# Patient Record
Sex: FEMALE | Race: BLACK OR AFRICAN AMERICAN | Employment: OTHER | URBAN - METROPOLITAN AREA
[De-identification: names, ages, dates, MRNs, and addresses within clinical notes are randomized per-mention and may not be internally consistent; named-entity substitution may affect disease eponyms.]

---

## 2016-07-01 LAB — DIABETIC RETINOPATHY: NEGATIVE

## 2017-01-18 ENCOUNTER — OFFICE VISIT (OUTPATIENT)
Dept: INTERNAL MEDICINE CLINIC | Age: 67
End: 2017-01-18

## 2017-01-18 VITALS
WEIGHT: 178 LBS | SYSTOLIC BLOOD PRESSURE: 136 MMHG | HEIGHT: 59 IN | BODY MASS INDEX: 35.88 KG/M2 | HEART RATE: 82 BPM | DIASTOLIC BLOOD PRESSURE: 70 MMHG

## 2017-01-18 DIAGNOSIS — R06.09 DOE (DYSPNEA ON EXERTION): ICD-10-CM

## 2017-01-18 DIAGNOSIS — E66.9 OBESITY, CLASS I, BMI 30-34.9: ICD-10-CM

## 2017-01-18 DIAGNOSIS — F17.200 TOBACCO DEPENDENCE: ICD-10-CM

## 2017-01-18 DIAGNOSIS — F33.9 MAJOR DEPRESSIVE DISORDER, RECURRENT EPISODE WITH ANXIOUS DISTRESS (HCC): ICD-10-CM

## 2017-01-18 DIAGNOSIS — E11.9 TYPE 2 DIABETES MELLITUS WITHOUT COMPLICATION, WITH LONG-TERM CURRENT USE OF INSULIN (HCC): Primary | ICD-10-CM

## 2017-01-18 DIAGNOSIS — Z79.4 TYPE 2 DIABETES MELLITUS WITHOUT COMPLICATION, WITH LONG-TERM CURRENT USE OF INSULIN (HCC): Primary | ICD-10-CM

## 2017-01-18 DIAGNOSIS — K59.00 CONSTIPATION, UNSPECIFIED CONSTIPATION TYPE: ICD-10-CM

## 2017-01-18 DIAGNOSIS — E78.2 MIXED HYPERLIPIDEMIA: ICD-10-CM

## 2017-01-18 PROCEDURE — 99213 OFFICE O/P EST LOW 20 MIN: CPT | Performed by: INTERNAL MEDICINE

## 2017-01-18 PROCEDURE — 83036 HEMOGLOBIN GLYCOSYLATED A1C: CPT | Performed by: INTERNAL MEDICINE

## 2017-01-18 RX ORDER — AMOXICILLIN 250 MG
1 CAPSULE ORAL NIGHTLY
Qty: 60 TABLET | Refills: 5 | Status: SHIPPED | OUTPATIENT
Start: 2017-01-18 | End: 2017-08-04 | Stop reason: SDUPTHER

## 2017-01-18 ASSESSMENT — ENCOUNTER SYMPTOMS
WHEEZING: 0
SORE THROAT: 0
SHORTNESS OF BREATH: 0
DIARRHEA: 0
ABDOMINAL PAIN: 0
BACK PAIN: 0
COUGH: 0
EYE PAIN: 0
CONSTIPATION: 0

## 2017-03-06 DIAGNOSIS — Z79.4 TYPE 2 DIABETES MELLITUS WITHOUT COMPLICATION, WITH LONG-TERM CURRENT USE OF INSULIN (HCC): ICD-10-CM

## 2017-03-06 DIAGNOSIS — E11.9 DIABETES MELLITUS WITHOUT COMPLICATION (HCC): ICD-10-CM

## 2017-03-06 DIAGNOSIS — E78.2 MIXED HYPERLIPIDEMIA: ICD-10-CM

## 2017-03-06 DIAGNOSIS — E11.9 TYPE 2 DIABETES MELLITUS WITHOUT COMPLICATION, WITH LONG-TERM CURRENT USE OF INSULIN (HCC): ICD-10-CM

## 2017-03-06 RX ORDER — PRAVASTATIN SODIUM 40 MG
40 TABLET ORAL DAILY
Qty: 30 TABLET | Refills: 5 | Status: SHIPPED | OUTPATIENT
Start: 2017-03-06 | End: 2017-11-06 | Stop reason: SDUPTHER

## 2017-05-01 ENCOUNTER — TELEPHONE (OUTPATIENT)
Dept: INTERNAL MEDICINE CLINIC | Age: 67
End: 2017-05-01

## 2017-05-03 ENCOUNTER — TELEPHONE (OUTPATIENT)
Dept: INTERNAL MEDICINE CLINIC | Age: 67
End: 2017-05-03

## 2017-05-04 ENCOUNTER — OFFICE VISIT (OUTPATIENT)
Dept: INTERNAL MEDICINE CLINIC | Age: 67
End: 2017-05-04

## 2017-05-04 VITALS
DIASTOLIC BLOOD PRESSURE: 62 MMHG | BODY MASS INDEX: 36.23 KG/M2 | SYSTOLIC BLOOD PRESSURE: 120 MMHG | HEART RATE: 73 BPM | WEIGHT: 179.4 LBS

## 2017-05-04 DIAGNOSIS — F33.41 RECURRENT MAJOR DEPRESSIVE DISORDER, IN PARTIAL REMISSION (HCC): ICD-10-CM

## 2017-05-04 DIAGNOSIS — Z12.11 COLON CANCER SCREENING: ICD-10-CM

## 2017-05-04 DIAGNOSIS — F17.200 TOBACCO DEPENDENCE: ICD-10-CM

## 2017-05-04 DIAGNOSIS — Z79.4 TYPE 2 DIABETES MELLITUS WITHOUT COMPLICATION, WITH LONG-TERM CURRENT USE OF INSULIN (HCC): Primary | ICD-10-CM

## 2017-05-04 DIAGNOSIS — E66.9 OBESITY, CLASS I, BMI 30-34.9: ICD-10-CM

## 2017-05-04 DIAGNOSIS — Z78.0 POSTMENOPAUSAL: ICD-10-CM

## 2017-05-04 DIAGNOSIS — E78.2 MIXED HYPERLIPIDEMIA: ICD-10-CM

## 2017-05-04 DIAGNOSIS — E11.9 TYPE 2 DIABETES MELLITUS WITHOUT COMPLICATION, WITH LONG-TERM CURRENT USE OF INSULIN (HCC): Primary | ICD-10-CM

## 2017-05-04 PROCEDURE — 99213 OFFICE O/P EST LOW 20 MIN: CPT | Performed by: INTERNAL MEDICINE

## 2017-05-04 PROCEDURE — 83036 HEMOGLOBIN GLYCOSYLATED A1C: CPT | Performed by: INTERNAL MEDICINE

## 2017-05-04 RX ORDER — BUPROPION HYDROCHLORIDE 150 MG/1
150 TABLET, EXTENDED RELEASE ORAL 2 TIMES DAILY
Qty: 60 TABLET | Refills: 5 | Status: SHIPPED | OUTPATIENT
Start: 2017-05-04 | End: 2017-11-06 | Stop reason: SDUPTHER

## 2017-05-04 ASSESSMENT — ENCOUNTER SYMPTOMS
SHORTNESS OF BREATH: 0
CONSTIPATION: 0
SORE THROAT: 0
DIARRHEA: 0
COUGH: 0
BACK PAIN: 0
ABDOMINAL PAIN: 0
EYE PAIN: 0
WHEEZING: 0

## 2017-05-22 ENCOUNTER — HOSPITAL ENCOUNTER (OUTPATIENT)
Dept: WOMENS IMAGING | Age: 67
Discharge: HOME | End: 2017-05-23
Attending: INTERNAL MEDICINE | Admitting: INTERNAL MEDICINE

## 2017-05-22 DIAGNOSIS — Z78.0 POSTMENOPAUSAL: ICD-10-CM

## 2017-08-03 ENCOUNTER — TELEPHONE (OUTPATIENT)
Dept: INTERNAL MEDICINE CLINIC | Age: 67
End: 2017-08-03

## 2017-08-04 ENCOUNTER — OFFICE VISIT (OUTPATIENT)
Dept: INTERNAL MEDICINE CLINIC | Age: 67
End: 2017-08-04

## 2017-08-04 VITALS
BODY MASS INDEX: 35.99 KG/M2 | SYSTOLIC BLOOD PRESSURE: 120 MMHG | WEIGHT: 178.2 LBS | DIASTOLIC BLOOD PRESSURE: 70 MMHG | HEART RATE: 69 BPM

## 2017-08-04 DIAGNOSIS — E78.2 MIXED HYPERLIPIDEMIA: ICD-10-CM

## 2017-08-04 DIAGNOSIS — K59.00 CONSTIPATION, UNSPECIFIED CONSTIPATION TYPE: ICD-10-CM

## 2017-08-04 DIAGNOSIS — F17.200 TOBACCO DEPENDENCE: ICD-10-CM

## 2017-08-04 DIAGNOSIS — Z12.11 COLON CANCER SCREENING: ICD-10-CM

## 2017-08-04 DIAGNOSIS — E66.9 OBESITY, CLASS I, BMI 30-34.9: ICD-10-CM

## 2017-08-04 DIAGNOSIS — E11.9 TYPE 2 DIABETES MELLITUS WITHOUT COMPLICATION, WITH LONG-TERM CURRENT USE OF INSULIN (HCC): Primary | ICD-10-CM

## 2017-08-04 DIAGNOSIS — Z79.4 TYPE 2 DIABETES MELLITUS WITHOUT COMPLICATION, WITH LONG-TERM CURRENT USE OF INSULIN (HCC): Primary | ICD-10-CM

## 2017-08-04 LAB — HBA1C MFR BLD: 6.7 %

## 2017-08-04 PROCEDURE — 99214 OFFICE O/P EST MOD 30 MIN: CPT | Performed by: INTERNAL MEDICINE

## 2017-08-04 PROCEDURE — 83036 HEMOGLOBIN GLYCOSYLATED A1C: CPT | Performed by: INTERNAL MEDICINE

## 2017-08-04 RX ORDER — GLUCOSAMINE HCL/CHONDROITIN SU 500-400 MG
CAPSULE ORAL
Qty: 100 STRIP | Refills: 5 | Status: SHIPPED | OUTPATIENT
Start: 2017-08-04 | End: 2019-01-22 | Stop reason: SDUPTHER

## 2017-08-04 RX ORDER — AMOXICILLIN 250 MG
1 CAPSULE ORAL NIGHTLY
Qty: 60 TABLET | Refills: 5 | Status: SHIPPED | OUTPATIENT
Start: 2017-08-04 | End: 2019-05-21 | Stop reason: SDUPTHER

## 2017-08-04 ASSESSMENT — ENCOUNTER SYMPTOMS
COUGH: 0
BACK PAIN: 0
SHORTNESS OF BREATH: 0
WHEEZING: 0
CONSTIPATION: 1
ABDOMINAL PAIN: 0
EYE PAIN: 0
SORE THROAT: 0
DIARRHEA: 0

## 2017-11-02 ENCOUNTER — TELEPHONE (OUTPATIENT)
Dept: INTERNAL MEDICINE CLINIC | Age: 67
End: 2017-11-02

## 2017-11-02 DIAGNOSIS — Z12.11 COLON CANCER SCREENING: ICD-10-CM

## 2017-11-02 LAB
CONTROL: POSITIVE
HEMOCCULT STL QL: NEGATIVE

## 2017-11-02 PROCEDURE — 82274 ASSAY TEST FOR BLOOD FECAL: CPT | Performed by: INTERNAL MEDICINE

## 2017-11-03 ENCOUNTER — TELEPHONE (OUTPATIENT)
Dept: INTERNAL MEDICINE CLINIC | Age: 67
End: 2017-11-03

## 2017-11-06 ENCOUNTER — OFFICE VISIT (OUTPATIENT)
Dept: INTERNAL MEDICINE CLINIC | Age: 67
End: 2017-11-06

## 2017-11-06 VITALS
HEART RATE: 69 BPM | BODY MASS INDEX: 36.96 KG/M2 | WEIGHT: 183 LBS | RESPIRATION RATE: 14 BRPM | DIASTOLIC BLOOD PRESSURE: 79 MMHG | SYSTOLIC BLOOD PRESSURE: 129 MMHG

## 2017-11-06 DIAGNOSIS — F33.41 RECURRENT MAJOR DEPRESSIVE DISORDER, IN PARTIAL REMISSION (HCC): ICD-10-CM

## 2017-11-06 DIAGNOSIS — E66.9 OBESITY, CLASS I, BMI 30-34.9: ICD-10-CM

## 2017-11-06 DIAGNOSIS — Z79.4 TYPE 2 DIABETES MELLITUS WITHOUT COMPLICATION, WITH LONG-TERM CURRENT USE OF INSULIN (HCC): Primary | ICD-10-CM

## 2017-11-06 DIAGNOSIS — E78.2 MIXED HYPERLIPIDEMIA: ICD-10-CM

## 2017-11-06 DIAGNOSIS — F17.200 TOBACCO DEPENDENCE: ICD-10-CM

## 2017-11-06 DIAGNOSIS — Z23 NEED FOR PNEUMOCOCCAL VACCINATION: ICD-10-CM

## 2017-11-06 DIAGNOSIS — E11.9 TYPE 2 DIABETES MELLITUS WITHOUT COMPLICATION, WITH LONG-TERM CURRENT USE OF INSULIN (HCC): Primary | ICD-10-CM

## 2017-11-06 LAB
CREATININE URINE: 30.5 MG/DL (ref 28–259)
HBA1C MFR BLD: 7 %
MICROALBUMIN UR-MCNC: <1.2 MG/DL
MICROALBUMIN/CREAT UR-RTO: NORMAL MG/G (ref 0–30)

## 2017-11-06 PROCEDURE — 90670 PCV13 VACCINE IM: CPT | Performed by: INTERNAL MEDICINE

## 2017-11-06 PROCEDURE — 83036 HEMOGLOBIN GLYCOSYLATED A1C: CPT | Performed by: INTERNAL MEDICINE

## 2017-11-06 PROCEDURE — 99213 OFFICE O/P EST LOW 20 MIN: CPT | Performed by: INTERNAL MEDICINE

## 2017-11-06 PROCEDURE — 90471 IMMUNIZATION ADMIN: CPT | Performed by: INTERNAL MEDICINE

## 2017-11-06 RX ORDER — PRAVASTATIN SODIUM 40 MG
40 TABLET ORAL DAILY
Qty: 30 TABLET | Refills: 5 | Status: SHIPPED | OUTPATIENT
Start: 2017-11-06 | End: 2018-05-09 | Stop reason: SDUPTHER

## 2017-11-06 RX ORDER — BUPROPION HYDROCHLORIDE 150 MG/1
150 TABLET, EXTENDED RELEASE ORAL 2 TIMES DAILY
Qty: 60 TABLET | Refills: 5 | Status: SHIPPED | OUTPATIENT
Start: 2017-11-06 | End: 2019-01-22 | Stop reason: SDUPTHER

## 2017-11-06 ASSESSMENT — ENCOUNTER SYMPTOMS
ABDOMINAL PAIN: 0
COUGH: 0
CONSTIPATION: 0
WHEEZING: 0
BACK PAIN: 0
EYE PAIN: 0
SORE THROAT: 0
SHORTNESS OF BREATH: 0
DIARRHEA: 0

## 2017-11-06 NOTE — PROGRESS NOTES
 insulin detemir (LEVEMIR FLEXTOUCH) 100 UNIT/ML injection pen Inject 15 Units into the skin nightly 5 Pen 3    SITagliptin (JANUVIA) 50 MG tablet Take 1 tablet by mouth daily 30 tablet 5    pravastatin (PRAVACHOL) 40 MG tablet Take 1 tablet by mouth daily 30 tablet 5    buPROPion (WELLBUTRIN SR) 150 MG extended release tablet Take 1 tablet by mouth 2 times daily 60 tablet 5    Insulin Pen Needle 30G X 8 MM MISC 1 each by Does not apply route daily 100 each 3    Glucose Blood (BLOOD GLUCOSE TEST STRIPS) STRP TIDAC and  strip 5    senna-docusate (PERICOLACE) 8.6-50 MG per tablet Take 1 tablet by mouth nightly 60 tablet 5    Lancets MISC TIDAC &  each 3     No current facility-administered medications for this visit. Objective:  /79   Pulse 69   Resp 14   Wt 183 lb (83 kg)   BMI 36.96 kg/m²   BP Readings from Last 3 Encounters:   11/06/17 129/79   08/04/17 120/70   05/04/17 120/62     Wt Readings from Last 3 Encounters:   11/06/17 183 lb (83 kg)   08/04/17 178 lb 3.2 oz (80.8 kg)   05/04/17 179 lb 6.4 oz (81.4 kg)         Physical Exam   Constitutional: She is oriented to person, place, and time. She appears well-developed and well-nourished. No distress. HENT:   Head: Normocephalic and atraumatic. Eyes: Conjunctivae are normal. No scleral icterus. Neck: Normal range of motion. Neck supple. Cardiovascular: Normal rate and regular rhythm. Pulmonary/Chest: Effort normal and breath sounds normal. No respiratory distress. She has no wheezes. Abdominal: Soft. Bowel sounds are normal. She exhibits no distension. There is no tenderness. Neurological: She is alert and oriented to person, place, and time. Psychiatric: She has a normal mood and affect.  Judgment normal.       Lab Results   Component Value Date    WBC 8.6 11/07/2016    HGB 14.7 11/07/2016    HCT 44.0 11/07/2016    MCV 85.8 11/07/2016     11/07/2016     Lab Results   Component Value Date     11/07/2016    K 4.4 11/07/2016     11/07/2016    CO2 23 11/07/2016    BUN 13 11/07/2016    CREATININE 0.5 (L) 11/07/2016    GLUCOSE 176 05/26/2016    CALCIUM 9.5 11/07/2016    PROT 7.1 11/07/2016    LABALBU 4.2 11/07/2016    BILITOT 0.3 11/07/2016    ALKPHOS 90 11/07/2016    AST 14 (L) 11/07/2016    ALT 12 11/07/2016    LABGLOM >60 11/07/2016    GFRAA >60 11/07/2016    AGRATIO 1.3 05/19/2016    GLOB 2.8 05/19/2016     Lab Results   Component Value Date    CHOL 273 (H) 05/19/2016    CHOL 245 (H) 05/19/2014    CHOL 242 (H) 11/18/2013     Lab Results   Component Value Date    TRIG 101 05/19/2016    TRIG 87 05/19/2014    TRIG 104 11/18/2013     Lab Results   Component Value Date    HDL 60 05/19/2016    HDL 59 (L) 05/19/2014    HDL 58 (L) 11/18/2013     Lab Results   Component Value Date    LDLCALC 193 (H) 05/19/2016    LDLCALC 169 (H) 05/19/2014    LDLCALC 163 (H) 11/18/2013     Lab Results   Component Value Date    LABA1C 7.0 11/06/2017     Lab Results   Component Value Date    TSH 1.99 05/19/2016    TSHHS 1.820 11/07/2016         ASSESSMENT:      1. Type 2 diabetes mellitus without complication, with long-term current use of insulin ([facility] [address]    2. Need for pneumococcal vaccination    3. Tobacco dependence    4. Mixed hyperlipidemia    5. Recurrent major depressive disorder, in partial remission (HCC)    6. Obesity, Class I, BMI 30-34.9        PLAN:  1. A1c 7.0 today. Acceptable. Continue current regimen. 2.  Medications reviewed and reconciled. She is compliant and tolerating the medications without any side effects. Necessary refills provided. 3.  Continue to encourage smoking cessation. 4.  Continue to encourage weight loss and lifestyle modification efforts. 5.  Prevnar 13 today.     Orders Placed This Encounter   Medications    insulin detemir (LEVEMIR FLEXTOUCH) 100 UNIT/ML injection pen     Sig: Inject 15 Units into the skin nightly     Dispense:  5 Pen     Refill:  3    SITagliptin (JANUVIA) 50 MG

## 2018-02-06 ENCOUNTER — OFFICE VISIT (OUTPATIENT)
Dept: INTERNAL MEDICINE CLINIC | Age: 68
End: 2018-02-06

## 2018-02-06 VITALS
HEIGHT: 59 IN | DIASTOLIC BLOOD PRESSURE: 70 MMHG | SYSTOLIC BLOOD PRESSURE: 118 MMHG | OXYGEN SATURATION: 97 % | RESPIRATION RATE: 14 BRPM | WEIGHT: 179 LBS | HEART RATE: 66 BPM | BODY MASS INDEX: 36.08 KG/M2

## 2018-02-06 DIAGNOSIS — E11.9 TYPE 2 DIABETES MELLITUS WITHOUT COMPLICATION, WITH LONG-TERM CURRENT USE OF INSULIN (HCC): ICD-10-CM

## 2018-02-06 DIAGNOSIS — E78.2 MIXED HYPERLIPIDEMIA: ICD-10-CM

## 2018-02-06 DIAGNOSIS — F33.41 RECURRENT MAJOR DEPRESSIVE DISORDER, IN PARTIAL REMISSION (HCC): ICD-10-CM

## 2018-02-06 DIAGNOSIS — R05.9 COUGH: Primary | ICD-10-CM

## 2018-02-06 DIAGNOSIS — Z79.4 TYPE 2 DIABETES MELLITUS WITHOUT COMPLICATION, WITH LONG-TERM CURRENT USE OF INSULIN (HCC): ICD-10-CM

## 2018-02-06 LAB — HBA1C MFR BLD: 7.4 %

## 2018-02-06 PROCEDURE — 99214 OFFICE O/P EST MOD 30 MIN: CPT | Performed by: FAMILY MEDICINE

## 2018-02-06 PROCEDURE — 83036 HEMOGLOBIN GLYCOSYLATED A1C: CPT | Performed by: FAMILY MEDICINE

## 2018-02-06 RX ORDER — PREDNISONE 20 MG/1
20 TABLET ORAL 2 TIMES DAILY
Qty: 10 TABLET | Refills: 0 | Status: SHIPPED | OUTPATIENT
Start: 2018-02-06 | End: 2018-02-11

## 2018-02-06 RX ORDER — AMOXICILLIN AND CLAVULANATE POTASSIUM 500; 125 MG/1; MG/1
1 TABLET, FILM COATED ORAL 2 TIMES DAILY
Qty: 14 TABLET | Refills: 0 | Status: SHIPPED | OUTPATIENT
Start: 2018-02-06 | End: 2018-02-13

## 2018-02-06 ASSESSMENT — ENCOUNTER SYMPTOMS
SINUS PRESSURE: 0
SHORTNESS OF BREATH: 1
SORE THROAT: 0
WHEEZING: 1
VOMITING: 0
BLOOD IN STOOL: 0
DIARRHEA: 0
EYE DISCHARGE: 0
BACK PAIN: 0
COUGH: 1
NAUSEA: 0

## 2018-02-06 ASSESSMENT — PATIENT HEALTH QUESTIONNAIRE - PHQ9
1. LITTLE INTEREST OR PLEASURE IN DOING THINGS: 0
2. FEELING DOWN, DEPRESSED OR HOPELESS: 0
SUM OF ALL RESPONSES TO PHQ9 QUESTIONS 1 & 2: 0
SUM OF ALL RESPONSES TO PHQ QUESTIONS 1-9: 0

## 2018-02-06 NOTE — PATIENT INSTRUCTIONS
affects blood flow and can make foot problems worse. If you need help quitting, talk to your doctor about stop-smoking programs and medicines. These can increase your chances of quitting for good. · Eat a diet that is low in fats. High fat intake can cause fat to build up in your blood vessels and decrease blood flow. · Inspect your feet daily for blisters, cuts, cracks, or sores. If you cannot see well, use a mirror or have someone help you. · Take care of your feet:  [facility] your feet every day. Use warm (not hot) water. Check the water temperature with your wrists or other part of your body, not your feet. ¨ Dry your feet well. Pat them dry. Do not rub the skin on your feet too hard. Dry well between your toes. If the skin on your feet stays moist, bacteria or a fungus can grow, which can lead to infection. ¨ Keep your skin soft. Use moisturizing skin cream to keep the skin on your feet soft and prevent calluses and cracks. But do not put the cream between your toes, and stop using any cream that causes a rash. ¨ Clean underneath your toenails carefully. Do not use a sharp object to clean underneath your toenails. Use the blunt end of a nail file or other rounded tool. ¨ Trim and file your toenails straight across to prevent ingrown toenails. Use a nail clipper, not scissors. Use an emery board to smooth the edges. · Change socks daily. Socks without seams are best, because seams often rub the feet. You can find socks for people with diabetes from specialty catalogs. · Look inside your shoes every day for things like gravel or torn linings, which could cause blisters or sores. · Buy shoes that fit well:  ¨ Look for shoes that have plenty of space around the toes. This helps prevent bunions and blisters. ¨ Try on shoes while wearing the kind of socks you will usually wear with the shoes. ¨ Avoid plastic shoes. They may rub your feet and cause blisters.  Good shoes should be made of materials that are 2017  Content Version: 11.5  © 5966-3680 [employer], [employer]. Care instructions adapted under license by [facility] ([facility]). If you have questions about a medical condition or this instruction, always ask your healthcare professional. [address] any warranty or liability for your use of this information. Patient Education        Learning About Benefits From Quitting Smoking  How does quitting smoking make you healthier? If you're thinking about quitting smoking, you may have a few reasons to be smoke-free. Your health may be one of them. · When you quit smoking, you lower your risks for cancer, lung disease, heart attack, stroke, blood vessel disease, and blindness from macular degeneration. · When you're smoke-free, you get sick less often, and you heal faster. You are less likely to get colds, flu, bronchitis, and pneumonia. · As a nonsmoker, you may find that your mood is better and you are less stressed. When and how will you feel healthier? Quitting has real health benefits that start from day 1 of being smoke-free. And the longer you stay smoke-free, the healthier you get and the better you feel. The first hours  · After just 20 minutes, your blood pressure and heart rate go down. That means there's less stress on your heart and blood vessels. · Within 12 hours, the level of carbon monoxide in your blood drops back to normal. That makes room for more oxygen. With more oxygen in your body, you may notice that you have more energy than when you smoked. After 2 weeks  · Your lungs start to work better. · Your risk of heart attack starts to drop. After 1 month  · When your lungs are clear, you cough less and breathe deeper, so it's easier to be active. · Your sense of taste and smell return. That means you can enjoy food more than you have since you started smoking.   Over the years  · After 1 year, your risk of heart disease is half what it would be if you kept

## 2018-05-07 ENCOUNTER — HOSPITAL ENCOUNTER (OUTPATIENT)
Dept: GENERAL RADIOLOGY | Age: 68
Discharge: HOME | End: 2018-05-07
Attending: FAMILY MEDICINE | Admitting: FAMILY MEDICINE

## 2018-05-07 LAB
ESTIMATED AVERAGE GLUCOSE: 177 MG/DL
HBA1C MFR BLD: 7.8 % (ref 4.2–6.3)

## 2018-05-09 ENCOUNTER — OFFICE VISIT (OUTPATIENT)
Dept: INTERNAL MEDICINE CLINIC | Age: 68
End: 2018-05-09

## 2018-05-09 VITALS
DIASTOLIC BLOOD PRESSURE: 54 MMHG | WEIGHT: 183 LBS | HEART RATE: 97 BPM | SYSTOLIC BLOOD PRESSURE: 104 MMHG | OXYGEN SATURATION: 97 % | RESPIRATION RATE: 14 BRPM | BODY MASS INDEX: 36.96 KG/M2

## 2018-05-09 DIAGNOSIS — E11.9 TYPE 2 DIABETES MELLITUS WITHOUT COMPLICATION, WITH LONG-TERM CURRENT USE OF INSULIN (HCC): Primary | ICD-10-CM

## 2018-05-09 DIAGNOSIS — F33.41 RECURRENT MAJOR DEPRESSIVE DISORDER, IN PARTIAL REMISSION (HCC): ICD-10-CM

## 2018-05-09 DIAGNOSIS — Z79.4 TYPE 2 DIABETES MELLITUS WITHOUT COMPLICATION, WITH LONG-TERM CURRENT USE OF INSULIN (HCC): Primary | ICD-10-CM

## 2018-05-09 DIAGNOSIS — E78.2 MIXED HYPERLIPIDEMIA: ICD-10-CM

## 2018-05-09 PROCEDURE — 99214 OFFICE O/P EST MOD 30 MIN: CPT | Performed by: FAMILY MEDICINE

## 2018-05-09 RX ORDER — PRAVASTATIN SODIUM 40 MG
40 TABLET ORAL DAILY
Qty: 90 TABLET | Refills: 1 | Status: SHIPPED | OUTPATIENT
Start: 2018-05-09 | End: 2019-01-22 | Stop reason: SDUPTHER

## 2018-05-09 ASSESSMENT — ENCOUNTER SYMPTOMS
EYE DISCHARGE: 0
COUGH: 0
SORE THROAT: 0
DIARRHEA: 0
BLOOD IN STOOL: 0
NAUSEA: 0
SHORTNESS OF BREATH: 0
BACK PAIN: 0
VOMITING: 0
SINUS PRESSURE: 0

## 2018-05-15 ENCOUNTER — TELEPHONE (OUTPATIENT)
Dept: INTERNAL MEDICINE CLINIC | Age: 68
End: 2018-05-15

## 2018-05-16 RX ORDER — METHOCARBAMOL 500 MG/1
500 TABLET, FILM COATED ORAL 4 TIMES DAILY
Qty: 40 TABLET | Refills: 1 | Status: SHIPPED | OUTPATIENT
Start: 2018-05-16 | End: 2018-05-26

## 2019-01-22 ENCOUNTER — OFFICE VISIT (OUTPATIENT)
Dept: FAMILY MEDICINE CLINIC | Age: 69
End: 2019-01-22
Payer: COMMERCIAL

## 2019-01-22 VITALS
HEART RATE: 67 BPM | SYSTOLIC BLOOD PRESSURE: 128 MMHG | DIASTOLIC BLOOD PRESSURE: 66 MMHG | WEIGHT: 167.2 LBS | OXYGEN SATURATION: 97 % | BODY MASS INDEX: 30.77 KG/M2 | HEIGHT: 62 IN

## 2019-01-22 DIAGNOSIS — F17.200 TOBACCO DEPENDENCE: ICD-10-CM

## 2019-01-22 DIAGNOSIS — E78.2 MIXED HYPERLIPIDEMIA: ICD-10-CM

## 2019-01-22 DIAGNOSIS — F41.9 ANXIETY: Primary | ICD-10-CM

## 2019-01-22 DIAGNOSIS — E11.9 TYPE 2 DIABETES MELLITUS WITHOUT COMPLICATION, UNSPECIFIED WHETHER LONG TERM INSULIN USE (HCC): ICD-10-CM

## 2019-01-22 LAB
GLUCOSE BLD-MCNC: NORMAL MG/DL
HBA1C MFR BLD: 14 %

## 2019-01-22 PROCEDURE — 1123F ACP DISCUSS/DSCN MKR DOCD: CPT | Performed by: NURSE PRACTITIONER

## 2019-01-22 PROCEDURE — 99213 OFFICE O/P EST LOW 20 MIN: CPT | Performed by: NURSE PRACTITIONER

## 2019-01-22 PROCEDURE — 3046F HEMOGLOBIN A1C LEVEL >9.0%: CPT | Performed by: NURSE PRACTITIONER

## 2019-01-22 PROCEDURE — G8427 DOCREV CUR MEDS BY ELIG CLIN: HCPCS | Performed by: NURSE PRACTITIONER

## 2019-01-22 PROCEDURE — 1090F PRES/ABSN URINE INCON ASSESS: CPT | Performed by: NURSE PRACTITIONER

## 2019-01-22 PROCEDURE — 1101F PT FALLS ASSESS-DOCD LE1/YR: CPT | Performed by: NURSE PRACTITIONER

## 2019-01-22 PROCEDURE — G8417 CALC BMI ABV UP PARAM F/U: HCPCS | Performed by: NURSE PRACTITIONER

## 2019-01-22 PROCEDURE — 3017F COLORECTAL CA SCREEN DOC REV: CPT | Performed by: NURSE PRACTITIONER

## 2019-01-22 PROCEDURE — 82962 GLUCOSE BLOOD TEST: CPT | Performed by: NURSE PRACTITIONER

## 2019-01-22 PROCEDURE — G8399 PT W/DXA RESULTS DOCUMENT: HCPCS | Performed by: NURSE PRACTITIONER

## 2019-01-22 PROCEDURE — 4004F PT TOBACCO SCREEN RCVD TLK: CPT | Performed by: NURSE PRACTITIONER

## 2019-01-22 PROCEDURE — 83036 HEMOGLOBIN GLYCOSYLATED A1C: CPT | Performed by: NURSE PRACTITIONER

## 2019-01-22 PROCEDURE — 2022F DILAT RTA XM EVC RTNOPTHY: CPT | Performed by: NURSE PRACTITIONER

## 2019-01-22 PROCEDURE — G8484 FLU IMMUNIZE NO ADMIN: HCPCS | Performed by: NURSE PRACTITIONER

## 2019-01-22 PROCEDURE — 4040F PNEUMOC VAC/ADMIN/RCVD: CPT | Performed by: NURSE PRACTITIONER

## 2019-01-22 RX ORDER — PRAVASTATIN SODIUM 40 MG
40 TABLET ORAL DAILY
Qty: 90 TABLET | Refills: 1 | Status: SHIPPED | OUTPATIENT
Start: 2019-01-22 | End: 2019-08-29 | Stop reason: SDUPTHER

## 2019-01-22 RX ORDER — BUPROPION HYDROCHLORIDE 150 MG/1
150 TABLET, EXTENDED RELEASE ORAL 2 TIMES DAILY
Qty: 60 TABLET | Refills: 5 | Status: SHIPPED | OUTPATIENT
Start: 2019-01-22 | End: 2020-01-27 | Stop reason: SDUPTHER

## 2019-01-22 RX ORDER — GLUCOSAMINE HCL/CHONDROITIN SU 500-400 MG
CAPSULE ORAL
Qty: 100 STRIP | Refills: 5 | Status: SHIPPED | OUTPATIENT
Start: 2019-01-22 | End: 2020-07-27 | Stop reason: SDUPTHER

## 2019-01-22 RX ORDER — LANCETS 30 GAUGE
EACH MISCELLANEOUS
Qty: 100 EACH | Refills: 3 | Status: SHIPPED | OUTPATIENT
Start: 2019-01-22

## 2019-01-22 ASSESSMENT — ENCOUNTER SYMPTOMS
NAUSEA: 0
SHORTNESS OF BREATH: 0
COUGH: 0
WHEEZING: 0
CHEST TIGHTNESS: 0

## 2019-01-23 DIAGNOSIS — Z79.4 TYPE 2 DIABETES MELLITUS WITHOUT COMPLICATION, WITH LONG-TERM CURRENT USE OF INSULIN (HCC): ICD-10-CM

## 2019-01-23 DIAGNOSIS — E11.9 TYPE 2 DIABETES MELLITUS WITHOUT COMPLICATION, WITH LONG-TERM CURRENT USE OF INSULIN (HCC): ICD-10-CM

## 2019-01-29 ENCOUNTER — TELEPHONE (OUTPATIENT)
Dept: FAMILY MEDICINE CLINIC | Age: 69
End: 2019-01-29

## 2019-04-23 ENCOUNTER — OFFICE VISIT (OUTPATIENT)
Dept: FAMILY MEDICINE CLINIC | Age: 69
End: 2019-04-23
Payer: MEDICARE

## 2019-04-23 VITALS
OXYGEN SATURATION: 96 % | BODY MASS INDEX: 31.68 KG/M2 | SYSTOLIC BLOOD PRESSURE: 112 MMHG | HEART RATE: 72 BPM | WEIGHT: 174.6 LBS | DIASTOLIC BLOOD PRESSURE: 72 MMHG

## 2019-04-23 DIAGNOSIS — Z12.11 COLON CANCER SCREENING: ICD-10-CM

## 2019-04-23 DIAGNOSIS — E78.2 MIXED HYPERLIPIDEMIA: ICD-10-CM

## 2019-04-23 DIAGNOSIS — F33.41 RECURRENT MAJOR DEPRESSIVE DISORDER, IN PARTIAL REMISSION (HCC): ICD-10-CM

## 2019-04-23 DIAGNOSIS — Z12.31 ENCOUNTER FOR SCREENING MAMMOGRAM FOR BREAST CANCER: ICD-10-CM

## 2019-04-23 DIAGNOSIS — F17.200 TOBACCO USE DISORDER: ICD-10-CM

## 2019-04-23 DIAGNOSIS — E11.9 TYPE 2 DIABETES MELLITUS WITHOUT COMPLICATION, UNSPECIFIED WHETHER LONG TERM INSULIN USE (HCC): Primary | ICD-10-CM

## 2019-04-23 LAB — HBA1C MFR BLD: 9.3 %

## 2019-04-23 PROCEDURE — G8399 PT W/DXA RESULTS DOCUMENT: HCPCS | Performed by: FAMILY MEDICINE

## 2019-04-23 PROCEDURE — 99214 OFFICE O/P EST MOD 30 MIN: CPT | Performed by: FAMILY MEDICINE

## 2019-04-23 PROCEDURE — 3017F COLORECTAL CA SCREEN DOC REV: CPT | Performed by: FAMILY MEDICINE

## 2019-04-23 PROCEDURE — 83036 HEMOGLOBIN GLYCOSYLATED A1C: CPT | Performed by: FAMILY MEDICINE

## 2019-04-23 PROCEDURE — G8427 DOCREV CUR MEDS BY ELIG CLIN: HCPCS | Performed by: FAMILY MEDICINE

## 2019-04-23 PROCEDURE — 1090F PRES/ABSN URINE INCON ASSESS: CPT | Performed by: FAMILY MEDICINE

## 2019-04-23 PROCEDURE — 3046F HEMOGLOBIN A1C LEVEL >9.0%: CPT | Performed by: FAMILY MEDICINE

## 2019-04-23 PROCEDURE — 1123F ACP DISCUSS/DSCN MKR DOCD: CPT | Performed by: FAMILY MEDICINE

## 2019-04-23 PROCEDURE — G8417 CALC BMI ABV UP PARAM F/U: HCPCS | Performed by: FAMILY MEDICINE

## 2019-04-23 PROCEDURE — 2022F DILAT RTA XM EVC RTNOPTHY: CPT | Performed by: FAMILY MEDICINE

## 2019-04-23 PROCEDURE — 4040F PNEUMOC VAC/ADMIN/RCVD: CPT | Performed by: FAMILY MEDICINE

## 2019-04-23 PROCEDURE — 4004F PT TOBACCO SCREEN RCVD TLK: CPT | Performed by: FAMILY MEDICINE

## 2019-04-23 NOTE — PROGRESS NOTES
Gerard Sawyer  20    Chief Complaint   Patient presents with    New Patient           80 yrs old lady with PMH of DM, HLD, came today to establish with us, patient doing fine and has no complaints, patient taking her medications, just restarted the levemin 3 months ago, tolerate the medication, she is taking 20 units at night, denies chest pain or SOB.  No concerns      Past Medical History:   Diagnosis Date    Arthritis     Diabetes mellitus ([address]     Hypertension     Mixed hyperlipidemia 2016     Past Surgical History:   Procedure Laterality Date     SECTION      x2    HERNIA REPAIR      OTHER SURGICAL HISTORY  12/10/13    Left AC lipoma excision    OTHER SURGICAL HISTORY  12/10/13    Left flank lipoma excision     Family History   Problem Relation Age of Onset    Diabetes Mother     Heart Disease Mother     High Blood Pressure Father     Diabetes Sister     High Blood Pressure Sister     Stroke Sister     Cancer Brother         Lung    High Blood Pressure Brother     Stroke Brother     Diabetes Brother     Diabetes Maternal Grandmother     Heart Disease Maternal Grandfather     Cancer Brother         Lung (smoker)     Social History     Socioeconomic History    Marital status:      Spouse name: Not on file    Number of children: Not on file    Years of education: Not on file    Highest education level: Not on file   Occupational History    Occupation: employed-[employer]   Social Needs    Financial resource strain: Not on file    Food insecurity:     Worry: Not on file     Inability: Not on file    Transportation needs:     Medical: Not on file     Non-medical: Not on file   Tobacco Use    Smoking status: Current Every Day Smoker     Packs/day: 0.50     Years: 32.00     Pack years: 16.00     Types: Cigarettes     Start date:     Smokeless tobacco: Never Used    Tobacco comment: currently 0.5 PPD   Substance and Sexual Activity    Alcohol use: No     Alcohol/week: 0.0 oz    Drug use: No    Sexual activity: Not Currently     Partners: Male   Lifestyle    Physical activity:     Days per week: Not on file     Minutes per session: Not on file    Stress: Not on file   Relationships    Social connections:     Talks on phone: Not on file     Gets together: Not on file     Attends Congregation service: Not on file     Active member of club or organization: Not on file     Attends meetings of clubs or organizations: Not on file     Relationship status: Not on file    Intimate partner violence:     Fear of current or ex partner: Not on file     Emotionally abused: Not on file     Physically abused: Not on file     Forced sexual activity: Not on file   Other Topics Concern    Not on file   Social History Narrative    Not on file       Allergies   Allergen Reactions    Codeine Rash    Hydrocodone-Acetaminophen Nausea And Vomiting     Current Outpatient Medications   Medication Sig Dispense Refill    insulin detemir (LEVEMIR FLEXTOUCH) 100 UNIT/ML injection pen Inject 20 Units into the skin nightly 5 pen 3    blood glucose monitor strips TIDAC and  strip 5    Insulin Pen Needle 30G X 8 MM MISC 1 each by Does not apply route daily 100 each 3    Lancets MISC TIDAC &  each 3    pravastatin (PRAVACHOL) 40 MG tablet Take 1 tablet by mouth daily 90 tablet 1    SITagliptin (JANUVIA) 50 MG tablet Take 1 tablet by mouth daily 90 tablet 1    buPROPion (WELLBUTRIN SR) 150 MG extended release tablet Take 1 tablet by mouth 2 times daily 60 tablet 5    insulin detemir (LEVEMIR FLEXTOUCH) 100 UNIT/ML injection pen Inject 20 Units into the skin nightly 5 pen 3    senna-docusate (PERICOLACE) 8.6-50 MG per tablet Take 1 tablet by mouth nightly 60 tablet 5     No current facility-administered medications for this visit. Review of Systems   Constitutional: Negative for activity change, appetite change, chills, fatigue and fever.    HENT: Negative for congestion, postnasal drip, sinus pressure and sore throat. Respiratory: Negative for cough, chest tightness, shortness of breath and wheezing. Cardiovascular: Negative for chest pain and leg swelling. Gastrointestinal: Negative for abdominal pain, constipation and diarrhea. Genitourinary: Negative for dysuria and frequency. Musculoskeletal: Negative for back pain and gait problem. Skin: Negative for rash. Neurological: Negative for dizziness, weakness and headaches. Psychiatric/Behavioral: Negative for agitation and behavioral problems. The patient is not nervous/anxious.         Lab Results   Component Value Date    WBC 8.6 11/07/2016    HGB 14.7 11/07/2016    HCT 44.0 11/07/2016    MCV 85.8 11/07/2016     11/07/2016     Lab Results   Component Value Date     11/07/2016    K 4.4 11/07/2016     11/07/2016    CO2 23 11/07/2016    BUN 13 11/07/2016    CREATININE 0.5 (L) 11/07/2016    GLUCOSE  01/22/2019      Comment:      ERROR-ordered incorrect test    CALCIUM 9.5 11/07/2016    PROT 7.1 11/07/2016    LABALBU 4.2 11/07/2016    BILITOT 0.3 11/07/2016    ALKPHOS 90 11/07/2016    AST 14 (L) 11/07/2016    ALT 12 11/07/2016    LABGLOM >60 11/07/2016    GFRAA >60 11/07/2016    AGRATIO 1.3 05/19/2016    GLOB 2.8 05/19/2016     Lab Results   Component Value Date    CHOL 273 (H) 05/19/2016    CHOL 245 (H) 05/19/2014    CHOL 242 (H) 11/18/2013     Lab Results   Component Value Date    TRIG 101 05/19/2016    TRIG 87 05/19/2014    TRIG 104 11/18/2013     Lab Results   Component Value Date    HDL 60 05/19/2016    HDL 59 (L) 05/19/2014    HDL 58 (L) 11/18/2013     Lab Results   Component Value Date    LDLCALC 193 (H) 05/19/2016    LDLCALC 169 (H) 05/19/2014    LDLCALC 163 (H) 11/18/2013     Lab Results   Component Value Date    LABA1C 9.3 04/23/2019     Lab Results   Component Value Date    TSH 1.99 05/19/2016    TSHHS 1.820 11/07/2016         /72 (Site: Left Upper Arm, Position: Sitting, Cuff Size: Medium Adult)   Pulse 72   Wt 174 lb 9.6 oz (79.2 kg)   SpO2 96%   BMI 31.68 kg/m²     BP Readings from Last 3 Encounters:   04/23/19 112/72   01/22/19 128/66   05/09/18 (!) 104/54       Wt Readings from Last 3 Encounters:   04/23/19 174 lb 9.6 oz (79.2 kg)   01/22/19 167 lb 3.2 oz (75.8 kg)   05/09/18 183 lb (83 kg)         Physical Exam   Constitutional: She is oriented to person, place, and time. She appears well-developed and well-nourished. No distress. HENT:   Head: Normocephalic and atraumatic. Right Ear: External ear normal.   Left Ear: External ear normal.   Nose: Nose normal.   Mouth/Throat: Oropharynx is clear and moist.   Eyes: Pupils are equal, round, and reactive to light. EOM are normal. No scleral icterus. Neck: Normal range of motion. Neck supple. No thyromegaly present. Cardiovascular: Normal rate, regular rhythm and normal heart sounds. No murmur heard. Pulmonary/Chest: Effort normal and breath sounds normal. She has no wheezes. She has no rales. Abdominal: Soft. She exhibits no distension and no mass. There is no tenderness. Musculoskeletal: Normal range of motion. She exhibits no edema. Lymphadenopathy:     She has no cervical adenopathy. Neurological: She is alert and oriented to person, place, and time. No cranial nerve deficit. She exhibits normal muscle tone. Coordination normal.   Skin: She is not diaphoretic. Psychiatric: She has a normal mood and affect. Her behavior is normal. Judgment and thought content normal.       ASSESSMENT/ PLAN:    1. Type 2 diabetes mellitus without complication, unspecified whether long term insulin use (HCC)  - still high, increase the levemire   - TSH without Reflex; Future  - T4, Free; Future  - Lipid Panel; Future  - Comprehensive Metabolic Panel; Future  - CBC Auto Differential; Future  - POCT glycosylated hemoglobin (Hb A1C)    2. Mixed hyperlipidemia  - will check the lipid panel    3.  Encounter for screening mammogram for breast cancer  - PAOLO DIGITAL SCREEN W CAD BILATERAL; Future    4. Colon cancer screening  - POCT Fecal Immunochemical Test (FIT); Future    5. Tobacco use disorder  - encourage smoking cessation    Quality & Risk Score Accuracy    Visit Dx:  F33.41 - Recurrent major depressive disorder, in partial remission ([facility] [address]  Assessment and plan:  Improving based upon symptoms and exam. Continue current treatment plan and follow up at least yearly. Last edited 04/28/19 13:01 EDT by Malena Heath MD                     - Appropriateprescription are addressed. - After visit summery provided. - Questions answered and patient verbalizes understanding.  - Call for any problem, questions, or concerns.  - RTC if symptoms worse. Return in about 1 month (around 5/23/2019).

## 2019-04-28 ASSESSMENT — ENCOUNTER SYMPTOMS
SHORTNESS OF BREATH: 0
CONSTIPATION: 0
BACK PAIN: 0
ABDOMINAL PAIN: 0
DIARRHEA: 0
SORE THROAT: 0
CHEST TIGHTNESS: 0
WHEEZING: 0
COUGH: 0
SINUS PRESSURE: 0

## 2019-05-17 ENCOUNTER — HOSPITAL ENCOUNTER (OUTPATIENT)
Age: 69
Discharge: HOME | End: 2019-05-17
Payer: COMMERCIAL

## 2019-05-17 DIAGNOSIS — E11.9 TYPE 2 DIABETES MELLITUS WITHOUT COMPLICATION, UNSPECIFIED WHETHER LONG TERM INSULIN USE (HCC): ICD-10-CM

## 2019-05-17 LAB
ALBUMIN SERPL-MCNC: 3.9 GM/DL (ref 3.4–5)
ALP BLD-CCNC: 89 IU/L (ref 40–128)
ALT SERPL-CCNC: 11 U/L (ref 10–40)
ANION GAP SERPL CALCULATED.3IONS-SCNC: 10 MMOL/L (ref 4–16)
AST SERPL-CCNC: 10 IU/L (ref 15–37)
BASOPHILS ABSOLUTE: 0 K/CU MM
BASOPHILS RELATIVE PERCENT: 0.3 % (ref 0–1)
BILIRUB SERPL-MCNC: 0.4 MG/DL (ref 0–1)
BUN BLDV-MCNC: 10 MG/DL (ref 6–23)
CALCIUM SERPL-MCNC: 9 MG/DL (ref 8.3–10.6)
CHLORIDE BLD-SCNC: 104 MMOL/L (ref 99–110)
CHOLESTEROL: 183 MG/DL
CO2: 29 MMOL/L (ref 21–32)
CREAT SERPL-MCNC: 0.7 MG/DL (ref 0.6–1.1)
DIFFERENTIAL TYPE: ABNORMAL
EOSINOPHILS ABSOLUTE: 0.2 K/CU MM
EOSINOPHILS RELATIVE PERCENT: 2 % (ref 0–3)
GFR AFRICAN AMERICAN: >60 ML/MIN/1.73M2
GFR NON-AFRICAN AMERICAN: >60 ML/MIN/1.73M2
GLUCOSE BLD-MCNC: 105 MG/DL (ref 70–99)
HCT VFR BLD CALC: 41.5 % (ref 37–47)
HDLC SERPL-MCNC: 63 MG/DL
HEMOGLOBIN: 13.6 GM/DL (ref 12.5–16)
IMMATURE NEUTROPHIL %: 0.2 % (ref 0–0.43)
LDL CHOLESTEROL DIRECT: 121 MG/DL
LYMPHOCYTES ABSOLUTE: 2.8 K/CU MM
LYMPHOCYTES RELATIVE PERCENT: 30.3 % (ref 24–44)
MCH RBC QN AUTO: 28.2 PG (ref 27–31)
MCHC RBC AUTO-ENTMCNC: 32.8 % (ref 32–36)
MCV RBC AUTO: 85.9 FL (ref 78–100)
MONOCYTES ABSOLUTE: 0.6 K/CU MM
MONOCYTES RELATIVE PERCENT: 6.7 % (ref 0–4)
NUCLEATED RBC %: 0 %
PDW BLD-RTO: 15.8 % (ref 11.7–14.9)
PLATELET # BLD: 247 K/CU MM (ref 140–440)
PMV BLD AUTO: 11 FL (ref 7.5–11.1)
POTASSIUM SERPL-SCNC: 4.4 MMOL/L (ref 3.5–5.1)
RBC # BLD: 4.83 M/CU MM (ref 4.2–5.4)
SEGMENTED NEUTROPHILS ABSOLUTE COUNT: 5.5 K/CU MM
SEGMENTED NEUTROPHILS RELATIVE PERCENT: 60.5 % (ref 36–66)
SODIUM BLD-SCNC: 143 MMOL/L (ref 135–145)
T4 FREE: 1.2 NG/DL (ref 0.9–1.8)
TOTAL IMMATURE NEUTOROPHIL: 0.02 K/CU MM
TOTAL NUCLEATED RBC: 0 K/CU MM
TOTAL PROTEIN: 6.6 GM/DL (ref 6.4–8.2)
TRIGL SERPL-MCNC: 70 MG/DL
TSH HIGH SENSITIVITY: 1.2 UIU/ML (ref 0.27–4.2)
WBC # BLD: 9.1 K/CU MM (ref 4–10.5)

## 2019-05-17 PROCEDURE — 80061 LIPID PANEL: CPT

## 2019-05-17 PROCEDURE — 84443 ASSAY THYROID STIM HORMONE: CPT

## 2019-05-17 PROCEDURE — 85025 COMPLETE CBC W/AUTO DIFF WBC: CPT

## 2019-05-17 PROCEDURE — 84439 ASSAY OF FREE THYROXINE: CPT

## 2019-05-17 PROCEDURE — 80053 COMPREHEN METABOLIC PANEL: CPT

## 2019-05-17 PROCEDURE — 83721 ASSAY OF BLOOD LIPOPROTEIN: CPT

## 2019-05-17 PROCEDURE — 36415 COLL VENOUS BLD VENIPUNCTURE: CPT

## 2019-05-21 ENCOUNTER — OFFICE VISIT (OUTPATIENT)
Dept: FAMILY MEDICINE CLINIC | Age: 69
End: 2019-05-21
Payer: COMMERCIAL

## 2019-05-21 VITALS
OXYGEN SATURATION: 95 % | HEART RATE: 73 BPM | SYSTOLIC BLOOD PRESSURE: 124 MMHG | BODY MASS INDEX: 32.11 KG/M2 | WEIGHT: 177 LBS | DIASTOLIC BLOOD PRESSURE: 74 MMHG

## 2019-05-21 DIAGNOSIS — K59.00 CONSTIPATION, UNSPECIFIED CONSTIPATION TYPE: ICD-10-CM

## 2019-05-21 DIAGNOSIS — Z12.31 ENCOUNTER FOR SCREENING MAMMOGRAM FOR BREAST CANCER: ICD-10-CM

## 2019-05-21 DIAGNOSIS — E11.9 TYPE 2 DIABETES MELLITUS WITHOUT COMPLICATION, WITH LONG-TERM CURRENT USE OF INSULIN (HCC): ICD-10-CM

## 2019-05-21 DIAGNOSIS — E11.9 TYPE 2 DIABETES MELLITUS WITHOUT COMPLICATION, UNSPECIFIED WHETHER LONG TERM INSULIN USE (HCC): Primary | ICD-10-CM

## 2019-05-21 DIAGNOSIS — L84 CALLUS: ICD-10-CM

## 2019-05-21 DIAGNOSIS — Z79.4 TYPE 2 DIABETES MELLITUS WITHOUT COMPLICATION, WITH LONG-TERM CURRENT USE OF INSULIN (HCC): ICD-10-CM

## 2019-05-21 LAB
CREATININE URINE POCT: NORMAL
HBA1C MFR BLD: 7.5 %
MICROALBUMIN/CREAT 24H UR: NORMAL MG/G{CREAT}
MICROALBUMIN/CREAT UR-RTO: NORMAL

## 2019-05-21 PROCEDURE — 4040F PNEUMOC VAC/ADMIN/RCVD: CPT | Performed by: FAMILY MEDICINE

## 2019-05-21 PROCEDURE — 1123F ACP DISCUSS/DSCN MKR DOCD: CPT | Performed by: FAMILY MEDICINE

## 2019-05-21 PROCEDURE — 3017F COLORECTAL CA SCREEN DOC REV: CPT | Performed by: FAMILY MEDICINE

## 2019-05-21 PROCEDURE — G8399 PT W/DXA RESULTS DOCUMENT: HCPCS | Performed by: FAMILY MEDICINE

## 2019-05-21 PROCEDURE — G8427 DOCREV CUR MEDS BY ELIG CLIN: HCPCS | Performed by: FAMILY MEDICINE

## 2019-05-21 PROCEDURE — 99213 OFFICE O/P EST LOW 20 MIN: CPT | Performed by: FAMILY MEDICINE

## 2019-05-21 PROCEDURE — 1090F PRES/ABSN URINE INCON ASSESS: CPT | Performed by: FAMILY MEDICINE

## 2019-05-21 PROCEDURE — 2022F DILAT RTA XM EVC RTNOPTHY: CPT | Performed by: FAMILY MEDICINE

## 2019-05-21 PROCEDURE — 82044 UR ALBUMIN SEMIQUANTITATIVE: CPT | Performed by: FAMILY MEDICINE

## 2019-05-21 PROCEDURE — 3045F PR MOST RECENT HEMOGLOBIN A1C LEVEL 7.0-9.0%: CPT | Performed by: FAMILY MEDICINE

## 2019-05-21 PROCEDURE — 83036 HEMOGLOBIN GLYCOSYLATED A1C: CPT | Performed by: FAMILY MEDICINE

## 2019-05-21 PROCEDURE — G8417 CALC BMI ABV UP PARAM F/U: HCPCS | Performed by: FAMILY MEDICINE

## 2019-05-21 PROCEDURE — 4004F PT TOBACCO SCREEN RCVD TLK: CPT | Performed by: FAMILY MEDICINE

## 2019-05-21 RX ORDER — AMOXICILLIN 250 MG
1 CAPSULE ORAL NIGHTLY
Qty: 60 TABLET | Refills: 5 | Status: SHIPPED | OUTPATIENT
Start: 2019-05-21 | End: 2021-07-09

## 2019-05-21 NOTE — PROGRESS NOTES
Katherine Freeman  20    Chief Complaint   Patient presents with    1 Month Follow-Up     discuss the lab results           Patient here to discuss the lab results, and f/u regarding her DM, we increased her levemir last visit. Doing fine and tolerate the medication.       Past Medical History:   Diagnosis Date    Arthritis     Diabetes mellitus ([address]     Hypertension     Mixed hyperlipidemia 2016     Past Surgical History:   Procedure Laterality Date     SECTION      x2    HERNIA REPAIR      OTHER SURGICAL HISTORY  12/10/13    Left AC lipoma excision    OTHER SURGICAL HISTORY  12/10/13    Left flank lipoma excision     Family History   Problem Relation Age of Onset    Diabetes Mother     Heart Disease Mother     High Blood Pressure Father     Diabetes Sister     High Blood Pressure Sister     Stroke Sister     Cancer Brother         Lung    High Blood Pressure Brother     Stroke Brother     Diabetes Brother     Diabetes Maternal Grandmother     Heart Disease Maternal Grandfather     Cancer Brother         Lung (smoker)     Social History     Socioeconomic History    Marital status:      Spouse name: Not on file    Number of children: Not on file    Years of education: Not on file    Highest education level: Not on file   Occupational History    Occupation: employed-[employer]   Social Needs    Financial resource strain: Not on file    Food insecurity:     Worry: Not on file     Inability: Not on file    Transportation needs:     Medical: Not on file     Non-medical: Not on file   Tobacco Use    Smoking status: Current Every Day Smoker     Packs/day: 0.50     Years: 32.00     Pack years: 16.00     Types: Cigarettes     Start date:     Smokeless tobacco: Never Used    Tobacco comment: currently 0.5 PPD   Substance and Sexual Activity    Alcohol use: No     Alcohol/week: 0.0 oz    Drug use: No    Sexual activity: Not Currently     Partners: Male Lifestyle    Physical activity:     Days per week: Not on file     Minutes per session: Not on file    Stress: Not on file   Relationships    Social connections:     Talks on phone: Not on file     Gets together: Not on file     Attends Jehovah's witness service: Not on file     Active member of club or organization: Not on file     Attends meetings of clubs or organizations: Not on file     Relationship status: Not on file    Intimate partner violence:     Fear of current or ex partner: Not on file     Emotionally abused: Not on file     Physically abused: Not on file     Forced sexual activity: Not on file   Other Topics Concern    Not on file   Social History Narrative    Not on file       Allergies   Allergen Reactions    Codeine Rash    Hydrocodone-Acetaminophen Nausea And Vomiting     Current Outpatient Medications   Medication Sig Dispense Refill    insulin detemir (LEVEMIR FLEXTOUCH) 100 UNIT/ML injection pen Inject 25 Units into the skin nightly 5 pen 3    senna-docusate (PERICOLACE) 8.6-50 MG per tablet Take 1 tablet by mouth nightly 60 tablet 5    blood glucose monitor strips TIDAC and  strip 5    Insulin Pen Needle 30G X 8 MM MISC 1 each by Does not apply route daily 100 each 3    Lancets MISC TIDAC &  each 3    pravastatin (PRAVACHOL) 40 MG tablet Take 1 tablet by mouth daily 90 tablet 1    SITagliptin (JANUVIA) 50 MG tablet Take 1 tablet by mouth daily 90 tablet 1    buPROPion (WELLBUTRIN SR) 150 MG extended release tablet Take 1 tablet by mouth 2 times daily 60 tablet 5    insulin detemir (LEVEMIR FLEXTOUCH) 100 UNIT/ML injection pen Inject 20 Units into the skin nightly 5 pen 3     No current facility-administered medications for this visit. Review of Systems   Constitutional: Negative for activity change, chills, fatigue and fever. Respiratory: Negative for cough and shortness of breath. Cardiovascular: Negative for chest pain and leg swelling. regular rhythm and normal heart sounds. No murmur heard. Pulmonary/Chest: Effort normal and breath sounds normal. She has no wheezes. She has no rales. Abdominal: Soft. She exhibits no mass. There is no tenderness. Musculoskeletal: Normal range of motion. She exhibits no edema. Neurological: She is alert and oriented to person, place, and time. microfilament test was negative for both feet, but patient has lots of callus both feet   Skin: She is not diaphoretic. ASSESSMENT/ PLAN:    1. Type 2 diabetes mellitus without complication, unspecified whether long term insulin use ([facility])  - POCT microalbumin  - POCT glycosylated hemoglobin (Hb A1C), doing much better  -  DIABETES FOOT EXAM    2. Type 2 diabetes mellitus without complication, with long-term current use of insulin ([facility])  - insulin detemir (LEVEMIR FLEXTOUCH) 100 UNIT/ML injection pen; Inject 25 Units into the skin nightly  Dispense: 5 pen; Refill: 3    3. Constipation, unspecified constipation type  - on medication  - senna-docusate (PERICOLACE) 8.6-50 MG per tablet; Take 1 tablet by mouth nightly  Dispense: 60 tablet; Refill: 5    4. Callus  - External Referral To Podiatry    5. Encounter for screening mammogram for breast cancer  - [facility] Screening Bilateral                - Appropriateprescription are addressed. - After visit summery provided. - Questions answered and patient verbalizes understanding.  - Call for any problem, questions, or concerns.  - RTC if symptoms worse. Return in about 3 weeks (around 6/11/2019).

## 2019-05-24 ENCOUNTER — HOSPITAL ENCOUNTER (OUTPATIENT)
Dept: WOMENS IMAGING | Age: 69
Discharge: HOME | End: 2019-05-24
Payer: COMMERCIAL

## 2019-05-24 DIAGNOSIS — Z12.31 ENCOUNTER FOR SCREENING MAMMOGRAM FOR BREAST CANCER: ICD-10-CM

## 2019-05-24 PROCEDURE — 77067 SCR MAMMO BI INCL CAD: CPT

## 2019-05-30 DIAGNOSIS — Z12.11 COLON CANCER SCREENING: ICD-10-CM

## 2019-05-30 LAB
CONTROL: NORMAL
HEMOCCULT STL QL: NEGATIVE

## 2019-05-30 PROCEDURE — 82274 ASSAY TEST FOR BLOOD FECAL: CPT | Performed by: FAMILY MEDICINE

## 2019-06-03 ASSESSMENT — ENCOUNTER SYMPTOMS
COUGH: 0
SHORTNESS OF BREATH: 0
ABDOMINAL PAIN: 0
CONSTIPATION: 1
VOMITING: 0
NAUSEA: 0

## 2019-06-04 ENCOUNTER — HOSPITAL ENCOUNTER (OUTPATIENT)
Dept: WOMENS IMAGING | Age: 69
Discharge: HOME | End: 2019-06-04
Payer: COMMERCIAL

## 2019-06-04 ENCOUNTER — HOSPITAL ENCOUNTER (OUTPATIENT)
Dept: ULTRASOUND IMAGING | Age: 69
Discharge: HOME | End: 2019-06-04
Payer: COMMERCIAL

## 2019-06-04 DIAGNOSIS — N64.89 BREAST ASYMMETRY: ICD-10-CM

## 2019-06-04 DIAGNOSIS — R92.8 ABNORMAL MAMMOGRAM: ICD-10-CM

## 2019-06-04 PROCEDURE — 76642 ULTRASOUND BREAST LIMITED: CPT

## 2019-06-04 PROCEDURE — 77065 DX MAMMO INCL CAD UNI: CPT

## 2019-06-27 ENCOUNTER — OFFICE VISIT (OUTPATIENT)
Dept: FAMILY MEDICINE CLINIC | Age: 69
End: 2019-06-27
Payer: COMMERCIAL

## 2019-06-27 VITALS
HEIGHT: 57 IN | SYSTOLIC BLOOD PRESSURE: 124 MMHG | HEART RATE: 83 BPM | DIASTOLIC BLOOD PRESSURE: 60 MMHG | BODY MASS INDEX: 38.36 KG/M2 | OXYGEN SATURATION: 96 % | WEIGHT: 177.8 LBS

## 2019-06-27 DIAGNOSIS — Z00.00 ROUTINE GENERAL MEDICAL EXAMINATION AT A HEALTH CARE FACILITY: Primary | ICD-10-CM

## 2019-06-27 PROCEDURE — 3017F COLORECTAL CA SCREEN DOC REV: CPT | Performed by: FAMILY MEDICINE

## 2019-06-27 PROCEDURE — 1123F ACP DISCUSS/DSCN MKR DOCD: CPT | Performed by: FAMILY MEDICINE

## 2019-06-27 PROCEDURE — G0438 PPPS, INITIAL VISIT: HCPCS | Performed by: FAMILY MEDICINE

## 2019-06-27 PROCEDURE — 4040F PNEUMOC VAC/ADMIN/RCVD: CPT | Performed by: FAMILY MEDICINE

## 2019-06-27 RX ORDER — NICOTINE 21 MG/24HR
1 PATCH, TRANSDERMAL 24 HOURS TRANSDERMAL EVERY 24 HOURS
Qty: 42 PATCH | Refills: 0 | Status: SHIPPED | OUTPATIENT
Start: 2019-06-27 | End: 2020-01-27 | Stop reason: SDUPTHER

## 2019-06-27 ASSESSMENT — PATIENT HEALTH QUESTIONNAIRE - PHQ9
SUM OF ALL RESPONSES TO PHQ QUESTIONS 1-9: 0
SUM OF ALL RESPONSES TO PHQ QUESTIONS 1-9: 0

## 2019-06-27 ASSESSMENT — ANXIETY QUESTIONNAIRES: GAD7 TOTAL SCORE: 0

## 2019-06-27 ASSESSMENT — LIFESTYLE VARIABLES: HOW OFTEN DO YOU HAVE A DRINK CONTAINING ALCOHOL: 0

## 2019-06-27 NOTE — PROGRESS NOTES
Medicare Annual Wellness Visit  Name: Rich Marx Date: 2019   MRN: [id number] Sex: Female   Age: 80 y.o. Ethnicity: Non-/Non    : 1950 Race: Black      Avril Naranjo is here for Medicare AWV    Screenings for behavioral, psychosocial and functional/safety risks, and cognitive dysfunction are all negative except as indicated below. These results, as well as other patient data from the [address] form, are documented in Flowsheets linked to this Encounter. Allergies   Allergen Reactions    Codeine Rash    Hydrocodone-Acetaminophen Nausea And Vomiting     Prior to Visit Medications    Medication Sig Taking?  Authorizing Provider   insulin detemir (LEVEMIR FLEXTOUCH) 100 UNIT/ML injection pen Inject 25 Units into the skin nightly Yes Dena Talley MD   senna-docusate (PERICOLACE) 8.6-50 MG per tablet Take 1 tablet by mouth nightly Yes Dena Talley MD   insulin detemir (LEVEMIR FLEXTOUCH) 100 UNIT/ML injection pen Inject 20 Units into the skin nightly Yes CLIVE De La Vega CNP   blood glucose monitor strips TIDAC and QHS Yes CLIVE De La Vega CNP   Insulin Pen Needle 30G X 8 MM MISC 1 each by Does not apply route daily Yes CLIVE De La Vega CNP   Lancets MISC TIDAC & QHS Yes CLIVE De La Vega CNP   pravastatin (PRAVACHOL) 40 MG tablet Take 1 tablet by mouth daily Yes CLIVE De La Vega CNP   SITagliptin (JANUVIA) 50 MG tablet Take 1 tablet by mouth daily Yes CLIVE De La Vega CNP   buPROPion [facility] SR) 150 MG extended release tablet Take 1 tablet by mouth 2 times daily Yes CLIVE De La Vega CNP     Past Medical History:   Diagnosis Date    Arthritis     Diabetes mellitus ([address]     Hypertension     Mixed hyperlipidemia 2016     Past Surgical History:   Procedure Laterality Date     SECTION      x2    HERNIA REPAIR      OTHER SURGICAL HISTORY  12/10/13    Left AC lipoma excision    OTHER SURGICAL HISTORY 12/10/13    Left flank lipoma excision     Family History   Problem Relation Age of Onset    Diabetes Mother     Heart Disease Mother     High Blood Pressure Father     Diabetes Sister     High Blood Pressure Sister     Stroke Sister     Cancer Brother         Lung    High Blood Pressure Brother     Stroke Brother     Diabetes Brother     Diabetes Maternal Grandmother     Heart Disease Maternal Grandfather     Cancer Brother         Lung (smoker)       CareTeam (Including outside providers/suppliers regularly involved in providing care):   Patient Care Team:  Colin Kessler MD as PCP - General (Family Medicine)  Colin Kessler MD as PCP - [facility] Empaneled Provider    Wt Readings from Last 3 Encounters:   06/27/19 177 lb 12.8 oz (80.6 kg)   05/21/19 177 lb (80.3 kg)   04/23/19 174 lb 9.6 oz (79.2 kg)     Vitals:    06/27/19 1108   BP: 124/60   Site: Left Upper Arm   Position: Sitting   Cuff Size: Medium Adult   Pulse: 83   SpO2: 96%   Weight: 177 lb 12.8 oz (80.6 kg)   Height: 4' 8.69\" (1.44 m)     Body mass index is 38.89 kg/m². Based upon direct observation of the patient, evaluation of cognition reveals recent and remote memory intact. Patient's complete Health Risk Assessment and screening values have been reviewed and are found in Flowsheets. The following problems were reviewed today and where indicated follow up appointments were made and/or referrals ordered.     Positive Risk Factor Screenings with Interventions:     Substance Abuse:  Social History     Tobacco History     Smoking Status  Current Every Day Smoker Smoking Start Date  1/1/1986 Smoking Frequency  0.5 packs/day for 32 years (16 pk yrs) Smoking Tobacco Type  Cigarettes    Smokeless Tobacco Use  Never Used    Tobacco Comment  currently 0.5 PPD          Alcohol History     Alcohol Use Status  No          Drug Use     Drug Use Status  No          Sexual Activity     Sexually Active  Not Currently Partners  Male Audit Questionnaire: Screen for Alcohol Misuse  How often do you have a drink containing alcohol?: Never  Substance Abuse Interventions:  · Tobacco abuse:  will order the nicotin patches    General Health:  General  In general, how would you say your health is?: Good  In the past 7 days, have you experienced any of the following? New or Increased Pain, New or Increased Fatigue, Loneliness, Social Isolation, Stress or Anger?: None of These  Do you get the social and emotional support that you need?: Yes  Do you have a Living Will?: (!) No  General Health Risk Interventions:  · she is going to work on the living will    Health Habits/Nutrition:  Health Habits/Nutrition  Do you exercise for at least 20 minutes 2-3 times per week?: Yes  Have you lost any weight without trying in the past 3 months?: No  Do you eat fewer than 2 meals per day?: (!) Yes  Have you seen a dentist within the past year?: (!) No  Body mass index is 38.89 kg/m².   Health Habits/Nutrition Interventions:  ·     Hearing/Vision:  Hearing/Vision  Do you or your family notice any trouble with your hearing?: No  Do you have difficulty driving, watching TV, or doing any of your daily activities because of your eyesight?: No  Have you had an eye exam within the past year?: (!) No  Hearing/Vision Interventions:  · good hearing and vision    Safety:  Safety  Do you have working smoke detectors?: Yes  Have all throw rugs been removed or fastened?: Yes  Do you have non-slip mats in all bathtubs?: (!) No  Do all of your stairways have a railing or banister?: Yes  Are your doorways, halls and stairs free of clutter?: Yes  Do you always fasten your seatbelt when you are in a car?: Yes  Safety Interventions:  · safe home    Personalized Preventive Plan   Current Health Maintenance Status  Immunization History   Administered Date(s) Administered    Pneumococcal Conjugate 13-valent ([id number]) 11/06/2017    Pneumococcal Polysaccharide ([id number]) 05/12/2016 Health Maintenance   Topic Date Due    DTaP/Tdap/Td vaccine (1 - Tdap) 08/24/1970    Shingles Vaccine (1 of 2) 08/24/2001    Annual Wellness Visit (AWV)  08/17/2017    Diabetic retinal exam  07/28/2018    Flu vaccine (Season Ended) 09/01/2019    Lipid screen  05/17/2020    Diabetic foot exam  05/21/2020    A1C test (Diabetic or Prediabetic)  05/21/2020    Diabetic microalbuminuria test  05/21/2020    Colon Cancer Screen FIT/FOBT  05/30/2020    Pneumococcal 65+ years Vaccine (2 of 2 - PPSV23) 05/12/2021    Breast cancer screen  06/04/2021    DEXA (modify frequency per FRAX score)  Completed    Hepatitis C screen  Completed     Recommendations for Preventive Services Due: see orders and patient instructions/AVS.  .   Recommended screening schedule for the next 5-10 years is provided to the patient in written form: see Patient Instructions/AVS.

## 2019-06-27 NOTE — PATIENT INSTRUCTIONS
Personalized Preventive Plan for Noemi Ortega - 6/27/2019  Medicare offers a range of preventive health benefits. Some of the tests and screenings are paid in full while other may be subject to a deductible, co-insurance, and/or copay. Some of these benefits include a comprehensive review of your medical history including lifestyle, illnesses that may run in your family, and various assessments and screenings as appropriate. After reviewing your medical record and screening and assessments performed today your provider may have ordered immunizations, labs, imaging, and/or referrals for you. A list of these orders (if applicable) as well as your Preventive Care list are included within your After Visit Summary for your review. Other Preventive Recommendations:    · A preventive eye exam performed by an eye specialist is recommended every 1-2 years to screen for glaucoma; cataracts, macular degeneration, and other eye disorders. · A preventive dental visit is recommended every 6 months. · Try to get at least 150 minutes of exercise per week or 10,000 steps per day on a pedometer . · Order or download the FREE \"Exercise & Physical Activity: Your Everyday Guide\" from [employer]. Call 2-993.945.3609 or search [employer] online. · You need 4630-6628 mg of calcium and 3671-2358 IU of vitamin D per day. It is possible to meet your calcium requirement with diet alone, but a vitamin D supplement is usually necessary to meet this goal.  · When exposed to the sun, use a sunscreen that protects against both UVA and UVB radiation with an SPF of 30 or greater. Reapply every 2 to 3 hours or after sweating, drying off with a towel, or swimming. · Always wear a seat belt when traveling in a car. Always wear a helmet when riding a bicycle or motorcycle.

## 2019-08-29 DIAGNOSIS — E78.2 MIXED HYPERLIPIDEMIA: ICD-10-CM

## 2019-08-29 DIAGNOSIS — E11.9 TYPE 2 DIABETES MELLITUS WITHOUT COMPLICATION, UNSPECIFIED WHETHER LONG TERM INSULIN USE (HCC): ICD-10-CM

## 2019-08-29 RX ORDER — PRAVASTATIN SODIUM 40 MG
40 TABLET ORAL DAILY
Qty: 90 TABLET | Refills: 1 | Status: SHIPPED | OUTPATIENT
Start: 2019-08-29 | End: 2020-01-27 | Stop reason: SDUPTHER

## 2019-09-26 ENCOUNTER — APPOINTMENT (OUTPATIENT)
Dept: CT IMAGING | Age: 69
End: 2019-09-26
Payer: COMMERCIAL

## 2019-09-26 ENCOUNTER — HOSPITAL ENCOUNTER (EMERGENCY)
Age: 69
Discharge: HOME | End: 2019-09-26
Payer: COMMERCIAL

## 2019-09-26 ENCOUNTER — APPOINTMENT (OUTPATIENT)
Dept: ULTRASOUND IMAGING | Age: 69
End: 2019-09-26
Payer: COMMERCIAL

## 2019-09-26 ENCOUNTER — APPOINTMENT (OUTPATIENT)
Dept: GENERAL RADIOLOGY | Age: 69
End: 2019-09-26
Payer: COMMERCIAL

## 2019-09-26 VITALS
TEMPERATURE: 98 F | HEART RATE: 71 BPM | DIASTOLIC BLOOD PRESSURE: 54 MMHG | WEIGHT: 176 LBS | SYSTOLIC BLOOD PRESSURE: 107 MMHG | RESPIRATION RATE: 15 BRPM | BODY MASS INDEX: 35.48 KG/M2 | OXYGEN SATURATION: 97 % | HEIGHT: 59 IN

## 2019-09-26 DIAGNOSIS — M79.605 LEFT LEG PAIN: ICD-10-CM

## 2019-09-26 DIAGNOSIS — M25.562 ACUTE PAIN OF LEFT KNEE: Primary | ICD-10-CM

## 2019-09-26 LAB
ALBUMIN SERPL-MCNC: 3.9 GM/DL (ref 3.4–5)
ALP BLD-CCNC: 93 IU/L (ref 40–129)
ALT SERPL-CCNC: 11 U/L (ref 10–40)
ANION GAP SERPL CALCULATED.3IONS-SCNC: 9 MMOL/L (ref 4–16)
AST SERPL-CCNC: 15 IU/L (ref 15–37)
BASOPHILS ABSOLUTE: 0 K/CU MM
BASOPHILS RELATIVE PERCENT: 0.3 % (ref 0–1)
BILIRUB SERPL-MCNC: 0.3 MG/DL (ref 0–1)
BUN BLDV-MCNC: 9 MG/DL (ref 6–23)
CALCIUM SERPL-MCNC: 9 MG/DL (ref 8.3–10.6)
CHLORIDE BLD-SCNC: 100 MMOL/L (ref 99–110)
CO2: 27 MMOL/L (ref 21–32)
CREAT SERPL-MCNC: 0.6 MG/DL (ref 0.6–1.1)
DIFFERENTIAL TYPE: ABNORMAL
EOSINOPHILS ABSOLUTE: 0.1 K/CU MM
EOSINOPHILS RELATIVE PERCENT: 1.4 % (ref 0–3)
GFR AFRICAN AMERICAN: >60 ML/MIN/1.73M2
GFR NON-AFRICAN AMERICAN: >60 ML/MIN/1.73M2
GLUCOSE BLD-MCNC: 133 MG/DL (ref 70–99)
HCT VFR BLD CALC: 42.4 % (ref 37–47)
HEMOGLOBIN: 14 GM/DL (ref 12.5–16)
IMMATURE NEUTROPHIL %: 0.1 % (ref 0–0.43)
LACTATE: 0.7 MMOL/L (ref 0.4–2)
LYMPHOCYTES ABSOLUTE: 2.9 K/CU MM
LYMPHOCYTES RELATIVE PERCENT: 33.1 % (ref 24–44)
MCH RBC QN AUTO: 28.5 PG (ref 27–31)
MCHC RBC AUTO-ENTMCNC: 33 % (ref 32–36)
MCV RBC AUTO: 86.2 FL (ref 78–100)
MONOCYTES ABSOLUTE: 0.6 K/CU MM
MONOCYTES RELATIVE PERCENT: 6.9 % (ref 0–4)
NUCLEATED RBC %: 0 %
PDW BLD-RTO: 15.9 % (ref 11.7–14.9)
PLATELET # BLD: 258 K/CU MM (ref 140–440)
PMV BLD AUTO: 11.2 FL (ref 7.5–11.1)
POTASSIUM SERPL-SCNC: 4.7 MMOL/L (ref 3.5–5.1)
RBC # BLD: 4.92 M/CU MM (ref 4.2–5.4)
SEGMENTED NEUTROPHILS ABSOLUTE COUNT: 5 K/CU MM
SEGMENTED NEUTROPHILS RELATIVE PERCENT: 58.2 % (ref 36–66)
SODIUM BLD-SCNC: 136 MMOL/L (ref 135–145)
TOTAL IMMATURE NEUTOROPHIL: 0.01 K/CU MM
TOTAL NUCLEATED RBC: 0 K/CU MM
TOTAL PROTEIN: 6.8 GM/DL (ref 6.4–8.2)
WBC # BLD: 8.6 K/CU MM (ref 4–10.5)

## 2019-09-26 PROCEDURE — 73560 X-RAY EXAM OF KNEE 1 OR 2: CPT

## 2019-09-26 PROCEDURE — 85025 COMPLETE CBC W/AUTO DIFF WBC: CPT

## 2019-09-26 PROCEDURE — 6360000004 HC RX CONTRAST MEDICATION: Performed by: PHYSICIAN ASSISTANT

## 2019-09-26 PROCEDURE — 80053 COMPREHEN METABOLIC PANEL: CPT

## 2019-09-26 PROCEDURE — 99284 EMERGENCY DEPT VISIT MOD MDM: CPT

## 2019-09-26 PROCEDURE — 83605 ASSAY OF LACTIC ACID: CPT

## 2019-09-26 PROCEDURE — 6370000000 HC RX 637 (ALT 250 FOR IP): Performed by: PHYSICIAN ASSISTANT

## 2019-09-26 PROCEDURE — 73701 CT LOWER EXTREMITY W/DYE: CPT

## 2019-09-26 PROCEDURE — 2580000003 HC RX 258: Performed by: PHYSICIAN ASSISTANT

## 2019-09-26 PROCEDURE — 93971 EXTREMITY STUDY: CPT

## 2019-09-26 RX ORDER — ACETAMINOPHEN 500 MG
1000 TABLET ORAL ONCE
Status: COMPLETED | OUTPATIENT
Start: 2019-09-26 | End: 2019-09-26

## 2019-09-26 RX ORDER — 0.9 % SODIUM CHLORIDE 0.9 %
10 VIAL (ML) INJECTION
Status: COMPLETED | OUTPATIENT
Start: 2019-09-26 | End: 2019-09-26

## 2019-09-26 RX ADMIN — IOPAMIDOL 75 ML: 755 INJECTION, SOLUTION INTRAVENOUS at 14:55

## 2019-09-26 RX ADMIN — ACETAMINOPHEN 1000 MG: 500 TABLET ORAL at 12:48

## 2019-09-26 RX ADMIN — Medication 10 ML: at 14:55

## 2019-09-26 ASSESSMENT — PAIN SCALES - GENERAL: PAINLEVEL_OUTOF10: 5

## 2019-09-26 NOTE — ED PROVIDER NOTES
([facility] [place]) 21 MG/24HR Place 1 patch onto the skin every 24 hours 42 patch 0    insulin detemir (LEVEMIR FLEXTOUCH) 100 UNIT/ML injection pen Inject 25 Units into the skin nightly 5 pen 3    senna-docusate (PERICOLACE) 8.6-50 MG per tablet Take 1 tablet by mouth nightly 60 tablet 5    insulin detemir (LEVEMIR FLEXTOUCH) 100 UNIT/ML injection pen Inject 20 Units into the skin nightly 5 pen 3    blood glucose monitor strips TIDAC and  strip 5    Insulin Pen Needle 30G X 8 MM MISC 1 each by Does not apply route daily 100 each 3    Lancets MISC TIDAC &  each 3    buPROPion (WELLBUTRIN SR) 150 MG extended release tablet Take 1 tablet by mouth 2 times daily 60 tablet 5       ALLERGIES    Allergies   Allergen Reactions    Codeine Rash    Hydrocodone-Acetaminophen Nausea And Vomiting       SOCIAL AND FAMILY HISTORY    Social History     Socioeconomic History    Marital status:      Spouse name: None    Number of children: None    Years of education: None    Highest education level: None   Occupational History    Occupation: [employer]   Social Needs    Financial resource strain: None    Food insecurity:     Worry: None     Inability: None    Transportation needs:     Medical: None     Non-medical: None   Tobacco Use    Smoking status: Current Every Day Smoker     Packs/day: 0.50     Years: 32.00     Pack years: 16.00     Types: Cigarettes     Start date: 1986    Smokeless tobacco: Never Used    Tobacco comment: currently 0.5 PPD   Substance and Sexual Activity    Alcohol use: No     Alcohol/week: 0.0 standard drinks    Drug use: No    Sexual activity: Not Currently     Partners: Male   Lifestyle    Physical activity:     Days per week: None     Minutes per session: None    Stress: None   Relationships    Social connections:     Talks on phone: None     Gets together: None     Attends [employer] service: None     Active member of club or organization: None     Attends

## 2020-01-02 ENCOUNTER — TELEPHONE (OUTPATIENT)
Dept: FAMILY MEDICINE CLINIC | Age: 70
End: 2020-01-02

## 2020-01-02 NOTE — TELEPHONE ENCOUNTER
1-2-2020 Patient was no call no show for todays appt. I called patient at home and patient forgot.  New appt scheduled for [email or web address] (EV) First no show Letter Mailed

## 2020-01-15 NOTE — PROGRESS NOTES
Jazlyn Lara  1950  78 y.o. SUBJECT [place]OR[place]:    Chief Complaint   Patient presents with    Cough     productive w/ green sputum, chest congestion x 1 week    Diabetes     Patient with above. No fever or body aches. She has been near sick family member. She has been using Robitussin which has bene helping with the cough. Patient is a smoker. She has had some occasional wheezing as well. Cough   This is a new problem. The current episode started 1 to 4 weeks ago (1 week). The problem has been gradually worsening. The problem occurs every few minutes. The cough is productive of sputum. Associated symptoms include nasal congestion, shortness of breath and wheezing. Pertinent negatives include no chest pain, chills, ear pain, fever, headaches, rash or sore throat. Nothing aggravates the symptoms. She has tried OTC cough suppressant (robotussin) for the symptoms. The treatment provided moderate relief. There is no history of asthma, COPD or environmental allergies. Smoker       Review of Systems   Constitutional: Negative for chills, fatigue and fever. HENT: Negative for congestion, ear pain, sinus pressure and sore throat. Eyes: Negative for discharge and visual disturbance. Respiratory: Positive for cough, shortness of breath and wheezing. Cardiovascular: Negative for chest pain and leg swelling. Gastrointestinal: Negative for blood in stool, diarrhea, nausea and vomiting. Endocrine: Negative for polydipsia. Genitourinary: Negative for dysuria, frequency and hematuria. Musculoskeletal: Negative for back pain and gait problem. Skin: Negative for rash. Allergic/Immunologic: Negative for environmental allergies and food allergies. Neurological: Negative for dizziness and headaches. Psychiatric/Behavioral: Negative for behavioral problems, sleep disturbance and suicidal ideas. The patient is not nervous/anxious.         Past Medical, Family, and Social History have been reviewed today.    OBJECTIVE:     /70   Pulse 66   Resp 14   Ht 4' 11\" (1.499 m)   Wt 179 lb (81.2 kg)   SpO2 97%   BMI 36.15 kg/m²       Physical Exam   Constitutional: She is oriented to person, place, and time. She appears well-developed and well-nourished. HENT:   Head: Normocephalic and atraumatic. Right Ear: External ear normal.   Left Ear: External ear normal.   Nose: Nose normal.   Mouth/Throat: Oropharynx is clear and moist.   Eyes: Conjunctivae and EOM are normal. Pupils are equal, round, and reactive to light. No scleral icterus. Neck: Normal range of motion. Neck supple. Cardiovascular: Normal rate, regular rhythm and intact distal pulses. Exam reveals no gallop and no friction rub. Murmur heard. Systolic murmur is present with a grade of 4/6   Pulmonary/Chest: Effort normal. No respiratory distress. She has decreased breath sounds. She has wheezes. She has no rales. Abdominal: Soft. Bowel sounds are normal. She exhibits no distension and no mass. There is no tenderness. There is no rebound and no guarding. Musculoskeletal: Normal range of motion. Neurological: She is alert and oriented to person, place, and time. Skin: Skin is warm and dry. No rash noted. Psychiatric: She has a normal mood and affect. Her behavior is normal. Judgment and thought content normal.       11/6/2017 12:14 PM - Dulce Maria Lindquist     Component Results     Component Value Ref Range & Units Status Collected Lab   Hemoglobin A1C 7.0  % Final 11/06/2017 12:14 PM      Results for orders placed or performed in visit on 02/06/18   POCT glycosylated hemoglobin (Hb A1C)   Result Value Ref Range    Hemoglobin A1C 7.4 %       ASSESSMENT/ PLAN:    1. Cough  Patient will be covered for bronchitis. We'll give her course as well as a prednisone burst.  Patient to push fluids and rest.  She is to contact the office if things are not resolved. Encouraged her to stop smoking as well today.   - amoxicillin-clavulanate (AUGMENTIN) 500-125 MG per tablet; Take 1 tablet by mouth 2 times daily for 7 days  Dispense: 14 tablet; Refill: 0  - predniSONE (DELTASONE) 20 MG tablet; Take 1 tablet by mouth 2 times daily for 5 days  Dispense: 10 tablet; Refill: 0    2. Type 2 diabetes mellitus without complication, with long-term current use of insulin ([facility])  A1c is up from her last visit. Also of note is that since the holidays. Patient has been encouraged to resume her regular diet and will recheck A1c prior to her follow-up appointment.  - POCT glycosylated hemoglobin (Hb A1C)  - TSH with Reflex; Future  - CBC WITH AUTO DIFFERENTIAL; Future    3. Mixed hyperlipidemia  Patient will have a repeat of her lipid panel and CMP done prior to her next appointment.  - LIPID PANEL; Future  - COMPREHENSIVE METABOLIC PANEL; Future    4. Recurrent major depressive disorder, in partial remission [facility])  Patient denies any depression or anxiety today. No suicidal or homicidal ideations. Patient is tolerating Wellbutrin well without any side effects. We'll continue to monitor. Orders Placed This Encounter   Procedures    LIPID PANEL     Standing Status:   Future     Standing Expiration Date:   2/6/2019     Order Specific Question:   Is Patient Fasting?/# of Hours     Answer:   yes,  12 hours    COMPREHENSIVE METABOLIC PANEL     Standing Status:   Future     Standing Expiration Date:   2/6/2019    TSH with Reflex     Standing Status:   Future     Standing Expiration Date:   2/6/2019    CBC WITH AUTO DIFFERENTIAL     Standing Status:   Future     Standing Expiration Date:   2/6/2019    POCT glycosylated hemoglobin (Hb A1C)       Return in about 3 months (around 5/6/2018), or if symptoms worsen or fail to improve, for Diabetes Recheck, Diabetic Foot Exam, Hyperlipidemia Recheck. Improved

## 2020-01-27 ENCOUNTER — OFFICE VISIT (OUTPATIENT)
Dept: FAMILY MEDICINE CLINIC | Age: 70
End: 2020-01-27
Payer: MEDICARE

## 2020-01-27 VITALS
BODY MASS INDEX: 36.19 KG/M2 | HEART RATE: 83 BPM | DIASTOLIC BLOOD PRESSURE: 78 MMHG | SYSTOLIC BLOOD PRESSURE: 116 MMHG | OXYGEN SATURATION: 96 % | WEIGHT: 179.2 LBS

## 2020-01-27 PROCEDURE — 1090F PRES/ABSN URINE INCON ASSESS: CPT | Performed by: FAMILY MEDICINE

## 2020-01-27 PROCEDURE — G8427 DOCREV CUR MEDS BY ELIG CLIN: HCPCS | Performed by: FAMILY MEDICINE

## 2020-01-27 PROCEDURE — 1123F ACP DISCUSS/DSCN MKR DOCD: CPT | Performed by: FAMILY MEDICINE

## 2020-01-27 PROCEDURE — 2022F DILAT RTA XM EVC RTNOPTHY: CPT | Performed by: FAMILY MEDICINE

## 2020-01-27 PROCEDURE — 4004F PT TOBACCO SCREEN RCVD TLK: CPT | Performed by: FAMILY MEDICINE

## 2020-01-27 PROCEDURE — G8484 FLU IMMUNIZE NO ADMIN: HCPCS | Performed by: FAMILY MEDICINE

## 2020-01-27 PROCEDURE — 83036 HEMOGLOBIN GLYCOSYLATED A1C: CPT | Performed by: FAMILY MEDICINE

## 2020-01-27 PROCEDURE — 4040F PNEUMOC VAC/ADMIN/RCVD: CPT | Performed by: FAMILY MEDICINE

## 2020-01-27 PROCEDURE — G8417 CALC BMI ABV UP PARAM F/U: HCPCS | Performed by: FAMILY MEDICINE

## 2020-01-27 PROCEDURE — 3017F COLORECTAL CA SCREEN DOC REV: CPT | Performed by: FAMILY MEDICINE

## 2020-01-27 PROCEDURE — 3046F HEMOGLOBIN A1C LEVEL >9.0%: CPT | Performed by: FAMILY MEDICINE

## 2020-01-27 PROCEDURE — 99214 OFFICE O/P EST MOD 30 MIN: CPT | Performed by: FAMILY MEDICINE

## 2020-01-27 PROCEDURE — G8399 PT W/DXA RESULTS DOCUMENT: HCPCS | Performed by: FAMILY MEDICINE

## 2020-01-27 RX ORDER — PRAVASTATIN SODIUM 40 MG
40 TABLET ORAL DAILY
Qty: 90 TABLET | Refills: 3 | Status: SHIPPED | OUTPATIENT
Start: 2020-01-27 | End: 2021-03-22

## 2020-01-27 RX ORDER — BUPROPION HYDROCHLORIDE 150 MG/1
150 TABLET, EXTENDED RELEASE ORAL 2 TIMES DAILY
Qty: 60 TABLET | Refills: 5 | Status: SHIPPED | OUTPATIENT
Start: 2020-01-27 | End: 2021-03-15

## 2020-01-27 RX ORDER — NICOTINE 21 MG/24HR
1 PATCH, TRANSDERMAL 24 HOURS TRANSDERMAL EVERY 24 HOURS
Qty: 42 PATCH | Refills: 0 | Status: SHIPPED | OUTPATIENT
Start: 2020-01-27 | End: 2020-04-29 | Stop reason: SDUPTHER

## 2020-01-27 NOTE — PROGRESS NOTES
Mathieu Brambila  21    Chief Complaint   Patient presents with    6 Month Follow-Up    Diabetes    Hyperlipidemia    Anxiety           Patient here for 6 months f/u regarding DM, HLD, and anxiety, patient doing fine and has no complaints, patient taking her medications, her BS at home is good, she is taking 25 units of levemir, patient stats her anxiety under control, and she is taking her cholesterol medication with no side effects. Needs medications refill.       Past Medical History:   Diagnosis Date    Arthritis     Diabetes mellitus ([address]     Hypertension     Mixed hyperlipidemia 2016     Past Surgical History:   Procedure Laterality Date     SECTION      x2    HERNIA REPAIR      OTHER SURGICAL HISTORY  12/10/13    Left AC lipoma excision    OTHER SURGICAL HISTORY  12/10/13    Left flank lipoma excision     Family History   Problem Relation Age of Onset    Diabetes Mother     Heart Disease Mother     High Blood Pressure Father     Diabetes Sister     High Blood Pressure Sister     Stroke Sister     Cancer Brother         Lung    High Blood Pressure Brother     Stroke Brother     Diabetes Brother     Diabetes Maternal Grandmother     Heart Disease Maternal Grandfather     Cancer Brother         Lung (smoker)     Social History     Socioeconomic History    Marital status:      Spouse name: Not on file    Number of children: Not on file    Years of education: Not on file    Highest education level: Not on file   Occupational History    Occupation: employed-[employer]   Social Needs    Financial resource strain: Not on file    Food insecurity:     Worry: Not on file     Inability: Not on file    Transportation needs:     Medical: Not on file     Non-medical: Not on file   Tobacco Use    Smoking status: Current Every Day Smoker     Packs/day: 0.50     Years: 32.00     Pack years: 16.00     Types: Cigarettes     Start date:     Smokeless 100 each 3     No current facility-administered medications for this visit. Review of Systems   Constitutional: Negative for activity change, appetite change, chills, fatigue and fever. HENT: Negative for congestion, postnasal drip, sinus pressure and sore throat. Respiratory: Negative for cough, chest tightness, shortness of breath and wheezing. Cardiovascular: Negative for chest pain and leg swelling. Gastrointestinal: Negative for abdominal pain, constipation and diarrhea. Genitourinary: Negative for dysuria and frequency. Musculoskeletal: Negative for back pain and gait problem. Skin: Negative for rash. Neurological: Negative for dizziness, weakness and headaches. Psychiatric/Behavioral: Negative for agitation and behavioral problems. The patient is not nervous/anxious.         Lab Results   Component Value Date    WBC 8.6 09/26/2019    HGB 14.0 09/26/2019    HCT 42.4 09/26/2019    MCV 86.2 09/26/2019     09/26/2019     Lab Results   Component Value Date     09/26/2019    K 4.7 09/26/2019     09/26/2019    CO2 27 09/26/2019    BUN 9 09/26/2019    CREATININE 0.6 09/26/2019    GLUCOSE 133 (H) 09/26/2019    CALCIUM 9.0 09/26/2019    PROT 6.8 09/26/2019    LABALBU 3.9 09/26/2019    BILITOT 0.3 09/26/2019    ALKPHOS 93 09/26/2019    AST 15 09/26/2019    ALT 11 09/26/2019    LABGLOM >60 09/26/2019    GFRAA >60 09/26/2019    AGRATIO 1.3 05/19/2016    GLOB 2.8 05/19/2016     Lab Results   Component Value Date    CHOL 183 05/17/2019    CHOL 273 (H) 05/19/2016    CHOL 245 (H) 05/19/2014     Lab Results   Component Value Date    TRIG 70 05/17/2019    TRIG 101 05/19/2016    TRIG 87 05/19/2014     Lab Results   Component Value Date    HDL 63 05/17/2019    HDL 60 05/19/2016    HDL 59 (L) 05/19/2014     Lab Results   Component Value Date    LDLCALC 193 (H) 05/19/2016    LDLCALC 169 (H) 05/19/2014    LDLCALC 163 (H) 11/18/2013     Lab Results   Component Value Date    LABA1C 7.5

## 2020-01-29 LAB — HBA1C MFR BLD: 9.9 %

## 2020-02-01 ASSESSMENT — ENCOUNTER SYMPTOMS
SORE THROAT: 0
CHEST TIGHTNESS: 0
BACK PAIN: 0
SINUS PRESSURE: 0
ABDOMINAL PAIN: 0
CONSTIPATION: 0
WHEEZING: 0
DIARRHEA: 0
COUGH: 0
SHORTNESS OF BREATH: 0

## 2020-04-29 ENCOUNTER — TELEMEDICINE (OUTPATIENT)
Dept: FAMILY MEDICINE CLINIC | Age: 70
End: 2020-04-29
Payer: MEDICARE

## 2020-04-29 PROCEDURE — 3017F COLORECTAL CA SCREEN DOC REV: CPT | Performed by: FAMILY MEDICINE

## 2020-04-29 PROCEDURE — 4040F PNEUMOC VAC/ADMIN/RCVD: CPT | Performed by: FAMILY MEDICINE

## 2020-04-29 PROCEDURE — 1123F ACP DISCUSS/DSCN MKR DOCD: CPT | Performed by: FAMILY MEDICINE

## 2020-04-29 PROCEDURE — 1090F PRES/ABSN URINE INCON ASSESS: CPT | Performed by: FAMILY MEDICINE

## 2020-04-29 PROCEDURE — G8399 PT W/DXA RESULTS DOCUMENT: HCPCS | Performed by: FAMILY MEDICINE

## 2020-04-29 PROCEDURE — 2022F DILAT RTA XM EVC RTNOPTHY: CPT | Performed by: FAMILY MEDICINE

## 2020-04-29 PROCEDURE — 99214 OFFICE O/P EST MOD 30 MIN: CPT | Performed by: FAMILY MEDICINE

## 2020-04-29 PROCEDURE — 3046F HEMOGLOBIN A1C LEVEL >9.0%: CPT | Performed by: FAMILY MEDICINE

## 2020-04-29 PROCEDURE — G8428 CUR MEDS NOT DOCUMENT: HCPCS | Performed by: FAMILY MEDICINE

## 2020-04-29 RX ORDER — INSULIN DETEMIR 100 [IU]/ML
35 INJECTION, SOLUTION SUBCUTANEOUS NIGHTLY
Qty: 10 PEN | Refills: 5 | Status: SHIPPED | OUTPATIENT
Start: 2020-04-29 | End: 2021-05-05

## 2020-04-29 RX ORDER — NICOTINE 21 MG/24HR
1 PATCH, TRANSDERMAL 24 HOURS TRANSDERMAL EVERY 24 HOURS
Qty: 42 PATCH | Refills: 0 | Status: SHIPPED | OUTPATIENT
Start: 2020-04-29 | End: 2021-03-15

## 2020-04-29 ASSESSMENT — ENCOUNTER SYMPTOMS
CONSTIPATION: 0
WHEEZING: 0
DIARRHEA: 0
ABDOMINAL PAIN: 0
SHORTNESS OF BREATH: 0
CHEST TIGHTNESS: 0
COUGH: 0
BACK PAIN: 0

## 2020-04-29 NOTE — PROGRESS NOTES
(PERICOLACE) 8.6-50 MG per tablet Take 1 tablet by mouth nightly  Alison Tapia MD   blood glucose monitor strips TIDAC and QHS  CLIVE Barrett CNP   Insulin Pen Needle 30G X 8 MM MISC 1 each by Does not apply route daily  CLIVE Barrett CNP   Lancets MISC TIDAC & QHS  CLIVE Barrett CNP       Social History     Tobacco Use    Smoking status: Current Every Day Smoker     Packs/day: 0.50     Years: 32.00     Pack years: 16.00     Types: Cigarettes     Start date:     Smokeless tobacco: Never Used    Tobacco comment: currently 0.5 PPD   Substance Use Topics    Alcohol use: No     Alcohol/week: 0.0 standard drinks    Drug use: No        Allergies   Allergen Reactions    Codeine Rash    Hydrocodone-Acetaminophen Nausea And Vomiting   ,   Past Medical History:   Diagnosis Date    Arthritis     Diabetes mellitus ([address]     Hypertension     Mixed hyperlipidemia 2016   ,   Past Surgical History:   Procedure Laterality Date     SECTION      x2    HERNIA REPAIR      OTHER SURGICAL HISTORY  12/10/13    Left AC lipoma excision    OTHER SURGICAL HISTORY  12/10/13    Left flank lipoma excision   ,   Social History     Tobacco Use    Smoking status: Current Every Day Smoker     Packs/day: 0.50     Years: 32.00     Pack years: 16.00     Types: Cigarettes     Start date:     Smokeless tobacco: Never Used    Tobacco comment: currently 0.5 PPD   Substance Use Topics    Alcohol use: No     Alcohol/week: 0.0 standard drinks    Drug use: No   ,   Family History   Problem Relation Age of Onset    Diabetes Mother     Heart Disease Mother     High Blood Pressure Father     Diabetes Sister     High Blood Pressure Sister     Stroke Sister     Cancer Brother         Lung    High Blood Pressure Brother     Stroke Brother     Diabetes Brother     Diabetes Maternal Grandmother     Heart Disease Maternal Grandfather     Cancer Brother         Lung (smoker)       PHYSICAL

## 2020-07-02 ENCOUNTER — OFFICE VISIT (OUTPATIENT)
Dept: FAMILY MEDICINE CLINIC | Age: 70
End: 2020-07-02
Payer: MEDICARE

## 2020-07-02 VITALS
BODY MASS INDEX: 37.54 KG/M2 | WEIGHT: 186.2 LBS | OXYGEN SATURATION: 96 % | DIASTOLIC BLOOD PRESSURE: 78 MMHG | HEART RATE: 84 BPM | SYSTOLIC BLOOD PRESSURE: 118 MMHG | HEIGHT: 59 IN

## 2020-07-02 PROCEDURE — 1123F ACP DISCUSS/DSCN MKR DOCD: CPT | Performed by: FAMILY MEDICINE

## 2020-07-02 PROCEDURE — 4040F PNEUMOC VAC/ADMIN/RCVD: CPT | Performed by: FAMILY MEDICINE

## 2020-07-02 PROCEDURE — G0439 PPPS, SUBSEQ VISIT: HCPCS | Performed by: FAMILY MEDICINE

## 2020-07-02 PROCEDURE — 3017F COLORECTAL CA SCREEN DOC REV: CPT | Performed by: FAMILY MEDICINE

## 2020-07-02 ASSESSMENT — PATIENT HEALTH QUESTIONNAIRE - PHQ9
SUM OF ALL RESPONSES TO PHQ QUESTIONS 1-9: 0
SUM OF ALL RESPONSES TO PHQ QUESTIONS 1-9: 0

## 2020-07-02 ASSESSMENT — LIFESTYLE VARIABLES: HOW OFTEN DO YOU HAVE A DRINK CONTAINING ALCOHOL: 0

## 2020-07-02 NOTE — PATIENT INSTRUCTIONS
Personalized Preventive Plan for Julianna Powers - 7/2/2020  Medicare offers a range of preventive health benefits. Some of the tests and screenings are paid in full while other may be subject to a deductible, co-insurance, and/or copay. Some of these benefits include a comprehensive review of your medical history including lifestyle, illnesses that may run in your family, and various assessments and screenings as appropriate. After reviewing your medical record and screening and assessments performed today your provider may have ordered immunizations, labs, imaging, and/or referrals for you. A list of these orders (if applicable) as well as your Preventive Care list are included within your After Visit Summary for your review. Other Preventive Recommendations:    · A preventive eye exam performed by an eye specialist is recommended every 1-2 years to screen for glaucoma; cataracts, macular degeneration, and other eye disorders. · A preventive dental visit is recommended every 6 months. · Try to get at least 150 minutes of exercise per week or 10,000 steps per day on a pedometer . · Order or download the FREE \"Exercise & Physical Activity: Your Everyday Guide\" from [employer]. Call 2-801.812.8758 or search [employer] online. · You need 6586-3598 mg of calcium and 3848-1390 IU of vitamin D per day. It is possible to meet your calcium requirement with diet alone, but a vitamin D supplement is usually necessary to meet this goal.  · When exposed to the sun, use a sunscreen that protects against both UVA and UVB radiation with an SPF of 30 or greater. Reapply every 2 to 3 hours or after sweating, drying off with a towel, or swimming. · Always wear a seat belt when traveling in a car. Always wear a helmet when riding a bicycle or motorcycle.

## 2020-07-02 NOTE — PROGRESS NOTES
Medicare Annual Wellness Visit  Name: Kaleigh Acosta Date: 2020   MRN: [id number] Sex: Female   Age: 77 y.o. Ethnicity: Non-/Non    : 1950 Race: Black      Avril Lechuga is here for Medicare AWV    Screenings for behavioral, psychosocial and functional/safety risks, and cognitive dysfunction are all negative except as indicated below. These results, as well as other patient data from the [address] form, are documented in Flowsheets linked to this Encounter. Allergies   Allergen Reactions    Codeine Rash    Hydrocodone-Acetaminophen Nausea And Vomiting         Prior to Visit Medications    Medication Sig Taking?  Authorizing Provider   nicotine (NICODERM CQ) 21 MG/24HR Place 1 patch onto the skin every 24 hours Yes Sanekt Rocha MD   insulin detemir (LEVEMIR FLEXTOUCH) 100 UNIT/ML injection pen Inject 35 Units into the skin nightly Yes Sanket Rocha MD   SITagliptin (JANUVIA) 100 MG tablet Take 1 tablet by mouth daily Yes Sanket Rocha MD   buPROPion (WELLBUTRIN SR) 150 MG extended release tablet Take 1 tablet by mouth 2 times daily Yes Sanket Rocha MD   pravastatin (PRAVACHOL) 40 MG tablet Take 1 tablet by mouth daily Yes Sanket Rocha MD   senna-docusate (PERICOLACE) 8.6-50 MG per tablet Take 1 tablet by mouth nightly Yes Sanket Rocha MD   blood glucose monitor strips TIDAC and QHS Yes CLIVE Meyers CNP   Insulin Pen Needle 30G X 8 MM MISC 1 each by Does not apply route daily Yes CLIVE Meyers CNP   Lancets [facility] QHS Yes CLIVE Meyers CNP         Past Medical History:   Diagnosis Date    Arthritis     Diabetes mellitus ([address]     Hypertension     Mixed hyperlipidemia 2016       Past Surgical History:   Procedure Laterality Date     SECTION      x2    HERNIA REPAIR      OTHER SURGICAL HISTORY  12/10/13    Left AC lipoma excision    OTHER SURGICAL HISTORY  12/10/13    Left flank lipoma excision Family History   Problem Relation Age of Onset    Diabetes Mother     Heart Disease Mother     High Blood Pressure Father     Diabetes Sister     High Blood Pressure Sister     Stroke Sister     Cancer Brother         Lung    High Blood Pressure Brother     Stroke Brother     Diabetes Brother     Diabetes Maternal Grandmother     Heart Disease Maternal Grandfather     Cancer Brother         Lung (smoker)       CareTeam (Including outside providers/suppliers regularly involved in providing care):   Patient Care Team:  Tawanda Lester MD as PCP - General (Family Medicine)  Tawanda Lester MD as PCP - [facility] Empaneled Provider    Wt Readings from Last 3 Encounters:   07/02/20 186 lb 3.2 oz (84.5 kg)   01/27/20 179 lb 3.2 oz (81.3 kg)   09/26/19 176 lb (79.8 kg)     Vitals:    07/02/20 1044   BP: 118/78   Site: Left Upper Arm   Position: Sitting   Cuff Size: Large Adult   Pulse: 84   SpO2: 96%   Weight: 186 lb 3.2 oz (84.5 kg)   Height: 4' 11\" (1.499 m)     Body mass index is 37.61 kg/m². Based upon direct observation of the patient, evaluation of cognition reveals recent and remote memory intact. Patient's complete Health Risk Assessment and screening values have been reviewed and are found in Flowsheets. The following problems were reviewed today and where indicated follow up appointments were made and/or referrals ordered.     Positive Risk Factor Screenings with Interventions:     Substance Abuse:  Social History     Tobacco History     Smoking Status  Current Every Day Smoker Smoking Start Date  1/1/1986 Smoking Frequency  0.5 packs/day for 32 years (16 pk yrs) Smoking Tobacco Type  Cigarettes    Smokeless Tobacco Use  Never Used    Tobacco Comment  currently 0.5 PPD          Alcohol History     Alcohol Use Status  No          Drug Use     Drug Use Status  No          Sexual Activity     Sexually Active  Not Currently Partners  Male               Audit Questionnaire: Screen for Alcohol Misuse  How often do you have a drink containing alcohol?: Never  Substance Abuse Interventions:  · she is on nicotin patches    General Health:  General  In general, how would you say your health is?: Good  In the past 7 days, have you experienced any of the following? New or Increased Pain, New or Increased Fatigue, Loneliness, Social Isolation, Stress or Anger?: None of These  Do you get the social and emotional support that you need?: Yes  Do you have a Living Will?: (!) No  General Health Risk Interventions:  · need to work on the living will    Health Habits/Nutrition:  Health Habits/Nutrition  Do you exercise for at least 20 minutes 2-3 times per week?: (!) No  Have you lost any weight without trying in the past 3 months?: No  Do you eat fewer than 2 meals per day?: No  Have you seen a dentist within the past year?: (!) No  Body mass index is 37.61 kg/m².   Health Habits/Nutrition Interventions:  · need to see a dentist    Hearing/Vision:  No exam data present  Hearing/Vision  Do you or your family notice any trouble with your hearing?: No  Do you have difficulty driving, watching TV, or doing any of your daily activities because of your eyesight?: No  Have you had an eye exam within the past year?: (!) No  Hearing/Vision Interventions:  · both okay    Safety:  Safety  Do you have working smoke detectors?: Yes  Have all throw rugs been removed or fastened?: Yes  Do you have non-slip mats or surfaces in all bathtubs/showers?: (!) No  Are your doorways, halls and stairs free of clutter?: Yes  Do you always fasten your seatbelt when you are in a car?: Yes  Safety Interventions:  · lives by herself, she is taking care of her parents    Personalized Preventive Plan   Current Health Maintenance Status  Immunization History   Administered Date(s) Administered    Pneumococcal Conjugate 13-valent ([id number]) 11/06/2017    Pneumococcal Polysaccharide ([id number]) 05/12/2016        Health Maintenance   Topic Date Due  DTaP/Tdap/Td vaccine (1 - Tdap) 08/24/1970    Shingles Vaccine (1 of 2) 08/24/2001    Diabetic retinal exam  07/28/2018    Annual Wellness Visit (AWV)  05/29/2019    A1C test (Diabetic or Prediabetic)  04/29/2020    Lipid screen  05/17/2020    Diabetic foot exam  05/21/2020    Diabetic microalbuminuria test  05/21/2020    Colon Cancer Screen FIT/FOBT  05/30/2020    Flu vaccine (1) 09/01/2020    Pneumococcal 65+ years Vaccine (2 of 2 - PPSV23) 05/12/2021    Breast cancer screen  06/04/2021    DEXA (modify frequency per FRAX score)  Completed    Hepatitis C screen  Completed    Hepatitis A vaccine  Aged Out    Hib vaccine  Aged Out    Meningococcal (ACWY) vaccine  Aged Out     Recommendations for [employer] Due: see orders and patient instructions/AVS.  . Recommended screening schedule for the next 5-10 years is provided to the patient in written form: see Patient Instructions/AVS.    Frank Corfu was seen today for medicare awv.     Diagnoses and all orders for this visit:    Routine general medical examination at a health care facility          Will bring her in one month to check her A1C

## 2020-07-27 RX ORDER — GLUCOSAMINE HCL/CHONDROITIN SU 500-400 MG
CAPSULE ORAL
Qty: 100 STRIP | Refills: 5 | Status: SHIPPED | OUTPATIENT
Start: 2020-07-27 | End: 2021-12-01 | Stop reason: SDUPTHER

## 2020-08-12 ENCOUNTER — OFFICE VISIT (OUTPATIENT)
Dept: FAMILY MEDICINE CLINIC | Age: 70
End: 2020-08-12
Payer: MEDICARE

## 2020-08-12 VITALS
SYSTOLIC BLOOD PRESSURE: 122 MMHG | OXYGEN SATURATION: 95 % | HEIGHT: 59 IN | HEART RATE: 89 BPM | TEMPERATURE: 97.5 F | BODY MASS INDEX: 37.29 KG/M2 | WEIGHT: 185 LBS | DIASTOLIC BLOOD PRESSURE: 76 MMHG

## 2020-08-12 PROBLEM — E66.01 MORBIDLY OBESE (HCC): Status: ACTIVE | Noted: 2020-08-12

## 2020-08-12 LAB — HBA1C MFR BLD: 6.7 %

## 2020-08-12 PROCEDURE — 4004F PT TOBACCO SCREEN RCVD TLK: CPT | Performed by: FAMILY MEDICINE

## 2020-08-12 PROCEDURE — 1123F ACP DISCUSS/DSCN MKR DOCD: CPT | Performed by: FAMILY MEDICINE

## 2020-08-12 PROCEDURE — G8417 CALC BMI ABV UP PARAM F/U: HCPCS | Performed by: FAMILY MEDICINE

## 2020-08-12 PROCEDURE — 99214 OFFICE O/P EST MOD 30 MIN: CPT | Performed by: FAMILY MEDICINE

## 2020-08-12 PROCEDURE — 1090F PRES/ABSN URINE INCON ASSESS: CPT | Performed by: FAMILY MEDICINE

## 2020-08-12 PROCEDURE — 2022F DILAT RTA XM EVC RTNOPTHY: CPT | Performed by: FAMILY MEDICINE

## 2020-08-12 PROCEDURE — G8427 DOCREV CUR MEDS BY ELIG CLIN: HCPCS | Performed by: FAMILY MEDICINE

## 2020-08-12 PROCEDURE — G8399 PT W/DXA RESULTS DOCUMENT: HCPCS | Performed by: FAMILY MEDICINE

## 2020-08-12 PROCEDURE — 3046F HEMOGLOBIN A1C LEVEL >9.0%: CPT | Performed by: FAMILY MEDICINE

## 2020-08-12 PROCEDURE — 83036 HEMOGLOBIN GLYCOSYLATED A1C: CPT | Performed by: FAMILY MEDICINE

## 2020-08-12 PROCEDURE — 4040F PNEUMOC VAC/ADMIN/RCVD: CPT | Performed by: FAMILY MEDICINE

## 2020-08-12 PROCEDURE — 3017F COLORECTAL CA SCREEN DOC REV: CPT | Performed by: FAMILY MEDICINE

## 2020-08-12 RX ORDER — NYSTATIN 100000 U/G
CREAM TOPICAL
Qty: 60 G | Refills: 0 | Status: SHIPPED | OUTPATIENT
Start: 2020-08-12 | End: 2021-07-09

## 2020-08-12 NOTE — PROGRESS NOTES
Tristen Manuel  1950  08/15/20    Chief Complaint   Patient presents with    3 Month Follow-Up    Diabetes    Hyperlipidemia    Anxiety    Depression           For 3 months f/u regarding DM, HLD, depression, and anxiety, patient doing fine and has no complaints, her BS much better 110- 120 fasting AM, , we increased her levemir last visit to 35 units and , patient stats her anxiety under control, and she is taking her cholesterol medication with no side effects.        Past Medical History:   Diagnosis Date    Arthritis     Diabetes mellitus ([address]     Hypertension     Mixed hyperlipidemia 2016     Past Surgical History:   Procedure Laterality Date     SECTION      x2    HERNIA REPAIR      OTHER SURGICAL HISTORY  12/10/13    Left AC lipoma excision    OTHER SURGICAL HISTORY  12/10/13    Left flank lipoma excision     Family History   Problem Relation Age of Onset    Diabetes Mother     Heart Disease Mother     High Blood Pressure Father     Diabetes Sister     High Blood Pressure Sister     Stroke Sister     Cancer Brother         Lung    High Blood Pressure Brother     Stroke Brother     Diabetes Brother     Diabetes Maternal Grandmother     Heart Disease Maternal Grandfather     Cancer Brother         Lung (smoker)     Social History     Socioeconomic History    Marital status:      Spouse name: Not on file    Number of children: Not on file    Years of education: Not on file    Highest education level: Not on file   Occupational History    Occupation: employed-[employer]er   Social Needs    Financial resource strain: Not on file    Food insecurity     Worry: Not on file     Inability: Not on file   [employer] needs     Medical: Not on file     Non-medical: Not on file   Tobacco Use    Smoking status: Current Every Day Smoker     Packs/day: 0.50     Years: 32.00     Pack years: 16.00     Types: Cigarettes     Start date:     Smokeless tobacco: Never Used    Tobacco comment: currently 0.5 PPD   Substance and Sexual Activity    Alcohol use: No     Alcohol/week: 0.0 standard drinks    Drug use: No    Sexual activity: Not Currently     Partners: Male   Lifestyle    Physical activity     Days per week: Not on file     Minutes per session: Not on file    Stress: Not on file   Relationships    Social connections     Talks on phone: Not on file     Gets together: Not on file     Attends Oriental orthodox service: Not on file     Active member of club or organization: Not on file     Attends meetings of clubs or organizations: Not on file     Relationship status: Not on file    Intimate partner violence     Fear of current or ex partner: Not on file     Emotionally abused: Not on file     Physically abused: Not on file     Forced sexual activity: Not on file   Other Topics Concern    Not on file   Social History Narrative    Not on file       Allergies   Allergen Reactions    Codeine Rash    Hydrocodone-Acetaminophen Nausea And Vomiting     Current Outpatient Medications   Medication Sig Dispense Refill    nystatin (MYCOSTATIN) 894054 UNIT/GM cream Apply topically 2 times daily.  60 g 0    Insulin Pen Needle 30G X 8 MM MISC 1 each by Does not apply route daily 100 each 5    blood glucose monitor strips TIDAC and  strip 5    insulin detemir (LEVEMIR FLEXTOUCH) 100 UNIT/ML injection pen Inject 35 Units into the skin nightly 10 pen 5    SITagliptin (JANUVIA) 100 MG tablet Take 1 tablet by mouth daily 30 tablet 5    buPROPion (WELLBUTRIN SR) 150 MG extended release tablet Take 1 tablet by mouth 2 times daily 60 tablet 5    pravastatin (PRAVACHOL) 40 MG tablet Take 1 tablet by mouth daily 90 tablet 3    senna-docusate (PERICOLACE) 8.6-50 MG per tablet Take 1 tablet by mouth nightly 60 tablet 5    Lancets MISC TIDAC &  each 3    nicotine (NICODERM CQ) 21 MG/24HR Place 1 patch onto the skin every 24 hours 42 patch 0     No current facility-administered medications for this visit. Review of Systems   Constitutional: Negative for activity change, appetite change, chills, fatigue and fever. HENT: Negative for congestion. Respiratory: Negative for cough, chest tightness, shortness of breath and wheezing. Cardiovascular: Negative for chest pain and leg swelling. Gastrointestinal: Negative for abdominal pain, constipation and diarrhea. Genitourinary: Negative for dysuria and frequency. Musculoskeletal: Negative for back pain and gait problem. Neurological: Negative for dizziness and headaches. Psychiatric/Behavioral: Negative for agitation and behavioral problems. The patient is not nervous/anxious.         Lab Results   Component Value Date    WBC 8.6 09/26/2019    HGB 14.0 09/26/2019    HCT 42.4 09/26/2019    MCV 86.2 09/26/2019     09/26/2019     Lab Results   Component Value Date     09/26/2019    K 4.7 09/26/2019     09/26/2019    CO2 27 09/26/2019    BUN 9 09/26/2019    CREATININE 0.6 09/26/2019    GLUCOSE 133 (H) 09/26/2019    CALCIUM 9.0 09/26/2019    PROT 6.8 09/26/2019    LABALBU 3.9 09/26/2019    BILITOT 0.3 09/26/2019    ALKPHOS 93 09/26/2019    AST 15 09/26/2019    ALT 11 09/26/2019    LABGLOM >60 09/26/2019    GFRAA >60 09/26/2019    AGRATIO 1.3 05/19/2016    GLOB 2.8 05/19/2016     Lab Results   Component Value Date    CHOL 183 05/17/2019    CHOL 273 (H) 05/19/2016    CHOL 245 (H) 05/19/2014     Lab Results   Component Value Date    TRIG 70 05/17/2019    TRIG 101 05/19/2016    TRIG 87 05/19/2014     Lab Results   Component Value Date    HDL 63 05/17/2019    HDL 60 05/19/2016    HDL 59 (L) 05/19/2014     Lab Results   Component Value Date    LDLCALC 193 (H) 05/19/2016    LDLCALC 169 (H) 05/19/2014    LDLCALC 163 (H) 11/18/2013     Lab Results   Component Value Date    LABA1C 9.9 01/29/2020     Lab Results   Component Value Date    TSH 1.99 05/19/2016    TSHHS 1.200 05/17/2019         BP 122/76 (Site: Left Upper Arm, Position: Sitting)   Pulse 89   Temp 97.5 °F (36.4 °C)   Ht 4' 11\" (1.499 m)   Wt 185 lb (83.9 kg)   SpO2 95%   BMI 37.37 kg/m²     BP Readings from Last 3 Encounters:   08/12/20 122/76   07/02/20 118/78   01/27/20 116/78       Wt Readings from Last 3 Encounters:   08/12/20 185 lb (83.9 kg)   07/02/20 186 lb 3.2 oz (84.5 kg)   01/27/20 179 lb 3.2 oz (81.3 kg)         Physical Exam  Constitutional:       General: She is not in acute distress. Appearance: Normal appearance. She is well-developed. She is not diaphoretic. HENT:      Head: Normocephalic and atraumatic. Eyes:      General: No scleral icterus. Neck:      Musculoskeletal: Normal range of motion and neck supple. Thyroid: No thyromegaly. Cardiovascular:      Rate and Rhythm: Normal rate and regular rhythm. Heart sounds: Normal heart sounds. No murmur. Pulmonary:      Effort: Pulmonary effort is normal.      Breath sounds: Normal breath sounds. No wheezing or rales. Abdominal:      Palpations: There is no mass. Musculoskeletal: Normal range of motion. Right lower leg: No edema. Left lower leg: No edema. Lymphadenopathy:      Cervical: No cervical adenopathy. Neurological:      Mental Status: She is alert and oriented to person, place, and time. Cranial Nerves: No cranial nerve deficit. Motor: No abnormal muscle tone. Coordination: Coordination normal.   Psychiatric:         Mood and Affect: Mood normal.         Behavior: Behavior normal.         ASSESSMENT/ PLAN:    1. Type 2 diabetes mellitus without complication, unspecified whether long term insulin use (HCC)  - getting much better  - MICROALBUMIN / CREATININE URINE RATIO; Future  -  DIABETES FOOT EXAM  - POCT glycosylated hemoglobin (Hb A1C)  - Insulin Pen Needle 30G X 8 MM MISC; 1 each by Does not apply route daily  Dispense: 100 each; Refill: 5    2. Mixed hyperlipidemia  - stable  - LIPID PANEL; Future    3.

## 2020-08-15 ASSESSMENT — ENCOUNTER SYMPTOMS
CONSTIPATION: 0
BACK PAIN: 0
DIARRHEA: 0
WHEEZING: 0
SHORTNESS OF BREATH: 0
COUGH: 0
CHEST TIGHTNESS: 0
ABDOMINAL PAIN: 0

## 2021-03-15 ENCOUNTER — OFFICE VISIT (OUTPATIENT)
Dept: FAMILY MEDICINE CLINIC | Age: 71
End: 2021-03-15
Payer: MEDICARE

## 2021-03-15 VITALS
BODY MASS INDEX: 39.91 KG/M2 | SYSTOLIC BLOOD PRESSURE: 114 MMHG | HEART RATE: 87 BPM | WEIGHT: 197.6 LBS | OXYGEN SATURATION: 97 % | DIASTOLIC BLOOD PRESSURE: 76 MMHG

## 2021-03-15 DIAGNOSIS — F17.200 TOBACCO DEPENDENCE: ICD-10-CM

## 2021-03-15 DIAGNOSIS — Z79.4 TYPE 2 DIABETES MELLITUS WITHOUT COMPLICATION, WITH LONG-TERM CURRENT USE OF INSULIN (HCC): Primary | ICD-10-CM

## 2021-03-15 DIAGNOSIS — F41.9 ANXIETY: ICD-10-CM

## 2021-03-15 DIAGNOSIS — E78.2 MIXED HYPERLIPIDEMIA: ICD-10-CM

## 2021-03-15 DIAGNOSIS — Z12.31 ENCOUNTER FOR SCREENING MAMMOGRAM FOR BREAST CANCER: ICD-10-CM

## 2021-03-15 DIAGNOSIS — E11.9 TYPE 2 DIABETES MELLITUS WITHOUT COMPLICATION, WITH LONG-TERM CURRENT USE OF INSULIN (HCC): Primary | ICD-10-CM

## 2021-03-15 DIAGNOSIS — Z12.11 COLON CANCER SCREENING: ICD-10-CM

## 2021-03-15 LAB
CREATININE URINE POCT: NORMAL
HBA1C MFR BLD: 7.9 %
MICROALBUMIN/CREAT 24H UR: NORMAL MG/G{CREAT}
MICROALBUMIN/CREAT UR-RTO: NORMAL

## 2021-03-15 PROCEDURE — 1090F PRES/ABSN URINE INCON ASSESS: CPT | Performed by: FAMILY MEDICINE

## 2021-03-15 PROCEDURE — 3017F COLORECTAL CA SCREEN DOC REV: CPT | Performed by: FAMILY MEDICINE

## 2021-03-15 PROCEDURE — 83036 HEMOGLOBIN GLYCOSYLATED A1C: CPT | Performed by: FAMILY MEDICINE

## 2021-03-15 PROCEDURE — 4004F PT TOBACCO SCREEN RCVD TLK: CPT | Performed by: FAMILY MEDICINE

## 2021-03-15 PROCEDURE — 4040F PNEUMOC VAC/ADMIN/RCVD: CPT | Performed by: FAMILY MEDICINE

## 2021-03-15 PROCEDURE — G8399 PT W/DXA RESULTS DOCUMENT: HCPCS | Performed by: FAMILY MEDICINE

## 2021-03-15 PROCEDURE — 3051F HG A1C>EQUAL 7.0%<8.0%: CPT | Performed by: FAMILY MEDICINE

## 2021-03-15 PROCEDURE — 2022F DILAT RTA XM EVC RTNOPTHY: CPT | Performed by: FAMILY MEDICINE

## 2021-03-15 PROCEDURE — 1123F ACP DISCUSS/DSCN MKR DOCD: CPT | Performed by: FAMILY MEDICINE

## 2021-03-15 PROCEDURE — G8417 CALC BMI ABV UP PARAM F/U: HCPCS | Performed by: FAMILY MEDICINE

## 2021-03-15 PROCEDURE — G8427 DOCREV CUR MEDS BY ELIG CLIN: HCPCS | Performed by: FAMILY MEDICINE

## 2021-03-15 PROCEDURE — 99214 OFFICE O/P EST MOD 30 MIN: CPT | Performed by: FAMILY MEDICINE

## 2021-03-15 PROCEDURE — G8484 FLU IMMUNIZE NO ADMIN: HCPCS | Performed by: FAMILY MEDICINE

## 2021-03-15 PROCEDURE — 82044 UR ALBUMIN SEMIQUANTITATIVE: CPT | Performed by: FAMILY MEDICINE

## 2021-03-15 ASSESSMENT — ENCOUNTER SYMPTOMS
WHEEZING: 0
SHORTNESS OF BREATH: 0
COUGH: 0
CHEST TIGHTNESS: 0
ABDOMINAL PAIN: 0
BACK PAIN: 0

## 2021-03-15 NOTE — PROGRESS NOTES
Sebas Sorto  1950  03/15/21    Chief Complaint   Patient presents with    6 Month Follow-Up    Diabetes    Hyperlipidemia    Anxiety    Depression           Patient here for 6 months f/u regarding DM, HLD, depression, and anxiety, patient doing fine and has no complaints, her BS okay, she gained weight and she has not been watching her diet, patient stats her anxiety under control, she stopped taking the Wellbutrin. She is taking her cholesterol medication with no side effects.        Past Medical History:   Diagnosis Date    Arthritis     Diabetes mellitus ([address]     Hypertension     Mixed hyperlipidemia 2016     Past Surgical History:   Procedure Laterality Date     SECTION      x2    HERNIA REPAIR      OTHER SURGICAL HISTORY  12/10/13    Left AC lipoma excision    OTHER SURGICAL HISTORY  12/10/13    Left flank lipoma excision     Family History   Problem Relation Age of Onset    Diabetes Mother     Heart Disease Mother     High Blood Pressure Father     Diabetes Sister     High Blood Pressure Sister     Stroke Sister     Cancer Brother         Lung    High Blood Pressure Brother     Stroke Brother     Diabetes Brother     Diabetes Maternal Grandmother     Heart Disease Maternal Grandfather     Cancer Brother         Lung (smoker)     Social History     Socioeconomic History    Marital status:      Spouse name: Not on file    Number of children: Not on file    Years of education: Not on file    Highest education level: Not on file   Occupational History    Occupation: employed-[employer]   Social Needs    Financial resource strain: Not on file    Food insecurity     Worry: Not on file     Inability: Not on file   [employer] needs     Medical: Not on file     Non-medical: Not on file   Tobacco Use    Smoking status: Current Every Day Smoker     Packs/day: 0.50     Years: 32.00     Pack years: 16.00     Types: Cigarettes     Start date:  [facility] Smokeless tobacco: Never Used    Tobacco comment: currently 0.5 PPD   Substance and Sexual Activity    Alcohol use: No     Alcohol/week: 0.0 standard drinks    Drug use: No    Sexual activity: Not Currently     Partners: Male   Lifestyle    Physical activity     Days per week: Not on file     Minutes per session: Not on file    Stress: Not on file   Relationships    Social connections     Talks on phone: Not on file     Gets together: Not on file     Attends Zoroastrian service: Not on file     Active member of club or organization: Not on file     Attends meetings of clubs or organizations: Not on file     Relationship status: Not on file    Intimate partner violence     Fear of current or ex partner: Not on file     Emotionally abused: Not on file     Physically abused: Not on file     Forced sexual activity: Not on file   Other Topics Concern    Not on file   Social History Narrative    Not on file       Allergies   Allergen Reactions    Codeine Rash    Hydrocodone-Acetaminophen Nausea And Vomiting     Current Outpatient Medications   Medication Sig Dispense Refill    nystatin (MYCOSTATIN) 261151 UNIT/GM cream Apply topically 2 times daily. 60 g 0    Insulin Pen Needle 30G X 8 MM MISC 1 each by Does not apply route daily 100 each 5    blood glucose monitor strips TIDAC and  strip 5    insulin detemir (LEVEMIR FLEXTOUCH) 100 UNIT/ML injection pen Inject 35 Units into the skin nightly 10 pen 5    SITagliptin (JANUVIA) 100 MG tablet Take 1 tablet by mouth daily 30 tablet 5    pravastatin (PRAVACHOL) 40 MG tablet Take 1 tablet by mouth daily 90 tablet 3    senna-docusate (PERICOLACE) 8.6-50 MG per tablet Take 1 tablet by mouth nightly 60 tablet 5    Lancets MISC TIDAC &  each 3     No current facility-administered medications for this visit. Review of Systems   Constitutional: Negative for activity change, appetite change, chills, fatigue and fever.    HENT: Negative for congestion. Respiratory: Negative for cough, chest tightness, shortness of breath and wheezing. Cardiovascular: Negative for chest pain and leg swelling. Gastrointestinal: Negative for abdominal pain. Genitourinary: Negative for dysuria and frequency. Musculoskeletal: Negative for back pain. Neurological: Negative for dizziness and headaches. Psychiatric/Behavioral: Negative for agitation, behavioral problems and sleep disturbance. The patient is not nervous/anxious.         Lab Results   Component Value Date    WBC 8.6 09/26/2019    HGB 14.0 09/26/2019    HCT 42.4 09/26/2019    MCV 86.2 09/26/2019     09/26/2019     Lab Results   Component Value Date     09/26/2019    K 4.7 09/26/2019     09/26/2019    CO2 27 09/26/2019    BUN 9 09/26/2019    CREATININE 0.6 09/26/2019    GLUCOSE 133 (H) 09/26/2019    CALCIUM 9.0 09/26/2019    PROT 6.8 09/26/2019    LABALBU 3.9 09/26/2019    BILITOT 0.3 09/26/2019    ALKPHOS 93 09/26/2019    AST 15 09/26/2019    ALT 11 09/26/2019    LABGLOM >60 09/26/2019    GFRAA >60 09/26/2019    AGRATIO 1.3 05/19/2016    GLOB 2.8 05/19/2016     Lab Results   Component Value Date    CHOL 183 05/17/2019    CHOL 273 (H) 05/19/2016    CHOL 245 (H) 05/19/2014     Lab Results   Component Value Date    TRIG 70 05/17/2019    TRIG 101 05/19/2016    TRIG 87 05/19/2014     Lab Results   Component Value Date    HDL 63 05/17/2019    HDL 60 05/19/2016    HDL 59 (L) 05/19/2014     Lab Results   Component Value Date    LDLCALC 193 (H) 05/19/2016    LDLCALC 169 (H) 05/19/2014    LDLCALC 163 (H) 11/18/2013     Lab Results   Component Value Date    LABA1C 7.9 03/15/2021     Lab Results   Component Value Date    TSH 1.99 05/19/2016    TSHHS 1.200 05/17/2019         /76 (Site: Left Upper Arm, Position: Sitting, Cuff Size: Large Adult)   Pulse 87   Wt 197 lb 9.6 oz (89.6 kg)   SpO2 97%   BMI 39.91 kg/m²     BP Readings from Last 3 Encounters:   03/15/21 114/76   08/12/20 122/76   07/02/20 118/78       Wt Readings from Last 3 Encounters:   03/15/21 197 lb 9.6 oz (89.6 kg)   08/12/20 185 lb (83.9 kg)   07/02/20 186 lb 3.2 oz (84.5 kg)         Physical Exam  Constitutional:       General: She is not in acute distress. Appearance: Normal appearance. She is well-developed. She is not ill-appearing or diaphoretic. HENT:      Head: Normocephalic and atraumatic. Eyes:      General: No scleral icterus. Neck:      Musculoskeletal: Normal range of motion and neck supple. Thyroid: No thyromegaly. Cardiovascular:      Rate and Rhythm: Normal rate and regular rhythm. Heart sounds: Normal heart sounds. No murmur. Pulmonary:      Effort: Pulmonary effort is normal.      Breath sounds: Normal breath sounds. No wheezing or rales. Musculoskeletal: Normal range of motion. Right lower leg: No edema. Left lower leg: No edema. Lymphadenopathy:      Cervical: No cervical adenopathy. Neurological:      General: No focal deficit present. Mental Status: She is alert and oriented to person, place, and time. Motor: No abnormal muscle tone. Psychiatric:         Mood and Affect: Mood normal.         Behavior: Behavior normal.         ASSESSMENT/ PLAN:    1. Type 2 diabetes mellitus without complication, with long-term current use of insulin (HCC)  -The A1c went up refused to change the medication she will go back watch her diet and lose weight  - POCT glycosylated hemoglobin (Hb A1C)  - POCT microalbumin    2. Mixed hyperlipidemia  -Stable  - Lipid Panel; Future    3. Anxiety  -Getting much better, stop taking the Wellbutrin    4. Tobacco dependence  -Encourage smoking cessation    5. Encounter for screening mammogram for breast cancer  - PAOLO DIGITAL SCREEN W CAD BILATERAL; Future    6. Colon cancer screening  - POCT Fecal Immunochemical Test (FIT); Future              - All old blood work reviewed with the patient  - Appropriate prescription are addressed.   -

## 2021-03-17 DIAGNOSIS — E78.2 MIXED HYPERLIPIDEMIA: ICD-10-CM

## 2021-03-17 DIAGNOSIS — Z12.11 COLON CANCER SCREENING: ICD-10-CM

## 2021-03-17 LAB
CHOLESTEROL, TOTAL: 309 MG/DL (ref 0–199)
CONTROL: PRESENT
HDLC SERPL-MCNC: 60 MG/DL (ref 40–60)
HEMOCCULT STL QL: NORMAL
LDL CHOLESTEROL CALCULATED: 224 MG/DL
TRIGL SERPL-MCNC: 126 MG/DL (ref 0–150)
VLDLC SERPL CALC-MCNC: 25 MG/DL

## 2021-03-17 PROCEDURE — 82274 ASSAY TEST FOR BLOOD FECAL: CPT | Performed by: FAMILY MEDICINE

## 2021-03-22 DIAGNOSIS — E78.2 MIXED HYPERLIPIDEMIA: ICD-10-CM

## 2021-03-22 RX ORDER — PRAVASTATIN SODIUM 80 MG/1
80 TABLET ORAL DAILY
Qty: 90 TABLET | Refills: 3 | Status: SHIPPED | OUTPATIENT
Start: 2021-03-22

## 2021-04-01 DIAGNOSIS — E11.9 TYPE 2 DIABETES MELLITUS WITHOUT COMPLICATION, WITH LONG-TERM CURRENT USE OF INSULIN (HCC): ICD-10-CM

## 2021-04-01 DIAGNOSIS — Z79.4 TYPE 2 DIABETES MELLITUS WITHOUT COMPLICATION, WITH LONG-TERM CURRENT USE OF INSULIN (HCC): ICD-10-CM

## 2021-05-04 DIAGNOSIS — E11.9 TYPE 2 DIABETES MELLITUS WITHOUT COMPLICATION, WITH LONG-TERM CURRENT USE OF INSULIN (HCC): ICD-10-CM

## 2021-05-04 DIAGNOSIS — Z79.4 TYPE 2 DIABETES MELLITUS WITHOUT COMPLICATION, WITH LONG-TERM CURRENT USE OF INSULIN (HCC): ICD-10-CM

## 2021-05-05 RX ORDER — INSULIN DETEMIR 100 [IU]/ML
INJECTION, SOLUTION SUBCUTANEOUS
Qty: 5 PEN | Refills: 5 | Status: SHIPPED | OUTPATIENT
Start: 2021-05-05 | End: 2022-04-04

## 2021-05-19 ENCOUNTER — TELEPHONE (OUTPATIENT)
Dept: FAMILY MEDICINE CLINIC | Age: 71
End: 2021-05-19

## 2021-05-19 DIAGNOSIS — N63.0 BREAST MASS: Primary | ICD-10-CM

## 2021-05-19 NOTE — TELEPHONE ENCOUNTER
Graciela from central scheduling called called stating pt told her the mammo that was ordered is the first mammo following a breast biopsy and if that is the case the mammo order needs chanded from screening to diagnostic, please advise.

## 2021-05-20 ENCOUNTER — TELEPHONE (OUTPATIENT)
Dept: FAMILY MEDICINE CLINIC | Age: 71
End: 2021-05-20

## 2021-05-20 DIAGNOSIS — N63.0 LUMP OR MASS IN BREAST: Primary | ICD-10-CM

## 2021-05-20 NOTE — TELEPHONE ENCOUNTER
Patient is scheduled to have her mammo but the patient's insurance will not except the diagnosis code of N63.0. Can you change this so the patient can have the mammo done?  Please advise

## 2021-05-26 ENCOUNTER — HOSPITAL ENCOUNTER (OUTPATIENT)
Dept: WOMENS IMAGING | Age: 71
Discharge: HOME | End: 2021-05-26
Payer: MEDICARE

## 2021-05-26 DIAGNOSIS — N63.0 BREAST MASS: ICD-10-CM

## 2021-05-26 PROCEDURE — G0279 TOMOSYNTHESIS, MAMMO: HCPCS

## 2021-06-04 ENCOUNTER — HOSPITAL ENCOUNTER (OUTPATIENT)
Dept: GENERAL RADIOLOGY | Age: 71
Discharge: HOME | End: 2021-06-04
Payer: MEDICARE

## 2021-06-04 ENCOUNTER — HOSPITAL ENCOUNTER (OUTPATIENT)
Age: 71
Discharge: HOME | End: 2021-06-04
Payer: MEDICARE

## 2021-06-04 DIAGNOSIS — J20.9 ACUTE BRONCHITIS, UNSPECIFIED ORGANISM: ICD-10-CM

## 2021-06-04 PROCEDURE — 71046 X-RAY EXAM CHEST 2 VIEWS: CPT

## 2021-06-07 ENCOUNTER — APPOINTMENT (OUTPATIENT)
Dept: CT IMAGING | Age: 71
DRG: 287 | End: 2021-06-07
Payer: MEDICARE

## 2021-06-07 ENCOUNTER — HOSPITAL ENCOUNTER (INPATIENT)
Age: 71
LOS: 1 days | Discharge: HOME | DRG: 287 | End: 2021-06-09
Attending: EMERGENCY MEDICINE | Admitting: STUDENT IN AN ORGANIZED HEALTH CARE EDUCATION/TRAINING PROGRAM
Payer: MEDICARE

## 2021-06-07 ENCOUNTER — APPOINTMENT (OUTPATIENT)
Dept: GENERAL RADIOLOGY | Age: 71
DRG: 287 | End: 2021-06-07
Payer: MEDICARE

## 2021-06-07 DIAGNOSIS — R09.89 PULMONARY VASCULAR CONGESTION: ICD-10-CM

## 2021-06-07 DIAGNOSIS — R77.8 ELEVATED TROPONIN: ICD-10-CM

## 2021-06-07 DIAGNOSIS — R06.02 SHORTNESS OF BREATH: Primary | ICD-10-CM

## 2021-06-07 LAB
ALBUMIN SERPL-MCNC: 4.1 GM/DL (ref 3.4–5)
ALP BLD-CCNC: 92 IU/L (ref 40–129)
ALT SERPL-CCNC: 15 U/L (ref 10–40)
ANION GAP SERPL CALCULATED.3IONS-SCNC: 11 MMOL/L (ref 4–16)
AST SERPL-CCNC: 16 IU/L (ref 15–37)
BASE EXCESS MIXED: 1.7 (ref 0–2.3)
BASOPHILS ABSOLUTE: 0 K/CU MM
BASOPHILS RELATIVE PERCENT: 0.1 % (ref 0–1)
BILIRUB SERPL-MCNC: 0.3 MG/DL (ref 0–1)
BUN BLDV-MCNC: 9 MG/DL (ref 6–23)
CALCIUM SERPL-MCNC: 9.2 MG/DL (ref 8.3–10.6)
CHLORIDE BLD-SCNC: 101 MMOL/L (ref 99–110)
CO2: 24 MMOL/L (ref 21–32)
CREAT SERPL-MCNC: 0.5 MG/DL (ref 0.6–1.1)
DIFFERENTIAL TYPE: ABNORMAL
EKG ATRIAL RATE: 66 BPM
EKG DIAGNOSIS: NORMAL
EKG P AXIS: 54 DEGREES
EKG P-R INTERVAL: 150 MS
EKG Q-T INTERVAL: 378 MS
EKG QRS DURATION: 70 MS
EKG QTC CALCULATION (BAZETT): 396 MS
EKG R AXIS: 13 DEGREES
EKG T AXIS: 122 DEGREES
EKG VENTRICULAR RATE: 66 BPM
EOSINOPHILS ABSOLUTE: 0 K/CU MM
EOSINOPHILS RELATIVE PERCENT: 0 % (ref 0–3)
GFR AFRICAN AMERICAN: >60 ML/MIN/1.73M2
GFR NON-AFRICAN AMERICAN: >60 ML/MIN/1.73M2
GLUCOSE BLD-MCNC: 141 MG/DL (ref 70–99)
HCO3 VENOUS: 24.2 MMOL/L (ref 19–25)
HCT VFR BLD CALC: 41.6 % (ref 37–47)
HEMOGLOBIN: 14.3 GM/DL (ref 12.5–16)
IMMATURE NEUTROPHIL %: 0.4 % (ref 0–0.43)
LYMPHOCYTES ABSOLUTE: 1.6 K/CU MM
LYMPHOCYTES RELATIVE PERCENT: 12.1 % (ref 24–44)
MCH RBC QN AUTO: 28.4 PG (ref 27–31)
MCHC RBC AUTO-ENTMCNC: 34.4 % (ref 32–36)
MCV RBC AUTO: 82.7 FL (ref 78–100)
MONOCYTES ABSOLUTE: 0.1 K/CU MM
MONOCYTES RELATIVE PERCENT: 0.7 % (ref 0–4)
NUCLEATED RBC %: 0 %
O2 SAT, VEN: 93.6 % (ref 50–70)
PCO2, VEN: 31 MMHG (ref 38–52)
PDW BLD-RTO: 15.6 % (ref 11.7–14.9)
PH VENOUS: 7.5 (ref 7.32–7.42)
PLATELET # BLD: 236 K/CU MM (ref 140–440)
PMV BLD AUTO: 11.6 FL (ref 7.5–11.1)
PO2, VEN: 104 MMHG (ref 28–48)
POTASSIUM SERPL-SCNC: 4.6 MMOL/L (ref 3.5–5.1)
PRO-BNP: 283.7 PG/ML
RBC # BLD: 5.03 M/CU MM (ref 4.2–5.4)
SARS-COV-2, NAAT: NOT DETECTED
SEGMENTED NEUTROPHILS ABSOLUTE COUNT: 11.8 K/CU MM
SEGMENTED NEUTROPHILS RELATIVE PERCENT: 86.7 % (ref 36–66)
SODIUM BLD-SCNC: 136 MMOL/L (ref 135–145)
SOURCE: NORMAL
TOTAL IMMATURE NEUTOROPHIL: 0.06 K/CU MM
TOTAL NUCLEATED RBC: 0 K/CU MM
TOTAL PROTEIN: 7.6 GM/DL (ref 6.4–8.2)
TROPONIN T: 0.02 NG/ML
TROPONIN T: 0.03 NG/ML
WBC # BLD: 13.6 K/CU MM (ref 4–10.5)

## 2021-06-07 PROCEDURE — 36415 COLL VENOUS BLD VENIPUNCTURE: CPT

## 2021-06-07 PROCEDURE — 85025 COMPLETE CBC W/AUTO DIFF WBC: CPT

## 2021-06-07 PROCEDURE — 94640 AIRWAY INHALATION TREATMENT: CPT

## 2021-06-07 PROCEDURE — 6370000000 HC RX 637 (ALT 250 FOR IP): Performed by: EMERGENCY MEDICINE

## 2021-06-07 PROCEDURE — 71275 CT ANGIOGRAPHY CHEST: CPT

## 2021-06-07 PROCEDURE — 80053 COMPREHEN METABOLIC PANEL: CPT

## 2021-06-07 PROCEDURE — 93010 ELECTROCARDIOGRAM REPORT: CPT | Performed by: INTERNAL MEDICINE

## 2021-06-07 PROCEDURE — 87635 SARS-COV-2 COVID-19 AMP PRB: CPT

## 2021-06-07 PROCEDURE — 82805 BLOOD GASES W/O2 SATURATION: CPT

## 2021-06-07 PROCEDURE — 93005 ELECTROCARDIOGRAM TRACING: CPT | Performed by: EMERGENCY MEDICINE

## 2021-06-07 PROCEDURE — 99285 EMERGENCY DEPT VISIT HI MDM: CPT

## 2021-06-07 PROCEDURE — 83880 ASSAY OF NATRIURETIC PEPTIDE: CPT

## 2021-06-07 PROCEDURE — 71045 X-RAY EXAM CHEST 1 VIEW: CPT

## 2021-06-07 PROCEDURE — 6360000004 HC RX CONTRAST MEDICATION: Performed by: EMERGENCY MEDICINE

## 2021-06-07 PROCEDURE — 84484 ASSAY OF TROPONIN QUANT: CPT

## 2021-06-07 PROCEDURE — 2700000000 HC OXYGEN THERAPY PER DAY

## 2021-06-07 RX ORDER — ALBUTEROL SULFATE 90 UG/1
2 AEROSOL, METERED RESPIRATORY (INHALATION) ONCE
Status: COMPLETED | OUTPATIENT
Start: 2021-06-07 | End: 2021-06-07

## 2021-06-07 RX ORDER — BENZONATATE 100 MG/1
100 CAPSULE ORAL ONCE
Status: COMPLETED | OUTPATIENT
Start: 2021-06-07 | End: 2021-06-07

## 2021-06-07 RX ORDER — GUAIFENESIN 100 MG/5ML
200 SOLUTION ORAL ONCE
Status: COMPLETED | OUTPATIENT
Start: 2021-06-07 | End: 2021-06-07

## 2021-06-07 RX ORDER — FUROSEMIDE 10 MG/ML
40 INJECTION INTRAMUSCULAR; INTRAVENOUS ONCE
Status: COMPLETED | OUTPATIENT
Start: 2021-06-07 | End: 2021-06-08

## 2021-06-07 RX ADMIN — ALBUTEROL SULFATE 2 PUFF: 90 AEROSOL, METERED RESPIRATORY (INHALATION) at 20:50

## 2021-06-07 RX ADMIN — IOPAMIDOL 80 ML: 755 INJECTION, SOLUTION INTRAVENOUS at 23:21

## 2021-06-07 RX ADMIN — Medication 2 PUFF: at 20:50

## 2021-06-07 RX ADMIN — BENZONATATE 100 MG: 100 CAPSULE ORAL at 20:41

## 2021-06-07 RX ADMIN — GUAIFENESIN 200 MG: 200 SOLUTION ORAL at 20:41

## 2021-06-07 NOTE — ED PROVIDER NOTES
[address] [place] [address]      As physician-in-triage, I performed a medical screening history and physical exam on this patient. HISTORY OF PRESENT ILLNESS  Ramez Jules is a 72 y.o. female with a complaint of cough/dyspnea for the past 3 days. Has cough and dyspnea, no other questions/concerns. Has not had Covid 19 vaccine. PHYSICAL EXAM  BP (!) 152/76   Pulse 78   Temp 98.4 °F (36.9 °C) (Oral)   Resp 22   Ht 4' 11\" (1.499 m)   Wt 185 lb (83.9 kg)   SpO2 97%   BMI 37.37 kg/m²     On exam, the patient appears well-hydrated, well-nourished, and in no acute distress. Mucous membranes are moist. Speech is clear. Breathing is unlabored. Skin is dry. Mental status is normal. Moves all extremities, and is without facial droop. Comment: Please note this report has been produced using speech recognition software and may contain errors related to that system including errors in grammar, punctuation, and spelling, as well as words and phrases that may be inappropriate. If there are any questions or concerns please feel free to contact the dictating provider for clarification.         Jude Westbrook,   06/07/21 2266

## 2021-06-08 PROBLEM — Z98.61 S/P PTCA (PERCUTANEOUS TRANSLUMINAL CORONARY ANGIOPLASTY): Status: ACTIVE | Noted: 2021-06-08

## 2021-06-08 PROBLEM — R06.02 SOB (SHORTNESS OF BREATH): Status: ACTIVE | Noted: 2021-06-08

## 2021-06-08 LAB
ACTIVATED CLOTTING TIME, LOW RANGE: 185 SEC
ANION GAP SERPL CALCULATED.3IONS-SCNC: 11 MMOL/L (ref 4–16)
BASE EXCESS MIXED: 9.1 (ref 0–2.3)
BUN BLDV-MCNC: 10 MG/DL (ref 6–23)
CALCIUM SERPL-MCNC: 9.3 MG/DL (ref 8.3–10.6)
CARBON MONOXIDE, BLOOD: 2.3 % (ref 0–5)
CHLORIDE BLD-SCNC: 101 MMOL/L (ref 99–110)
CHOLESTEROL: 256 MG/DL
CO2 CONTENT: 35.6 MMOL/L (ref 19–24)
CO2: 28 MMOL/L (ref 21–32)
COMMENT: ABNORMAL
CREAT SERPL-MCNC: 0.6 MG/DL (ref 0.6–1.1)
ESTIMATED AVERAGE GLUCOSE: 166 MG/DL
GFR AFRICAN AMERICAN: >60 ML/MIN/1.73M2
GFR NON-AFRICAN AMERICAN: >60 ML/MIN/1.73M2
GLUCOSE BLD-MCNC: 131 MG/DL (ref 70–99)
GLUCOSE BLD-MCNC: 136 MG/DL (ref 70–99)
GLUCOSE BLD-MCNC: 230 MG/DL (ref 70–99)
GLUCOSE BLD-MCNC: 293 MG/DL (ref 70–99)
GLUCOSE BLD-MCNC: 343 MG/DL (ref 70–99)
HBA1C MFR BLD: 7.4 % (ref 4.2–6.3)
HCO3 ARTERIAL: 34.2 MMOL/L (ref 18–23)
HCT VFR BLD CALC: 43.2 % (ref 37–47)
HDLC SERPL-MCNC: 65 MG/DL
HEMOGLOBIN: 14.3 GM/DL (ref 12.5–16)
LDL CHOLESTEROL DIRECT: 187 MG/DL
LV EF: 53 %
LVEF MODALITY: NORMAL
MAGNESIUM: 2 MG/DL (ref 1.8–2.4)
MCH RBC QN AUTO: 27.3 PG (ref 27–31)
MCHC RBC AUTO-ENTMCNC: 33.1 % (ref 32–36)
MCV RBC AUTO: 82.6 FL (ref 78–100)
METHEMOGLOBIN ARTERIAL: 1.5 %
O2 SATURATION: 93.1 % (ref 96–97)
PCO2 ARTERIAL: 47 MMHG (ref 32–45)
PDW BLD-RTO: 15.5 % (ref 11.7–14.9)
PH BLOOD: 7.47 (ref 7.34–7.45)
PLATELET # BLD: 200 K/CU MM (ref 140–440)
PMV BLD AUTO: 11.6 FL (ref 7.5–11.1)
PO2 ARTERIAL: 73 MMHG (ref 75–100)
POTASSIUM SERPL-SCNC: 4.2 MMOL/L (ref 3.5–5.1)
RBC # BLD: 5.23 M/CU MM (ref 4.2–5.4)
SODIUM BLD-SCNC: 140 MMOL/L (ref 135–145)
TRIGL SERPL-MCNC: 104 MG/DL
TROPONIN T: 0.03 NG/ML
WBC # BLD: 12.4 K/CU MM (ref 4–10.5)

## 2021-06-08 PROCEDURE — 85610 PROTHROMBIN TIME: CPT

## 2021-06-08 PROCEDURE — B2111ZZ FLUOROSCOPY OF MULTIPLE CORONARY ARTERIES USING LOW OSMOLAR CONTRAST: ICD-10-PCS | Performed by: INTERNAL MEDICINE

## 2021-06-08 PROCEDURE — 94640 AIRWAY INHALATION TREATMENT: CPT

## 2021-06-08 PROCEDURE — 6370000000 HC RX 637 (ALT 250 FOR IP): Performed by: INTERNAL MEDICINE

## 2021-06-08 PROCEDURE — 6360000002 HC RX W HCPCS

## 2021-06-08 PROCEDURE — 6370000000 HC RX 637 (ALT 250 FOR IP): Performed by: STUDENT IN AN ORGANIZED HEALTH CARE EDUCATION/TRAINING PROGRAM

## 2021-06-08 PROCEDURE — 4A023N8 MEASUREMENT OF CARDIAC SAMPLING AND PRESSURE, BILATERAL, PERCUTANEOUS APPROACH: ICD-10-PCS | Performed by: INTERNAL MEDICINE

## 2021-06-08 PROCEDURE — 84439 ASSAY OF FREE THYROXINE: CPT

## 2021-06-08 PROCEDURE — 93460 R&L HRT ART/VENTRICLE ANGIO: CPT

## 2021-06-08 PROCEDURE — 83735 ASSAY OF MAGNESIUM: CPT

## 2021-06-08 PROCEDURE — 6360000002 HC RX W HCPCS: Performed by: STUDENT IN AN ORGANIZED HEALTH CARE EDUCATION/TRAINING PROGRAM

## 2021-06-08 PROCEDURE — 1200000000 HC SEMI PRIVATE

## 2021-06-08 PROCEDURE — 80048 BASIC METABOLIC PNL TOTAL CA: CPT

## 2021-06-08 PROCEDURE — 85027 COMPLETE CBC AUTOMATED: CPT

## 2021-06-08 PROCEDURE — 83721 ASSAY OF BLOOD LIPOPROTEIN: CPT

## 2021-06-08 PROCEDURE — 94761 N-INVAS EAR/PLS OXIMETRY MLT: CPT

## 2021-06-08 PROCEDURE — 6360000002 HC RX W HCPCS: Performed by: INTERNAL MEDICINE

## 2021-06-08 PROCEDURE — 85347 COAGULATION TIME ACTIVATED: CPT

## 2021-06-08 PROCEDURE — 2580000003 HC RX 258: Performed by: STUDENT IN AN ORGANIZED HEALTH CARE EDUCATION/TRAINING PROGRAM

## 2021-06-08 PROCEDURE — 2709999900 HC NON-CHARGEABLE SUPPLY

## 2021-06-08 PROCEDURE — 2580000003 HC RX 258: Performed by: INTERNAL MEDICINE

## 2021-06-08 PROCEDURE — C1887 CATHETER, GUIDING: HCPCS

## 2021-06-08 PROCEDURE — B2161ZZ FLUOROSCOPY OF RIGHT AND LEFT HEART USING LOW OSMOLAR CONTRAST: ICD-10-PCS | Performed by: INTERNAL MEDICINE

## 2021-06-08 PROCEDURE — 36415 COLL VENOUS BLD VENIPUNCTURE: CPT

## 2021-06-08 PROCEDURE — 93306 TTE W/DOPPLER COMPLETE: CPT

## 2021-06-08 PROCEDURE — 82962 GLUCOSE BLOOD TEST: CPT

## 2021-06-08 PROCEDURE — 6360000004 HC RX CONTRAST MEDICATION

## 2021-06-08 PROCEDURE — 80061 LIPID PANEL: CPT

## 2021-06-08 PROCEDURE — 82803 BLOOD GASES ANY COMBINATION: CPT

## 2021-06-08 PROCEDURE — 84484 ASSAY OF TROPONIN QUANT: CPT

## 2021-06-08 PROCEDURE — C1894 INTRO/SHEATH, NON-LASER: HCPCS

## 2021-06-08 PROCEDURE — 83036 HEMOGLOBIN GLYCOSYLATED A1C: CPT

## 2021-06-08 PROCEDURE — 6360000002 HC RX W HCPCS: Performed by: EMERGENCY MEDICINE

## 2021-06-08 PROCEDURE — C1769 GUIDE WIRE: HCPCS

## 2021-06-08 PROCEDURE — 85730 THROMBOPLASTIN TIME PARTIAL: CPT

## 2021-06-08 PROCEDURE — 84443 ASSAY THYROID STIM HORMONE: CPT

## 2021-06-08 RX ORDER — SODIUM CHLORIDE 9 MG/ML
25 INJECTION, SOLUTION INTRAVENOUS PRN
Status: DISCONTINUED | OUTPATIENT
Start: 2021-06-08 | End: 2021-06-09 | Stop reason: HOSPADM

## 2021-06-08 RX ORDER — POLYETHYLENE GLYCOL 3350 17 G/17G
17 POWDER, FOR SOLUTION ORAL DAILY PRN
Status: DISCONTINUED | OUTPATIENT
Start: 2021-06-08 | End: 2021-06-09 | Stop reason: HOSPADM

## 2021-06-08 RX ORDER — ONDANSETRON 4 MG/1
4 TABLET, ORALLY DISINTEGRATING ORAL EVERY 8 HOURS PRN
Status: DISCONTINUED | OUTPATIENT
Start: 2021-06-08 | End: 2021-06-09 | Stop reason: HOSPADM

## 2021-06-08 RX ORDER — SODIUM CHLORIDE 0.9 % (FLUSH) 0.9 %
5-40 SYRINGE (ML) INJECTION EVERY 12 HOURS SCHEDULED
Status: DISCONTINUED | OUTPATIENT
Start: 2021-06-08 | End: 2021-06-09 | Stop reason: HOSPADM

## 2021-06-08 RX ORDER — SODIUM CHLORIDE 9 MG/ML
INJECTION, SOLUTION INTRAVENOUS CONTINUOUS
Status: DISCONTINUED | OUTPATIENT
Start: 2021-06-08 | End: 2021-06-09 | Stop reason: HOSPADM

## 2021-06-08 RX ORDER — NICOTINE 21 MG/24HR
1 PATCH, TRANSDERMAL 24 HOURS TRANSDERMAL DAILY
Status: DISCONTINUED | OUTPATIENT
Start: 2021-06-08 | End: 2021-06-09 | Stop reason: HOSPADM

## 2021-06-08 RX ORDER — DEXTROSE MONOHYDRATE 50 MG/ML
100 INJECTION, SOLUTION INTRAVENOUS PRN
Status: DISCONTINUED | OUTPATIENT
Start: 2021-06-08 | End: 2021-06-09 | Stop reason: HOSPADM

## 2021-06-08 RX ORDER — PRAVASTATIN SODIUM 40 MG
80 TABLET ORAL DAILY
Status: DISCONTINUED | OUTPATIENT
Start: 2021-06-08 | End: 2021-06-08

## 2021-06-08 RX ORDER — SODIUM CHLORIDE 0.9 % (FLUSH) 0.9 %
5-40 SYRINGE (ML) INJECTION PRN
Status: DISCONTINUED | OUTPATIENT
Start: 2021-06-08 | End: 2021-06-09 | Stop reason: HOSPADM

## 2021-06-08 RX ORDER — ATROPINE SULFATE 0.4 MG/ML
0.5 AMPUL (ML) INJECTION
Status: ACTIVE | OUTPATIENT
Start: 2021-06-08 | End: 2021-06-08

## 2021-06-08 RX ORDER — ACETAMINOPHEN 325 MG/1
650 TABLET ORAL EVERY 4 HOURS PRN
Status: DISCONTINUED | OUTPATIENT
Start: 2021-06-08 | End: 2021-06-09 | Stop reason: HOSPADM

## 2021-06-08 RX ORDER — BENZONATATE 100 MG/1
100 CAPSULE ORAL 3 TIMES DAILY PRN
Status: DISCONTINUED | OUTPATIENT
Start: 2021-06-08 | End: 2021-06-09 | Stop reason: HOSPADM

## 2021-06-08 RX ORDER — IPRATROPIUM BROMIDE AND ALBUTEROL SULFATE 2.5; .5 MG/3ML; MG/3ML
1 SOLUTION RESPIRATORY (INHALATION)
Status: DISCONTINUED | OUTPATIENT
Start: 2021-06-08 | End: 2021-06-09 | Stop reason: HOSPADM

## 2021-06-08 RX ORDER — ACETAMINOPHEN 650 MG/1
650 SUPPOSITORY RECTAL EVERY 6 HOURS PRN
Status: DISCONTINUED | OUTPATIENT
Start: 2021-06-08 | End: 2021-06-09 | Stop reason: HOSPADM

## 2021-06-08 RX ORDER — ONDANSETRON 2 MG/ML
4 INJECTION INTRAMUSCULAR; INTRAVENOUS EVERY 6 HOURS PRN
Status: DISCONTINUED | OUTPATIENT
Start: 2021-06-08 | End: 2021-06-09 | Stop reason: HOSPADM

## 2021-06-08 RX ORDER — DEXTROSE MONOHYDRATE 25 G/50ML
12.5 INJECTION, SOLUTION INTRAVENOUS PRN
Status: DISCONTINUED | OUTPATIENT
Start: 2021-06-08 | End: 2021-06-09 | Stop reason: HOSPADM

## 2021-06-08 RX ORDER — PREDNISONE 20 MG/1
40 TABLET ORAL DAILY
Status: DISCONTINUED | OUTPATIENT
Start: 2021-06-08 | End: 2021-06-09 | Stop reason: HOSPADM

## 2021-06-08 RX ORDER — ACETAMINOPHEN 325 MG/1
650 TABLET ORAL EVERY 6 HOURS PRN
Status: DISCONTINUED | OUTPATIENT
Start: 2021-06-08 | End: 2021-06-08 | Stop reason: ALTCHOICE

## 2021-06-08 RX ORDER — FUROSEMIDE 10 MG/ML
20 INJECTION INTRAMUSCULAR; INTRAVENOUS 2 TIMES DAILY
Status: DISCONTINUED | OUTPATIENT
Start: 2021-06-08 | End: 2021-06-09 | Stop reason: HOSPADM

## 2021-06-08 RX ORDER — NICOTINE POLACRILEX 4 MG
15 LOZENGE BUCCAL PRN
Status: DISCONTINUED | OUTPATIENT
Start: 2021-06-08 | End: 2021-06-09 | Stop reason: HOSPADM

## 2021-06-08 RX ADMIN — FUROSEMIDE 20 MG: 10 INJECTION, SOLUTION INTRAMUSCULAR; INTRAVENOUS at 18:27

## 2021-06-08 RX ADMIN — SODIUM CHLORIDE, PRESERVATIVE FREE 10 ML: 5 INJECTION INTRAVENOUS at 09:33

## 2021-06-08 RX ADMIN — FUROSEMIDE 20 MG: 10 INJECTION, SOLUTION INTRAMUSCULAR; INTRAVENOUS at 10:49

## 2021-06-08 RX ADMIN — INSULIN DETEMIR 35 UNITS: 100 INJECTION, SOLUTION SUBCUTANEOUS at 20:22

## 2021-06-08 RX ADMIN — INSULIN LISPRO 6 UNITS: 100 INJECTION, SOLUTION INTRAVENOUS; SUBCUTANEOUS at 18:28

## 2021-06-08 RX ADMIN — SODIUM CHLORIDE: 9 INJECTION, SOLUTION INTRAVENOUS at 18:30

## 2021-06-08 RX ADMIN — ENOXAPARIN SODIUM 40 MG: 40 INJECTION SUBCUTANEOUS at 09:33

## 2021-06-08 RX ADMIN — SODIUM CHLORIDE, PRESERVATIVE FREE 10 ML: 5 INJECTION INTRAVENOUS at 20:25

## 2021-06-08 RX ADMIN — PREDNISONE 40 MG: 20 TABLET ORAL at 09:33

## 2021-06-08 RX ADMIN — IPRATROPIUM BROMIDE AND ALBUTEROL SULFATE 1 AMPULE: .5; 3 SOLUTION RESPIRATORY (INHALATION) at 22:29

## 2021-06-08 RX ADMIN — FUROSEMIDE 40 MG: 10 INJECTION INTRAMUSCULAR; INTRAVENOUS at 00:53

## 2021-06-08 RX ADMIN — SODIUM CHLORIDE, PRESERVATIVE FREE 10 ML: 5 INJECTION INTRAVENOUS at 20:23

## 2021-06-08 RX ADMIN — INSULIN LISPRO 4 UNITS: 100 INJECTION, SOLUTION INTRAVENOUS; SUBCUTANEOUS at 12:32

## 2021-06-08 ASSESSMENT — ENCOUNTER SYMPTOMS
WHEEZING: 1
CHEST TIGHTNESS: 0
SHORTNESS OF BREATH: 1
VOMITING: 0
RHINORRHEA: 0
COLOR CHANGE: 0
BACK PAIN: 0
DIARRHEA: 0
SORE THROAT: 0
COUGH: 1
NAUSEA: 0
ABDOMINAL PAIN: 0

## 2021-06-08 ASSESSMENT — PAIN SCALES - GENERAL
PAINLEVEL_OUTOF10: 0
PAINLEVEL_OUTOF10: 0

## 2021-06-08 NOTE — ED PROVIDER NOTES
reported. No yellowing of the skin.     Medical history:  Past Medical History:   Diagnosis Date    Arthritis     Diabetes mellitus ([address]     Hypertension     Mixed hyperlipidemia 2016     Past Surgical History:   Procedure Laterality Date    BREAST BIOPSY      BREAST SURGERY       SECTION      x2    HERNIA REPAIR      OTHER SURGICAL HISTORY  12/10/13    Left AC lipoma excision    OTHER SURGICAL HISTORY  12/10/13    Left flank lipoma excision     Family History   Problem Relation Age of Onset    Diabetes Mother     Heart Disease Mother     High Blood Pressure Father     Diabetes Sister     High Blood Pressure Sister     Stroke Sister     Breast Cancer Sister 58    Cancer Brother         Lung    High Blood Pressure Brother     Stroke Brother     Diabetes Brother     Diabetes Maternal Grandmother     Heart Disease Maternal Grandfather     Cancer Brother         Lung (smoker)    Breast Cancer Maternal Aunt      Social History     Socioeconomic History    Marital status:      Spouse name: Not on file    Number of children: Not on file    Years of education: Not on file    Highest education level: Not on file   Occupational History    Occupation: employed-[employer]   Tobacco Use    Smoking status: Current Every Day Smoker     Packs/day: 0.50     Years: 32.00     Pack years: 16.00     Types: Cigarettes     Start date:     Smokeless tobacco: Never Used    Tobacco comment: currently 0.5 PPD   Vaping Use    Vaping Use: Former    Substances: Occasionally   Substance and Sexual Activity    Alcohol use: No     Alcohol/week: 0.0 standard drinks    Drug use: No    Sexual activity: Not Currently     Partners: Male   Other Topics Concern    Not on file   Social History Narrative    Not on file     Social Determinants of Health     Financial Resource Strain:     Difficulty of Paying Living Expenses:    Food Insecurity:     Worried About Running Out of Food in the Last palpable cords or Homans signs. No tenderness or limitation range of motion to the bilateral shoulders, elbows, wrists, hips, knees, or ankles. No accompanying long bone tenderness or deformity. SKIN: Normal tone for ethnicity. Normal turgor and brisk capillary refill peripherally. No petechiae, purpura, vesicles, bullae, or other lesions. No icterus. PSYCHIATRIC: Normal mood. Normal affect. No voiced suicidal or homicidal ideation. Patient does not respond to internal stimuli. Judgment and insight appear intact. Emergency department course. Initial medical screening studies ordered by the provider in triage. Bronchodilators are ordered by another physician separately. Patient is brought to bed 18 at approximately 2040 and assessed and reassessed by me. Triage EKG shows no criteria ST elevation or reciprocal changes, but there is T wave inversion in leads I, aVL, and V6 compared with most recent prior, though that EKG was in 2016. After initial evaluation, additional orders are placed for CT chest PE protocol to rule out occult pneumonia, pulmonary embolism, or other cause for new onset dyspnea and pulmonary congestion. Furosemide 40 mg IV is ordered for pulmonary congestion and clinical signs of CHF. Repeat troponin is ordered to assess for acuity. We have begun discussed likely admission to the hospital.  Patient is agreeable to continuing plan. Upon most recent reevaluation, patient is resting a little more comfortably. Work of breathing appears to have improved. There has been no hypoxia or cyanosis. Second troponin is still abnormal, though it does not appear to be rising sharply. We have discussed that this may represent cardiac strain, though this is difficult to confirm. Patient and I have discussed further monitoring in the hospital, and she is agreeable. Hospitalist Dr. Nicole De Leon is paged at [facility] to discuss admission.   Initial verbal orders are discussed at >60 >60 mL/min/1.73m2    GFR African American >60 >60 mL/min/1.73m2    Anion Gap 11 4 - 16   Troponin   Result Value Ref Range    Troponin T 0.025 (H) <0.01 NG/ML   Brain Natriuretic Peptide   Result Value Ref Range    Pro-.7 <300 PG/ML   Blood Gas, Venous   Result Value Ref Range    pH, Mac 7.50 (H) 7.32 - 7.42    pCO2, Mac 31 (L) 38 - 52 mmHG    pO2, Mac 104 (H) 28 - 48 mmHG    Base Exc, Mixed 1.7 0 - 2.3    HCO3, Venous 24.2 19 - 25 MMOL/L    O2 Sat, Mac 93.6 (H) 50 - 70 %   Troponin   Result Value Ref Range    Troponin T 0.020 (H) <0.01 NG/ML   EKG 12 Lead   Result Value Ref Range    Ventricular Rate 66 BPM    Atrial Rate 66 BPM    P-R Interval 150 ms    QRS Duration 70 ms    Q-T Interval 378 ms    QTc Calculation (Bazett) 396 ms    P Axis 54 degrees    R Axis 13 degrees    T Axis 122 degrees    Diagnosis       Normal sinus rhythm with sinus arrhythmia  ST & T wave abnormality, consider lateral ischemia  Abnormal ECG  When compared with ECG of 07-NOV-2016 22:42,  Borderline criteria for Inferior infarct are no longer present  T wave inversion now evident in Lateral leads  Confirmed by [facility] Omar QUINN ([id number]) on 6/7/2021 7:03:33 PM          Radiographs (if obtained):  Radiologist's Report Reviewed:  XR CHEST PORTABLE    Result Date: 6/7/2021  EXAMINATION: ONE XRAY VIEW OF THE CHEST 6/7/2021 11:47 am COMPARISON: 06/04/2021 HISTORY: ORDERING SYSTEM PROVIDED HISTORY: shortness of breath TECHNOLOGIST PROVIDED HISTORY: Reason for exam:->shortness of breath Reason for Exam: shortness of breath FINDINGS: Discoid atelectasis/scarring in the peripheral left lung is again noted. Pulmonary vasculature is congested and there is increased interstitial opacity, and that appears increased when compared to the previous exam.  A focal infiltrate is not found. No pneumothorax is seen. No free air. No acute bony abnormality.      Findings suggest interstitial edema, which appears increased when compared to the previous exam.     CTA PULMONARY W CONTRAST    1. No evidence of large or central intraluminal filling defect to suggest pulmonary embolism. Smaller more peripheral pulmonary emboli are difficult to exclude due to motion artifact at the lung bases. 2. Cardiomegaly with reflux of contrast into the IVC and hepatic veins which could be related to right heart failure. 3. Mild diffuse septal thickening is seen which could be related to pulmonary vascular congestion. 4. Bilateral lower lobe atelectasis. No focal consolidation. 5. Mild enlargement of the main pulmonary artery which can be seen in pulmonary hypertension. EKG:  Twelve-lead EKG obtained at 1415 on 7 June 2021 and interpreted by me in the absence of a cardiologist.  There is T wave inversion in leads I, aVL, and V6 compared to prior. There is no criteria ST elevation or reciprocal change. There are no hyperacute T wave changes. There is no sign of acute ischemia or infarction. This tracing shows a normal sinus rhythm with sinus arrhythmia. Rate and intervals are 66 beats per minute, LA interval 150 milliseconds, QRS duration 70 milliseconds, QTc interval 396 milliseconds, and R axis normal at 13 degrees. There is no acute change compared with the most recent EKG dated November 7, 2016 except were noted. Medical decision making:  Patient presents the emerge department with about a weeks worth of shortness of breath. She did present with increased respiratory effort, though she has improved with initial management. There was some faint wheezing noted. Certainly consider an infectious process such as an asthmatic bronchitis. Primary concern, however, is for a cardiac source, particularly new onset and developing congestive heart failure. BNP is normal, but this does not entirely exclude the diagnosis. She does have modest, flat elevations in troponin. EKG does not show detectable ischemia or infarctions.   Abdomen does not suggest a surgical, infectious, or vascular emergency. CT chest PE protocol does not show complicating pulmonary embolism,  pneumonia, or aortic catastrophe. Procedures: None. Consultations: Hospitalist service. Clinical Impression:  1. Shortness of breath    2. Elevated troponin    3. Pulmonary vascular congestion      Disposition referral (if applicable):  No follow-up provider specified. Disposition medications (if applicable):  New Prescriptions    No medications on file     ED Provider Disposition Time  DISPOSITION Admitted 06/08/2021 12:43:52 AM      Comment: Please note this report has been produced using speech recognition software and may contain errors related to that system including errors in grammar, punctuation, and spelling, as well as words and phrases that may be inappropriate. Efforts were made to edit the dictations.         Nikki Almaraz MD  06/08/21 [id number]

## 2021-06-08 NOTE — CONSULTS
Patient known to me  Admitted with CHF  Will dictate full note  Thanks     DICTATED [id number]  SEVERE AS   WILL NEED CATH RIGHT AND LEFT  CTA NEGATIVE     Electronically signed by Lorenzo Lobo MD on 6/8/21 at 10:04 AM EDT

## 2021-06-08 NOTE — FLOWSHEET NOTE
TR Band removed at this time. Right radial and right ulnar sites free of bleeding/hematoma. Tegaderm applied to site. Arm board remains secured with kerlex as reminder to pt to minimize use of right arm.

## 2021-06-08 NOTE — CONSULTS
[address], [address] [facility]                                  CONSULTATION    PATIENT NAME: Payal Rahman                    :        1950  MED REC NO:   [id number]                          ROOM:       [id number]  ACCOUNT NO:   [de-identified]                           ADMIT DATE: 2021  PROVIDER:     Senthil Kim MD    CONSULT DATE:  2021    INDICATION:  Heart failure. HISTORY OF PRESENT ILLNESS:  A 60-year-old female patient who follows up  in the office, again comes to the hospital having progressive shortness  of breath present, and is getting worse for the last month. She has a  cough present. She denies any fevers or chills present. No other ,  GI complaints present. No syncopal episode present. She did have an  echo done. Echo shows LV function was preserved. Severe aortic  stenosis noted. Mean gradient is around 63 mmHg present. Her last echo  was done in . Her echo at that time had showed the mean gradient  was around 30 mmHg present. This was in 2016. Mild to moderate AI also  was noted at that time. She denies any fevers or chills. No sputum  production. PAST MEDICAL HISTORY:  History of having arthritis, diabetes,  hypertension, hyperlipidemia present. Aortic stenosis present. PAST SURGICAL HISTORY:  Breast biopsy done. Hernia repair. SOCIAL HISTORY:  She quit smoking. No alcohol use. ALLERGIES:  CODEINE. MEDICATIONS:  At home, she was on Januvia, Pravachol, insulin. FAMILY HISTORY:  Significant for hypertension present. PHYSICAL EXAMINATION:  GENERAL:  The patient is awake, alert, and answers questions, not in  acute distress. VITAL SIGNS:  Temperature is afebrile, pulse is 79, blood pressure is  139/79. HEENT:  Head is normocephalic and atraumatic. Pupils are equal and  reactive. CHEST:  Equal expansion. LUNGS:  Clear to auscultation.   Diffuse wheezing and rhonchi  appreciated. HEART:  Regular rate and rhythm. ABDOMEN:  Soft and nontender. Bowel sounds present. No  hepatosplenomegaly or guarding appreciated. EXTREMITIES:  No cyanosis or clubbing noted. NEUROLOGIC:  Cranial nerves II through XII are grossly intact. LABORATORY DATA:  BUN is 9, creatinine is 0.5. Troponin is elevated,  not positive. LFTs are normal.  CBC is within normal range. CT chest  with contrast was performed. A large central PE noted. Cardiomegaly  with reflux of contrast in the IVC present. Her EKG shows a sinus  rhythm present. IMPRESSION:  This is a 73-year-old female patient who was getting  progressive shortness of breath present. Echo shows LV function is  preserved, but severe aortic stenosis is noted. Right ventricle  systolic pressure is within normal range. CT with the contrast showed  reflux of the contrast into the hepatic veins present. She has right  heart failure present. I think her symptoms are secondary to her aortic stenosis. We will make  further recommendation based on hospital course. I think she will need  right and left heart catheterization also.         Andrea Nixon MD    D: 06/08/2021 10:01:51       T: 06/08/2021 10:06:37     [email or web address]  Job#: [id number]     Doc#: [id number]    CC:

## 2021-06-08 NOTE — OP NOTE
Operative Note      Patient: Ellie Graves  YOB: 1950  MRN: [id number]    Date of Procedure: 6/8/21    Pre-Op Diagnosis: severe aortic stenosis   Post-Op Diagnosis: Same      Estimated Blood Loss (mL): Minimal    Complications: None          Electronically signed by Eduar Irvin MD on 6/8/2021 at 3:57 PM   DICTATED --[id number]  LEFT MAIN PATENT   LAD MILD DX  LCX MILD DX  RCA MILD DX  LVEDP 25  BRITTNEE MEAN PG IS 64 AND BRITTNEE IS 0.68  MODERATELY ELEVATED RIGHT HEART PRESSURES   CONTINUE DIURETICS  WILL DISCUSS WITH CV TEAM FOR AVR  RIGHT BRACHIAL AND RIGHT ULNAR ACCESS   NO COMPLICATIONS

## 2021-06-08 NOTE — ED NOTES
Pt arrived to room from lobby with 02 from NC at 4Lpm. Pt states that she has never required 02.  Removed 02 from pt and sats maintained at 100-99%     Micki Garcia RN  06/07/21 2052

## 2021-06-08 NOTE — H&P
History and Physical      Name:  Jordon Lawrence /Age/Sex: 1950  (76 y.o. female)   MRN & CSN:  [id number] & [id number] Admission Date/Time: 2021  8:36 PM   Location:  ED18/ED-18 PCP: Mary Cain MD       Hospital Day: 2    Assessment and Plan:   Jordon Lawrence is a 72 y.o.  female  who presents with SOB (shortness of breath)    1. COPD exacerbation, likely, undiagnosed  2. New onset heart failure? 3. Elevated troponin  -Admit to inpatient services with telemetry  -.7 on presentation but patient is obese so this is likely falsely decreased. So cannot effectively rule out HF  -Echocardiogram in AM  -Daily weights, Strict intake and output  -Troponin 0.020, cycle troponins x2  -CTA pulmonary with contrast as below  -40 mg IV Lasix given in ED, will hold off on further Lasix until seen by cardiology and echo was done.  -N.p.o.  -Cardiology consulted, appreciate recommendations  -Duonebs, and prednisone  -Encourage deep breathing and coughing and incentive spirometry  -Continue home COPD meds, hold Spiriva when on short-acting inhaled anticholinergics  -Covid negative  -Tessalon Perles  -Avoid mucoactive agents (N-acetylcysteine) and chest physiotherapy if possible  -Needs outpatient PFT's    4. IDDM type II  -Holding home oral medication, n.p.o. diet, low n.p.o. SSI + BG checks ACHS, hypoglycemic protocol, and target -180    5. Tobacco abuse  6. Tobacco abuse counseling  -I spent approximately 70 minutes counseling patient to stop smoking and using tobacco.  I explained the complications and consequences that might be encountered by smoking. These include but are not limited to cancer, stroke, heart disease and ultimately death. The patient expressed understanding. Other chronic medical conditions:   Continue all home meds except stated above or contraindicated.    HLD    Diet No diet orders on file   DVT Prophylaxis [x] Lovenox, []  Heparin, [] SCDs, [] Ambulation   GI Prophylaxis [] PPI,  [] H2 Blocker,  [] Carafate,  [] Diet/Tube Feeds   Code Status Prior   Disposition Patient requires continued admission due to SOB (shortness of breath)   MDM [] Low, [] Moderate,[x]  High  Patient's risk as above due to acuity of condition with potential for decompensation. History of Present Illness:     Chief Complaint: SOB (shortness of breath)    Hakeem Handley is a 72 y.o.  female with a reviewed and noncontributory family history and a PMH as stated above, who presents with complaints of complaints for evaluation of dyspnea, palpitations and tachypnea. Symptoms have included: fatigue, poor exercise tolerance and tachypnea and have been moderate. Onset was 1 week ago, and symptoms occur all day. The symptoms are aggravated by exertion. They are relieved by rest. Associated factors: orthopnea. Patient denies any history of heart failure or COPD but does endorse current tobacco abuse. Patient states she has also gained quite a bit of abdominal girth and weight over the last 3 months. Patient denies any headaches, fever, chills, chest pains, abdominal pain, nausea, vomiting, diarrhea, dark tarry stools, blood per rectum, or dysuria. Discussed case with ED provider. ROS:   Review of Systems   Constitutional: Positive for fatigue. Negative for appetite change, chills, diaphoresis and fever. HENT: Negative for congestion, rhinorrhea and sore throat. Eyes: Negative for visual disturbance. Respiratory: Positive for cough, shortness of breath and wheezing. Negative for chest tightness. Cardiovascular: Negative for chest pain, palpitations and leg swelling. Gastrointestinal: Negative for abdominal pain, diarrhea, nausea and vomiting. Genitourinary: Negative for dysuria, frequency, hematuria and urgency. Musculoskeletal: Negative for arthralgias and back pain. Skin: Negative for color change, pallor and rash.    Neurological: Negative for dizziness, seizures, syncope, speech difficulty, weakness, light-headedness, numbness and headaches. Psychiatric/Behavioral: Negative for confusion. Objective:   No intake or output data in the 24 hours ending 06/08/21 0045   Vitals:   Vitals:    06/08/21 0030   BP:    Pulse: 63   Resp: 19   Temp:    SpO2: 99%     /73   Pulse 63   Temp 98.4 °F (36.9 °C) (Oral)   Resp 19   Ht 4' 11\" (1.499 m)   Wt 185 lb (83.9 kg)   SpO2 99%   BMI 37.37 kg/m²   Physical Exam:   Physical Exam  Vitals and nursing note reviewed. Constitutional:       General: She is awake. She is not in acute distress. Appearance: Normal appearance. She is obese. She is not ill-appearing, toxic-appearing or diaphoretic. HENT:      Head: Atraumatic. Right Ear: External ear normal.      Left Ear: External ear normal.      Nose: Nose normal. No rhinorrhea. Mouth/Throat:      Mouth: Mucous membranes are moist.   Eyes:      General: No scleral icterus. Extraocular Movements: Extraocular movements intact. Conjunctiva/sclera: Conjunctivae normal.      Pupils: Pupils are equal, round, and reactive to light. Cardiovascular:      Rate and Rhythm: Normal rate and regular rhythm. Pulses: Normal pulses. Heart sounds: Murmur heard. No gallop. Pulmonary:      Effort: Pulmonary effort is normal. Tachypnea present. No accessory muscle usage, prolonged expiration or respiratory distress. Breath sounds: No stridor or decreased air movement. Wheezing and rales present. No rhonchi. Abdominal:      General: Abdomen is protuberant. Bowel sounds are normal. There is no distension. Palpations: Abdomen is soft. Tenderness: There is no abdominal tenderness. There is no guarding or rebound. Negative signs include Mora's sign and Rovsing's sign. Musculoskeletal:         General: Normal range of motion. Cervical back: Neck supple. Right lower leg: No edema. Left lower leg: No edema.    Skin:     General: Skin is warm and dry. Capillary Refill: Capillary refill takes less than 2 seconds. Neurological:      General: No focal deficit present. Mental Status: She is alert and oriented to person, place, and time. Mental status is at baseline. Cranial Nerves: No cranial nerve deficit, dysarthria or facial asymmetry. Motor: No tremor or seizure activity. Psychiatric:         Attention and Perception: She is attentive. Mood and Affect: Mood is not anxious. Speech: She is communicative. Speech is not slurred. Behavior: Behavior is cooperative. Past Medical History:      Past Medical History:   Diagnosis Date    Arthritis     Diabetes mellitus ([address]     Hypertension     Mixed hyperlipidemia 2016     PSHX:  has a past surgical history that includes hernia repair; other surgical history (12/10/13); other surgical history (12/10/13);  section; Breast surgery; and Breast biopsy. Allergies: Allergies   Allergen Reactions    Codeine Rash    Hydrocodone-Acetaminophen Nausea And Vomiting       FAM HX: family history includes Breast Cancer in her maternal aunt; Breast Cancer (age of onset: 58) in her sister; Cancer in her brother and brother; Diabetes in her brother, maternal grandmother, mother, and sister; Heart Disease in her maternal grandfather and mother; High Blood Pressure in her brother, father, and sister; Stroke in her brother and sister.   Soc HX:   Social History     Socioeconomic History    Marital status:      Spouse name: None    Number of children: None    Years of education: None    Highest education level: None   Occupational History    Occupation: employed-[employer]   Tobacco Use    Smoking status: Current Every Day Smoker     Packs/day: 0.50     Years: 32.00     Pack years: 16.00     Types: Cigarettes     Start date:     Smokeless tobacco: Never Used    Tobacco comment: currently 0.5 PPD   Vaping Use    Vaping Use: Former    >60 06/07/2021    GLUCOSE 141 06/07/2021    PROT 7.6 06/07/2021    PROT 7.3 09/28/2012    LABALBU 4.1 06/07/2021    CALCIUM 9.2 06/07/2021    BILITOT 0.3 06/07/2021    ALKPHOS 92 06/07/2021    AST 16 06/07/2021    ALT 15 06/07/2021       Troponin:  Lab Results   Component Value Date    TROPONINT 0.020 06/07/2021       U/A:    Lab Results   Component Value Date    COLORU YELLOW 06/25/2015    PROTEINU NEGATIVE 06/25/2015    WBCUA 1 06/25/2015    RBCUA <1 06/25/2015    MUCUS RARE 06/25/2015    BACTERIA NEGATIVE 06/25/2015    CLARITYU CLEAR 06/25/2015    SPECGRAV 1.023 06/25/2015    LEUKOCYTESUR NEGATIVE 06/25/2015    UROBILINOGEN 0.2 06/25/2015    BILIRUBINUR NEGATIVE 06/25/2015    BLOODU NEGATIVE 06/25/2015     Radiology results:  CTA PULMONARY W CONTRAST   Preliminary Result   1. No evidence of large or central intraluminal filling defect to suggest   pulmonary embolism. Smaller more peripheral pulmonary emboli are difficult to   exclude due to motion artifact at the lung bases. 2. Cardiomegaly with reflux of contrast into the IVC and hepatic veins which   could be related to right heart failure. 3. Mild diffuse septal thickening is seen which could be related to pulmonary   vascular congestion. 4. Bilateral lower lobe atelectasis. No focal consolidation. 5. Mild enlargement of the main pulmonary artery which can be seen in   pulmonary hypertension. XR CHEST PORTABLE   Final Result   Findings suggest interstitial edema, which appears increased when compared to   the previous exam.                Medications:   Home Medications:   Prior to Admission medications    Medication Sig Start Date End Date Taking?  Authorizing Provider   LEVEMIR FLEXTOUCH 100 UNIT/ML injection pen INJECT 35 UNITS UNDER THE SKIN EVERY NIGHT 5/5/21   Xi Lema MD   SITagliptin (JANUVIA) 100 MG tablet Take 1 tablet by mouth daily 4/1/21   Xi Lema MD   pravastatin (PRAVACHOL) 80 MG tablet Take 1 tablet by mouth daily 3/22/21   Payal Cuevas MD   nystatin (MYCOSTATIN) 224597 UNIT/GM cream Apply topically 2 times daily. 8/12/20   Payal Cuevas MD   Insulin Pen Needle 30G X 8 MM MISC 1 each by Does not apply route daily 8/12/20   Payal Cuevas MD   blood glucose monitor strips TIDAC and QHS 7/27/20   Payal Cuevas MD   senna-docusate (PERICOLACE) 8.6-50 MG per tablet Take 1 tablet by mouth nightly 5/21/19   Payal Cuevas MD   Lancets [facility] & QHS 1/22/19   Peyman Lechuga, APRN - CNP     Medications:    ipratropium  2 puff Inhalation 4x daily    furosemide  40 mg Intravenous Once      Infusions:   PRN Meds:      Electronically signed by Belén Francisco DO on 6/8/2021 at 12:45 AM      This dictation was created with voice recognition software. While attempts have been made to review the dictation as it is transcribed, on occasion the spoken word can be misinterpreted by the technology leading to omissions or inappropriate words, phrases or sentences.

## 2021-06-08 NOTE — CONSULTS
Nutrition Education    · Verbally reviewed information with Patient and Family  · Educated on Heart Healthy Consistent Carbohydrate Nutrition Therapy (Nutrition Care Manual)  · Written educational materials provided. · Contact name and number provided. · Refer to Patient Education activity for more details.     Electronically signed by Sky Richmond RD, SCHUYLER on 6/8/21 at 2:19 PM EDT    Contact: [phone]

## 2021-06-09 VITALS
DIASTOLIC BLOOD PRESSURE: 60 MMHG | OXYGEN SATURATION: 97 % | TEMPERATURE: 98 F | RESPIRATION RATE: 26 BRPM | HEIGHT: 59 IN | WEIGHT: 194.4 LBS | HEART RATE: 77 BPM | SYSTOLIC BLOOD PRESSURE: 122 MMHG | BODY MASS INDEX: 39.19 KG/M2

## 2021-06-09 LAB
ANION GAP SERPL CALCULATED.3IONS-SCNC: 11 MMOL/L (ref 4–16)
BUN BLDV-MCNC: 14 MG/DL (ref 6–23)
CALCIUM SERPL-MCNC: 8.7 MG/DL (ref 8.3–10.6)
CHLORIDE BLD-SCNC: 102 MMOL/L (ref 99–110)
CO2: 29 MMOL/L (ref 21–32)
CREAT SERPL-MCNC: 0.6 MG/DL (ref 0.6–1.1)
GFR AFRICAN AMERICAN: >60 ML/MIN/1.73M2
GFR NON-AFRICAN AMERICAN: >60 ML/MIN/1.73M2
GLUCOSE BLD-MCNC: 117 MG/DL (ref 70–99)
GLUCOSE BLD-MCNC: 145 MG/DL (ref 70–99)
GLUCOSE BLD-MCNC: 162 MG/DL (ref 70–99)
GLUCOSE BLD-MCNC: 230 MG/DL (ref 70–99)
GLUCOSE BLD-MCNC: 84 MG/DL (ref 70–99)
MAGNESIUM: 1.9 MG/DL (ref 1.8–2.4)
POTASSIUM SERPL-SCNC: 3.6 MMOL/L (ref 3.5–5.1)
SODIUM BLD-SCNC: 142 MMOL/L (ref 135–145)
T4 FREE: 1.34 NG/DL (ref 0.9–1.8)
TSH HIGH SENSITIVITY: 1.37 UIU/ML (ref 0.27–4.2)

## 2021-06-09 PROCEDURE — 6360000002 HC RX W HCPCS: Performed by: INTERNAL MEDICINE

## 2021-06-09 PROCEDURE — 36415 COLL VENOUS BLD VENIPUNCTURE: CPT

## 2021-06-09 PROCEDURE — 2580000003 HC RX 258: Performed by: INTERNAL MEDICINE

## 2021-06-09 PROCEDURE — 80048 BASIC METABOLIC PNL TOTAL CA: CPT

## 2021-06-09 PROCEDURE — 82962 GLUCOSE BLOOD TEST: CPT

## 2021-06-09 PROCEDURE — 83735 ASSAY OF MAGNESIUM: CPT

## 2021-06-09 PROCEDURE — 94761 N-INVAS EAR/PLS OXIMETRY MLT: CPT

## 2021-06-09 PROCEDURE — 6370000000 HC RX 637 (ALT 250 FOR IP): Performed by: INTERNAL MEDICINE

## 2021-06-09 PROCEDURE — 94640 AIRWAY INHALATION TREATMENT: CPT

## 2021-06-09 RX ORDER — FUROSEMIDE 20 MG/1
20 TABLET ORAL 2 TIMES DAILY
Qty: 180 TABLET | Refills: 1 | Status: SHIPPED | OUTPATIENT
Start: 2021-06-09 | End: 2021-10-14

## 2021-06-09 RX ORDER — MAGNESIUM SULFATE 1 G/100ML
1000 INJECTION INTRAVENOUS ONCE
Status: DISCONTINUED | OUTPATIENT
Start: 2021-06-09 | End: 2021-06-09

## 2021-06-09 RX ORDER — LISINOPRIL 5 MG/1
2.5 TABLET ORAL DAILY
Qty: 45 TABLET | Refills: 1 | Status: ON HOLD
Start: 2021-06-09 | End: 2021-10-01 | Stop reason: HOSPADM

## 2021-06-09 RX ORDER — ALBUTEROL SULFATE 90 UG/1
2 AEROSOL, METERED RESPIRATORY (INHALATION) 4 TIMES DAILY PRN
Qty: 3 INHALER | Refills: 1 | Status: SHIPPED | OUTPATIENT
Start: 2021-06-09 | End: 2021-09-09

## 2021-06-09 RX ADMIN — SODIUM CHLORIDE, PRESERVATIVE FREE 10 ML: 5 INJECTION INTRAVENOUS at 08:41

## 2021-06-09 RX ADMIN — ENOXAPARIN SODIUM 40 MG: 40 INJECTION SUBCUTANEOUS at 08:40

## 2021-06-09 RX ADMIN — PREDNISONE 40 MG: 20 TABLET ORAL at 08:41

## 2021-06-09 RX ADMIN — FUROSEMIDE 20 MG: 10 INJECTION, SOLUTION INTRAMUSCULAR; INTRAVENOUS at 08:40

## 2021-06-09 RX ADMIN — INSULIN LISPRO 4 UNITS: 100 INJECTION, SOLUTION INTRAVENOUS; SUBCUTANEOUS at 00:14

## 2021-06-09 RX ADMIN — IPRATROPIUM BROMIDE AND ALBUTEROL SULFATE 1 AMPULE: .5; 3 SOLUTION RESPIRATORY (INHALATION) at 07:21

## 2021-06-09 ASSESSMENT — PAIN SCALES - GENERAL
PAINLEVEL_OUTOF10: 0
PAINLEVEL_OUTOF10: 0

## 2021-06-09 NOTE — PROCEDURES
[address] [place] [address], [address] [place] [address]                            CARDIAC CATHETERIZATION    PATIENT NAME: Traci Benavidez                    :        1950  MED REC NO:   [id number]                          ROOM:       1866  ACCOUNT NO:   [de-identified]                           ADMIT DATE: 2021  PROVIDER:     Mary Glynn MD    DATE OF PROCEDURE:  2021    INDICATION:  Aortic stenosis. This is a 67-year-old female patient brought to the cath lab today. Right and left heart catheterization was performed. The patient was injected 5 mL of 2% lidocaine in the right brachial  vein. A 5-[place] sheath was placed in the right brachial vein. A  5/6-[place] sheath was placed in the right ulnar artery. The entire  procedure was done using guidewire. The sheath was flushed in between  the procedure. The RA pressure is 15/11 with mean of 11. RV pressure  is 40/5 with the mean of 16. PA pressure is 36/15 with the mean of 23. Pulmonary capillary wedge pressure is 15/13 with a mean of 11 present. LVEDP is around 25 mmHg present. Using a TIG catheter, right coronary angiograph was performed. Right  coronary angiogram reveals the right coronary artery is a medium-sized  vessel. It is a dominant vessel. It fills PD and PL branch. Mild  disease noted. Using a TIG catheter, left coronary angiograph was performed. Left  coronary angiogram reveals the left main is patent. It bifurcates into  LAD and circumflex artery. The circ is a large-sized vessel, gives off  the large OM branch. Mild disease noted. LAD is a medium-sized vessel  and it reaches and wraps the apex. LAD has mild disease present. It  gives off a medium-sized diagonal branch. There is mild disease in the  diagonal branch also. ARIAN-3 flow is noted.     Using a 6-[place]izen [place] AL-1 catheter, it was crossing the LV and simultaneous  pressures were recorded. There was severe aortic stenosis noted. Her  cardiac output is 5.29 L/min. Cardiac index is index is 3 L/m2. Her  valve area is _____. The mean gradient 64 is mmHg present. IMPRESSION:  1. Moderately elevated right heart pressure present. PA mean is around  23, RA is around 11.  2.  Left main is patent. 3.  The circ has mild disease noted. 4. LAD has mild disease present. 5.  RCA has mild disease noted. 6.  The patient has severe aortic stenosis noted. We will discuss with the CV team and make further recommendation  regarding her aortic stenosis. Mean gradient 64 mmHg present. Valve  area is _____.     Blood loss Basia Magaña MD    D: 06/08/2021 16:03:44       T: 06/08/2021 16:08:36     [email or web address]  Job#: [id number]     Doc#: [id number]    CC:

## 2021-06-09 NOTE — DISCHARGE SUMMARY
Days  2. Cardiology in 1 week  3. Cardiothoracic surgery in 1 week    Follow up labs: none       Discharge Physician Signed: Electronically signed by Eloisa Gale MD on 6/9/2021 at 1:51 PM    The patient was seen and examined on day of discharge and this discharge summary is in conjunction with any daily progress note from day of discharge.   Time spent on discharge in the examination, evaluation, counseling and review of medications and discharge plan: >30 minutes

## 2021-06-09 NOTE — CARE COORDINATION
CM into see pt to initiate a safe discharge plan. Cm introduced self and explained role of CM. Pt is kind, alert and oriented. Pt lives alone. Pt has several siblings that are very support. Pt is independent for all her ADL's. She has transportation. Pt is able to obtain her groceries, prepare meals. Pt uses no DME. Pt has a PCP. Pt has insurance and is able to obtain her prescriptions. Discharge plan is for pt to return home alone. Pt declines home care. Pt is very well supported by her family and spiritual support from her Zoroastrian. No needs LH  CM provided card and encouraged to call for any needs or concern. CM is available if any needs arise.

## 2021-06-09 NOTE — DISCHARGE INSTR - COC
Continuity of Care Form    Patient Name: Logan Franks   :  1950  MRN:  [id number]    [address] [place] [address] date:  2021  Discharge date:  ***    Code Status Order: Full Code   Advance Directives:   Advance Care Flowsheet Documentation     Date/Time Healthcare Directive Type of Healthcare Directive Copy in [address] Agent's Name Healthcare Agent's Phone Number    21 1558  No, patient does not have an advance directive for healthcare treatment -- -- -- -- --          Admitting Physician:  Ramy Ewing DO  PCP: Luis Miguel Hayward MD    Discharging Nurse: [facility] Unit/Room#: 4004/4004-A  Discharging Unit Phone Number: ***    Emergency Contact:   Extended Emergency Contact Information  Primary Emergency Contact: Mimi Byrd, [address] [place] [address] Phone: [phone]  Mobile Phone: [phone]  Relation: Brother/Sister  Secondary Emergency Contact: [place] [address] Phone: [phone]  Mobile Phone: [phone]  Relation: Brother/Sister    Past Surgical History:  Past Surgical History:   Procedure Laterality Date    BREAST BIOPSY      BREAST SURGERY       SECTION      x2    HERNIA REPAIR      OTHER SURGICAL HISTORY  12/10/13    Left AC lipoma excision    OTHER SURGICAL HISTORY  12/10/13    Left flank lipoma excision       Immunization History:   Immunization History   Administered Date(s) Administered    Pneumococcal Conjugate 13-valent (Sridevi Nipple) 2017    Pneumococcal Polysaccharide (Ibgbkmyth28) 2016       Active Problems:  Patient Active Problem List   Diagnosis Code    Type 2 diabetes mellitus without complication [facility] K10.2    Mixed hyperlipidemia E78.2    Tobacco dependence F17.200    Obesity, Class I, BMI 30-34.9 E66.9    Recurrent major depressive disorder, in partial remission ([facility] F33.41    Murmur, cardiac R01.1    Morbidly obese (HCC) E66.01    SOB (shortness of breath) R06.02    S/P PTCA (percutaneous transluminal coronary angioplasty) Z98.61       Isolation/Infection:   Isolation          No Isolation        Patient Infection Status     Infection Onset Added Last Indicated Last Indicated By Review Planned Expiration Resolved Resolved By    None active    Resolved    COVID-19 Rule Out 21 COVID-19, Rapid (Ordered)   21 Rule-Out Test Resulted          Nurse Assessment:  Last Vital Signs: /60   Pulse 63   Temp 98 °F (36.7 °C) (Oral)   Resp 16   Ht 4' 11\" (1.499 m)   Wt 194 lb 6.4 oz (88.2 kg)   SpO2 97%   BMI 39.26 kg/m²     Last documented pain score (0-10 scale): Pain Level: 0  Last Weight:   Wt Readings from Last 1 Encounters:   21 194 lb 6.4 oz (88.2 kg)     Mental Status:  {IP PT MENTAL STATUS:}    IV Access:  { TERESA IV ACCESS:135669810}    Nursing Mobility/ADLs:  Walking   {[facility] DME BRISA:596324754}  Transfer  {[facility] DME HVFO:114574409}  Bathing  {[facility] DME UMP}  Dressing  {[facility] DME OJVQ:634798966}  Toileting  {[facility] DME BTOO:715588245}  Feeding  {[facility] DME SLWT:380586069}  Med Admin  {[facility] DME NOCR:787699830}  Med Delivery   { TERESA MED Delivery:911833032}    Wound Care Documentation and Therapy:        Elimination:  Continence:   · Bowel: {YES / OU:84975}  · Bladder: {YES / EK:40607}  Urinary Catheter: {Urinary Catheter:818385030}   Colostomy/Ileostomy/Ileal Conduit: {YES / GZ:36763}       Date of Last BM: ***    Intake/Output Summary (Last 24 hours) at 2021 1222  Last data filed at 2021 5509  Gross per 24 hour   Intake 594.17 ml   Output 1275 ml   Net -680.83 ml     I/O last 3 completed shifts: In: 834.2 [P.O.:240;  I.V.:594.2]  Out: 1275 [Urine:1275]    Safety Concerns:     [facility] Safety Concerns:143639881}    Impairments/Disabilities:      [address] Impairments/Disabilities:330246384}    Nutrition Therapy:  Current Nutrition Therapy:   [address] MO[address] Diet List:243062820}    Routes of Feeding: {CHP DME Other Feedings:350899674}  Liquids: {Slp liquid thickness:49063}  Daily Fluid Restriction: {CHP DME Yes amt example:612969959}  Last Modified Barium Swallow with Video (Video Swallowing Test): {Done Not Done [facility]:608030923}    Treatments at the Time of Hospital Discharge:   Respiratory Treatments: ***  Oxygen Therapy:  {Therapy; copd oxygen:26337}  Ventilator:    {MH CC Vent PPEI:724429204}    Rehab Therapies: {THERAPEUTIC INTERVENTION:1642549735}  Weight Bearing Status/Restrictions: { CC Weight Bearin}  Other Medical Equipment (for information only, NOT a DME order):  {EQUIPMENT:840732515}  Other Treatments: ***    Patient's personal belongings (please select all that are sent with patient):  {CHP DME Belongings:115404331}    RN SIGNATURE:  {Esignature:046142550}    CASE MANAGEMENT/SOCIAL WORK SECTION    Inpatient Status Date: ***    Readmission Risk Assessment Score:  Readmission Risk              Risk of Unplanned Readmission:  11           Discharging to Facility/ Agency   · Name:   · Address:  · Phone:  · Fax:    Dialysis Facility (if applicable)   · Name:  · Address:  · Dialysis Schedule:  · Phone:  · Fax:    / signature: {Esignature:745883682}    PHYSICIAN SECTION    Prognosis: {Prognosis:6633553769}    Condition at Discharge: [facility] Patient Condition:985354672}    Rehab Potential (if transferring to Rehab): {Prognosis:9129784675}    Recommended Labs or Other Treatments After Discharge: ***    Physician Certification: I certify the above information and transfer of Stacie Hilliard  is necessary for the continuing treatment of the diagnosis listed and that she requires {Admit to Appropriate Level of Care:18041} for {GREATER/LESS:705400195} 30 days.      Update Admission H&P: {CHP DME Changes in UNXVZ:729927404}    PHYSICIAN SIGNATURE:  {Esignature:644183347}

## 2021-06-09 NOTE — PLAN OF CARE
Problem: Falls - Risk of:  Goal: Will remain free from falls  6/9/2021 1000 by Harpreet Cueto RN  Outcome: Ongoing  6/9/2021 0022 by Devi Villavicencio RN  Outcome: Ongoing  Goal: Absence of physical injury  6/9/2021 1000 by Harpreet Cueto RN  Outcome: Ongoing  6/9/2021 0022 by Devi Villavicencio RN  Outcome: Ongoing

## 2021-06-09 NOTE — DISCHARGE INSTR - DIET

## 2021-06-18 PROBLEM — I50.32 CHRONIC DIASTOLIC CONGESTIVE HEART FAILURE (HCC): Status: ACTIVE | Noted: 2021-06-18

## 2021-06-18 PROBLEM — I35.0 NONRHEUMATIC AORTIC VALVE STENOSIS: Status: ACTIVE | Noted: 2021-06-18

## 2021-06-18 PROBLEM — I11.0 LVH (LEFT VENTRICULAR HYPERTROPHY) DUE TO HYPERTENSIVE DISEASE, WITH HEART FAILURE (HCC): Status: ACTIVE | Noted: 2021-06-18

## 2021-06-23 NOTE — PROGRESS NOTES
Chart reviewed by Cardiac Rehab. Patient seen at bedside with Dr. Eil Leary regarding consult for AVR per Dr. Brielle Willis. Patient aware of need for surgery. She states that she is still active in her home and assist with caring for her elderly parents. Dr. Eli Leary states that it is okay for her to go home and come back for PAT and surgery. Patient is requesting an office visit to go over the surgery more in detail with the doctor. I messaged Astrid Rivera regarding office visit and awaiting response.
Hospitalist Progress Note       6/8/2021 1:18 PM  Admit Date: 6/7/2021    PCP: Efren Rivera MD     Assessment and Plan:   1. Acute heart failure: This may be due to severe aortic stenosis or acute coronary syndrome. Echo-preserved EF, severe aortic stenosis. Cardiology consult appreciated. Continue IV Lasix 20 mg twice daily. Monitor chemistry. 2.  Possible acute coronary syndrome: Cardiology consult appreciated- for cardiac catheterization possibly today. 3.  Possible undiagnosed COPD exacerbation: CTA-no pulmonary embolism. On bronchodilators and prednisone. 4.  Diabetes type 2: Hold Januvia-resume when patient starts to eat. On Levemir and insulin sliding scale. 5.  Smoking: Smoking cessation counseling given. Nicotine patch ordered. Chronic problems include hypertension and hyperlipidemia. DVT prophylaxis: Lovenox. Patient Active Problem List:     Type 2 diabetes mellitus without complication (HCC)     Mixed hyperlipidemia     Tobacco dependence     Obesity, Class I, BMI 30-34.9     Recurrent major depressive disorder, in partial remission (HCC)     Murmur, cardiac     Morbidly obese (HCC)     SOB (shortness of breath)     S/P PTCA (percutaneous transluminal coronary angioplasty)      Subjective:     Chief Complaint   Patient presents with    Cough    Shortness of Breath       F/U:  Interval History: Daughter and brother at bedside. Admitted with shortness of breath, palpitation, orthopnea and weight gain. No chest pain. Objective: Intake/Output Summary (Last 24 hours) at 6/8/2021 1318  Last data filed at 6/8/2021 0900  Gross per 24 hour   Intake 240 ml   Output --   Net 240 ml      Vitals:   Vitals:    06/08/21 1228   BP:    Pulse: 69   Resp: 15   Temp:    SpO2: 97%     Physical Exam:  General: No evidence of respiratory distress. Neurologic: Generally weak. No gross focal neurological deficit  Cardiovascular: Regular heartbeat.   No S3.  Respiratory: Bilateral
Physician Progress Note      PATIENT:               Abel Dias  MARIE #:                  [id number]  :                       1950  ADMIT DATE:       2021 8:36 PM  [address] [place] DATE:        2021 1:14 PM  RESPONDING  PROVIDER #:        Jannie MCKENZIE          QUERY TEXT:    /Hospitalists,    -Pt admitted with and has acute heart failure documented. If possible, please   document in progress notes and discharge summary further specificity regarding   the type and acuity of CHF:      The medical record reflects the following:  Risk Factors: aortic stenosis, CAD  Clinical Indicators: ECHO-Left ventricular systolic function is normal.    Ejection fraction is visually estimated at 50-55%. Moderate left ventricular hypertrophy. Indeterminate diastolic function; E/A   flow reversal is noted; CXR-interstital edema  Treatment: labs, imaging, ECHO, LHC, Lasix, Lisinopril    Thank you,  Nancy Gordillo RN CDS  [phone]  Options provided:  -- Acute on Chronic Systolic CHF/HFrEF  -- Acute on Chronic Diastolic CHF/HFpEF  -- Acute on Chronic Systolic and Diastolic CHF  -- Acute Systolic CHF/HFrEF  -- Acute Diastolic CHF/HFpEF  -- Acute Systolic and Diastolic CHF  -- Other - I will add my own diagnosis  -- Disagree - Not applicable / Not valid  -- Disagree - Clinically unable to determine / Unknown  -- Refer to Clinical Documentation Reviewer    PROVIDER RESPONSE TEXT:    This patient is in acute diastolic CHF/HFpEF.     Query created by: Nandini Ceballos on 6/15/2021 11:28 AM      Electronically signed by:  Marlena Small 2021 10:19 AM
Regular heartbeat. No S3.  Respiratory: Bilateral rhonchi and minimal wheezes. No crepitation. Genitourinary: Tilley catheter noted  Abdomen: No tenderness or palpable mass. Extremities: No pedal edema. Feet are both warm.
percussion. Abdomen: Soft, NT, ND, +BS  Extremities: no cyanosis or edema   Vascular:  Equal 2+ peripheral pulses. Lab Data:  CBC:   Recent Labs     06/07/21  1552 06/08/21  0930   WBC 13.6* 12.4*   HGB 14.3 14.3   HCT 41.6 43.2   MCV 82.7 82.6    200     BMP:   Recent Labs     06/07/21  1552 06/08/21  0930    140   K 4.6 4.2    101   CO2 24 28   BUN 9 10   CREATININE 0.5* 0.6     LIVER PROFILE:   Recent Labs     06/07/21  1552   AST 16   ALT 15   BILITOT 0.3   ALKPHOS 92     PT/INR: No results for input(s): PROTIME, INR in the last 72 hours. APTT: No results for input(s): APTT in the last 72 hours. BNP:  No results for input(s): BNP in the last 72 hours.       Assessment:  Patient Active Problem List    Diagnosis Date Noted    SOB (shortness of breath) 06/08/2021    S/P PTCA (percutaneous transluminal coronary angioplasty) 06/08/2021    Morbidly obese ([facility] [address] 08/12/2020    Murmur, cardiac 10/25/2016    Recurrent major depressive disorder, in partial remission ([address] 08/17/2016    Obesity, Class I, BMI 30-34.9 05/26/2016    Type 2 diabetes mellitus without complication ([facility] [address] 33/31/2488    Mixed hyperlipidemia 05/12/2016    Tobacco dependence 05/12/2016       Electronically signed by CLIVE Anderson CNP on 6/9/2021 at 11:38 AM     Electronically signed by Archana Chavez MD on 6/9/21 at 12:55 PM EDT

## 2021-07-06 ENCOUNTER — OFFICE VISIT (OUTPATIENT)
Dept: FAMILY MEDICINE CLINIC | Age: 71
End: 2021-07-06
Payer: MEDICARE

## 2021-07-06 VITALS
BODY MASS INDEX: 38.95 KG/M2 | DIASTOLIC BLOOD PRESSURE: 78 MMHG | SYSTOLIC BLOOD PRESSURE: 116 MMHG | HEIGHT: 59 IN | OXYGEN SATURATION: 98 % | HEART RATE: 69 BPM | WEIGHT: 193.2 LBS

## 2021-07-06 DIAGNOSIS — Z00.00 ROUTINE GENERAL MEDICAL EXAMINATION AT A HEALTH CARE FACILITY: Primary | ICD-10-CM

## 2021-07-06 PROCEDURE — 3017F COLORECTAL CA SCREEN DOC REV: CPT | Performed by: FAMILY MEDICINE

## 2021-07-06 PROCEDURE — 1123F ACP DISCUSS/DSCN MKR DOCD: CPT | Performed by: FAMILY MEDICINE

## 2021-07-06 PROCEDURE — G0439 PPPS, SUBSEQ VISIT: HCPCS | Performed by: FAMILY MEDICINE

## 2021-07-06 PROCEDURE — 4040F PNEUMOC VAC/ADMIN/RCVD: CPT | Performed by: FAMILY MEDICINE

## 2021-07-06 ASSESSMENT — PATIENT HEALTH QUESTIONNAIRE - PHQ9
SUM OF ALL RESPONSES TO PHQ QUESTIONS 1-9: 0
2. FEELING DOWN, DEPRESSED OR HOPELESS: 0
SUM OF ALL RESPONSES TO PHQ QUESTIONS 1-9: 0
SUM OF ALL RESPONSES TO PHQ QUESTIONS 1-9: 0
SUM OF ALL RESPONSES TO PHQ9 QUESTIONS 1 & 2: 0
1. LITTLE INTEREST OR PLEASURE IN DOING THINGS: 0

## 2021-07-06 ASSESSMENT — LIFESTYLE VARIABLES: HOW OFTEN DO YOU HAVE A DRINK CONTAINING ALCOHOL: 0

## 2021-07-06 NOTE — PATIENT INSTRUCTIONS
Personalized Preventive Plan for Melanie Oliver - 7/6/2021  Medicare offers a range of preventive health benefits. Some of the tests and screenings are paid in full while other may be subject to a deductible, co-insurance, and/or copay. Some of these benefits include a comprehensive review of your medical history including lifestyle, illnesses that may run in your family, and various assessments and screenings as appropriate. After reviewing your medical record and screening and assessments performed today your provider may have ordered immunizations, labs, imaging, and/or referrals for you. A list of these orders (if applicable) as well as your Preventive Care list are included within your After Visit Summary for your review. Other Preventive Recommendations:    · A preventive eye exam performed by an eye specialist is recommended every 1-2 years to screen for glaucoma; cataracts, macular degeneration, and other eye disorders. · A preventive dental visit is recommended every 6 months. · Try to get at least 150 minutes of exercise per week or 10,000 steps per day on a pedometer . · Order or download the FREE \"Exercise & Physical Activity: Your Everyday Guide\" from [employer]. Call 6-102.140.1041 or search [employer] online. · You need 2305-2698 mg of calcium and 4177-5298 IU of vitamin D per day. It is possible to meet your calcium requirement with diet alone, but a vitamin D supplement is usually necessary to meet this goal.  · When exposed to the sun, use a sunscreen that protects against both UVA and UVB radiation with an SPF of 30 or greater. Reapply every 2 to 3 hours or after sweating, drying off with a towel, or swimming. · Always wear a seat belt when traveling in a car. Always wear a helmet when riding a bicycle or motorcycle.

## 2021-07-06 NOTE — PROGRESS NOTES
Medicare Annual Wellness Visit  Name: Brad Mew Date: 2021   MRN: [id number] Sex: Female   Age: 72 y.o. Ethnicity: Non-/Non    : 1950 Race: Black      Avril Bowers is here for Medicare AWV    Screenings for behavioral, psychosocial and functional/safety risks, and cognitive dysfunction are all negative except as indicated below. These results, as well as other patient data from the [address] form, are documented in Flowsheets linked to this Encounter. Allergies   Allergen Reactions    Codeine Rash    Hydrocodone-Acetaminophen Nausea And Vomiting         Prior to Visit Medications    Medication Sig Taking? Authorizing Provider   lisinopril (PRINIVIL;ZESTRIL) 5 MG tablet Take 0.5 tablets by mouth daily Yes Rosalinda Singh MD   furosemide (LASIX) 20 MG tablet Take 1 tablet by mouth 2 times daily Yes Rosalinda Singh MD   LEVEMIR FLEXTOUCH 100 UNIT/ML injection pen INJECT 35 UNITS UNDER THE SKIN EVERY NIGHT Yes Nereida Castañeda MD   SITagliptin (JANUVIA) 100 MG tablet Take 1 tablet by mouth daily Yes Nereida Castañeda MD   pravastatin (PRAVACHOL) 80 MG tablet Take 1 tablet by mouth daily Yes Nereida Castañeda MD   Insulin Pen Needle 30G X 8 MM MISC 1 each by Does not apply route daily Yes Nereida Castañeda MD   blood glucose monitor strips TIDAC and QHS Yes Nereida Castañeda MD   Lancets MISC TIDAC & QHS Yes Marchia Gaucher, APRN - CNP   albuterol sulfate HFA (VENTOLIN HFA) 108 (90 Base) MCG/ACT inhaler Inhale 2 puffs into the lungs 4 times daily as needed for Wheezing  Patient not taking: Reported on 2021  Rosalinda Singh MD   nystatin (MYCOSTATIN) 428749 UNIT/GM cream Apply topically 2 times daily.   Patient not taking: Reported on 2021  Nereida Castañeda MD   senna-docusate (PERICOLACE) 8.6-50 MG per tablet Take 1 tablet by mouth nightly  Patient not taking: Reported on 2021  Nereida Castañeda MD         Past Medical History:   Diagnosis Date    Arthritis     Diabetes mellitus [facility] [address]     Hypertension     Mixed hyperlipidemia 2016       Past Surgical History:   Procedure Laterality Date    BREAST BIOPSY      BREAST SURGERY       SECTION      x2    HERNIA REPAIR      OTHER SURGICAL HISTORY  12/10/13    Left AC lipoma excision    OTHER SURGICAL HISTORY  12/10/13    Left flank lipoma excision         Family History   Problem Relation Age of Onset    Diabetes Mother     Heart Disease Mother     High Blood Pressure Father     Diabetes Sister     High Blood Pressure Sister     Stroke Sister     Breast Cancer Sister 58    Cancer Brother         Lung    High Blood Pressure Brother     Stroke Brother     Diabetes Brother     Diabetes Maternal Grandmother     Heart Disease Maternal Grandfather     Cancer Brother         Lung (smoker)    Breast Cancer Maternal Aunt        CareTeam (Including outside providers/suppliers regularly involved in providing care):   Patient Care Team:  Isabella Dickey MD as PCP - General (Family Medicine)  Isabella Dickey MD as PCP - [facility] Empaneled Provider    Wt Readings from Last 3 Encounters:   21 193 lb 3.2 oz (87.6 kg)   21 195 lb (88.5 kg)   21 194 lb 6.4 oz (88.2 kg)     Vitals:    21 1041   BP: 116/78   Site: Left Upper Arm   Position: Sitting   Pulse: 69   SpO2: 98%   Weight: 193 lb 3.2 oz (87.6 kg)   Height: 4' 11\" (1.499 m)     Body mass index is 39.02 kg/m². Based upon direct observation of the patient, evaluation of cognition reveals recent and remote memory intact. Patient's complete Health Risk Assessment and screening values have been reviewed and are found in Flowsheets. The following problems were reviewed today and where indicated follow up appointments were made and/or referrals ordered. Positive Risk Factor Screenings with Interventions:      Cognitive:   Words recalled: 0 Words Recalled  Clock Drawing Test (CDT) Score: Normal  Total Score Interpretation: Positive Mini-Cog  Cognitive Impairment Interventions:  · going to have AVR next wk         General Health and ACP:  General  In general, how would you say your health is?: Fair  In the past 7 days, have you experienced any of the following?  New or Increased Pain, New or Increased Fatigue, Loneliness, Social Isolation, Stress or Anger?: None of These  Do you get the social and emotional support that you need?: Yes  Do you have a Living Will?: (!) No  Advance Directives     Power of [facility] Will ACP-Advance Directive ACP-Power of     Not on File Not on File Not on File Not on File      General Health Risk Interventions:  · need to work on the living will    Health Habits/Nutrition:  Health Habits/Nutrition  Do you exercise for at least 20 minutes 2-3 times per week?: (!) No  Have you lost any weight without trying in the past 3 months?: No  Do you eat only one meal per day?: No  Have you seen the dentist within the past year?: (!) No  Body mass index: (!) 39.02  Health Habits/Nutrition Interventions:  · doing fine    Hearing/Vision:  No exam data present  Hearing/Vision  Do you or your family notice any trouble with your hearing that hasn't been managed with hearing aids?: No  Do you have difficulty driving, watching TV, or doing any of your daily activities because of your eyesight?: No  Have you had an eye exam within the past year?: (!) No  Hearing/Vision Interventions:  · doing fine      Personalized Preventive Plan   Current Health Maintenance Status  Immunization History   Administered Date(s) Administered    Pneumococcal Conjugate 13-valent ([id number]) 11/06/2017    Pneumococcal Polysaccharide ([id number]) 05/12/2016        Health Maintenance   Topic Date Due    COVID-19 Vaccine (1) Never done    DTaP/Tdap/Td vaccine (1 - Tdap) Never done    Shingles Vaccine (1 of 2) Never done    Diabetic retinal exam  07/28/2018    Annual Wellness Visit (AWV)  Never done    Pneumococcal 65+ years Vaccine (2

## 2021-07-08 RX ORDER — CHLORHEXIDINE GLUCONATE 0.12 MG/ML
30 RINSE ORAL ONCE
Status: CANCELLED | OUTPATIENT
Start: 2021-07-13

## 2021-07-08 RX ORDER — SIMVASTATIN 40 MG
40 TABLET ORAL ONCE
Status: CANCELLED | OUTPATIENT
Start: 2021-07-13

## 2021-07-08 RX ORDER — CARVEDILOL 3.12 MG/1
3.12 TABLET ORAL ONCE
Status: CANCELLED | OUTPATIENT
Start: 2021-07-13

## 2021-07-09 ENCOUNTER — HOSPITAL ENCOUNTER (OUTPATIENT)
Dept: PULMONOLOGY | Age: 71
Discharge: HOME | End: 2021-07-09
Payer: MEDICARE

## 2021-07-09 ENCOUNTER — HOSPITAL ENCOUNTER (OUTPATIENT)
Dept: LAB | Age: 71
Discharge: HOME | End: 2021-07-09
Payer: MEDICARE

## 2021-07-09 ENCOUNTER — HOSPITAL ENCOUNTER (OUTPATIENT)
Dept: PREADMISSION TESTING | Age: 71
Discharge: HOME | End: 2021-07-13
Payer: MEDICARE

## 2021-07-09 ENCOUNTER — HOSPITAL ENCOUNTER (OUTPATIENT)
Dept: ULTRASOUND IMAGING | Age: 71
Discharge: HOME | End: 2021-07-09
Payer: MEDICARE

## 2021-07-09 VITALS
HEART RATE: 72 BPM | OXYGEN SATURATION: 97 % | SYSTOLIC BLOOD PRESSURE: 131 MMHG | TEMPERATURE: 96.2 F | BODY MASS INDEX: 39.11 KG/M2 | WEIGHT: 194 LBS | RESPIRATION RATE: 18 BRPM | HEIGHT: 59 IN | DIASTOLIC BLOOD PRESSURE: 69 MMHG

## 2021-07-09 LAB
ANION GAP SERPL CALCULATED.3IONS-SCNC: 11 MMOL/L (ref 4–16)
APTT: 32 SECONDS (ref 25.1–37.1)
BACTERIA: ABNORMAL /HPF
BASOPHILS ABSOLUTE: 0 K/CU MM
BASOPHILS RELATIVE PERCENT: 0.5 % (ref 0–1)
BILIRUBIN URINE: NEGATIVE MG/DL
BLOOD, URINE: NEGATIVE
BUN BLDV-MCNC: 11 MG/DL (ref 6–23)
CALCIUM SERPL-MCNC: 9.5 MG/DL (ref 8.3–10.6)
CHLORIDE BLD-SCNC: 103 MMOL/L (ref 99–110)
CLARITY: ABNORMAL
CO2: 27 MMOL/L (ref 21–32)
COLOR: YELLOW
CREAT SERPL-MCNC: 0.6 MG/DL (ref 0.6–1.1)
DIFFERENTIAL TYPE: ABNORMAL
EOSINOPHILS ABSOLUTE: 0.1 K/CU MM
EOSINOPHILS RELATIVE PERCENT: 1.5 % (ref 0–3)
ESTIMATED AVERAGE GLUCOSE: 192 MG/DL
GFR AFRICAN AMERICAN: >60 ML/MIN/1.73M2
GFR NON-AFRICAN AMERICAN: >60 ML/MIN/1.73M2
GLUCOSE BLD-MCNC: 127 MG/DL (ref 70–99)
GLUCOSE, URINE: >500 MG/DL
HBA1C MFR BLD: 8.3 % (ref 4.2–6.3)
HCT VFR BLD CALC: 41.2 % (ref 37–47)
HEMOGLOBIN: 14.1 GM/DL (ref 12.5–16)
IMMATURE NEUTROPHIL %: 0.2 % (ref 0–0.43)
INR BLD: 0.92 INDEX
KETONES, URINE: NEGATIVE MG/DL
LEUKOCYTE ESTERASE, URINE: NEGATIVE
LYMPHOCYTES ABSOLUTE: 2.2 K/CU MM
LYMPHOCYTES RELATIVE PERCENT: 26 % (ref 24–44)
MAGNESIUM: 2 MG/DL (ref 1.8–2.4)
MCH RBC QN AUTO: 28.3 PG (ref 27–31)
MCHC RBC AUTO-ENTMCNC: 34.2 % (ref 32–36)
MCV RBC AUTO: 82.7 FL (ref 78–100)
MONOCYTES ABSOLUTE: 0.6 K/CU MM
MONOCYTES RELATIVE PERCENT: 7 % (ref 0–4)
MUCUS: ABNORMAL HPF
NITRITE URINE, QUANTITATIVE: NEGATIVE
NUCLEATED RBC %: 0 %
PDW BLD-RTO: 15.7 % (ref 11.7–14.9)
PH, URINE: 5 (ref 5–8)
PLATELET # BLD: 206 K/CU MM (ref 140–440)
PMV BLD AUTO: 11 FL (ref 7.5–11.1)
POTASSIUM SERPL-SCNC: 4.4 MMOL/L (ref 3.5–5.1)
PROTEIN UA: NEGATIVE MG/DL
PROTHROMBIN TIME: 11.9 SECONDS (ref 11.7–14.5)
RBC # BLD: 4.98 M/CU MM (ref 4.2–5.4)
RBC URINE: ABNORMAL /HPF (ref 0–6)
SARS-COV-2, NAAT: NOT DETECTED
SEGMENTED NEUTROPHILS ABSOLUTE COUNT: 5.6 K/CU MM
SEGMENTED NEUTROPHILS RELATIVE PERCENT: 64.8 % (ref 36–66)
SODIUM BLD-SCNC: 141 MMOL/L (ref 135–145)
SOURCE: NORMAL
SPECIFIC GRAVITY UA: 1.02 (ref 1–1.03)
SQUAMOUS EPITHELIAL: 2 /HPF
TOTAL IMMATURE NEUTOROPHIL: 0.02 K/CU MM
TOTAL NUCLEATED RBC: 0 K/CU MM
TRICHOMONAS: ABNORMAL /HPF
UROBILINOGEN, URINE: NEGATIVE MG/DL (ref 0.2–1)
WBC # BLD: 8.6 K/CU MM (ref 4–10.5)
WBC UA: 3 /HPF (ref 0–5)

## 2021-07-09 PROCEDURE — 93880 EXTRACRANIAL BILAT STUDY: CPT

## 2021-07-09 PROCEDURE — 86850 RBC ANTIBODY SCREEN: CPT

## 2021-07-09 PROCEDURE — 87081 CULTURE SCREEN ONLY: CPT

## 2021-07-09 PROCEDURE — 87635 SARS-COV-2 COVID-19 AMP PRB: CPT

## 2021-07-09 PROCEDURE — 83735 ASSAY OF MAGNESIUM: CPT

## 2021-07-09 PROCEDURE — 86901 BLOOD TYPING SEROLOGIC RH(D): CPT

## 2021-07-09 PROCEDURE — 86900 BLOOD TYPING SEROLOGIC ABO: CPT

## 2021-07-09 PROCEDURE — 85730 THROMBOPLASTIN TIME PARTIAL: CPT

## 2021-07-09 PROCEDURE — 94010 BREATHING CAPACITY TEST: CPT

## 2021-07-09 PROCEDURE — 81001 URINALYSIS AUTO W/SCOPE: CPT

## 2021-07-09 PROCEDURE — 36415 COLL VENOUS BLD VENIPUNCTURE: CPT

## 2021-07-09 PROCEDURE — 83036 HEMOGLOBIN GLYCOSYLATED A1C: CPT

## 2021-07-09 PROCEDURE — 86922 COMPATIBILITY TEST ANTIGLOB: CPT

## 2021-07-09 PROCEDURE — 85610 PROTHROMBIN TIME: CPT

## 2021-07-09 PROCEDURE — 80048 BASIC METABOLIC PNL TOTAL CA: CPT

## 2021-07-09 PROCEDURE — 82803 BLOOD GASES ANY COMBINATION: CPT

## 2021-07-09 PROCEDURE — 85025 COMPLETE CBC W/AUTO DIFF WBC: CPT

## 2021-07-09 ASSESSMENT — PAIN SCALES - GENERAL: PAINLEVEL_OUTOF10: 0

## 2021-07-11 LAB
CULTURE: NORMAL
Lab: NORMAL
SPECIMEN: NORMAL

## 2021-07-12 NOTE — PROGRESS NOTES
Called results of abn HgbA1c- 8.3, glucose 127 and abn urine results done with pretesting 7/9/2021 to Dr Duong Cook

## 2021-07-17 LAB
ABO/RH: NORMAL
ANTIBODY SCREEN: NEGATIVE
COMMENT: NORMAL
COMPONENT: NORMAL
CROSSMATCH RESULT: NORMAL
STATUS: NORMAL
THERMAL AMPLITUDE STUDY: NORMAL
TRANSFUSION STATUS: NORMAL
UNIT DIVISION: 0
UNIT NUMBER: NORMAL

## 2021-09-08 RX ORDER — CARVEDILOL 3.12 MG/1
3.12 TABLET ORAL ONCE
Status: CANCELLED | OUTPATIENT
Start: 2021-09-16

## 2021-09-08 RX ORDER — CHLORHEXIDINE GLUCONATE 0.12 MG/ML
30 RINSE ORAL ONCE
Status: CANCELLED | OUTPATIENT
Start: 2021-09-16

## 2021-09-08 RX ORDER — SIMVASTATIN 40 MG
40 TABLET ORAL ONCE
Status: CANCELLED | OUTPATIENT
Start: 2021-09-16

## 2021-09-09 ENCOUNTER — HOSPITAL ENCOUNTER (OUTPATIENT)
Age: 71
Discharge: HOME | End: 2021-09-09
Payer: MEDICARE

## 2021-09-09 ENCOUNTER — HOSPITAL ENCOUNTER (OUTPATIENT)
Dept: PULMONOLOGY | Age: 71
Discharge: HOME | End: 2021-09-09
Payer: MEDICARE

## 2021-09-09 ENCOUNTER — HOSPITAL ENCOUNTER (OUTPATIENT)
Dept: PREADMISSION TESTING | Age: 71
Discharge: HOME | End: 2021-09-13
Payer: MEDICARE

## 2021-09-09 ENCOUNTER — HOSPITAL ENCOUNTER (OUTPATIENT)
Dept: LAB | Age: 71
Discharge: HOME | End: 2021-09-09
Payer: MEDICARE

## 2021-09-09 ENCOUNTER — HOSPITAL ENCOUNTER (OUTPATIENT)
Dept: GENERAL RADIOLOGY | Age: 71
Discharge: HOME | End: 2021-09-09
Payer: MEDICARE

## 2021-09-09 VITALS
OXYGEN SATURATION: 98 % | BODY MASS INDEX: 39.88 KG/M2 | SYSTOLIC BLOOD PRESSURE: 123 MMHG | DIASTOLIC BLOOD PRESSURE: 67 MMHG | HEART RATE: 68 BPM | WEIGHT: 197.8 LBS | RESPIRATION RATE: 18 BRPM | HEIGHT: 59 IN

## 2021-09-09 DIAGNOSIS — Z01.818 PRE-OP TESTING: ICD-10-CM

## 2021-09-09 DIAGNOSIS — Z01.818 PRE-OP TESTING: Primary | ICD-10-CM

## 2021-09-09 LAB
ANION GAP SERPL CALCULATED.3IONS-SCNC: 8 MMOL/L (ref 4–16)
APTT: 32.2 SECONDS (ref 25.1–37.1)
BACTERIA: NEGATIVE /HPF
BASE EXCESS MIXED: 3.9 (ref 0–2.3)
BASOPHILS ABSOLUTE: 0 K/CU MM
BASOPHILS RELATIVE PERCENT: 0.5 % (ref 0–1)
BILIRUBIN URINE: NEGATIVE MG/DL
BLOOD, URINE: NEGATIVE
BUN BLDV-MCNC: 9 MG/DL (ref 6–23)
CALCIUM SERPL-MCNC: 8.9 MG/DL (ref 8.3–10.6)
CARBON MONOXIDE, BLOOD: 2.5 % (ref 0–5)
CHLORIDE BLD-SCNC: 105 MMOL/L (ref 99–110)
CLARITY: CLEAR
CO2 CONTENT: 29.8 MMOL/L (ref 19–24)
CO2: 28 MMOL/L (ref 21–32)
COLOR: YELLOW
COMMENT: ABNORMAL
CREAT SERPL-MCNC: 0.6 MG/DL (ref 0.6–1.1)
DIFFERENTIAL TYPE: ABNORMAL
EKG ATRIAL RATE: 66 BPM
EKG DIAGNOSIS: NORMAL
EKG P AXIS: 62 DEGREES
EKG P-R INTERVAL: 174 MS
EKG Q-T INTERVAL: 390 MS
EKG QRS DURATION: 70 MS
EKG QTC CALCULATION (BAZETT): 408 MS
EKG R AXIS: -4 DEGREES
EKG T AXIS: 147 DEGREES
EKG VENTRICULAR RATE: 66 BPM
EOSINOPHILS ABSOLUTE: 0.2 K/CU MM
EOSINOPHILS RELATIVE PERCENT: 1.8 % (ref 0–3)
ESTIMATED AVERAGE GLUCOSE: 166 MG/DL
GFR AFRICAN AMERICAN: >60 ML/MIN/1.73M2
GFR NON-AFRICAN AMERICAN: >60 ML/MIN/1.73M2
GLUCOSE BLD-MCNC: 105 MG/DL (ref 70–99)
GLUCOSE, URINE: 150 MG/DL
HBA1C MFR BLD: 7.4 % (ref 4.2–6.3)
HCO3 ARTERIAL: 28.5 MMOL/L (ref 18–23)
HCT VFR BLD CALC: 39.7 % (ref 37–47)
HEMOGLOBIN: 13.3 GM/DL (ref 12.5–16)
IMMATURE NEUTROPHIL %: 0.2 % (ref 0–0.43)
INR BLD: 0.88 INDEX
KETONES, URINE: NEGATIVE MG/DL
LEUKOCYTE ESTERASE, URINE: NEGATIVE
LYMPHOCYTES ABSOLUTE: 2.3 K/CU MM
LYMPHOCYTES RELATIVE PERCENT: 27.9 % (ref 24–44)
MAGNESIUM: 2 MG/DL (ref 1.8–2.4)
MCH RBC QN AUTO: 27.9 PG (ref 27–31)
MCHC RBC AUTO-ENTMCNC: 33.5 % (ref 32–36)
MCV RBC AUTO: 83.2 FL (ref 78–100)
METHEMOGLOBIN ARTERIAL: 1.3 %
MONOCYTES ABSOLUTE: 0.5 K/CU MM
MONOCYTES RELATIVE PERCENT: 6 % (ref 0–4)
MUCUS: ABNORMAL HPF
NITRITE URINE, QUANTITATIVE: NEGATIVE
NUCLEATED RBC %: 0 %
O2 SATURATION: 93.9 % (ref 96–97)
PCO2 ARTERIAL: 42 MMHG (ref 32–45)
PDW BLD-RTO: 16 % (ref 11.7–14.9)
PH BLOOD: 7.44 (ref 7.34–7.45)
PH, URINE: 5 (ref 5–8)
PLATELET # BLD: 216 K/CU MM (ref 140–440)
PMV BLD AUTO: 11.7 FL (ref 7.5–11.1)
PO2 ARTERIAL: 75 MMHG (ref 75–100)
POTASSIUM SERPL-SCNC: 4.1 MMOL/L (ref 3.5–5.1)
PROTEIN UA: NEGATIVE MG/DL
PROTHROMBIN TIME: 11.4 SECONDS (ref 11.7–14.5)
RBC # BLD: 4.77 M/CU MM (ref 4.2–5.4)
RBC URINE: <1 /HPF (ref 0–6)
SARS-COV-2, NAAT: NOT DETECTED
SEGMENTED NEUTROPHILS ABSOLUTE COUNT: 5.2 K/CU MM
SEGMENTED NEUTROPHILS RELATIVE PERCENT: 63.6 % (ref 36–66)
SODIUM BLD-SCNC: 141 MMOL/L (ref 135–145)
SOURCE: NORMAL
SPECIFIC GRAVITY UA: 1.01 (ref 1–1.03)
SQUAMOUS EPITHELIAL: 2 /HPF
TOTAL IMMATURE NEUTOROPHIL: 0.02 K/CU MM
TOTAL NUCLEATED RBC: 0 K/CU MM
TRICHOMONAS: ABNORMAL /HPF
UROBILINOGEN, URINE: NEGATIVE MG/DL (ref 0.2–1)
WBC # BLD: 8.2 K/CU MM (ref 4–10.5)
WBC UA: 2 /HPF (ref 0–5)

## 2021-09-09 PROCEDURE — 94150 VITAL CAPACITY TEST: CPT

## 2021-09-09 PROCEDURE — 85610 PROTHROMBIN TIME: CPT

## 2021-09-09 PROCEDURE — 85025 COMPLETE CBC W/AUTO DIFF WBC: CPT

## 2021-09-09 PROCEDURE — 86922 COMPATIBILITY TEST ANTIGLOB: CPT

## 2021-09-09 PROCEDURE — 86901 BLOOD TYPING SEROLOGIC RH(D): CPT

## 2021-09-09 PROCEDURE — 86850 RBC ANTIBODY SCREEN: CPT

## 2021-09-09 PROCEDURE — 83036 HEMOGLOBIN GLYCOSYLATED A1C: CPT

## 2021-09-09 PROCEDURE — 94010 BREATHING CAPACITY TEST: CPT

## 2021-09-09 PROCEDURE — 80048 BASIC METABOLIC PNL TOTAL CA: CPT

## 2021-09-09 PROCEDURE — 86900 BLOOD TYPING SEROLOGIC ABO: CPT

## 2021-09-09 PROCEDURE — 93005 ELECTROCARDIOGRAM TRACING: CPT | Performed by: SURGERY

## 2021-09-09 PROCEDURE — 87635 SARS-COV-2 COVID-19 AMP PRB: CPT

## 2021-09-09 PROCEDURE — 36600 WITHDRAWAL OF ARTERIAL BLOOD: CPT

## 2021-09-09 PROCEDURE — 93010 ELECTROCARDIOGRAM REPORT: CPT | Performed by: INTERNAL MEDICINE

## 2021-09-09 PROCEDURE — 87081 CULTURE SCREEN ONLY: CPT

## 2021-09-09 PROCEDURE — 81001 URINALYSIS AUTO W/SCOPE: CPT

## 2021-09-09 PROCEDURE — 71046 X-RAY EXAM CHEST 2 VIEWS: CPT

## 2021-09-09 PROCEDURE — 85730 THROMBOPLASTIN TIME PARTIAL: CPT

## 2021-09-09 PROCEDURE — 82803 BLOOD GASES ANY COMBINATION: CPT

## 2021-09-09 PROCEDURE — 83735 ASSAY OF MAGNESIUM: CPT

## 2021-09-09 NOTE — PROGRESS NOTES
Patient called because she was not checked in at 0800. Patient said she had a text stating she was to be here at 0900. She said she is on her way.

## 2021-09-09 NOTE — PROGRESS NOTES
Chart reviewed by cardiac rehab nurse. Met patient in lobby. Introduced myself and teaching plan. Patient was previously scheduled for surgery in July but due to needing some dental work for decayed teeth surgery needed to be postponed. Patient's planned procedure is AVR. Teaching done on A&P of the heart valves and formation of valve disease. Explained the surgical process, Pre-Op,Inter-Op and Post-Op. Discussed length of stay and recovery. Explanation given of pre op routine tests, lines/tubes/equipment, and infection control. Educated on weaning from ventilator, medication and equipment to expect, on progressive activity, post op pain, and how it will be managed. Encouraged pt to communicate about pain in order to create a partnership for her recovery success. Discussed importance of coughing and deep breathing and sternal precautions. Introduced multidisciplinary approach and daily routine in CVICU. Pt verbalized commitment to recovery program.        Teaching done on post discharge recovery, activity guidelines, and weight monitoring. Discussed follow up appointments, possibility of ECF, role of home health, and family involvement. Pt lives alone but her sister and adult children will be assisting with her care when she returns home. Introduced benefits of out patient cardiac rehab after appropriate time frame. Went over visitation policy with patient. Given Understanding Heart Surgery book. Escorted to main exit with all personal belongings as well as items given to patient from facility for upcoming surgery.

## 2021-09-09 NOTE — PROGRESS NOTES
.Surgery is on 9/16/21 you will be called      with times on 9/15/21               1. Do not eat or drink anything after midnight - unless instructed by your doctor prior to surgery. This includes                   no water, chewing gum or mints. 2. Follow your directions as prescribed by the doctor for your procedure and medications. 3. Check with your Doctor regarding stopping Plavix, Coumadin, Lovenox,Effient,Pradaxa,Xarelto, Fragmin or other blood thinners and                   follow their instructions. Do not take anymore lisinopril after 9/10/21. You may use your inhaler the morning of surgery, but do not take anything else. 4. Do not smoke, and do not drink any alcoholic beverages 24 hours prior to surgery. This includes NA Beer. 5. You may brush your teeth and gargle the morning of surgery. DO NOT SWALLOW WATER   6. You MUST make arrangements for a responsible adult to take you home after your surgery and be able to check on you every couple                   hours for the day. You will not be allowed to leave alone or drive yourself home. It is strongly suggested someone stay with you the first 24                   hrs. Your surgery will be cancelled if you do not have a ride home. 7. Please wear simple, loose fitting clothing to the hospital.  Enma Mckeon not bring valuables (money, credit cards, checkbooks, etc.) Do not wear any                   makeup (including no eye makeup) or nail polish on your fingers or toes. 8. DO NOT wear any jewelry or piercings on day of surgery. All body piercing jewelry must be removed. 9. If you have dentures, they will be removed before going to the OR; we will provide you a container. If you wear contact lenses or glasses,                  they will be removed; please bring a case for them. 10. If you  have a Living Will and Durable Power of  for Healthcare, please bring in a copy.            11. Please bring picture ID,  insurance card, paperwork from the doctors office    (H & P, Consent, & card for implantable devices). 12. Take a shower the night before or morning of your procedure, do not apply any lotion, oil or powder. 13. Wear a mask covering your nose & mouth when entering the hospital. Have your covid-19 test performed within 2-7 days of your                  surgery. Quarantine yourself after the test until after your surgery.   Patient completing Covid test today 9/9/21 at hospital.

## 2021-09-09 NOTE — PROGRESS NOTES
Dr. Mack Sicard notified of pt's risk score as well as abnormal labwork. No new orders noted at this time.

## 2021-09-11 LAB
CULTURE: NORMAL
Lab: NORMAL
SPECIMEN: NORMAL

## 2021-09-15 ENCOUNTER — ANESTHESIA EVENT (OUTPATIENT)
Dept: OPERATING ROOM | Age: 71
DRG: 220 | End: 2021-09-15
Payer: MEDICARE

## 2021-09-15 RX ORDER — NOREPINEPHRINE BIT/0.9 % NACL 16MG/250ML
2-100 INFUSION BOTTLE (ML) INTRAVENOUS
Status: DISCONTINUED | OUTPATIENT
Start: 2021-09-16 | End: 2021-09-15 | Stop reason: CLARIF

## 2021-09-15 ASSESSMENT — ENCOUNTER SYMPTOMS: SHORTNESS OF BREATH: 1

## 2021-09-15 NOTE — ANESTHESIA PRE PROCEDURE
Department of Anesthesiology  Preprocedure Note       Name:  Bert Hagen   Age:  80 y.o.  :  1950                                          MRN:  [id number]         Date:  9/15/2021      Surgeon: Margarita Huffman):  Collette Strickland MD    Procedure: Procedure(s):  AORTIC VALVE REPLACEMENT    Medications prior to admission:   Prior to Admission medications    Medication Sig Start Date End Date Taking? Authorizing Provider   lisinopril (PRINIVIL;ZESTRIL) 5 MG tablet Take 0.5 tablets by mouth daily 21   Donnie Jeff MD   furosemide (LASIX) 20 MG tablet Take 1 tablet by mouth 2 times daily 21   Donnie Jeff MD   LEVEMIR FLEXTOUCH 100 UNIT/ML injection pen INJECT 35 UNITS UNDER THE SKIN EVERY NIGHT 21   Lupe Cherry MD   SITagliptin (JANUVIA) 100 MG tablet Take 1 tablet by mouth daily 21   Lupe Cherry MD   pravastatin (PRAVACHOL) 80 MG tablet Take 1 tablet by mouth daily 3/22/21   Lupe Cherry MD   Insulin Pen Needle 30G X 8 MM MISC 1 each by Does not apply route daily 20   Lupe Cherry MD   blood glucose monitor strips TIDAC and QHS 20   Lupe Cherry MD   Lancets [facility] & QHS 19   Talia Schrader, APRN - CNP       Current medications:    No current facility-administered medications for this encounter.      Current Outpatient Medications   Medication Sig Dispense Refill    lisinopril (PRINIVIL;ZESTRIL) 5 MG tablet Take 0.5 tablets by mouth daily 45 tablet 1    furosemide (LASIX) 20 MG tablet Take 1 tablet by mouth 2 times daily 180 tablet 1    LEVEMIR FLEXTOUCH 100 UNIT/ML injection pen INJECT 35 UNITS UNDER THE SKIN EVERY NIGHT 5 pen 5    SITagliptin (JANUVIA) 100 MG tablet Take 1 tablet by mouth daily 30 tablet 5    pravastatin (PRAVACHOL) 80 MG tablet Take 1 tablet by mouth daily 90 tablet 3    Insulin Pen Needle 30G X 8 MM MISC 1 each by Does not apply route daily 100 each 5    blood glucose monitor strips TIDAC and  strip 5    Lancets MISC TIDAC &  each 3       Allergies:     Allergies   Allergen Reactions    Codeine Rash    Hydrocodone-Acetaminophen Nausea And Vomiting       Problem List:    Patient Active Problem List   Diagnosis Code    Type 2 diabetes mellitus without complication ([facility] [address] U27.1    Mixed hyperlipidemia E78.2    Tobacco dependence F17.200    Obesity, Class I, BMI 30-34.9 E66.9    Recurrent major depressive disorder, in partial remission ([facility] [address] F33.41    Murmur, cardiac R01.1    Morbidly obese (HCC) E66.01    SOB (shortness of breath) R06.02    S/P PTCA (percutaneous transluminal coronary angioplasty) Z98.61    Nonrheumatic aortic valve stenosis I35.0    Chronic diastolic congestive heart failure (HCC) I50.32    LVH (left ventricular hypertrophy) due to hypertensive disease, with heart failure (HCC) I11.0       Past Medical History:        Diagnosis Date    Aortic valve stenosis     Arthritis     Bronchitis 2021    Diabetes mellitus (HCC)     dx age 53's    Heart murmur     Hx of drug abuse ([facility] [address]     \"did cocaine back in     Hypertension     Irregular heart beat     \"they just say I skip a beat , I think from the heart murmur- not sure\" follow with Dr Marysol Hedrick LVH (left ventricular hypertrophy)     hx per old chart    Mixed hyperlipidemia 2016    Wears dentures     full upper plate    Wears glasses     to read       Past Surgical History:        Procedure Laterality Date    BREAST BIOPSY      BREAST SURGERY  2019    left breast bx    CARDIAC CATHETERIZATION  2021     SECTION   and ( with BPS)    x2    DILATION AND CURETTAGE OF UTERUS      HERNIA REPAIR      umbilical hernia\"think done when I was a teenager    OTHER SURGICAL HISTORY  12/10/13    Left AC lipoma excision    OTHER SURGICAL HISTORY  12/10/13    Left flank lipoma excision       Social History:    Social History     Tobacco Use    Smoking status: Former Smoker     Packs/day: 0.50     Years: 32.00 Pack years: 16.00     Types: Cigarettes     Start date: 12     Quit date: 2021     Years since quittin.2    Smokeless tobacco: Never Used    Tobacco comment: currently 0.5 PPD   Substance Use Topics    Alcohol use: No     Alcohol/week: 0.0 standard drinks     Comment: average \"2 times per month glass of wine\"                                Counseling given: Not Answered  Comment: currently 0.5 PPD      Vital Signs (Current): There were no vitals filed for this visit. BP Readings from Last 3 Encounters:   21 123/67   21 (!) 148/76   21 131/69       NPO Status:                                                                                 BMI:   Wt Readings from Last 3 Encounters:   21 197 lb 12.8 oz (89.7 kg)   21 196 lb (88.9 kg)   21 194 lb (88 kg)     There is no height or weight on file to calculate BMI.    CBC:   Lab Results   Component Value Date    WBC 8.2 2021    RBC 4.77 2021    HGB 13.3 2021    HCT 39.7 2021    MCV 83.2 2021    RDW 16.0 2021     2021       CMP:   Lab Results   Component Value Date     2021    K 4.1 2021     2021    CO2 28 2021    BUN 9 2021    CREATININE 0.6 2021    GFRAA >60 2021    AGRATIO 1.3 2016    LABGLOM >60 2021    GLUCOSE 105 2021    PROT 7.6 2021    PROT 7.3 2012    CALCIUM 8.9 2021    BILITOT 0.3 2021    ALKPHOS 92 2021    AST 16 2021    ALT 15 2021       POC Tests: No results for input(s): POCGLU, POCNA, POCK, POCCL, POCBUN, POCHEMO, POCHCT in the last 72 hours.     Coags:   Lab Results   Component Value Date    PROTIME 11.4 2021    PROTIME 11.6 2011    INR 0.88 2021    APTT 32.2 2021       HCG (If Applicable): No results found for: PREGTESTUR, PREGSERUM, HCG, HCGQUANT     ABGs:   Lab Results Component Value Date    PO2ART 75 2021    GSN0REN 42.0 2021    MIP4BSC 28.5 2021        Type & Screen (If Applicable):  No results found for: LABABO, LABRH    Drug/Infectious Status (If Applicable):  No results found for: HIV, HEPCAB    COVID-19 Screening (If Applicable):   Lab Results   Component Value Date    COVID19 NOT DETECTED 2021           Anesthesia Evaluation  Patient summary reviewed  Airway:         Dental:          Pulmonary:   (+) shortness of breath:                             Cardiovascular:  Exercise tolerance: poor (<4 METS),   (+) hypertension:, valvular problems/murmurs: AS, dysrhythmias:,       NYHA Classification: III  ECG reviewed      Echocardiogram reviewed               ROS comment:  echo       Summary   Left ventricular systolic function is normal.   Ejection fraction is visually estimated at 50-55%. Moderate left ventricular hypertrophy. Indeterminate diastolic function; E/A flow reversal is noted. Heavily calcified aortic valve with severe aortic stenosis; mean P   mmHG. BRITTNEE: 0.57 cm2. DVI: 0.2. No evidence of any pericardial effusion.    [facility]  IMPRESSION:  1. Moderately elevated right heart pressure present. PA mean is around  23, RA is around 11.  2.  Left main is patent. 3.  The circ has mild disease noted. 4. LAD has mild disease present. 5.  RCA has mild disease noted. 6.  The patient has severe aortic stenosis noted.           Neuro/Psych:   (+) psychiatric history:            GI/Hepatic/Renal:   (+) morbid obesity          Endo/Other:    (+) DiabetesType II DM, , .                 Abdominal:             Vascular:           ROS comment: The right internal carotid artery demonstrates 0-50% stenosis.     The left internal carotid artery demonstrates 0-50% stenosis.     Bilateral vertebral arteries are patent with flow in the normal direction.     . Other Findings:             Anesthesia Plan      general     ASA 4     ( Pre Anesthesia Assessment complete.  Chart reviewed on 9/15/2021)    arterial line, BIS, central line, CVP, PA catheter and JOSE                        CLIVE Cristobal - CRNA   9/15/2021

## 2021-09-16 ENCOUNTER — APPOINTMENT (OUTPATIENT)
Dept: GENERAL RADIOLOGY | Age: 71
DRG: 220 | End: 2021-09-16
Attending: SURGERY
Payer: MEDICARE

## 2021-09-16 ENCOUNTER — ANESTHESIA (OUTPATIENT)
Dept: OPERATING ROOM | Age: 71
DRG: 220 | End: 2021-09-16
Payer: MEDICARE

## 2021-09-16 ENCOUNTER — HOSPITAL ENCOUNTER (INPATIENT)
Age: 71
LOS: 5 days | Discharge: NURSING, REHAB OR LONG-TERM CARE | DRG: 220 | End: 2021-09-21
Attending: SURGERY | Admitting: SURGERY
Payer: MEDICARE

## 2021-09-16 VITALS
OXYGEN SATURATION: 100 % | TEMPERATURE: 96.3 F | SYSTOLIC BLOOD PRESSURE: 147 MMHG | RESPIRATION RATE: 1 BRPM | DIASTOLIC BLOOD PRESSURE: 76 MMHG

## 2021-09-16 DIAGNOSIS — I35.0 NONRHEUMATIC AORTIC VALVE STENOSIS: ICD-10-CM

## 2021-09-16 LAB
ANION GAP SERPL CALCULATED.3IONS-SCNC: 9 MMOL/L (ref 4–16)
APTT: 43.3 SECONDS (ref 25.1–37.1)
BASE EXCESS MIXED: 0.5 (ref 0–2.3)
BASE EXCESS MIXED: 0.6 (ref 0–2.3)
BASE EXCESS MIXED: 2.1 (ref 0–2.3)
BASE EXCESS MIXED: 2.1 (ref 0–2.3)
BASE EXCESS MIXED: 2.2 (ref 0–2.3)
BASE EXCESS MIXED: 2.6 (ref 0–2.3)
BASE EXCESS MIXED: 2.7 (ref 0–2.3)
BASE EXCESS MIXED: 2.7 (ref 0–2.3)
BASE EXCESS MIXED: 2.9 (ref 0–2.3)
BASE EXCESS MIXED: 3.4 (ref 0–2.3)
BASE EXCESS MIXED: 3.5 (ref 0–2.3)
BASE EXCESS MIXED: 3.5 (ref 0–2.3)
BASE EXCESS MIXED: 3.7 (ref 0–2.3)
BASE EXCESS MIXED: 3.8 (ref 0–2.3)
BASE EXCESS MIXED: 3.9 (ref 0–2.3)
BASE EXCESS MIXED: 4.1 (ref 0–2.3)
BASE EXCESS MIXED: 4.1 (ref 0–2.3)
BASE EXCESS MIXED: 4.5 (ref 0–2.3)
BASE EXCESS: ABNORMAL (ref 0–2.4)
BUN BLDV-MCNC: 9 MG/DL (ref 6–23)
CALCIUM SERPL-MCNC: 8.7 MG/DL (ref 8.3–10.6)
CHLORIDE BLD-SCNC: 112 MMOL/L (ref 99–110)
CO2 CONTENT: 21.5 MMOL/L (ref 19–24)
CO2 CONTENT: 22.5 MMOL/L (ref 19–24)
CO2 CONTENT: 26.1 MMOL/L (ref 19–24)
CO2: 20 MMOL/L (ref 21–32)
CO2: 21 MMOL/L (ref 21–32)
CO2: 22 MMOL/L (ref 21–32)
CO2: 22 MMOL/L (ref 21–32)
CO2: 23 MMOL/L (ref 21–32)
CO2: 23 MMOL/L (ref 21–32)
CO2: 24 MMOL/L (ref 21–32)
CO2: 24 MMOL/L (ref 21–32)
CO2: 25 MMOL/L (ref 21–32)
CO2: 28 MMOL/L (ref 21–32)
CO2: 30 MMOL/L (ref 21–32)
CO2: 30 MMOL/L (ref 21–32)
CO2: 31 MMOL/L (ref 21–32)
CREAT SERPL-MCNC: 0.4 MG/DL (ref 0.6–1.1)
GFR AFRICAN AMERICAN: >60 ML/MIN/1.73M2
GFR NON-AFRICAN AMERICAN: >60 ML/MIN/1.73M2
GLUCOSE BLD-MCNC: 100 MG/DL (ref 70–99)
GLUCOSE BLD-MCNC: 102 MG/DL (ref 70–99)
GLUCOSE BLD-MCNC: 105 MG/DL (ref 70–99)
GLUCOSE BLD-MCNC: 110 MG/DL (ref 70–99)
GLUCOSE BLD-MCNC: 110 MG/DL (ref 70–99)
GLUCOSE BLD-MCNC: 113 MG/DL (ref 70–99)
GLUCOSE BLD-MCNC: 116 MG/DL (ref 70–99)
GLUCOSE BLD-MCNC: 117 MG/DL (ref 70–99)
GLUCOSE BLD-MCNC: 118 MG/DL (ref 70–99)
GLUCOSE BLD-MCNC: 120 MG/DL (ref 70–99)
GLUCOSE BLD-MCNC: 123 MG/DL (ref 70–99)
GLUCOSE BLD-MCNC: 130 MG/DL (ref 70–99)
GLUCOSE BLD-MCNC: 139 MG/DL (ref 70–99)
GLUCOSE BLD-MCNC: 146 MG/DL (ref 70–99)
GLUCOSE BLD-MCNC: 146 MG/DL (ref 70–99)
GLUCOSE BLD-MCNC: 170 MG/DL (ref 70–99)
GLUCOSE BLD-MCNC: 182 MG/DL (ref 70–99)
GLUCOSE BLD-MCNC: 82 MG/DL (ref 70–99)
GLUCOSE BLD-MCNC: 84 MG/DL (ref 70–99)
GLUCOSE BLD-MCNC: 90 MG/DL (ref 70–99)
HCO3 ARTERIAL: 19 MMOL/L (ref 18–23)
HCO3 ARTERIAL: 19.4 MMOL/L (ref 18–23)
HCO3 ARTERIAL: 19.6 MMOL/L (ref 18–23)
HCO3 ARTERIAL: 20.2 MMOL/L (ref 18–23)
HCO3 ARTERIAL: 20.5 MMOL/L (ref 18–23)
HCO3 ARTERIAL: 20.6 MMOL/L (ref 18–23)
HCO3 ARTERIAL: 20.8 MMOL/L (ref 18–23)
HCO3 ARTERIAL: 21.4 MMOL/L (ref 18–23)
HCO3 ARTERIAL: 22 MMOL/L (ref 18–23)
HCO3 ARTERIAL: 22.1 MMOL/L (ref 18–23)
HCO3 ARTERIAL: 22.5 MMOL/L (ref 18–23)
HCO3 ARTERIAL: 23 MMOL/L (ref 18–23)
HCO3 ARTERIAL: 23.8 MMOL/L (ref 18–23)
HCO3 ARTERIAL: 24.8 MMOL/L (ref 18–23)
HCO3 ARTERIAL: 27 MMOL/L (ref 18–23)
HCO3 ARTERIAL: 28.3 MMOL/L (ref 18–23)
HCO3 ARTERIAL: 28.4 MMOL/L (ref 18–23)
HCO3 ARTERIAL: 29.3 MMOL/L (ref 18–23)
HCT VFR BLD CALC: 21.6 % (ref 37–47)
HCT VFR BLD CALC: 22 % (ref 37–47)
HCT VFR BLD CALC: 24 % (ref 37–47)
HCT VFR BLD CALC: 26 % (ref 37–47)
HCT VFR BLD CALC: 26 % (ref 37–47)
HCT VFR BLD CALC: 27 % (ref 37–47)
HCT VFR BLD CALC: 28 % (ref 37–47)
HCT VFR BLD CALC: 29 % (ref 37–47)
HCT VFR BLD CALC: 30 % (ref 37–47)
HCT VFR BLD CALC: 31 % (ref 37–47)
HCT VFR BLD CALC: 32 % (ref 37–47)
HCT VFR BLD CALC: 33 % (ref 37–47)
HCT VFR BLD CALC: 35 % (ref 37–47)
HCT VFR BLD CALC: NORMAL % (ref 37–47)
HEMOGLOBIN: 10.3 GM/DL (ref 12.5–16)
HEMOGLOBIN: 10.6 GM/DL (ref 12.5–16)
HEMOGLOBIN: 10.8 GM/DL (ref 12.5–16)
HEMOGLOBIN: 11.3 GM/DL (ref 12.5–16)
HEMOGLOBIN: 11.9 GM/DL (ref 12.5–16)
HEMOGLOBIN: 6.9 GM/DL (ref 12.5–16)
HEMOGLOBIN: 7.4 GM/DL (ref 12.5–16)
HEMOGLOBIN: 8.3 GM/DL (ref 12.5–16)
HEMOGLOBIN: 8.8 GM/DL (ref 12.5–16)
HEMOGLOBIN: 8.8 GM/DL (ref 12.5–16)
HEMOGLOBIN: 9 GM/DL (ref 12.5–16)
HEMOGLOBIN: 9.2 GM/DL (ref 12.5–16)
HEMOGLOBIN: 9.3 GM/DL (ref 12.5–16)
HEMOGLOBIN: 9.4 GM/DL (ref 12.5–16)
HEMOGLOBIN: 9.7 GM/DL (ref 12.5–16)
HEMOGLOBIN: 9.7 GM/DL (ref 12.5–16)
HEMOGLOBIN: 9.9 GM/DL (ref 12.5–16)
HEMOGLOBIN: NORMAL GM/DL (ref 12.5–16)
INR BLD: 2.07 INDEX
MAGNESIUM: 0.9 MG/DL (ref 1.8–2.4)
MAGNESIUM: 1.9 MG/DL (ref 1.8–2.4)
MCH RBC QN AUTO: 28.5 PG (ref 27–31)
MCH RBC QN AUTO: NORMAL PG (ref 27–31)
MCHC RBC AUTO-ENTMCNC: 31.9 % (ref 32–36)
MCHC RBC AUTO-ENTMCNC: NORMAL % (ref 32–36)
MCV RBC AUTO: 89.3 FL (ref 78–100)
MCV RBC AUTO: NORMAL FL (ref 78–100)
O2 SATURATION: 100 % (ref 96–97)
O2 SATURATION: 90.1 % (ref 96–97)
O2 SATURATION: 90.7 % (ref 96–97)
O2 SATURATION: 90.8 % (ref 96–97)
O2 SATURATION: 90.9 % (ref 96–97)
O2 SATURATION: 91.4 % (ref 96–97)
O2 SATURATION: 92.7 % (ref 96–97)
O2 SATURATION: 94 % (ref 96–97)
O2 SATURATION: 94.6 % (ref 96–97)
O2 SATURATION: 95 % (ref 96–97)
O2 SATURATION: 96.8 % (ref 96–97)
O2 SATURATION: 97.2 % (ref 96–97)
O2 SATURATION: 99.7 % (ref 96–97)
O2 SATURATION: 99.9 % (ref 96–97)
PCO2 ARTERIAL: 26.4 MMHG (ref 32–45)
PCO2 ARTERIAL: 27.5 MMHG (ref 32–45)
PCO2 ARTERIAL: 28.6 MMHG (ref 32–45)
PCO2 ARTERIAL: 30 MMHG (ref 32–45)
PCO2 ARTERIAL: 33.1 MMHG (ref 32–45)
PCO2 ARTERIAL: 33.8 MMHG (ref 32–45)
PCO2 ARTERIAL: 34 MMHG (ref 32–45)
PCO2 ARTERIAL: 34.7 MMHG (ref 32–45)
PCO2 ARTERIAL: 35.2 MMHG (ref 32–45)
PCO2 ARTERIAL: 36.6 MMHG (ref 32–45)
PCO2 ARTERIAL: 38.2 MMHG (ref 32–45)
PCO2 ARTERIAL: 39.3 MMHG (ref 32–45)
PCO2 ARTERIAL: 39.3 MMHG (ref 32–45)
PCO2 ARTERIAL: 42.3 MMHG (ref 32–45)
PCO2 ARTERIAL: 42.6 MMHG (ref 32–45)
PCO2 ARTERIAL: 43.3 MMHG (ref 32–45)
PCO2 ARTERIAL: 46.6 MMHG (ref 32–45)
PCO2 ARTERIAL: 48.7 MMHG (ref 32–45)
PDW BLD-RTO: 16.6 % (ref 11.7–14.9)
PDW BLD-RTO: NORMAL % (ref 11.7–14.9)
PH BLOOD: 7.37 (ref 7.34–7.45)
PH BLOOD: 7.38 (ref 7.34–7.45)
PH BLOOD: 7.38 (ref 7.34–7.45)
PH BLOOD: 7.4 (ref 7.34–7.45)
PH BLOOD: 7.41 (ref 7.34–7.45)
PH BLOOD: 7.41 (ref 7.34–7.45)
PH BLOOD: 7.42 (ref 7.34–7.45)
PH BLOOD: 7.43 (ref 7.34–7.45)
PH BLOOD: 7.44 (ref 7.34–7.45)
PH BLOOD: 7.45 (ref 7.34–7.45)
PH BLOOD: 7.46 (ref 7.34–7.45)
PH BLOOD: 7.47 (ref 7.34–7.45)
PH BLOOD: 7.47 (ref 7.34–7.45)
PLATELET # BLD: 183 K/CU MM (ref 140–440)
PLATELET # BLD: NORMAL K/CU MM (ref 140–440)
PMV BLD AUTO: 12.5 FL (ref 7.5–11.1)
PMV BLD AUTO: NORMAL FL (ref 7.5–11.1)
PO2 ARTERIAL: 204.1 MMHG (ref 75–100)
PO2 ARTERIAL: 261.1 MMHG (ref 75–100)
PO2 ARTERIAL: 334.1 MMHG (ref 75–100)
PO2 ARTERIAL: 580.8 MMHG (ref 75–100)
PO2 ARTERIAL: 60 MMHG (ref 75–100)
PO2 ARTERIAL: 60.1 MMHG (ref 75–100)
PO2 ARTERIAL: 61.9 MMHG (ref 75–100)
PO2 ARTERIAL: 62 MMHG (ref 75–100)
PO2 ARTERIAL: 62.2 MMHG (ref 75–100)
PO2 ARTERIAL: 64 MMHG (ref 75–100)
PO2 ARTERIAL: 642.9 MMHG (ref 75–100)
PO2 ARTERIAL: 645.1 MMHG (ref 75–100)
PO2 ARTERIAL: 667.7 MMHG (ref 75–100)
PO2 ARTERIAL: 68.5 MMHG (ref 75–100)
PO2 ARTERIAL: 71.1 MMHG (ref 75–100)
PO2 ARTERIAL: 74.3 MMHG (ref 75–100)
PO2 ARTERIAL: 81.2 MMHG (ref 75–100)
PO2 ARTERIAL: 85.3 MMHG (ref 75–100)
POC ACT PLUS: 109 SEC
POC ACT PLUS: 447 SEC
POC ACT PLUS: 457 SEC
POC ACT PLUS: 480 SEC
POC ACT PLUS: 480 SEC
POC ACT PLUS: 99 SEC
POC ACT PLUS: >1000 SEC
POC CALCIUM: 0.99 MMOL/L (ref 1.12–1.32)
POC CALCIUM: 1.09 MMOL/L (ref 1.12–1.32)
POC CALCIUM: 1.13 MMOL/L (ref 1.12–1.32)
POC CALCIUM: 1.14 MMOL/L (ref 1.12–1.32)
POC CALCIUM: 1.16 MMOL/L (ref 1.12–1.32)
POC CALCIUM: 1.16 MMOL/L (ref 1.12–1.32)
POC CALCIUM: 1.26 MMOL/L (ref 1.12–1.32)
POC CALCIUM: 1.29 MMOL/L (ref 1.12–1.32)
POC CALCIUM: 1.3 MMOL/L (ref 1.12–1.32)
POC CALCIUM: 1.31 MMOL/L (ref 1.12–1.32)
POC CALCIUM: 1.31 MMOL/L (ref 1.12–1.32)
POC CALCIUM: 1.33 MMOL/L (ref 1.12–1.32)
POC CALCIUM: 1.42 MMOL/L (ref 1.12–1.32)
POC CALCIUM: 1.45 MMOL/L (ref 1.12–1.32)
POC CALCIUM: 1.49 MMOL/L (ref 1.12–1.32)
POC CALCIUM: 1.54 MMOL/L (ref 1.12–1.32)
POC CHLORIDE: 105 MMOL/L (ref 98–109)
POC CHLORIDE: 106 MMOL/L (ref 98–109)
POC CHLORIDE: 107 MMOL/L (ref 98–109)
POC CHLORIDE: 109 MMOL/L (ref 98–109)
POC CHLORIDE: 112 MMOL/L (ref 98–109)
POC CHLORIDE: 112 MMOL/L (ref 98–109)
POC CHLORIDE: 113 MMOL/L (ref 98–109)
POC CHLORIDE: 114 MMOL/L (ref 98–109)
POC CREATININE: 0.6 MG/DL (ref 0.6–1.1)
POC CREATININE: 0.7 MG/DL (ref 0.6–1.1)
POC CREATININE: 0.8 MG/DL (ref 0.6–1.1)
POC CREATININE: 0.9 MG/DL (ref 0.6–1.1)
POTASSIUM SERPL-SCNC: 3.1 MMOL/L (ref 3.5–4.5)
POTASSIUM SERPL-SCNC: 3.2 MMOL/L (ref 3.5–4.5)
POTASSIUM SERPL-SCNC: 3.5 MMOL/L (ref 3.5–4.5)
POTASSIUM SERPL-SCNC: 3.6 MMOL/L (ref 3.5–4.5)
POTASSIUM SERPL-SCNC: 3.6 MMOL/L (ref 3.5–4.5)
POTASSIUM SERPL-SCNC: 3.7 MMOL/L (ref 3.5–4.5)
POTASSIUM SERPL-SCNC: 3.8 MMOL/L (ref 3.5–4.5)
POTASSIUM SERPL-SCNC: 3.8 MMOL/L (ref 3.5–4.5)
POTASSIUM SERPL-SCNC: 4.1 MMOL/L (ref 3.5–4.5)
POTASSIUM SERPL-SCNC: 4.2 MMOL/L (ref 3.5–4.5)
POTASSIUM SERPL-SCNC: 4.2 MMOL/L (ref 3.5–5.1)
POTASSIUM SERPL-SCNC: 4.4 MMOL/L (ref 3.5–4.5)
POTASSIUM SERPL-SCNC: 4.6 MMOL/L (ref 3.5–4.5)
POTASSIUM SERPL-SCNC: 5 MMOL/L (ref 3.5–4.5)
POTASSIUM SERPL-SCNC: 5.7 MMOL/L (ref 3.5–4.5)
PROTHROMBIN TIME: 26.9 SECONDS (ref 11.7–14.5)
RBC # BLD: 2.42 M/CU MM (ref 4.2–5.4)
RBC # BLD: NORMAL M/CU MM (ref 4.2–5.4)
SODIUM BLD-SCNC: 141 MMOL/L (ref 135–145)
SODIUM BLD-SCNC: 142 MMOL/L (ref 138–146)
SODIUM BLD-SCNC: 143 MMOL/L (ref 138–146)
SODIUM BLD-SCNC: 144 MMOL/L (ref 138–146)
SODIUM BLD-SCNC: 144 MMOL/L (ref 138–146)
SODIUM BLD-SCNC: 145 MMOL/L (ref 138–146)
SODIUM BLD-SCNC: 145 MMOL/L (ref 138–146)
SODIUM BLD-SCNC: 146 MMOL/L (ref 138–146)
SODIUM BLD-SCNC: 153 MMOL/L (ref 138–146)
SOURCE, BLOOD GAS: ABNORMAL
WBC # BLD: 11.1 K/CU MM (ref 4–10.5)
WBC # BLD: NORMAL K/CU MM (ref 4–10.5)

## 2021-09-16 PROCEDURE — 02RF08Z REPLACEMENT OF AORTIC VALVE WITH ZOOPLASTIC TISSUE, OPEN APPROACH: ICD-10-PCS | Performed by: THORACIC SURGERY (CARDIOTHORACIC VASCULAR SURGERY)

## 2021-09-16 PROCEDURE — 6370000000 HC RX 637 (ALT 250 FOR IP)

## 2021-09-16 PROCEDURE — 88311 DECALCIFY TISSUE: CPT | Performed by: PATHOLOGY

## 2021-09-16 PROCEDURE — C1768 GRAFT, VASCULAR: HCPCS | Performed by: THORACIC SURGERY (CARDIOTHORACIC VASCULAR SURGERY)

## 2021-09-16 PROCEDURE — 2580000003 HC RX 258

## 2021-09-16 PROCEDURE — 85730 THROMBOPLASTIN TIME PARTIAL: CPT

## 2021-09-16 PROCEDURE — 6360000002 HC RX W HCPCS

## 2021-09-16 PROCEDURE — 3600000008 HC SURGERY OHS BASE: Performed by: THORACIC SURGERY (CARDIOTHORACIC VASCULAR SURGERY)

## 2021-09-16 PROCEDURE — 84295 ASSAY OF SERUM SODIUM: CPT

## 2021-09-16 PROCEDURE — 6360000002 HC RX W HCPCS: Performed by: THORACIC SURGERY (CARDIOTHORACIC VASCULAR SURGERY)

## 2021-09-16 PROCEDURE — P9016 RBC LEUKOCYTES REDUCED: HCPCS

## 2021-09-16 PROCEDURE — 94761 N-INVAS EAR/PLS OXIMETRY MLT: CPT

## 2021-09-16 PROCEDURE — 3700000000 HC ANESTHESIA ATTENDED CARE: Performed by: THORACIC SURGERY (CARDIOTHORACIC VASCULAR SURGERY)

## 2021-09-16 PROCEDURE — P9045 ALBUMIN (HUMAN), 5%, 250 ML: HCPCS | Performed by: THORACIC SURGERY (CARDIOTHORACIC VASCULAR SURGERY)

## 2021-09-16 PROCEDURE — 85027 COMPLETE CBC AUTOMATED: CPT

## 2021-09-16 PROCEDURE — 2780000006 HC MISC HEART VALVE: Performed by: THORACIC SURGERY (CARDIOTHORACIC VASCULAR SURGERY)

## 2021-09-16 PROCEDURE — 82962 GLUCOSE BLOOD TEST: CPT

## 2021-09-16 PROCEDURE — 37799 UNLISTED PX VASCULAR SURGERY: CPT

## 2021-09-16 PROCEDURE — 6370000000 HC RX 637 (ALT 250 FOR IP): Performed by: PHYSICIAN ASSISTANT

## 2021-09-16 PROCEDURE — 6370000000 HC RX 637 (ALT 250 FOR IP): Performed by: SURGERY

## 2021-09-16 PROCEDURE — 6370000000 HC RX 637 (ALT 250 FOR IP): Performed by: THORACIC SURGERY (CARDIOTHORACIC VASCULAR SURGERY)

## 2021-09-16 PROCEDURE — 2709999900 HC NON-CHARGEABLE SUPPLY: Performed by: THORACIC SURGERY (CARDIOTHORACIC VASCULAR SURGERY)

## 2021-09-16 PROCEDURE — 2580000003 HC RX 258: Performed by: SURGERY

## 2021-09-16 PROCEDURE — 2580000003 HC RX 258: Performed by: ANESTHESIOLOGY

## 2021-09-16 PROCEDURE — 82330 ASSAY OF CALCIUM: CPT

## 2021-09-16 PROCEDURE — P9047 ALBUMIN (HUMAN), 25%, 50ML: HCPCS

## 2021-09-16 PROCEDURE — 85610 PROTHROMBIN TIME: CPT

## 2021-09-16 PROCEDURE — 2580000003 HC RX 258: Performed by: PHYSICIAN ASSISTANT

## 2021-09-16 PROCEDURE — 2580000003 HC RX 258: Performed by: THORACIC SURGERY (CARDIOTHORACIC VASCULAR SURGERY)

## 2021-09-16 PROCEDURE — 2500000003 HC RX 250 WO HCPCS

## 2021-09-16 PROCEDURE — 94002 VENT MGMT INPAT INIT DAY: CPT

## 2021-09-16 PROCEDURE — 6360000002 HC RX W HCPCS: Performed by: PHYSICIAN ASSISTANT

## 2021-09-16 PROCEDURE — 85347 COAGULATION TIME ACTIVATED: CPT

## 2021-09-16 PROCEDURE — 02HV33Z INSERTION OF INFUSION DEVICE INTO SUPERIOR VENA CAVA, PERCUTANEOUS APPROACH: ICD-10-PCS | Performed by: THORACIC SURGERY (CARDIOTHORACIC VASCULAR SURGERY)

## 2021-09-16 PROCEDURE — C1769 GUIDE WIRE: HCPCS | Performed by: THORACIC SURGERY (CARDIOTHORACIC VASCULAR SURGERY)

## 2021-09-16 PROCEDURE — C1751 CATH, INF, PER/CENT/MIDLINE: HCPCS | Performed by: THORACIC SURGERY (CARDIOTHORACIC VASCULAR SURGERY)

## 2021-09-16 PROCEDURE — 85014 HEMATOCRIT: CPT

## 2021-09-16 PROCEDURE — 80048 BASIC METABOLIC PNL TOTAL CA: CPT

## 2021-09-16 PROCEDURE — 3600000018 HC SURGERY OHS ADDTL 15MIN: Performed by: THORACIC SURGERY (CARDIOTHORACIC VASCULAR SURGERY)

## 2021-09-16 PROCEDURE — 2000000000 HC ICU R&B

## 2021-09-16 PROCEDURE — 6360000002 HC RX W HCPCS: Performed by: NURSE ANESTHETIST, CERTIFIED REGISTERED

## 2021-09-16 PROCEDURE — P9045 ALBUMIN (HUMAN), 5%, 250 ML: HCPCS | Performed by: NURSE ANESTHETIST, CERTIFIED REGISTERED

## 2021-09-16 PROCEDURE — 2500000003 HC RX 250 WO HCPCS: Performed by: NURSE ANESTHETIST, CERTIFIED REGISTERED

## 2021-09-16 PROCEDURE — 2700000000 HC OXYGEN THERAPY PER DAY

## 2021-09-16 PROCEDURE — 6360000002 HC RX W HCPCS: Performed by: SURGERY

## 2021-09-16 PROCEDURE — 94640 AIRWAY INHALATION TREATMENT: CPT

## 2021-09-16 PROCEDURE — 84132 ASSAY OF SERUM POTASSIUM: CPT

## 2021-09-16 PROCEDURE — 3700000001 HC ADD 15 MINUTES (ANESTHESIA): Performed by: THORACIC SURGERY (CARDIOTHORACIC VASCULAR SURGERY)

## 2021-09-16 PROCEDURE — 2720000010 HC SURG SUPPLY STERILE: Performed by: THORACIC SURGERY (CARDIOTHORACIC VASCULAR SURGERY)

## 2021-09-16 PROCEDURE — C1894 INTRO/SHEATH, NON-LASER: HCPCS | Performed by: THORACIC SURGERY (CARDIOTHORACIC VASCULAR SURGERY)

## 2021-09-16 PROCEDURE — 5A1221Z PERFORMANCE OF CARDIAC OUTPUT, CONTINUOUS: ICD-10-PCS | Performed by: THORACIC SURGERY (CARDIOTHORACIC VASCULAR SURGERY)

## 2021-09-16 PROCEDURE — C1729 CATH, DRAINAGE: HCPCS | Performed by: THORACIC SURGERY (CARDIOTHORACIC VASCULAR SURGERY)

## 2021-09-16 PROCEDURE — 83735 ASSAY OF MAGNESIUM: CPT

## 2021-09-16 PROCEDURE — 71045 X-RAY EXAM CHEST 1 VIEW: CPT

## 2021-09-16 PROCEDURE — 2580000003 HC RX 258: Performed by: NURSE ANESTHETIST, CERTIFIED REGISTERED

## 2021-09-16 PROCEDURE — 2500000003 HC RX 250 WO HCPCS: Performed by: PHYSICIAN ASSISTANT

## 2021-09-16 PROCEDURE — 7100000000 HC PACU RECOVERY - FIRST 15 MIN

## 2021-09-16 PROCEDURE — 88305 TISSUE EXAM BY PATHOLOGIST: CPT | Performed by: PATHOLOGY

## 2021-09-16 PROCEDURE — 82803 BLOOD GASES ANY COMBINATION: CPT

## 2021-09-16 PROCEDURE — B24BZZ4 ULTRASONOGRAPHY OF HEART WITH AORTA, TRANSESOPHAGEAL: ICD-10-PCS | Performed by: ANESTHESIOLOGY

## 2021-09-16 PROCEDURE — P9045 ALBUMIN (HUMAN), 5%, 250 ML: HCPCS | Performed by: PHYSICIAN ASSISTANT

## 2021-09-16 PROCEDURE — 85018 HEMOGLOBIN: CPT

## 2021-09-16 DEVICE — VALVE AORT DIA25MM H17.5MM ORIFICE DIA22.5MM SUT RNG: Type: IMPLANTABLE DEVICE | Site: HEART | Status: FUNCTIONAL

## 2021-09-16 DEVICE — GRAFT VASC L30CM BOR 32MM THORACOABDOMINAL POLY GEL SEAL: Type: IMPLANTABLE DEVICE | Site: CHEST | Status: FUNCTIONAL

## 2021-09-16 RX ORDER — ALBUMIN, HUMAN INJ 5% 5 %
25 SOLUTION INTRAVENOUS PRN
Status: DISCONTINUED | OUTPATIENT
Start: 2021-09-16 | End: 2021-09-21 | Stop reason: HOSPADM

## 2021-09-16 RX ORDER — CARVEDILOL 3.12 MG/1
3.12 TABLET ORAL ONCE
Status: COMPLETED | OUTPATIENT
Start: 2021-09-16 | End: 2021-09-16

## 2021-09-16 RX ORDER — FENTANYL CITRATE 50 UG/ML
INJECTION, SOLUTION INTRAMUSCULAR; INTRAVENOUS
Status: COMPLETED
Start: 2021-09-16 | End: 2021-09-16

## 2021-09-16 RX ORDER — DEXAMETHASONE SODIUM PHOSPHATE 4 MG/ML
INJECTION, SOLUTION INTRA-ARTICULAR; INTRALESIONAL; INTRAMUSCULAR; INTRAVENOUS; SOFT TISSUE PRN
Status: DISCONTINUED | OUTPATIENT
Start: 2021-09-16 | End: 2021-09-16 | Stop reason: SDUPTHER

## 2021-09-16 RX ORDER — HEPARIN SODIUM 1000 [USP'U]/ML
INJECTION, SOLUTION INTRAVENOUS; SUBCUTANEOUS PRN
Status: DISCONTINUED | OUTPATIENT
Start: 2021-09-16 | End: 2021-09-16 | Stop reason: SDUPTHER

## 2021-09-16 RX ORDER — FENTANYL CITRATE 0.05 MG/ML
INJECTION, SOLUTION INTRAMUSCULAR; INTRAVENOUS PRN
Status: DISCONTINUED | OUTPATIENT
Start: 2021-09-16 | End: 2021-09-16 | Stop reason: SDUPTHER

## 2021-09-16 RX ORDER — NICOTINE POLACRILEX 4 MG
15 LOZENGE BUCCAL PRN
Status: DISCONTINUED | OUTPATIENT
Start: 2021-09-16 | End: 2021-09-21 | Stop reason: HOSPADM

## 2021-09-16 RX ORDER — SODIUM CHLORIDE 450 MG/100ML
INJECTION, SOLUTION INTRAVENOUS CONTINUOUS
Status: DISCONTINUED | OUTPATIENT
Start: 2021-09-16 | End: 2021-09-19

## 2021-09-16 RX ORDER — PROPOFOL 10 MG/ML
INJECTION, EMULSION INTRAVENOUS PRN
Status: DISCONTINUED | OUTPATIENT
Start: 2021-09-16 | End: 2021-09-16 | Stop reason: SDUPTHER

## 2021-09-16 RX ORDER — VANCOMYCIN HYDROCHLORIDE 1 G/20ML
INJECTION, POWDER, LYOPHILIZED, FOR SOLUTION INTRAVENOUS PRN
Status: DISCONTINUED | OUTPATIENT
Start: 2021-09-16 | End: 2021-09-16 | Stop reason: SDUPTHER

## 2021-09-16 RX ORDER — ROCURONIUM BROMIDE 10 MG/ML
INJECTION, SOLUTION INTRAVENOUS PRN
Status: DISCONTINUED | OUTPATIENT
Start: 2021-09-16 | End: 2021-09-16 | Stop reason: SDUPTHER

## 2021-09-16 RX ORDER — ALBUMIN, HUMAN INJ 5% 5 %
12.5 SOLUTION INTRAVENOUS ONCE
Status: COMPLETED | OUTPATIENT
Start: 2021-09-17 | End: 2021-09-17

## 2021-09-16 RX ORDER — CALCIUM GLUCONATE 20 MG/ML
2000 INJECTION, SOLUTION INTRAVENOUS PRN
Status: DISCONTINUED | OUTPATIENT
Start: 2021-09-16 | End: 2021-09-21 | Stop reason: HOSPADM

## 2021-09-16 RX ORDER — VECURONIUM BROMIDE 1 MG/ML
INJECTION, POWDER, LYOPHILIZED, FOR SOLUTION INTRAVENOUS PRN
Status: DISCONTINUED | OUTPATIENT
Start: 2021-09-16 | End: 2021-09-16 | Stop reason: SDUPTHER

## 2021-09-16 RX ORDER — SODIUM CHLORIDE 9 MG/ML
25 INJECTION, SOLUTION INTRAVENOUS PRN
Status: DISCONTINUED | OUTPATIENT
Start: 2021-09-16 | End: 2021-09-21 | Stop reason: HOSPADM

## 2021-09-16 RX ORDER — ACETAMINOPHEN 325 MG/1
650 TABLET ORAL EVERY 4 HOURS PRN
Status: DISCONTINUED | OUTPATIENT
Start: 2021-09-16 | End: 2021-09-21 | Stop reason: HOSPADM

## 2021-09-16 RX ORDER — BISACODYL 10 MG
10 SUPPOSITORY, RECTAL RECTAL DAILY PRN
Status: DISCONTINUED | OUTPATIENT
Start: 2021-09-16 | End: 2021-09-21 | Stop reason: HOSPADM

## 2021-09-16 RX ORDER — KETOROLAC TROMETHAMINE 30 MG/ML
15 INJECTION, SOLUTION INTRAMUSCULAR; INTRAVENOUS EVERY 6 HOURS PRN
Status: DISPENSED | OUTPATIENT
Start: 2021-09-16 | End: 2021-09-21

## 2021-09-16 RX ORDER — NITROGLYCERIN 20 MG/100ML
10 INJECTION INTRAVENOUS CONTINUOUS PRN
Status: DISCONTINUED | OUTPATIENT
Start: 2021-09-16 | End: 2021-09-21 | Stop reason: HOSPADM

## 2021-09-16 RX ORDER — HYDRALAZINE HYDROCHLORIDE 20 MG/ML
5 INJECTION INTRAMUSCULAR; INTRAVENOUS EVERY 5 MIN PRN
Status: DISCONTINUED | OUTPATIENT
Start: 2021-09-16 | End: 2021-09-21 | Stop reason: HOSPADM

## 2021-09-16 RX ORDER — IPRATROPIUM BROMIDE AND ALBUTEROL SULFATE 2.5; .5 MG/3ML; MG/3ML
1 SOLUTION RESPIRATORY (INHALATION)
Status: DISCONTINUED | OUTPATIENT
Start: 2021-09-16 | End: 2021-09-16

## 2021-09-16 RX ORDER — ONDANSETRON 2 MG/ML
INJECTION INTRAMUSCULAR; INTRAVENOUS PRN
Status: DISCONTINUED | OUTPATIENT
Start: 2021-09-16 | End: 2021-09-16 | Stop reason: SDUPTHER

## 2021-09-16 RX ORDER — HYDROCODONE BITARTRATE AND ACETAMINOPHEN 5; 325 MG/1; MG/1
2 TABLET ORAL EVERY 4 HOURS PRN
Status: DISCONTINUED | OUTPATIENT
Start: 2021-09-16 | End: 2021-09-21 | Stop reason: HOSPADM

## 2021-09-16 RX ORDER — CHLORHEXIDINE GLUCONATE 0.12 MG/ML
30 RINSE ORAL ONCE
Status: COMPLETED | OUTPATIENT
Start: 2021-09-16 | End: 2021-09-16

## 2021-09-16 RX ORDER — SODIUM CHLORIDE, SODIUM LACTATE, POTASSIUM CHLORIDE, CALCIUM CHLORIDE 600; 310; 30; 20 MG/100ML; MG/100ML; MG/100ML; MG/100ML
INJECTION, SOLUTION INTRAVENOUS CONTINUOUS PRN
Status: DISCONTINUED | OUTPATIENT
Start: 2021-09-16 | End: 2021-09-16 | Stop reason: SDUPTHER

## 2021-09-16 RX ORDER — CALCIUM CHLORIDE 100 MG/ML
INJECTION INTRAVENOUS; INTRAVENTRICULAR PRN
Status: DISCONTINUED | OUTPATIENT
Start: 2021-09-16 | End: 2021-09-16 | Stop reason: SDUPTHER

## 2021-09-16 RX ORDER — MULTIVITAMIN WITH IRON
1 TABLET ORAL
Status: DISCONTINUED | OUTPATIENT
Start: 2021-09-17 | End: 2021-09-21 | Stop reason: HOSPADM

## 2021-09-16 RX ORDER — SIMVASTATIN 40 MG
40 TABLET ORAL ONCE
Status: COMPLETED | OUTPATIENT
Start: 2021-09-16 | End: 2021-09-16

## 2021-09-16 RX ORDER — SODIUM CHLORIDE 0.9 % (FLUSH) 0.9 %
10 SYRINGE (ML) INJECTION PRN
Status: DISCONTINUED | OUTPATIENT
Start: 2021-09-16 | End: 2021-09-21 | Stop reason: HOSPADM

## 2021-09-16 RX ORDER — POTASSIUM CHLORIDE 29.8 MG/ML
20 INJECTION INTRAVENOUS PRN
Status: DISCONTINUED | OUTPATIENT
Start: 2021-09-16 | End: 2021-09-21 | Stop reason: HOSPADM

## 2021-09-16 RX ORDER — MAGNESIUM SULFATE IN WATER 40 MG/ML
2000 INJECTION, SOLUTION INTRAVENOUS PRN
Status: DISCONTINUED | OUTPATIENT
Start: 2021-09-16 | End: 2021-09-21 | Stop reason: HOSPADM

## 2021-09-16 RX ORDER — CARVEDILOL 3.12 MG/1
3.12 TABLET ORAL 2 TIMES DAILY
Status: DISCONTINUED | OUTPATIENT
Start: 2021-09-16 | End: 2021-09-21 | Stop reason: HOSPADM

## 2021-09-16 RX ORDER — ALBUTEROL SULFATE 90 UG/1
2 AEROSOL, METERED RESPIRATORY (INHALATION)
Status: DISCONTINUED | OUTPATIENT
Start: 2021-09-16 | End: 2021-09-18

## 2021-09-16 RX ORDER — SENNA AND DOCUSATE SODIUM 50; 8.6 MG/1; MG/1
1 TABLET, FILM COATED ORAL DAILY
Status: DISCONTINUED | OUTPATIENT
Start: 2021-09-16 | End: 2021-09-21 | Stop reason: HOSPADM

## 2021-09-16 RX ORDER — PROTAMINE SULFATE 10 MG/ML
INJECTION, SOLUTION INTRAVENOUS PRN
Status: DISCONTINUED | OUTPATIENT
Start: 2021-09-16 | End: 2021-09-16 | Stop reason: SDUPTHER

## 2021-09-16 RX ORDER — AMIODARONE HYDROCHLORIDE 200 MG/1
200 TABLET ORAL DAILY
Status: DISCONTINUED | OUTPATIENT
Start: 2021-09-20 | End: 2021-09-21 | Stop reason: HOSPADM

## 2021-09-16 RX ORDER — SODIUM CHLORIDE 0.9 % (FLUSH) 0.9 %
10 SYRINGE (ML) INJECTION EVERY 12 HOURS SCHEDULED
Status: DISCONTINUED | OUTPATIENT
Start: 2021-09-16 | End: 2021-09-21 | Stop reason: HOSPADM

## 2021-09-16 RX ORDER — SODIUM CHLORIDE 9 MG/ML
INJECTION, SOLUTION INTRAVENOUS CONTINUOUS PRN
Status: DISCONTINUED | OUTPATIENT
Start: 2021-09-16 | End: 2021-09-16 | Stop reason: SDUPTHER

## 2021-09-16 RX ORDER — ONDANSETRON 2 MG/ML
4 INJECTION INTRAMUSCULAR; INTRAVENOUS EVERY 8 HOURS PRN
Status: DISCONTINUED | OUTPATIENT
Start: 2021-09-16 | End: 2021-09-17

## 2021-09-16 RX ORDER — FUROSEMIDE 10 MG/ML
20 INJECTION INTRAMUSCULAR; INTRAVENOUS
Status: DISPENSED | OUTPATIENT
Start: 2021-09-16 | End: 2021-09-16

## 2021-09-16 RX ORDER — CEFAZOLIN SODIUM 2 G/100ML
2000 INJECTION, SOLUTION INTRAVENOUS EVERY 8 HOURS
Status: COMPLETED | OUTPATIENT
Start: 2021-09-16 | End: 2021-09-18

## 2021-09-16 RX ORDER — DEXTROSE MONOHYDRATE 50 MG/ML
100 INJECTION, SOLUTION INTRAVENOUS PRN
Status: DISCONTINUED | OUTPATIENT
Start: 2021-09-16 | End: 2021-09-21 | Stop reason: HOSPADM

## 2021-09-16 RX ORDER — FENTANYL CITRATE 50 UG/ML
25 INJECTION, SOLUTION INTRAMUSCULAR; INTRAVENOUS
Status: ACTIVE | OUTPATIENT
Start: 2021-09-16 | End: 2021-09-17

## 2021-09-16 RX ORDER — SODIUM PHOSPHATE, DIBASIC AND SODIUM PHOSPHATE, MONOBASIC 7; 19 G/133ML; G/133ML
1 ENEMA RECTAL DAILY PRN
Status: DISCONTINUED | OUTPATIENT
Start: 2021-09-16 | End: 2021-09-21 | Stop reason: HOSPADM

## 2021-09-16 RX ORDER — SODIUM CHLORIDE, SODIUM LACTATE, POTASSIUM CHLORIDE, CALCIUM CHLORIDE 600; 310; 30; 20 MG/100ML; MG/100ML; MG/100ML; MG/100ML
INJECTION, SOLUTION INTRAVENOUS CONTINUOUS
Status: DISCONTINUED | OUTPATIENT
Start: 2021-09-16 | End: 2021-09-16

## 2021-09-16 RX ORDER — LIDOCAINE HYDROCHLORIDE 20 MG/ML
INJECTION, SOLUTION INTRAVENOUS PRN
Status: DISCONTINUED | OUTPATIENT
Start: 2021-09-16 | End: 2021-09-16 | Stop reason: SDUPTHER

## 2021-09-16 RX ORDER — HYDROCODONE BITARTRATE AND ACETAMINOPHEN 5; 325 MG/1; MG/1
1 TABLET ORAL EVERY 4 HOURS PRN
Status: DISCONTINUED | OUTPATIENT
Start: 2021-09-16 | End: 2021-09-21 | Stop reason: HOSPADM

## 2021-09-16 RX ORDER — MIDAZOLAM HYDROCHLORIDE 5 MG/ML
INJECTION INTRAMUSCULAR; INTRAVENOUS PRN
Status: DISCONTINUED | OUTPATIENT
Start: 2021-09-16 | End: 2021-09-16 | Stop reason: SDUPTHER

## 2021-09-16 RX ORDER — METOPROLOL TARTRATE 5 MG/5ML
2.5 INJECTION INTRAVENOUS EVERY 10 MIN PRN
Status: DISCONTINUED | OUTPATIENT
Start: 2021-09-16 | End: 2021-09-21 | Stop reason: HOSPADM

## 2021-09-16 RX ORDER — PANTOPRAZOLE SODIUM 40 MG/1
40 TABLET, DELAYED RELEASE ORAL DAILY
Status: DISCONTINUED | OUTPATIENT
Start: 2021-09-16 | End: 2021-09-21 | Stop reason: HOSPADM

## 2021-09-16 RX ORDER — DEXTROSE MONOHYDRATE 25 G/50ML
12.5 INJECTION, SOLUTION INTRAVENOUS PRN
Status: DISCONTINUED | OUTPATIENT
Start: 2021-09-16 | End: 2021-09-21 | Stop reason: HOSPADM

## 2021-09-16 RX ORDER — CEFAZOLIN SODIUM 2 G/100ML
2000 INJECTION, SOLUTION INTRAVENOUS ONCE
Status: COMPLETED | OUTPATIENT
Start: 2021-09-16 | End: 2021-09-16

## 2021-09-16 RX ORDER — AMIODARONE HYDROCHLORIDE 200 MG/1
200 TABLET ORAL 3 TIMES DAILY
Status: DISPENSED | OUTPATIENT
Start: 2021-09-16 | End: 2021-09-20

## 2021-09-16 RX ORDER — ALBUMIN, HUMAN INJ 5% 5 %
SOLUTION INTRAVENOUS PRN
Status: DISCONTINUED | OUTPATIENT
Start: 2021-09-16 | End: 2021-09-16 | Stop reason: SDUPTHER

## 2021-09-16 RX ORDER — ASPIRIN 81 MG/1
81 TABLET ORAL DAILY
Status: DISCONTINUED | OUTPATIENT
Start: 2021-09-16 | End: 2021-09-21 | Stop reason: HOSPADM

## 2021-09-16 RX ADMIN — CALCIUM CHLORIDE 1 G: 100 INJECTION, SOLUTION INTRAVENOUS; INTRAVENTRICULAR at 11:09

## 2021-09-16 RX ADMIN — ALBUMIN (HUMAN) 12.5 G: 12.5 INJECTION, SOLUTION INTRAVENOUS at 23:39

## 2021-09-16 RX ADMIN — FENTANYL CITRATE 50 MCG: 50 INJECTION, SOLUTION INTRAMUSCULAR; INTRAVENOUS at 07:50

## 2021-09-16 RX ADMIN — FENTANYL CITRATE 100 MCG: 50 INJECTION, SOLUTION INTRAMUSCULAR; INTRAVENOUS at 12:02

## 2021-09-16 RX ADMIN — HEPARIN SODIUM 30000 UNITS: 1000 INJECTION INTRAVENOUS; SUBCUTANEOUS at 07:54

## 2021-09-16 RX ADMIN — ALBUTEROL SULFATE 2 PUFF: 90 AEROSOL, METERED RESPIRATORY (INHALATION) at 15:41

## 2021-09-16 RX ADMIN — MIDAZOLAM 7 MG: 5 INJECTION INTRAMUSCULAR; INTRAVENOUS at 07:39

## 2021-09-16 RX ADMIN — VANCOMYCIN HYDROCHLORIDE 1000 MG: 500 INJECTION, POWDER, LYOPHILIZED, FOR SOLUTION INTRAVENOUS at 07:49

## 2021-09-16 RX ADMIN — FENTANYL CITRATE 100 MCG: 50 INJECTION, SOLUTION INTRAMUSCULAR; INTRAVENOUS at 07:40

## 2021-09-16 RX ADMIN — SODIUM CHLORIDE, POTASSIUM CHLORIDE, SODIUM LACTATE AND CALCIUM CHLORIDE: 600; 310; 30; 20 INJECTION, SOLUTION INTRAVENOUS at 06:53

## 2021-09-16 RX ADMIN — SODIUM CHLORIDE, POTASSIUM CHLORIDE, SODIUM LACTATE AND CALCIUM CHLORIDE: 600; 310; 30; 20 INJECTION, SOLUTION INTRAVENOUS at 06:19

## 2021-09-16 RX ADMIN — SODIUM CHLORIDE, PRESERVATIVE FREE 10 ML: 5 INJECTION INTRAVENOUS at 21:30

## 2021-09-16 RX ADMIN — FENTANYL CITRATE 25 MCG: 50 INJECTION, SOLUTION INTRAMUSCULAR; INTRAVENOUS at 11:00

## 2021-09-16 RX ADMIN — FENTANYL CITRATE 50 MCG: 50 INJECTION, SOLUTION INTRAMUSCULAR; INTRAVENOUS at 11:45

## 2021-09-16 RX ADMIN — SODIUM CHLORIDE: 4.5 INJECTION, SOLUTION INTRAVENOUS at 13:43

## 2021-09-16 RX ADMIN — ALBUMIN (HUMAN) 12.5 G: 12.5 INJECTION, SOLUTION INTRAVENOUS at 11:05

## 2021-09-16 RX ADMIN — SODIUM CHLORIDE: 9 INJECTION, SOLUTION INTRAVENOUS at 07:45

## 2021-09-16 RX ADMIN — CEFAZOLIN SODIUM 2000 MG: 2 INJECTION, SOLUTION INTRAVENOUS at 18:47

## 2021-09-16 RX ADMIN — ONDANSETRON 4 MG: 2 INJECTION INTRAMUSCULAR; INTRAVENOUS at 20:30

## 2021-09-16 RX ADMIN — Medication 2 PUFF: at 15:43

## 2021-09-16 RX ADMIN — EPINEPHRINE 5 MCG/MIN: 1 INJECTION INTRAMUSCULAR; INTRAVENOUS; SUBCUTANEOUS at 10:23

## 2021-09-16 RX ADMIN — CEFAZOLIN SODIUM 2000 MG: 2 INJECTION, SOLUTION INTRAVENOUS at 11:02

## 2021-09-16 RX ADMIN — VECURONIUM BROMIDE FOR INJECTION 10 MG: 1 INJECTION, POWDER, LYOPHILIZED, FOR SOLUTION INTRAVENOUS at 07:39

## 2021-09-16 RX ADMIN — SODIUM CHLORIDE: 9 INJECTION, SOLUTION INTRAVENOUS at 11:10

## 2021-09-16 RX ADMIN — VECURONIUM BROMIDE FOR INJECTION 6 MG: 1 INJECTION, POWDER, LYOPHILIZED, FOR SOLUTION INTRAVENOUS at 08:00

## 2021-09-16 RX ADMIN — PROPOFOL 100 MG: 10 INJECTION, EMULSION INTRAVENOUS at 07:17

## 2021-09-16 RX ADMIN — ROCURONIUM BROMIDE 50 MG: 10 INJECTION INTRAVENOUS at 07:17

## 2021-09-16 RX ADMIN — PROTAMINE SULFATE 350 MG: 10 INJECTION, SOLUTION INTRAVENOUS at 10:41

## 2021-09-16 RX ADMIN — CEFAZOLIN SODIUM 2000 MG: 2 INJECTION, SOLUTION INTRAVENOUS at 07:00

## 2021-09-16 RX ADMIN — CHLORHEXIDINE GLUCONATE: 4 LIQUID TOPICAL at 06:20

## 2021-09-16 RX ADMIN — FENTANYL CITRATE 100 MCG: 50 INJECTION, SOLUTION INTRAMUSCULAR; INTRAVENOUS at 07:45

## 2021-09-16 RX ADMIN — MIDAZOLAM 3 MG: 5 INJECTION INTRAMUSCULAR; INTRAVENOUS at 06:57

## 2021-09-16 RX ADMIN — NITROGLYCERIN 10 MCG/MIN: 20 INJECTION INTRAVENOUS at 12:37

## 2021-09-16 RX ADMIN — ONDANSETRON 8 MG: 2 INJECTION INTRAMUSCULAR; INTRAVENOUS at 07:39

## 2021-09-16 RX ADMIN — VECURONIUM BROMIDE FOR INJECTION 4 MG: 1 INJECTION, POWDER, LYOPHILIZED, FOR SOLUTION INTRAVENOUS at 07:58

## 2021-09-16 RX ADMIN — INSULIN HUMAN 3.5 UNITS/HR: 1 INJECTION, SOLUTION INTRAVENOUS at 14:34

## 2021-09-16 RX ADMIN — FENTANYL CITRATE 100 MCG: 50 INJECTION, SOLUTION INTRAMUSCULAR; INTRAVENOUS at 11:54

## 2021-09-16 RX ADMIN — DEXAMETHASONE SODIUM PHOSPHATE 8 MG: 4 INJECTION, SOLUTION INTRAMUSCULAR; INTRAVENOUS at 07:39

## 2021-09-16 RX ADMIN — SIMVASTATIN 40 MG: 40 TABLET, FILM COATED ORAL at 06:13

## 2021-09-16 RX ADMIN — SODIUM CHLORIDE, PRESERVATIVE FREE 10 ML: 5 INJECTION INTRAVENOUS at 21:31

## 2021-09-16 RX ADMIN — LIDOCAINE HYDROCHLORIDE 100 MG: 20 INJECTION, SOLUTION INTRAVENOUS at 07:17

## 2021-09-16 RX ADMIN — AMINOCAPROIC ACID 5 G/HR: 250 INJECTION, SOLUTION INTRAVENOUS at 07:45

## 2021-09-16 RX ADMIN — ALBUMIN (HUMAN) 25 G: 12.5 INJECTION, SOLUTION INTRAVENOUS at 13:40

## 2021-09-16 RX ADMIN — 0.12% CHLORHEXIDINE GLUCONATE 30 ML: 1.2 RINSE ORAL at 06:13

## 2021-09-16 RX ADMIN — Medication: at 06:13

## 2021-09-16 RX ADMIN — CARVEDILOL 3.12 MG: 3.12 TABLET, FILM COATED ORAL at 06:13

## 2021-09-16 ASSESSMENT — PULMONARY FUNCTION TESTS
PIF_VALUE: 26
PIF_VALUE: 0
PIF_VALUE: 0
PIF_VALUE: 2
PIF_VALUE: 1
PIF_VALUE: 24
PIF_VALUE: 26
PIF_VALUE: 1
PIF_VALUE: 17
PIF_VALUE: 1
PIF_VALUE: 0
PIF_VALUE: 1
PIF_VALUE: 0
PIF_VALUE: 30
PIF_VALUE: 24
PIF_VALUE: 31
PIF_VALUE: 0
PIF_VALUE: 1
PIF_VALUE: 0
PIF_VALUE: 24
PIF_VALUE: -8
PIF_VALUE: 6
PIF_VALUE: 28
PIF_VALUE: 18
PIF_VALUE: 1
PIF_VALUE: 0
PIF_VALUE: 23
PIF_VALUE: 0
PIF_VALUE: 0
PIF_VALUE: 24
PIF_VALUE: 29
PIF_VALUE: 0
PIF_VALUE: 0
PIF_VALUE: 24
PIF_VALUE: 0
PIF_VALUE: 0
PIF_VALUE: 25
PIF_VALUE: 27
PIF_VALUE: 22
PIF_VALUE: 0
PIF_VALUE: 0
PIF_VALUE: 23
PIF_VALUE: 0
PIF_VALUE: 24
PIF_VALUE: 1
PIF_VALUE: 29
PIF_VALUE: 0
PIF_VALUE: 23
PIF_VALUE: 30
PIF_VALUE: 0
PIF_VALUE: 30
PIF_VALUE: 0
PIF_VALUE: 26
PIF_VALUE: 28
PIF_VALUE: 21
PIF_VALUE: 0
PIF_VALUE: 0
PIF_VALUE: 23
PIF_VALUE: 0
PIF_VALUE: -2
PIF_VALUE: 30
PIF_VALUE: 0
PIF_VALUE: 1
PIF_VALUE: 5
PIF_VALUE: 0
PIF_VALUE: 31
PIF_VALUE: 23
PIF_VALUE: 1
PIF_VALUE: 0
PIF_VALUE: 1
PIF_VALUE: 29
PIF_VALUE: 0
PIF_VALUE: 5
PIF_VALUE: 0
PIF_VALUE: 30
PIF_VALUE: 13
PIF_VALUE: 28
PIF_VALUE: 1
PIF_VALUE: 31
PIF_VALUE: 28
PIF_VALUE: 0
PIF_VALUE: 0
PIF_VALUE: 1
PIF_VALUE: 1
PIF_VALUE: 32
PIF_VALUE: 0
PIF_VALUE: 24
PIF_VALUE: 0
PIF_VALUE: 30
PIF_VALUE: -5
PIF_VALUE: 27
PIF_VALUE: 0
PIF_VALUE: 25
PIF_VALUE: 23
PIF_VALUE: 0
PIF_VALUE: 0
PIF_VALUE: 1
PIF_VALUE: -2
PIF_VALUE: 31
PIF_VALUE: 23
PIF_VALUE: -6
PIF_VALUE: 23
PIF_VALUE: 17
PIF_VALUE: 0
PIF_VALUE: 1
PIF_VALUE: 2
PIF_VALUE: 22
PIF_VALUE: 0
PIF_VALUE: 2
PIF_VALUE: 31
PIF_VALUE: 23
PIF_VALUE: 32
PIF_VALUE: 1
PIF_VALUE: 0
PIF_VALUE: -8
PIF_VALUE: 0
PIF_VALUE: 30
PIF_VALUE: 0
PIF_VALUE: 0
PIF_VALUE: 25
PIF_VALUE: 0
PIF_VALUE: 15
PIF_VALUE: 28
PIF_VALUE: 36
PIF_VALUE: 0
PIF_VALUE: -7
PIF_VALUE: 0
PIF_VALUE: 23
PIF_VALUE: 0
PIF_VALUE: 0
PIF_VALUE: 1
PIF_VALUE: 24
PIF_VALUE: 0
PIF_VALUE: 0
PIF_VALUE: 31
PIF_VALUE: 25
PIF_VALUE: 1
PIF_VALUE: 25
PIF_VALUE: 28
PIF_VALUE: 0
PIF_VALUE: 1
PIF_VALUE: 27
PIF_VALUE: 0
PIF_VALUE: 1
PIF_VALUE: 0
PIF_VALUE: 1
PIF_VALUE: 24
PIF_VALUE: 32
PIF_VALUE: 0
PIF_VALUE: 0
PIF_VALUE: 16
PIF_VALUE: 31
PIF_VALUE: 0
PIF_VALUE: 22
PIF_VALUE: 0
PIF_VALUE: 24
PIF_VALUE: 25
PIF_VALUE: 0
PIF_VALUE: 26
PIF_VALUE: 0
PIF_VALUE: 25
PIF_VALUE: 0
PIF_VALUE: 0
PIF_VALUE: 25
PIF_VALUE: -6
PIF_VALUE: 26
PIF_VALUE: 25
PIF_VALUE: 0
PIF_VALUE: 0
PIF_VALUE: 31
PIF_VALUE: 0
PIF_VALUE: 1
PIF_VALUE: 33
PIF_VALUE: 32
PIF_VALUE: 0
PIF_VALUE: 31
PIF_VALUE: 0
PIF_VALUE: 1
PIF_VALUE: 0
PIF_VALUE: 22
PIF_VALUE: 0
PIF_VALUE: 35
PIF_VALUE: 2
PIF_VALUE: 2
PIF_VALUE: 25
PIF_VALUE: 25
PIF_VALUE: 2
PIF_VALUE: 25
PIF_VALUE: 1
PIF_VALUE: 0
PIF_VALUE: -6
PIF_VALUE: 0
PIF_VALUE: 23
PIF_VALUE: 0
PIF_VALUE: 1
PIF_VALUE: 26
PIF_VALUE: 0
PIF_VALUE: 32
PIF_VALUE: 0
PIF_VALUE: 27
PIF_VALUE: 24
PIF_VALUE: 0
PIF_VALUE: 0
PIF_VALUE: 30
PIF_VALUE: 27
PIF_VALUE: -4
PIF_VALUE: 28
PIF_VALUE: 0
PIF_VALUE: 22
PIF_VALUE: 27
PIF_VALUE: 24
PIF_VALUE: 2
PIF_VALUE: 28
PIF_VALUE: 0
PIF_VALUE: -8
PIF_VALUE: 0
PIF_VALUE: 0
PIF_VALUE: 1
PIF_VALUE: 26
PIF_VALUE: 24
PIF_VALUE: 0
PIF_VALUE: 0
PIF_VALUE: 27
PIF_VALUE: 0
PIF_VALUE: 28
PIF_VALUE: 25
PIF_VALUE: 30
PIF_VALUE: 29
PIF_VALUE: 0
PIF_VALUE: 1
PIF_VALUE: 32
PIF_VALUE: 33
PIF_VALUE: 18
PIF_VALUE: 28
PIF_VALUE: 22
PIF_VALUE: 0
PIF_VALUE: 27
PIF_VALUE: 2
PIF_VALUE: 29
PIF_VALUE: 0
PIF_VALUE: 22
PIF_VALUE: 28
PIF_VALUE: 32
PIF_VALUE: 27
PIF_VALUE: -5
PIF_VALUE: 1
PIF_VALUE: 0
PIF_VALUE: 0
PIF_VALUE: 1
PIF_VALUE: 0
PIF_VALUE: 24
PIF_VALUE: 28
PIF_VALUE: 0
PIF_VALUE: 28
PIF_VALUE: -2
PIF_VALUE: 0
PIF_VALUE: 29
PIF_VALUE: 21
PIF_VALUE: 0
PIF_VALUE: 24
PIF_VALUE: 32
PIF_VALUE: 0
PIF_VALUE: 26
PIF_VALUE: 0
PIF_VALUE: 30
PIF_VALUE: 2
PIF_VALUE: 15
PIF_VALUE: 30
PIF_VALUE: 30
PIF_VALUE: 26
PIF_VALUE: 32
PIF_VALUE: 1
PIF_VALUE: 1
PIF_VALUE: 31
PIF_VALUE: -7
PIF_VALUE: 31
PIF_VALUE: 0
PIF_VALUE: 25
PIF_VALUE: 27
PIF_VALUE: 24
PIF_VALUE: 23
PIF_VALUE: 18
PIF_VALUE: 0
PIF_VALUE: 1
PIF_VALUE: 32
PIF_VALUE: 1
PIF_VALUE: 26
PIF_VALUE: 0
PIF_VALUE: 3
PIF_VALUE: 29
PIF_VALUE: 22
PIF_VALUE: 24
PIF_VALUE: 0
PIF_VALUE: 29
PIF_VALUE: 0
PIF_VALUE: 1
PIF_VALUE: 0
PIF_VALUE: 1
PIF_VALUE: 1
PIF_VALUE: 26
PIF_VALUE: 0
PIF_VALUE: 0
PIF_VALUE: 22
PIF_VALUE: 27
PIF_VALUE: 0
PIF_VALUE: 1
PIF_VALUE: 0
PIF_VALUE: 32
PIF_VALUE: 33
PIF_VALUE: 0
PIF_VALUE: 34
PIF_VALUE: 0
PIF_VALUE: 1
PIF_VALUE: 8
PIF_VALUE: 16
PIF_VALUE: 0
PIF_VALUE: 29
PIF_VALUE: 28
PIF_VALUE: 28
PIF_VALUE: 1
PIF_VALUE: 26
PIF_VALUE: 25
PIF_VALUE: 23
PIF_VALUE: 24
PIF_VALUE: 31
PIF_VALUE: 28
PIF_VALUE: 26
PIF_VALUE: 0
PIF_VALUE: 26
PIF_VALUE: 26

## 2021-09-16 ASSESSMENT — PAIN SCALES - GENERAL
PAINLEVEL_OUTOF10: 0

## 2021-09-16 ASSESSMENT — ENCOUNTER SYMPTOMS: SHORTNESS OF BREATH: 1

## 2021-09-16 NOTE — ANESTHESIA POSTPROCEDURE EVALUATION
Department of Anesthesiology  Postprocedure Note    Patient: Shonna Husbands  MRN: [id number]  YOB: 1950  Date of evaluation: 9/16/2021  Time:  12:16 PM     Procedure Summary     Date: 09/16/21 Room / Location: [address]    Anesthesia Start: 5015 Anesthesia Stop: 1216    Procedure: AORTIC VALVE REPLACEMENT AND AORTIC ROOT ENLARGEMENT (N/A Chest) Diagnosis:       Nonrheumatic aortic valve stenosis      (Nonrheumatic aortic valve stenosis [I35.0])    Surgeons: Heide Jones MD Responsible Provider: Fabrizio Martinez MD    Anesthesia Type: general ASA Status: 4          Anesthesia Type: general    Karla Phase I: Karla Score: 3    Karla Phase II:      Last vitals: Reviewed and per EMR flowsheets. Anesthesia Post Evaluation    Patient location during evaluation: ICU  Patient participation: waiting for patient participation  Level of consciousness: sedated and ventilated  Pain score: 0  Airway patency: patent  Nausea & Vomiting: no vomiting and no nausea  Complications: no  Cardiovascular status: hemodynamically stable and vasoactive/inotropes  Respiratory status: intubated and ventilator  Hydration status: stable  Comments: Patient taken to ICU. Blood pressure arterial line, as well as pulse oximetry, and TELE monitored throughout transport.  VSS

## 2021-09-16 NOTE — ANESTHESIA PROCEDURE NOTES
Procedure Performed: JOSE     Start Time:        End Time:      Preanesthesia Checklist:  Patient identified, IV assessed, risks and benefits discussed, monitors and equipment assessed, procedure being performed at surgeon's request and anesthesia consent obtained.     General Procedure Information  Diagnostic Indications for Echo:  hemodynamic monitoring  Physician Requesting Echo: Flaquito Hernandez MD  Location performed:  OR  Intubated  Heart visualized  Probe Type:  Mulitplane          Anesthesia Information      Echocardiogram Comments:       Finding  Conveyed with surgeon

## 2021-09-16 NOTE — BRIEF OP NOTE
Brief Postoperative Note      Patient: Simon Thomas  YOB: 1950  MRN: [id number]    Date of Procedure: 9/16/2021    Pre-Op Diagnosis: Nonrheumatic aortic valve stenosis [I35.0]    Post-Op Diagnosis: Same       Procedure(s):  AORTIC VALVE REPLACEMENT AND AORTIC ROOT ENLARGEMENT    Surgeon(s):  Huston Saint, MD Elon Frost, MD    Assistant:  Physician Assistant: Juan Dallas PA-C    Anesthesia: General    Estimated Blood Loss (mL): 402     Complications: None    Specimens:   ID Type Source Tests Collected by Time Destination   A : native aortic valve Tissue Heart SURGICAL PATHOLOGY Clif Matos MD 9/16/2021 0950        Implants:  Implant Name Type Inv.  Item Serial No.  Lot No. LRB No. Used Action   GRAFT VASC L30CM BOR 32MM THORACOABDOMINAL POLY GEL SEAL  GRAFT VASC L30CM BOR 32MM THORACOABDOMINAL POLY GEL SEAL  [employer]ASC[employer]-WD [id number] N/A 1 Implanted   VALVE AORT [id number] H17.5MM ORIFICE DIA22.5MM SUT RNG  VALVE AORT [id number] H17.5MM ORIFICE DIA22.5MM SUT RNG  [employer] [place] [employer] [id number] N/A 1 Implanted         Drains:   Chest Tube 2 Pericardial 24 [place] (Active)       Chest Tube 1 Mediastinal 32 [place] (Active)       Urethral Catheter 16 fr (Active)       Findings:     Electronically signed by Juan Dallas PA-C on 9/16/2021 at 11:32 AM

## 2021-09-16 NOTE — H&P
H&P update    Patient presents with a planned aortic valve placement based on the preoperative discussions with the patient and family    Previous H&P and work-up was reviewed    No changes in H P noted    Reviewed the procedure and alternate options expectations again with the patient and family    Patient verbalized excellent understanding of the above discussion accordingly proceed with the planned aortic valve placement    The patient was counseled at length about the risks of yoan Covid-19 during their perioperative period and any recovery window from their procedure. The patient was made aware that yoan Covid-19  may worsen their prognosis for recovering from their procedure  and lend to a higher morbidity and/or mortality risk. All material risks, benefits, and reasonable alternatives including postponing the procedure were discussed. The patient does wish to proceed with the procedure at this time.

## 2021-09-16 NOTE — ANESTHESIA PRE PROCEDURE
Department of Anesthesiology  Preprocedure Note       Name:  Jazmine Jurado   Age:  80 y.o.  :  1950                                          MRN:  [id number]         Date:  2021      Surgeon: Gera Bolden):  Leonel Gamez MD    Procedure: Procedure(s):  AORTIC VALVE REPLACEMENT    Medications prior to admission:   Prior to Admission medications    Medication Sig Start Date End Date Taking?  Authorizing Provider   lisinopril (PRINIVIL;ZESTRIL) 5 MG tablet Take 0.5 tablets by mouth daily 21  Yes Damien Smith MD   furosemide (LASIX) 20 MG tablet Take 1 tablet by mouth 2 times daily 21  Yes Damien Smith MD   LEVEMIR FLEXTOUCH 100 UNIT/ML injection pen INJECT 35 UNITS UNDER THE SKIN EVERY NIGHT 21  Yes Kaylie Chavez MD   SITagliptin (JANUVIA) 100 MG tablet Take 1 tablet by mouth daily 21  Yes Kaylie Chavez MD   pravastatin (PRAVACHOL) 80 MG tablet Take 1 tablet by mouth daily 3/22/21  Yes Kaylie Chavez MD   Insulin Pen Needle 30G X 8 MM MISC 1 each by Does not apply route daily 20   aKylie Chavez MD   blood glucose monitor strips TIDA and QHS 20   Kaylie Chavez MD   Lancets [facility] & QHS 19   Mega Unger, APRN - CNP       Current medications:    Current Facility-Administered Medications   Medication Dose Route Frequency Provider Last Rate Last Admin    carvedilol (COREG) tablet 3.125 mg  3.125 mg Oral Once Leonel Gamez MD        ceFAZolin (ANCEF) 2000 mg in dextrose 4 % 100 mL IVPB (premix)  2,000 mg IntraVENous Once Leonel Gamez MD        chlorhexidine (PERIDEX) 0.12 % solution 30 mL  30 mL Mouth/Throat Once Leonel Gamez MD        chlorhexidine gluconate (ANTISEPTIC SKIN CLEANSER) 4 % solution   Topical Once Leonel Gamez MD        mupirocin (BACTROBAN) 2 % ointment   Nasal Once Richy Russ MD        simvastatin (ZOCOR) tablet 40 mg  40 mg Oral Once Leonel Gamez MD        aminocaproic acid (AMICAR) 10,000 mg in sodium chloride 0.9 % 210 mL infusion bolus  10,000 mg IntraVENous On Call to [address] Noel Fernandez MD        EPINEPHrine (EPINEPHrine HCL) 5 mg in sodium chloride 0.9 % 250 mL infusion  1-30 mcg/min IntraVENous On Call to [address] Noel Fernandez MD        sodium chloride 0.9 % 1,000 mL with gentamicin (GARAMYCIN) 80 mg   Irrigation Once Josefina Anotnio MD        heparin (porcine) 30,000 Units in sodium chloride 0.9 % 1,000 mL (cell saver)  30,000 Units IntraVENous On Call to [address] Noel Fernandez MD        insulin regular (MYXREDLIN) 100 units in sodium chloride 0.9 % 100 ml infusion  1-50 Units/hr IntraVENous On Call to Elia S Noel Fernandez MD        norepinephrine (LEVOPHED) 16,000 mcg in dextrose 5 % 250 mL infusion  2-100 mcg/min IntraVENous On Call to Elia S Noel Fernandez MD           Allergies:     Allergies   Allergen Reactions    Codeine Rash    Hydrocodone-Acetaminophen Nausea And Vomiting       Problem List:    Patient Active Problem List   Diagnosis Code    Type 2 diabetes mellitus without complication (HCC) E86.7    Mixed hyperlipidemia E78.2    Tobacco dependence F17.200    Obesity, Class I, BMI 30-34.9 E66.9    Recurrent major depressive disorder, in partial remission ([facility] [address] F33.41    Murmur, cardiac R01.1    Morbidly obese (HCC) E66.01    SOB (shortness of breath) R06.02    S/P PTCA (percutaneous transluminal coronary angioplasty) Z98.61    Nonrheumatic aortic valve stenosis I35.0    Chronic diastolic congestive heart failure (HCC) I50.32    LVH (left ventricular hypertrophy) due to hypertensive disease, with heart failure (HCC) I11.0       Past Medical History:        Diagnosis Date    Aortic valve stenosis     Arthritis     Bronchitis 06/2021    Diabetes mellitus (HCC)     dx age 53's    Heart murmur     Hx of drug abuse ([facility] [address]     \"did cocaine back in 1980's    Hypertension     Irregular heart beat     \"they just say I skip a beat RBC 4.77 09/09/2021    HGB 13.3 09/09/2021    HCT 39.7 09/09/2021    MCV 83.2 09/09/2021    RDW 16.0 09/09/2021     09/09/2021       CMP:   Lab Results   Component Value Date     09/09/2021    K 4.1 09/09/2021     09/09/2021    CO2 28 09/09/2021    BUN 9 09/09/2021    CREATININE 0.6 09/09/2021    GFRAA >60 09/09/2021    AGRATIO 1.3 05/19/2016    LABGLOM >60 09/09/2021    GLUCOSE 105 09/09/2021    PROT 7.6 06/07/2021    PROT 7.3 09/28/2012    CALCIUM 8.9 09/09/2021    BILITOT 0.3 06/07/2021    ALKPHOS 92 06/07/2021    AST 16 06/07/2021    ALT 15 06/07/2021       POC Tests: No results for input(s): POCGLU, POCNA, POCK, POCCL, POCBUN, POCHEMO, POCHCT in the last 72 hours. Coags:   Lab Results   Component Value Date    PROTIME 11.4 09/09/2021    PROTIME 11.6 03/02/2011    INR 0.88 09/09/2021    APTT 32.2 09/09/2021       HCG (If Applicable): No results found for: PREGTESTUR, PREGSERUM, HCG, HCGQUANT     ABGs:   Lab Results   Component Value Date    PO2ART 75 09/09/2021    GOZ8FCN 42.0 09/09/2021    XBW8GIA 28.5 09/09/2021        Type & Screen (If Applicable):  No results found for: LABABO, LABRH    Drug/Infectious Status (If Applicable):  No results found for: HIV, HEPCAB    COVID-19 Screening (If Applicable):   Lab Results   Component Value Date    COVID19 NOT DETECTED 09/09/2021           Anesthesia Evaluation  Patient summary reviewed  Airway: Mallampati: II  TM distance: <3 FB   Neck ROM: full  Mouth opening: > = 3 FB Dental:          Pulmonary:   (+) shortness of breath:                             Cardiovascular:  Exercise tolerance: poor (<4 METS),   (+) hypertension:, valvular problems/murmurs: AS, dysrhythmias:,       NYHA Classification: III  ECG reviewed  Rhythm: regular  Rate: normal  Echocardiogram reviewed               ROS comment: 2021 echo       Summary   Left ventricular systolic function is normal.   Ejection fraction is visually estimated at 50-55%.    Moderate left ventricular

## 2021-09-16 NOTE — FLOWSHEET NOTE
Patient admitted to CVICU from OR per bed, monitors, orally intubated currently being bagged per anesthesia and OR staff. Pt pale pink, cool and dry. RT here. Pt placed on vent per RT. Assessment completed--see computer charting for assessment, IV fluids, VS and vent settings. Labs drawn and sent. PCXR done.

## 2021-09-17 ENCOUNTER — APPOINTMENT (OUTPATIENT)
Dept: GENERAL RADIOLOGY | Age: 71
DRG: 220 | End: 2021-09-17
Attending: SURGERY
Payer: MEDICARE

## 2021-09-17 LAB
ANION GAP SERPL CALCULATED.3IONS-SCNC: 11 MMOL/L (ref 4–16)
BASE EXCESS MIXED: 3.1 (ref 0–2.3)
BASE EXCESS: ABNORMAL (ref 0–2.4)
BUN BLDV-MCNC: 14 MG/DL (ref 6–23)
CALCIUM IONIZED: 4.92 MG/DL (ref 4.48–5.28)
CALCIUM SERPL-MCNC: 8.2 MG/DL (ref 8.3–10.6)
CHLORIDE BLD-SCNC: 110 MMOL/L (ref 99–110)
CO2: 20 MMOL/L (ref 21–32)
CO2: 23 MMOL/L (ref 21–32)
CREAT SERPL-MCNC: 0.8 MG/DL (ref 0.6–1.1)
GFR AFRICAN AMERICAN: >60 ML/MIN/1.73M2
GFR NON-AFRICAN AMERICAN: >60 ML/MIN/1.73M2
GLUCOSE BLD-MCNC: 112 MG/DL (ref 70–99)
GLUCOSE BLD-MCNC: 120 MG/DL (ref 70–99)
GLUCOSE BLD-MCNC: 122 MG/DL (ref 70–99)
GLUCOSE BLD-MCNC: 125 MG/DL (ref 70–99)
GLUCOSE BLD-MCNC: 128 MG/DL (ref 70–99)
GLUCOSE BLD-MCNC: 132 MG/DL (ref 70–99)
GLUCOSE BLD-MCNC: 142 MG/DL (ref 70–99)
GLUCOSE BLD-MCNC: 150 MG/DL (ref 70–99)
GLUCOSE BLD-MCNC: 79 MG/DL (ref 70–99)
GLUCOSE BLD-MCNC: 80 MG/DL (ref 70–99)
GLUCOSE BLD-MCNC: 81 MG/DL (ref 70–99)
GLUCOSE BLD-MCNC: 85 MG/DL (ref 70–99)
GLUCOSE BLD-MCNC: 92 MG/DL (ref 70–99)
GLUCOSE BLD-MCNC: 94 MG/DL (ref 70–99)
GLUCOSE BLD-MCNC: 94 MG/DL (ref 70–99)
GLUCOSE BLD-MCNC: 99 MG/DL (ref 70–99)
HCO3 ARTERIAL: 21.4 MMOL/L (ref 18–23)
HCT VFR BLD CALC: 27 % (ref 37–47)
HCT VFR BLD CALC: 30.4 % (ref 37–47)
HEMOGLOBIN: 10.2 GM/DL (ref 12.5–16)
HEMOGLOBIN: 9.1 GM/DL (ref 12.5–16)
IONIZED CA: 1.23 MMOL/L (ref 1.12–1.32)
MAGNESIUM: 1.9 MG/DL (ref 1.8–2.4)
MCH RBC QN AUTO: 28.7 PG (ref 27–31)
MCHC RBC AUTO-ENTMCNC: 33.6 % (ref 32–36)
MCV RBC AUTO: 85.4 FL (ref 78–100)
O2 SATURATION: 94.6 % (ref 96–97)
PCO2 ARTERIAL: 35.5 MMHG (ref 32–45)
PDW BLD-RTO: 16.6 % (ref 11.7–14.9)
PH BLOOD: 7.39 (ref 7.34–7.45)
PLATELET # BLD: 154 K/CU MM (ref 140–440)
PMV BLD AUTO: 11.7 FL (ref 7.5–11.1)
PO2 ARTERIAL: 73.6 MMHG (ref 75–100)
POC CALCIUM: 1.26 MMOL/L (ref 1.12–1.32)
POC CHLORIDE: 113 MMOL/L (ref 98–109)
POC CREATININE: 0.9 MG/DL (ref 0.6–1.1)
POTASSIUM SERPL-SCNC: 4.4 MMOL/L (ref 3.5–4.5)
POTASSIUM SERPL-SCNC: 4.6 MMOL/L (ref 3.5–5.1)
RBC # BLD: 3.56 M/CU MM (ref 4.2–5.4)
SODIUM BLD-SCNC: 141 MMOL/L (ref 135–145)
SODIUM BLD-SCNC: 141 MMOL/L (ref 138–146)
SOURCE, BLOOD GAS: ABNORMAL
WBC # BLD: 17.8 K/CU MM (ref 4–10.5)

## 2021-09-17 PROCEDURE — 2500000003 HC RX 250 WO HCPCS

## 2021-09-17 PROCEDURE — 94150 VITAL CAPACITY TEST: CPT

## 2021-09-17 PROCEDURE — 6370000000 HC RX 637 (ALT 250 FOR IP): Performed by: PHYSICIAN ASSISTANT

## 2021-09-17 PROCEDURE — 82962 GLUCOSE BLOOD TEST: CPT

## 2021-09-17 PROCEDURE — 71045 X-RAY EXAM CHEST 1 VIEW: CPT

## 2021-09-17 PROCEDURE — 94664 DEMO&/EVAL PT USE INHALER: CPT

## 2021-09-17 PROCEDURE — 97116 GAIT TRAINING THERAPY: CPT

## 2021-09-17 PROCEDURE — 2700000000 HC OXYGEN THERAPY PER DAY

## 2021-09-17 PROCEDURE — 82330 ASSAY OF CALCIUM: CPT

## 2021-09-17 PROCEDURE — 6360000002 HC RX W HCPCS: Performed by: THORACIC SURGERY (CARDIOTHORACIC VASCULAR SURGERY)

## 2021-09-17 PROCEDURE — 6360000002 HC RX W HCPCS: Performed by: PHYSICIAN ASSISTANT

## 2021-09-17 PROCEDURE — 97530 THERAPEUTIC ACTIVITIES: CPT

## 2021-09-17 PROCEDURE — 80048 BASIC METABOLIC PNL TOTAL CA: CPT

## 2021-09-17 PROCEDURE — 97162 PT EVAL MOD COMPLEX 30 MIN: CPT

## 2021-09-17 PROCEDURE — 85027 COMPLETE CBC AUTOMATED: CPT

## 2021-09-17 PROCEDURE — 2000000000 HC ICU R&B

## 2021-09-17 PROCEDURE — P9045 ALBUMIN (HUMAN), 5%, 250 ML: HCPCS | Performed by: PHYSICIAN ASSISTANT

## 2021-09-17 PROCEDURE — 2580000003 HC RX 258: Performed by: PHYSICIAN ASSISTANT

## 2021-09-17 PROCEDURE — 97166 OT EVAL MOD COMPLEX 45 MIN: CPT

## 2021-09-17 PROCEDURE — P9045 ALBUMIN (HUMAN), 5%, 250 ML: HCPCS

## 2021-09-17 PROCEDURE — 94761 N-INVAS EAR/PLS OXIMETRY MLT: CPT

## 2021-09-17 PROCEDURE — 6360000002 HC RX W HCPCS: Performed by: SURGERY

## 2021-09-17 PROCEDURE — 6360000002 HC RX W HCPCS

## 2021-09-17 PROCEDURE — 94640 AIRWAY INHALATION TREATMENT: CPT

## 2021-09-17 PROCEDURE — 83735 ASSAY OF MAGNESIUM: CPT

## 2021-09-17 RX ORDER — FUROSEMIDE 10 MG/ML
20 INJECTION INTRAMUSCULAR; INTRAVENOUS ONCE
Status: COMPLETED | OUTPATIENT
Start: 2021-09-17 | End: 2021-09-17

## 2021-09-17 RX ORDER — ONDANSETRON 2 MG/ML
4 INJECTION INTRAMUSCULAR; INTRAVENOUS EVERY 6 HOURS PRN
Status: DISCONTINUED | OUTPATIENT
Start: 2021-09-17 | End: 2021-09-21 | Stop reason: HOSPADM

## 2021-09-17 RX ADMIN — SODIUM CHLORIDE, PRESERVATIVE FREE 10 ML: 5 INJECTION INTRAVENOUS at 08:43

## 2021-09-17 RX ADMIN — AMIODARONE HYDROCHLORIDE 200 MG: 200 TABLET ORAL at 21:18

## 2021-09-17 RX ADMIN — PANTOPRAZOLE SODIUM 40 MG: 40 TABLET, DELAYED RELEASE ORAL at 08:42

## 2021-09-17 RX ADMIN — CEFAZOLIN SODIUM 2000 MG: 2 INJECTION, SOLUTION INTRAVENOUS at 18:09

## 2021-09-17 RX ADMIN — FUROSEMIDE 20 MG: 10 INJECTION, SOLUTION INTRAVENOUS at 01:26

## 2021-09-17 RX ADMIN — HYDROCODONE BITARTRATE AND ACETAMINOPHEN 1 TABLET: 5; 325 TABLET ORAL at 15:18

## 2021-09-17 RX ADMIN — AMIODARONE HYDROCHLORIDE 200 MG: 200 TABLET ORAL at 03:39

## 2021-09-17 RX ADMIN — HYDROCODONE BITARTRATE AND ACETAMINOPHEN 1 TABLET: 5; 325 TABLET ORAL at 21:27

## 2021-09-17 RX ADMIN — Medication 2 PUFF: at 19:42

## 2021-09-17 RX ADMIN — HYDROCODONE BITARTRATE AND ACETAMINOPHEN 1 TABLET: 5; 325 TABLET ORAL at 10:57

## 2021-09-17 RX ADMIN — SODIUM CHLORIDE, PRESERVATIVE FREE 10 ML: 5 INJECTION INTRAVENOUS at 21:11

## 2021-09-17 RX ADMIN — Medication: at 08:42

## 2021-09-17 RX ADMIN — AMIODARONE HYDROCHLORIDE 200 MG: 200 TABLET ORAL at 15:17

## 2021-09-17 RX ADMIN — Medication: at 21:11

## 2021-09-17 RX ADMIN — Medication: at 00:37

## 2021-09-17 RX ADMIN — FUROSEMIDE 20 MG: 10 INJECTION, SOLUTION INTRAVENOUS at 10:58

## 2021-09-17 RX ADMIN — HYDROCODONE BITARTRATE AND ACETAMINOPHEN 1 TABLET: 5; 325 TABLET ORAL at 06:43

## 2021-09-17 RX ADMIN — Medication 2 PUFF: at 10:53

## 2021-09-17 RX ADMIN — SENNOSIDES AND DOCUSATE SODIUM 1 TABLET: 50; 8.6 TABLET ORAL at 08:42

## 2021-09-17 RX ADMIN — ONDANSETRON 4 MG: 2 INJECTION INTRAMUSCULAR; INTRAVENOUS at 14:01

## 2021-09-17 RX ADMIN — ALBUMIN (HUMAN) 25 G: 12.5 INJECTION, SOLUTION INTRAVENOUS at 15:18

## 2021-09-17 RX ADMIN — KETOROLAC TROMETHAMINE 15 MG: 30 INJECTION, SOLUTION INTRAMUSCULAR; INTRAVENOUS at 08:41

## 2021-09-17 RX ADMIN — KETOROLAC TROMETHAMINE 15 MG: 30 INJECTION, SOLUTION INTRAMUSCULAR; INTRAVENOUS at 00:31

## 2021-09-17 RX ADMIN — ALBUTEROL SULFATE 2 PUFF: 90 AEROSOL, METERED RESPIRATORY (INHALATION) at 19:42

## 2021-09-17 RX ADMIN — FUROSEMIDE 20 MG: 10 INJECTION, SOLUTION INTRAVENOUS at 17:57

## 2021-09-17 RX ADMIN — SODIUM CHLORIDE: 4.5 INJECTION, SOLUTION INTRAVENOUS at 03:22

## 2021-09-17 RX ADMIN — CARVEDILOL 3.12 MG: 3.12 TABLET, FILM COATED ORAL at 21:18

## 2021-09-17 RX ADMIN — ASPIRIN 81 MG: 81 TABLET, COATED ORAL at 08:42

## 2021-09-17 RX ADMIN — ALBUTEROL SULFATE 2 PUFF: 90 AEROSOL, METERED RESPIRATORY (INHALATION) at 15:40

## 2021-09-17 RX ADMIN — AMIODARONE HYDROCHLORIDE 200 MG: 200 TABLET ORAL at 08:42

## 2021-09-17 RX ADMIN — Medication 2 PUFF: at 15:42

## 2021-09-17 RX ADMIN — CEFAZOLIN SODIUM 2000 MG: 2 INJECTION, SOLUTION INTRAVENOUS at 10:57

## 2021-09-17 RX ADMIN — CARVEDILOL 3.12 MG: 3.12 TABLET, FILM COATED ORAL at 03:38

## 2021-09-17 RX ADMIN — CEFAZOLIN SODIUM 2000 MG: 2 INJECTION, SOLUTION INTRAVENOUS at 03:38

## 2021-09-17 RX ADMIN — ALBUTEROL SULFATE 2 PUFF: 90 AEROSOL, METERED RESPIRATORY (INHALATION) at 10:51

## 2021-09-17 ASSESSMENT — PAIN DESCRIPTION - ORIENTATION
ORIENTATION: MID

## 2021-09-17 ASSESSMENT — PAIN SCALES - GENERAL
PAINLEVEL_OUTOF10: 3
PAINLEVEL_OUTOF10: 0
PAINLEVEL_OUTOF10: 6
PAINLEVEL_OUTOF10: 0
PAINLEVEL_OUTOF10: 0
PAINLEVEL_OUTOF10: 3
PAINLEVEL_OUTOF10: 7
PAINLEVEL_OUTOF10: 0
PAINLEVEL_OUTOF10: 0
PAINLEVEL_OUTOF10: 5
PAINLEVEL_OUTOF10: 4
PAINLEVEL_OUTOF10: 3
PAINLEVEL_OUTOF10: 0
PAINLEVEL_OUTOF10: 7
PAINLEVEL_OUTOF10: 4
PAINLEVEL_OUTOF10: 0

## 2021-09-17 ASSESSMENT — PAIN DESCRIPTION - PAIN TYPE
TYPE: SURGICAL PAIN
TYPE: ACUTE PAIN;SURGICAL PAIN
TYPE: SURGICAL PAIN

## 2021-09-17 ASSESSMENT — PAIN DESCRIPTION - ONSET
ONSET: GRADUAL
ONSET: GRADUAL
ONSET: ON-GOING
ONSET: GRADUAL

## 2021-09-17 ASSESSMENT — PAIN DESCRIPTION - FREQUENCY
FREQUENCY: CONTINUOUS
FREQUENCY: INTERMITTENT

## 2021-09-17 ASSESSMENT — PAIN - FUNCTIONAL ASSESSMENT
PAIN_FUNCTIONAL_ASSESSMENT: ACTIVITIES ARE NOT PREVENTED
PAIN_FUNCTIONAL_ASSESSMENT: PREVENTS OR INTERFERES SOME ACTIVE ACTIVITIES AND ADLS
PAIN_FUNCTIONAL_ASSESSMENT: ACTIVITIES ARE NOT PREVENTED
PAIN_FUNCTIONAL_ASSESSMENT: PREVENTS OR INTERFERES SOME ACTIVE ACTIVITIES AND ADLS
PAIN_FUNCTIONAL_ASSESSMENT: PREVENTS OR INTERFERES SOME ACTIVE ACTIVITIES AND ADLS
PAIN_FUNCTIONAL_ASSESSMENT: ACTIVITIES ARE NOT PREVENTED

## 2021-09-17 ASSESSMENT — PAIN DESCRIPTION - PROGRESSION
CLINICAL_PROGRESSION: GRADUALLY WORSENING

## 2021-09-17 ASSESSMENT — PAIN DESCRIPTION - LOCATION
LOCATION: CHEST

## 2021-09-17 ASSESSMENT — PAIN DESCRIPTION - DESCRIPTORS
DESCRIPTORS: ACHING;DISCOMFORT
DESCRIPTORS: ACHING;SORE
DESCRIPTORS: ACHING;DISCOMFORT
DESCRIPTORS: ACHING;SORE

## 2021-09-17 ASSESSMENT — PULMONARY FUNCTION TESTS: PIF_VALUE: 33

## 2021-09-17 NOTE — PROGRESS NOTES
Occupational Therapy    [facility] ACUTE CARE OCCUPATIONAL THERAPY EVALUATION    Mando Rivera, 1950, 2129/2129-A, 9/17/2021    Discharge Recommendation: Inpatient Rehabilitation      History:  [place]:  The encounter diagnosis was Nonrheumatic aortic valve stenosis. Subjective:  Patient states: \"I'm feeling a little dizzy again. \" (pt stated while standing)  Pain: Pt reported 5/10 surgical pain in sternum  Communication with other providers: PT Ulisses Ramesh, ROYA Ambrocio  Restrictions: General Precautions, Fall Risk, Sternal Precautions, Central Line, Chest Tube, Tilley, Telemetry, Pulse Ox, BP cuff, SCDs, 2L o2, Bed exit alarm    Home Setup/Prior level of function:  Social/Functional History  Lives With: Alone (sister and DIL plan on providing initial support)  Type of Home: Apartment  Home Layout: One level  Home Access: Level entry  Bathroom Shower/Tub: Tub/Shower unit  Bathroom Toilet: Standard  Bathroom Equipment: Grab bars in shower  ADL Assistance: Independent  Homemaking Assistance: Independent  Homemaking Responsibilities: Yes  Ambulation Assistance: Independent (uses no AD)  Transfer Assistance: Independent  Active : Yes  Occupation: Retired    Examination:  · Observation: Supine in bed upon arrival. Pleasant and agreeable to evaluation. Pt very soft-spoken.   · Vision: [place]  · [facility]  · Vitals: Stable HR and o2 sats throughout session, BP of 86/59 seated EOB, BP of 85/59 standing (pt with mild to moderate symptoms)    Body Systems and functions:  · ROM: WFL all joints in BL UEs (active shoulder flexion kept 0-90')  · Strength: Grossly 4/5 MMT all major muscle groups BL UEs  · Sensation: WFL (denies numbness/tingling)   · Tone: Normal  · Coordination: WFL for ADLs  · Perception: WNL    Activities of Daily Living (ADLs):  · Feeding: Independent   · Grooming: SBA (seated facial hygiene task EOB; unable to complete in standing this date due to hypotension)  · UB bathing: SBA   · LB bathing: Max A   · UB dressing: CGA (light dynamic sitting balance with donning robe seated EOB)  · LB dressing: Dependent (donning BL socks)  · Toileting: Max A    Cognitive and Psychosocial Functioning:  · Overall cognitive status: WFL   · Affect: Somewhat flat    Balance:   · Sitting: SBA static sitting, CGA with light dynamic sitting tasks  · Standing: CGA with cardiac walker    Functional Mobility:  · Bed Mobility: Max A supine to sitting EOB, Max A sitting EOB to supine (mod coaching for technique/maintaining sternal precautions each direction)  · Transfers: CGA to/from bed and recliner (min cues for sternal precautions each direction)  · Ambulation: CGA with cardiac walker 40 ft; decreased speed, several standing rest breaks required due to dizziness/hypotension      AM-PAC 6 click short form for inpatient daily activity:   How much help from another person does the patient currently need. .. Unable  Dep A Lot  Max A A Lot   Mod A A Little  Min A A Little   CGA  SBA None   Mod I  Indep  Sup   1. Putting on and taking off regular lower body clothing? [x] 1    [] 2   [] 2   [] 3   [] 3   [] 4      2. Bathing (including washing, rinsing, drying)? [] 1   [] 2   [x] 2 [] 3 [] 3 [] 4   3. Toileting, which includes using toilet, bedpan, or urinal? [] 1    [x] 2   [] 2   [] 3   [] 3   [] 4     4. Putting on and taking off regular upper body clothing? [] 1   [] 2   [] 2   [] 3   [x] 3    [] 4      5. Taking care of personal grooming such as brushing teeth? [] 1   [] 2    [] 2 [] 3    [x] 3   [] 4      6. Eating meals? [] 1   [] 2   [] 2   [] 3   [] 3   [x] 4      Raw Score:  15     [24=0% impaired(CH), 23=1-19%(CI), 20-22=20-39%(CJ), 15-19=40-59%(CK), 10-14=60-79%(CL), 7-9=80-99%(CM), 6=100%(CN)]     Treatment:  Therapeutic Activity Training:   Therapeutic activity training was instructed today. Cues were given for safety, sequence, UE/LE placement, awareness, and balance.     Activities performed today included bed mobility training, UB/LB dressing tasks, sitting balance/tolerance, transfer training, household mobility with cardiac walker, education on role of OT, POC, sternal precautions, pursed lip breathing, d/c planning and recommendations      Safety Measures: Gait belt used, Left in Bed, Alarm in place    Assessment:  Pt is a 80year old female with a past medical history of Aortic valve stenosis, Arthritis, Bronchitis, Diabetes mellitus ([facility] [address] Heart murmur, Hx of drug abuse ([facility] [address] Hypertension, Irregular heart beat, LVH (left ventricular hypertrophy), Mixed hyperlipidemia, Wears dentures, and Wears glasses. Pt admitted with aortic stenosis and underwent AV replacement on 9-16. Pt lives at home alone and at baseline is fully independent with ADLs, IADLs, mobility, and driving. Pt currently presents with the above impairments, and would benefit from continued OT services in inpatient rehab setting at discharge. Complexity: Moderate  Prognosis: Good  Plan: 3x/week      Goals:  1. Pt will complete all aspects of bed mobility for EOB/OOB ADLs min A/good adherence to sternal precautions  2. Pt will complete UB/LB bathing min A with setup using long handled sponge PRN  3. Pt will complete all aspects of LB dressing mod A with setup using AE PRN  4. Pt will complete all functional transfers to and from bed, chair, toilet, shower chair SBA/good safety awareness  5. Pt will ambulate HH distance to bathroom for toileting SBA with cardiac walker  6. Pt will complete all aspects of toileting task CGA  7. Pt will complete oral hygiene/grooming routine in standing at sink SBA with setup  8.  Pt will complete ther ex/ther act with focus on cardiac rehab exercises      Time:   Time in: 913  Time out: 944  Timed treatment minutes: 16  Total time: 31      Electronically signed by:    CHUCK Haji/L, [address] [facility], [id number]

## 2021-09-17 NOTE — PROGRESS NOTES
Dr. Eyal Pratt requested to get CVP reading- done- 10-12 range- message sent to him- he stated to hold off on giving lasix for now.

## 2021-09-17 NOTE — FLOWSHEET NOTE
Pt. Alert, follows commands. Indicating that she is nauseous. Able to lift head, arms, legs off of bed. 4mg zofran ivp given for nausea.

## 2021-09-17 NOTE — CARE COORDINATION
Received referral for ARU. Reviewed patients clinicals and OT evals. Will need PT eval.     ARU will continue to follow. Patient meets criteria and is approved to come to ARU. Patient able to admit once medically stable and after ARU Medical Director and  sign the pre-admission screen (PAS), pending bed availability.

## 2021-09-17 NOTE — CONSULTS
[address] [place] [address], [facility]                                  CONSULTATION    PATIENT NAME: Mehrdad Griffin                    :        1950  MED REC NO:   [id number]                          ROOM:       2129  ACCOUNT NO:   [de-identified]                           ADMIT DATE: 2021  PROVIDER:     Phill Pitt MD    CONSULT DATE:  2021    HISTORY OF PRESENT ILLNESS:  This is a 67-year-old female patient status  post bowel surgery done yesterday. The patient underwent an aortic  valve replacement with a Manougian root enlargement and a 25 Medtronic  Mosaic bioprosthetic valve was placed for severe aortic stenosis  present. The patient is doing well this morning. She is extubated and  making slow recovery. The patient had an echo done back in 2021. At that time, found to  have severe aortic stenosis noted. Mean gradient was around 63 mmHg  noted, valve area was 0.7 at that time. The patient also had a heart  catheterization done and found to have nonobstructive coronary artery  disease present. Left main is patent. LAD, circ and RCA has mild  disease noted. This was in 2021. Her mean gradient was 64 mmHg,  valve area was 0.68. Moderately increased right heart pressure was also  noted at that time. PAST MEDICAL HISTORY:  Severe aortic stenosis present. Status post  aortic valve replacement with aortic root dilatation. History of  hypertension, hyperlipidemia, arthritis present. PAST SURGICAL HISTORY:  Breast biopsy done. Hernia repair. Now status  post aortic valve replacement with a bioprosthetic valve. SOCIAL HISTORY:  Quit smoking long time ago. No alcohol use. ALLERGIES:  CODEINE. MEDICATIONS:  At home, she was on lisinopril 2.5 mg a day, Lasix 20 mg  b.i.d., Januvia, Pravachol, and insulin.     PHYSICAL EXAMINATION:  GENERAL:  The patient is awake, alert, answering questions, not in acute  distress. VITAL SIGNS:  Temperature is afebrile, pulse is 75, and blood pressure  is 98/54. HEENT:  Head is normocephalic, atraumatic. Pupils are equal and  reactive to light. CHEST:  Equal expansion. LUNGS:  Clear to auscultation. No wheezing or rhonchi noted. HEART:  Regular rhythm. ABDOMEN:  Soft and nontender. Bowel sounds are present. No  hepatosplenomegaly or guarding appreciated. EXTREMITIES:  No cyanosis or clubbing present. LABORATORY DATA:  BUN is 14, creatinine 0.8. CBC is white count is  elevated, platelet count is 960,270. IMPRESSION:  This is a 61-year-old female patient who is status post  aortic valve replacement for severe aortic stenosis noted. She has a  history of diabetes present. We will continue medical treatment and we  will follow the patient along with you. Continue supportive care.         Ruthie Lazar MD    D: 09/17/2021 12:42:22       T: 09/17/2021 12:44:49     [email or web address]  Job#: [id number]     Doc#: [id number]    CC:

## 2021-09-17 NOTE — OP NOTE
[address], [address] [place] [address]                                OPERATIVE REPORT    PATIENT NAME: Anay Sosa                    :        1950  MED REC NO:   [id number]                          ROOM:       2129  ACCOUNT NO:   [de-identified]                           ADMIT DATE: 2021  PROVIDER:     Arjun Lanier MD    DATE OF PROCEDURE:  2021    PRE-PROCEDURE DIAGNOSIS:  Severe symptomatic aortic stenosis. POSTPROCEDURE DIAGNOSIS:  Severe symptomatic aortic stenosis. PROCEDURE:  1. Aortic valve replacement with Manouguian root enlargement with 25  Medtronic Mosaic bioprosthetic valve. 2.  Left [place] introducer and central venous catheter placement. SURGEON:  Jluis Rahman MD    ASSISTANT SURGEON:  Arjun Lanier MD, who is present for the entirety of  the operation to help with exposure, suturing, and appropriate graft  placement. ASSISTANT:  Martha Scott PA-C, who was present for assistance with  opening, cannulating, and closure. ESTIMATED BLOOD LOSS:  Unable to determine due to cardiopulmonary  bypass. PREOP:  This is a 73-year-old female who was found to have severe aortic  stenosis. She was symptomatic and the risks and benefits of aortic  valve replacement were discussed in detail with the patient. The  patient understood and agreed to proceed. FINDINGS:  The aortic root was narrow, a heavily calcified trileaflet  valve, sinotubular junction had some calcification in it. Because of  her size, we felt that the 23 valve that we could place would not be  ideal for her size. We were able to place 25 after enlarging the root. PROCEDURE:  The patient was identified, brought to the operating room,  and laid in a supine position. After successful induction of general  anesthesia, the patient was intubated by the Anesthesia staff, prepped  and draped in normal sterile fashion. Prior to incision, a time-out was  performed and antibiotics were given. We first began by performing left  [place]-Nina introducer and central venous catheter placement using  Seldinger technique. Median sternotomy was performed in normal fashion. Pericardium was opened from the innominate vein and teed off at the  diaphragm. The patient was systemically heparinized. Pericardial  cradle was formed. Appropriate ACT was achieved. Two concentric  pursestrings were placed in the distal ascending aorta. Aorta was  cannulated using a 20-[place] Medtronic EOPA soft-flow aortic cannula. Right atrium was cannulated using a three-stage venous cannula. Root  vent was placed in the ascending aorta. Retrograde coronary sinus  catheter was placed through the right atrium. LV vent was placed  through the right superior pulmonary vein. The patient was placed on  cardiopulmonary bypass. Aorta was dissected away from the pulmonary  artery. Cross-clamp was placed. Heart was arrested using antegrade  cardioplegia. After an appropriate arrest, we created an aortotomy. Retraction sutures helped expose the valve. It was heavily calcified. The valve was excised and annulus was debrided. We attempted to place a  23 sizer and felt that it was not big enough for the patient because we  felt the 25 was a better size for her. We then extended our aortotomy  down into the commissure between the left and noncoronary leaflets onto the mitral leaflet. At this point, we then placed our sutures through the valve with a  pledget on the ventricular side. Once we got to the position where we  had created a root enlargement, we used a [place] tube graft, fashioned  in appropriate shape and size, and used a running 4-0 Prolene suture to  affix this to the mitral leaflet along with the aortic valve annulus. We then placed our sutures through the patch and placed these sutures  through a 25 valve.   The valve was seated in place

## 2021-09-17 NOTE — CONSULTS
[facility] PHYSICAL THERAPY EVALUATION  Mando Rivera, 1950, -A, 2021    History  [place]:  The encounter diagnosis was Nonrheumatic aortic valve stenosis. Patient  has a past medical history of Aortic valve stenosis, Arthritis, Bronchitis, Diabetes mellitus ([address] Heart murmur, Hx of drug abuse ([address] Hypertension, Irregular heart beat, LVH (left ventricular hypertrophy), Mixed hyperlipidemia, Wears dentures, and Wears glasses. Patient  has a past surgical history that includes other surgical history (12/10/13); other surgical history (12/10/13);  section ( and ( with BPS)); Breast surgery (); Breast biopsy; hernia repair; Dilation and curettage of uterus; Cardiac catheterization (2021); and Aortic valve replacement (N/A, 2021). Subjective:  Patient states:\"I just feel dizzy\"  . Pain:  C/o 5/10 sternum.     Communication with other providers:  Handoff to RN, co-eval with Augusto FONG  Restrictions: Sternal precautions, fall risk, multiple lines    Home Setup/Prior level of function  Social/Functional History  Lives With: Alone (sister and DIL plan on providing initial support)  Type of Home: Apartment  Home Layout: One level  Home Access: Level entry  Bathroom Shower/Tub: Tub/Shower unit  Bathroom Toilet: Standard  Bathroom Equipment: Grab bars in shower  ADL Assistance: Independent  Homemaking Assistance: Independent  Homemaking Responsibilities: Yes  Ambulation Assistance: Independent (uses no AD)  Transfer Assistance: Independent  Active : Yes  Occupation: Retired    Examination of body systems (includes body structures/functions, activity/participation limitations):  · Observation:  Pt is awake in supine with tray and RN present upon arrival  · Vision:  [place]  · Hearing:  [place]  · Cardiopulmonary:  Stable HR and o2 sats throughout session, BP of 86/59 seated EOB, BP of 85/59 standing  · Cognition: WFL, min increased recall time, see OT/SLP note for further evaluation. Musculoskeletal  · ROM R/L:  WFL BLE. · Strength R/L:  Observed at least 3+/5 BLE, not formal testing done this session, decreased in function and endurance. · Gait pattern: Pt demonstrates step-through pattern with decreased step length, justen, foot clearance, with CW and CGA. Limited by dizziness. Mobility:  · Supine to sit: Max A  · Transfers: CGA  · Sitting balance:  SBA. · Standing balance:  CGA. · Gait: CGA    [facility] 6 Clicks Inpatient Mobility:  AM-PAC Inpatient Mobility Raw Score : 15    Treatment:  Bed mobility: Max A for advancement of the BLE to EOB and trunk to upright. Pt weak and painful trunk. Pt attempts scooting to EOB with use of momentum and maintains UE across chest with v/c, multiple attempts with SBA. Return to supine end of session with Max A for trunk and LE. Sitting balance: Pt able to maintain good upright balance at EOB x5' in preparation for gait, assessment of vitals. Pt requires Min A for assisting trunk forward and away from resting on recliner back d/t pain. STS: Pt performs STS from EOB to CW with CGA, UE maintained across chest. In standing pt with fair- standing with CW and v/c for light UE support for sternal precautions with good carryover. Return to seated in recliner PT v/c's pt for sternal precautions prior to seated with good carryover, good eccentric control to seated. Standing balance: Pt demonstrates fair- standing balance with CW and and CGA, pt c/o dizziness, initial increased postural sway in stance which improves with time. Pt overall limited tolerance of upright this session d/t dizziness and low BP. Gait: Pt AMB x40 ft with CW and CGA. Pt c/o dizziness with PT/OT assessing vitals and symptoms frequently. Pt demonstrates step-through pattern with decreased step length, justen, foot clearance, with CW. Limited by dizziness. Chair follow provided d/t pt symptoms. Pt required return to seated. Education: Discussed and re-enforced knowledge of sternal precautions, discussed d/c planning. Safety: patient left in supine , call light within reach, RN notified, gait belt used. Assessment:    Pt is a 81 y/o female s/p aortic valve replacement 9/16. Patient with significant h/o LVH, DM, HTN, see chart. Per pt report pt has been performing ADLs/IADLs independently prior to [facility]. At this time pt appears to be functioning below baseline. Pt is now presenting with impairments in BLE strength, functional endurance, safety awareness, balance, complicated by new sternal precautions and pain. Pt would benefit from skilled PT services in order to address impairments and promote return to PLOF. PT to recommend d/c to ARU for rehab services. Complexity: Moderate  Prognosis: Good, no significant barriers to participation at this time. Plan Times per week: 6+/week, 1 week,   Discharge Recommendations: IP Rehab  Equipment: Defer    Goals:  Short term goals  Time Frame for Short term goals: 1 week  Short term goal 1: Pt will AMB x75 ft with CW, SBA, and stable vitals. Short term goal 2: Pt will perform Sup>sit with Min A and good carryover with precautions  Short term goal 3: Pt will perform x3 STS from low surface with sternal precaution and SBA.        Treatment plan:  Bed mobility, transfers, balance, gait, TA, TX,     Recommendations for NURSING mobility: AMB in gage with CW and CGA, chair follow    Time:   Time in: 09:12  Time out: 09:45  Timed treatment minutes: 23  Total time: 33    Electronically signed by:    Felipe Arnold, PT  0/14/9526, 0:26 PM  PT Lic #: [id number]

## 2021-09-17 NOTE — PROGRESS NOTES
09/16/21  2139 09/16/21  2258 09/17/21  0630      < > 142 143 141   K 4.2   < > 4.4 4.6* 4.6   *  --   --   --  110   CO2 20*   < > 24 24 20*   BUN 9  --   --   --  14   CREATININE 0.4*   < > 0.8 0.7 0.8   GLUCOSE 139*  --   --   --  94    < > = values in this interval not displayed. Hepatic: No results for input(s): AST, ALT, ALB, BILITOT, ALKPHOS in the last 72 hours. Mag:      Recent Labs     09/16/21  1224 09/16/21  1640 09/17/21  0630   MG 0.9* 1.9 1.9      Phos:   No results for input(s): PHOS in the last 72 hours. INR:   Recent Labs     09/16/21  1214   INR 2.07       Radiology Review:  CXR  Impression:     Interval removal of endotracheal tube.  Otherwise, no significant portable   radiographic change compared to 09/16/2021.  Thoracostomy tubes, vascular   access catheter, pulmonary arterial catheter, foci of subsegmental   atelectasis or consolidating infiltrate throughout both lung fields,   cardiomegaly and mediastinal widening again seen and radiographically   unchanged. ASSESSMENT AND PLAN:    Patient Active Problem List   Diagnosis    Type 2 diabetes mellitus without complication ([facility] [address]    Mixed hyperlipidemia    Tobacco dependence    Obesity, Class I, BMI 30-34.9    Recurrent major depressive disorder, in partial remission (HCC)    Murmur, cardiac    Morbidly obese (HCC)    SOB (shortness of breath)    S/P PTCA (percutaneous transluminal coronary angioplasty)    Nonrheumatic aortic valve stenosis    Chronic diastolic congestive heart failure ([address]    LVH (left ventricular hypertrophy) due to hypertensive disease, with heart failure (HCC)    Severe aortic stenosis       S/P AVR, aortic root enlargement    Cardio: stable, on low dose coreg and PO amio. No gtts. Pulm: stable on 2L NC, encourage IS, wean O2 as tolerated   GI: stable  Renal: creatinine stable 0.8, adequate UOP.  Lasix x 1 this am.   Tubes/Lines: DC art line, swan, hard CT  Wound: stable    [employer] Ruben Carrera PA-C

## 2021-09-18 ENCOUNTER — APPOINTMENT (OUTPATIENT)
Dept: GENERAL RADIOLOGY | Age: 71
DRG: 220 | End: 2021-09-18
Attending: SURGERY
Payer: MEDICARE

## 2021-09-18 LAB
ANION GAP SERPL CALCULATED.3IONS-SCNC: 13 MMOL/L (ref 4–16)
BUN BLDV-MCNC: 27 MG/DL (ref 6–23)
CALCIUM IONIZED: 4.8 MG/DL (ref 4.48–5.28)
CALCIUM SERPL-MCNC: 8 MG/DL (ref 8.3–10.6)
CHLORIDE BLD-SCNC: 104 MMOL/L (ref 99–110)
CO2: 20 MMOL/L (ref 21–32)
CREAT SERPL-MCNC: 1.2 MG/DL (ref 0.6–1.1)
GFR AFRICAN AMERICAN: 54 ML/MIN/1.73M2
GFR NON-AFRICAN AMERICAN: 44 ML/MIN/1.73M2
GLUCOSE BLD-MCNC: 114 MG/DL (ref 70–99)
GLUCOSE BLD-MCNC: 117 MG/DL (ref 70–99)
GLUCOSE BLD-MCNC: 118 MG/DL (ref 70–99)
GLUCOSE BLD-MCNC: 118 MG/DL (ref 70–99)
GLUCOSE BLD-MCNC: 125 MG/DL (ref 70–99)
GLUCOSE BLD-MCNC: 127 MG/DL (ref 70–99)
GLUCOSE BLD-MCNC: 132 MG/DL (ref 70–99)
GLUCOSE BLD-MCNC: 134 MG/DL (ref 70–99)
GLUCOSE BLD-MCNC: 139 MG/DL (ref 70–99)
GLUCOSE BLD-MCNC: 144 MG/DL (ref 70–99)
GLUCOSE BLD-MCNC: 147 MG/DL (ref 70–99)
GLUCOSE BLD-MCNC: 149 MG/DL (ref 70–99)
GLUCOSE BLD-MCNC: 150 MG/DL (ref 70–99)
GLUCOSE BLD-MCNC: 156 MG/DL (ref 70–99)
GLUCOSE BLD-MCNC: 157 MG/DL (ref 70–99)
GLUCOSE BLD-MCNC: 81 MG/DL (ref 70–99)
HCT VFR BLD CALC: 30.1 % (ref 37–47)
HEMOGLOBIN: 9.7 GM/DL (ref 12.5–16)
IONIZED CA: 1.2 MMOL/L (ref 1.12–1.32)
MAGNESIUM: 2.1 MG/DL (ref 1.8–2.4)
MCH RBC QN AUTO: 28.4 PG (ref 27–31)
MCHC RBC AUTO-ENTMCNC: 32.2 % (ref 32–36)
MCV RBC AUTO: 88 FL (ref 78–100)
PDW BLD-RTO: 17.4 % (ref 11.7–14.9)
PLATELET # BLD: 71 K/CU MM (ref 140–440)
PMV BLD AUTO: 12.5 FL (ref 7.5–11.1)
POTASSIUM SERPL-SCNC: 5 MMOL/L (ref 3.5–5.1)
RBC # BLD: 3.42 M/CU MM (ref 4.2–5.4)
SODIUM BLD-SCNC: 137 MMOL/L (ref 135–145)
WBC # BLD: 19.8 K/CU MM (ref 4–10.5)

## 2021-09-18 PROCEDURE — 6370000000 HC RX 637 (ALT 250 FOR IP): Performed by: INTERNAL MEDICINE

## 2021-09-18 PROCEDURE — 2580000003 HC RX 258: Performed by: PHYSICIAN ASSISTANT

## 2021-09-18 PROCEDURE — 82962 GLUCOSE BLOOD TEST: CPT

## 2021-09-18 PROCEDURE — 97116 GAIT TRAINING THERAPY: CPT

## 2021-09-18 PROCEDURE — 6370000000 HC RX 637 (ALT 250 FOR IP): Performed by: SURGERY

## 2021-09-18 PROCEDURE — 85027 COMPLETE CBC AUTOMATED: CPT

## 2021-09-18 PROCEDURE — 83735 ASSAY OF MAGNESIUM: CPT

## 2021-09-18 PROCEDURE — 6360000002 HC RX W HCPCS: Performed by: SURGERY

## 2021-09-18 PROCEDURE — 80048 BASIC METABOLIC PNL TOTAL CA: CPT

## 2021-09-18 PROCEDURE — 71045 X-RAY EXAM CHEST 1 VIEW: CPT

## 2021-09-18 PROCEDURE — 97530 THERAPEUTIC ACTIVITIES: CPT

## 2021-09-18 PROCEDURE — 82330 ASSAY OF CALCIUM: CPT

## 2021-09-18 PROCEDURE — 6360000002 HC RX W HCPCS: Performed by: PHYSICIAN ASSISTANT

## 2021-09-18 PROCEDURE — 2000000000 HC ICU R&B

## 2021-09-18 PROCEDURE — 6370000000 HC RX 637 (ALT 250 FOR IP): Performed by: PHYSICIAN ASSISTANT

## 2021-09-18 RX ORDER — ALBUTEROL SULFATE 90 UG/1
2 AEROSOL, METERED RESPIRATORY (INHALATION)
Status: DISCONTINUED | OUTPATIENT
Start: 2021-09-18 | End: 2021-09-21 | Stop reason: HOSPADM

## 2021-09-18 RX ORDER — FUROSEMIDE 10 MG/ML
20 INJECTION INTRAMUSCULAR; INTRAVENOUS 2 TIMES DAILY
Status: DISCONTINUED | OUTPATIENT
Start: 2021-09-18 | End: 2021-09-21 | Stop reason: HOSPADM

## 2021-09-18 RX ADMIN — Medication: at 21:23

## 2021-09-18 RX ADMIN — SENNOSIDES AND DOCUSATE SODIUM 1 TABLET: 50; 8.6 TABLET ORAL at 09:40

## 2021-09-18 RX ADMIN — ONDANSETRON 4 MG: 2 INJECTION INTRAMUSCULAR; INTRAVENOUS at 14:02

## 2021-09-18 RX ADMIN — PANTOPRAZOLE SODIUM 40 MG: 40 TABLET, DELAYED RELEASE ORAL at 09:40

## 2021-09-18 RX ADMIN — SODIUM CHLORIDE, PRESERVATIVE FREE 10 ML: 5 INJECTION INTRAVENOUS at 09:41

## 2021-09-18 RX ADMIN — FUROSEMIDE 20 MG: 10 INJECTION, SOLUTION INTRAMUSCULAR; INTRAVENOUS at 09:47

## 2021-09-18 RX ADMIN — ASPIRIN 81 MG: 81 TABLET, COATED ORAL at 09:40

## 2021-09-18 RX ADMIN — CEFAZOLIN SODIUM 2000 MG: 2 INJECTION, SOLUTION INTRAVENOUS at 02:19

## 2021-09-18 RX ADMIN — ACETAMINOPHEN 650 MG: 325 TABLET ORAL at 16:18

## 2021-09-18 RX ADMIN — CARVEDILOL 3.12 MG: 3.12 TABLET, FILM COATED ORAL at 21:23

## 2021-09-18 RX ADMIN — INSULIN HUMAN 1.2 UNITS/HR: 1 INJECTION, SOLUTION INTRAVENOUS at 13:47

## 2021-09-18 RX ADMIN — AMIODARONE HYDROCHLORIDE 200 MG: 200 TABLET ORAL at 09:40

## 2021-09-18 RX ADMIN — HYDROCODONE BITARTRATE AND ACETAMINOPHEN 2 TABLET: 5; 325 TABLET ORAL at 10:22

## 2021-09-18 RX ADMIN — HYDROCODONE BITARTRATE AND ACETAMINOPHEN 1 TABLET: 5; 325 TABLET ORAL at 05:53

## 2021-09-18 RX ADMIN — BENZOCAINE AND MENTHOL, UNSPECIFIED FORM 1 LOZENGE: 15; 3.6 LOZENGE ORAL at 07:00

## 2021-09-18 RX ADMIN — FUROSEMIDE 20 MG: 10 INJECTION, SOLUTION INTRAMUSCULAR; INTRAVENOUS at 16:18

## 2021-09-18 RX ADMIN — SODIUM CHLORIDE, PRESERVATIVE FREE 10 ML: 5 INJECTION INTRAVENOUS at 21:24

## 2021-09-18 RX ADMIN — Medication: at 09:40

## 2021-09-18 RX ADMIN — AMIODARONE HYDROCHLORIDE 200 MG: 200 TABLET ORAL at 21:23

## 2021-09-18 ASSESSMENT — PAIN DESCRIPTION - DESCRIPTORS: DESCRIPTORS: ACHING;DISCOMFORT

## 2021-09-18 ASSESSMENT — PAIN DESCRIPTION - FREQUENCY: FREQUENCY: INTERMITTENT

## 2021-09-18 ASSESSMENT — PAIN DESCRIPTION - LOCATION: LOCATION: CHEST

## 2021-09-18 ASSESSMENT — PAIN DESCRIPTION - ORIENTATION: ORIENTATION: MID

## 2021-09-18 ASSESSMENT — PAIN SCALES - GENERAL
PAINLEVEL_OUTOF10: 5
PAINLEVEL_OUTOF10: 0
PAINLEVEL_OUTOF10: 0
PAINLEVEL_OUTOF10: 3
PAINLEVEL_OUTOF10: 7
PAINLEVEL_OUTOF10: 0
PAINLEVEL_OUTOF10: 0

## 2021-09-18 ASSESSMENT — PAIN DESCRIPTION - PAIN TYPE: TYPE: SURGICAL PAIN

## 2021-09-18 ASSESSMENT — PAIN - FUNCTIONAL ASSESSMENT: PAIN_FUNCTIONAL_ASSESSMENT: PREVENTS OR INTERFERES SOME ACTIVE ACTIVITIES AND ADLS

## 2021-09-18 ASSESSMENT — PAIN DESCRIPTION - PROGRESSION: CLINICAL_PROGRESSION: GRADUALLY WORSENING

## 2021-09-18 ASSESSMENT — PAIN DESCRIPTION - ONSET: ONSET: SUDDEN

## 2021-09-18 NOTE — PROGRESS NOTES
PATIENT NAME: Benita Gonsales    TODAY'S DATE: 09/18/21    SUBJECTIVE:    Pt s/p AVR day #2  . Good progress     OBJECTIVE:   VITALS:    Vitals:    09/18/21 1202   BP: 98/61   Pulse: 61   Resp: 13   Temp:    SpO2: 94%     INTAKE/OUTPUT:    Date 09/18/21 0000 - 09/18/21 2359   Shift 1445-2177 6452-3900 5869-5798 24 Hour Total   INTAKE   P.O. 200   200   I. V.(mL/kg/hr) 120(0.2)   120   IV Piggyback 98.9   98.9   Shift Total(mL/kg) 418.9(4.3)   418.9(4.3)   OUTPUT   Urine(mL/kg/hr) 145(0.2) 67  212   Chest Tube 10 70  80   Shift Total(mL/kg) 155(1.6) 137(1.4)  292(3)   Weight (kg) 98 98 98 98      Patient Vitals for the past 96 hrs (Last 3 readings):   Weight   09/18/21 0638 216 lb 0.8 oz (98 kg)   09/16/21 0556 197 lb (89.4 kg)       EXAM:  Blood pressure 98/61, pulse 61, temperature 98.1 °F (36.7 °C), temperature source Oral, resp. rate 13, height 4' 11\" (1.499 m), weight 216 lb 0.8 oz (98 kg), last menstrual period 07/09/2000, SpO2 94 %, not currently breastfeeding. General appearance: No apparent distress, appears stated age and cooperative. Skin: unremarkable  HEENT Normocephalic, atraumatic without obvious deformity. Neck: Supple, Trachea midline   Lungs: Good respiratory effort. Clear to auscultation, bilaterally  Heart: Regular rate/ rhythm inc c/d/i  Abdomen: Soft, non-tender or non-distended   Extremities: edema warm well perfused  Neurologic: Alert, grossly intact  Mental status: normal affect      Data:  CBC:   Recent Labs     09/16/21  1250 09/16/21  1312 09/16/21  2258 09/17/21  0630 09/18/21  0505   WBC 11.1*  --   --  17.8* 19.8*   HGB 6.9*   < > 9.7* 10.2* 9.7*   HCT 21.6*   < > 28.0* 30.4* 30.1*     --   --  154 71*    < > = values in this interval not displayed.      BMP:    Recent Labs     09/16/21  1640 09/16/21  1829 09/16/21  2258 09/17/21  0630 09/18/21  0505      < > 143 141 137   K 4.2   < > 4.6* 4.6 5.0   *  --   --  110 104   CO2 20*   < > 24 20* 20*   BUN 9  --   -- 14 27*   CREATININE 0.4*   < > 0.7 0.8 1.2*   GLUCOSE 139*  --   --  94 157*    < > = values in this interval not displayed. Hepatic: No results for input(s): AST, ALT, ALB, BILITOT, ALKPHOS in the last 72 hours. Mag:      Recent Labs     09/16/21  1640 09/17/21  0630 09/18/21  0505   MG 1.9 1.9 2.1      Phos:   No results for input(s): PHOS in the last 72 hours. INR:   Recent Labs     09/16/21  1214   INR 2.07       Radiology Review:  CXR      ASSESSMENT AND PLAN:  S/P AVR with root enlargement postop day #2  Patient Active Problem List   Diagnosis    Type [facility] diabetes mellitus without [facility]    Mixed hyperlipidemia    Tobacco dependence    Obesity, Class I, BMI 30-34.9    Recurrent major depressive disorder, in partial remission (HCC)    Murmur, cardiac    Morbidly obese (HCC)    SOB (shortness of breath)    S/P PTCA (percutaneous transluminal coronary angioplasty)    Nonrheumatic aortic valve stenosis    Chronic diastolic congestive heart failure ([address]    LVH (left ventricular hypertrophy) due to hypertensive disease, with heart failure (HCC)    Severe aortic stenosis       Mobilize. Pulmonary toilet. D/C garza. D/C CT.   Diuresis

## 2021-09-18 NOTE — PROGRESS NOTES
Mediastinal CT removed per order of Dr. Segundo Graham. Pt tolerated well. Vaseline gauze applied with ABD pad. Taped. Pt informed of bedrest for 2 hours. Will obtain chest x ray per protocol.

## 2021-09-18 NOTE — PROGRESS NOTES
Daily Progress Note      Awake and alert in chair today  AVR POD #2  Doing ok, making a slow progress  She will need ARU on d/c  Heart rate is stable--BP is low   Coreg on hold --giving lasix   Low platelets watch for now  Supportive care      BP stable  NSR on tele  Up with therapy this AM  Supportive care    AV repair POD#2    Bioprosthetic valve    CT surgery following    Worked with PT and OT today    Pain controlled    Chest tube in place    HTN    BP stable    Continue coreg     Echo 21 Summary   Left ventricular systolic function is normal.   Ejection fraction is visually estimated at 50-55%. Moderate left ventricular hypertrophy. Indeterminate diastolic function; E/A flow reversal is noted. Heavily calcified aortic valve with severe aortic stenosis; mean P   mmHG. BRITTNEE: 0.57 cm2. DVI: 0.2. No evidence of any pericardial effusion. Heart cath 21 IMPRESSION:  1. Moderately elevated right heart pressure present. PA mean is around  23, RA is around 11.  2.  Left main is patent. 3.  The circ has mild disease noted. 4. LAD has mild disease present. 5.  RCA has mild disease noted. 6.  The patient has severe aortic stenosis noted. PAST MEDICAL HISTORY:  Severe aortic stenosis present. Status post  aortic valve replacement with aortic root dilatation. History of  hypertension, hyperlipidemia, arthritis present.     PAST SURGICAL HISTORY:  Breast biopsy done. Hernia repair. Now status  post aortic valve replacement with a bioprosthetic valve.     SOCIAL HISTORY:  Quit smoking long time ago. No alcohol use.     ALLERGIES:  CODEINE.     MEDICATIONS:  At home, she was on lisinopril 2.5 mg a day, Lasix 20 mg  b.i.d., Januvia, Pravachol, and insulin.     Objective:   BP (!) 112/59   Pulse 65   Temp 98.1 °F (36.7 °C) (Oral)   Resp 18   Ht 4' 11\" (1.499 m)   Wt 216 lb 0.8 oz (98 kg)   LMP 2000   SpO2 (!) 86%   BMI 43.64 kg/m²     Intake/Output Summary (Last 24 hours) at BMP:   Recent Labs     09/16/21  1640 09/16/21  1829 09/16/21  2258 09/17/21  0630 09/18/21  0505      < > 143 141 137   K 4.2   < > 4.6* 4.6 5.0   *  --   --  110 104   CO2 20*   < > 24 20* 20*   BUN 9  --   --  14 27*   CREATININE 0.4*   < > 0.7 0.8 1.2*    < > = values in this interval not displayed. LIVER PROFILE: No results for input(s): AST, ALT, LIPASE, BILIDIR, BILITOT, ALKPHOS in the last 72 hours. Invalid input(s): AMYLASE,  ALB  PT/INR:   Recent Labs     09/16/21  1214   PROTIME 26.9*   INR 2.07     APTT:   Recent Labs     09/16/21  1214   APTT 43.3*     BNP:  No results for input(s): BNP in the last 72 hours.       Assessment:  Patient Active Problem List    Diagnosis Date Noted    Severe aortic stenosis 09/16/2021    Nonrheumatic aortic valve stenosis 06/18/2021    Chronic diastolic congestive heart failure ([address] 06/18/2021    LVH (left ventricular hypertrophy) due to hypertensive disease, with heart failure ([address] 06/18/2021    SOB (shortness of breath) 06/08/2021    S/P PTCA (percutaneous transluminal coronary angioplasty) 06/08/2021    Morbidly obese ([address] 08/12/2020    Murmur, cardiac 10/25/2016    Recurrent major depressive disorder, in partial remission ([address] 08/17/2016    Obesity, Class I, BMI 30-34.9 05/26/2016    Type 2 diabetes mellitus without complication ([address] 95/43/3655    Mixed hyperlipidemia 05/12/2016    Tobacco dependence 05/12/2016       Electronically signed by CLIVE Sahni CNP on 9/18/2021 at 10:40 AM    Electronically signed by Kathie Stanton MD on 9/18/21 at 1:16 PM EDT

## 2021-09-18 NOTE — FLOWSHEET NOTE
Physical Therapy Treatment Note  Name: Mando Rivera MRN: [id number] :   1950   Date:  2021   Admission Date: 2021 Room:  [address] [place] [address]   Restrictions/Precautions:        Restrictions: Sternal precautions, fall risk, multiple lines  Communication with other providers:  PT ok per nsg   Subjective:  Patient states:  \"I'm ok. \"   Pain:   Location, Type, Intensity (0/10 to 10/10): 6/10 initially; 8/10 (when OOB) (Chest incision)   Objective:    Observation:  Pt seen in high biggs's position in bed at beginning of treatment. Agreeable to therapy. Pt on 2 L of O2. Pt left up in recliner with call light at end of treatment. Chair alarm not present in room. Treatment, including education/measures:  Vitals: HR 65 bpm; O2 sats 95%; /65 (high biggs's position)  Seated EOB /55; Seated after walk /63; Seated in recliner at end of treatment 97/75     Transfers:  Supine->sit max A with bed features (Vcs for sternal precautions; pt with retro lean and required vcs to lean fwd to assist with scooting)  Sit->stand CGA-min A (vcs for sternal precautions and for proper hand placement)  Stand->sit CGA (vcs for sternal precautions and for proper hand placement)     Gait: pt amb 39' with cardiac walker with 2-person assist: CGA x 1 + SBA x 1 for chair follow. Vcs for pursed lip breathing. Pt took several standing rest breaks. Education: importance of hydration with water to help improve BP values.      Assessment / Impression:       Patient's tolerance of treatment: Fair   Adverse Reaction: no   Significant change in status and impact:  No   Barriers to improvement:  Decreased overall strength, balance, and endurance     Plan for Next Session:    Gait; transfers; exercises   Time in:  940  Time out:  1015  Timed treatment minutes: 35  Total treatment time: 35    Previously filed items:  Social/Functional History  Lives With: Alone (sister and DIL plan on providing initial support)  Type of Home: Apartment  Home Layout: One level  Home Access: Level entry  Bathroom Shower/Tub: Tub/Shower unit  Bathroom Toilet: Standard  Bathroom Equipment: Grab bars in shower  ADL Assistance: Independent  Homemaking Assistance: Independent  Homemaking Responsibilities: Yes  Ambulation Assistance: Independent (uses no AD)  Transfer Assistance: Independent  Active : Yes  Occupation: Retired  Short term goals  Time Frame for [employer] term goals: 1 week  Short term goal 1: Pt will AMB x75 ft with CW, SBA, and stable vitals. Short term goal 2: Pt will perform Sup>sit with Min A and good carryover with precautions  Short term goal 3: Pt will perform x3 STS from low surface with sternal precaution and SBA.        Electronically signed by:    Eagle López [id number]  9/18/2021, 9:21 AM

## 2021-09-18 NOTE — PROGRESS NOTES
Pt unable to eat lunch at all due to nausea. Gave 4 mg ivp zofran. Pt still not passing much flatus. ISE done.

## 2021-09-18 NOTE — RT PROTOCOL NOTE
RT Inhaler-Nebulizer Bronchodilator Protocol Note    There is a bronchodilator order in the chart from a provider indicating to follow the RT Bronchodilator Protocol and there is an Initiate RT Bronchodilator Protocol order as well (see protocol at bottom of note). The findings from the last RT Protocol Assessment were as follows:  Smoking: Non smoker  Surgical Status: Thoracic or upper airway  Xray: Clear  Respiratory Pattern: RR 12-20  Mental Status: Alert and Oriented  Breath Sounds: Clear  Cough: Strong, spontaneous, non-productive  Activity Level: Walking with assistance  Oxygen Requirement: Room Air - 2LNC/28% or home setting  Indication for Bronchodilator Therapy:    Bronchodilator Assessment Score: 0    Aerosolized bronchodilator medication orders have been revised according to the RT Bronchodilator Protocol. RT Inhaler-Nebulizer Bronchodilator Protocol:    Respiratory Therapist to perform RT Therapy Protocol Assessment then follow the protocol. No Indications - adjust the frequency to every 6 hours PRN wheezing or bronchospasm, if no treatments needed after 48 hours then discontinue using Per Protocol order mode. If indication present, adjust the RT bronchodilator orders based on the Bronchodilator Assessment Score as follows:    0-6 - enter or revise RT bronchodilator order to Albuterol Inhaler order with frequency of every 2 hours PRN for wheezing or increased work of breathing using Per Protocol order mode. If Albuterol Inhaler not tolerated or not effective, then discontinue the Albuterol Inhaler order and enter Albuterol Nebulizer order with same frequency and PRN reasons. Repeat RT Therapy Protocol Assessment as needed.     7-10 - discontinue any other Inpatient aerosolized bronchodilator medication orders and enter or revise two Albuterol Inhaler orders, one with BID frequency and one with frequency of every 2 hours PRN wheezing or increased work of breathing using Per Protocol order

## 2021-09-19 LAB
ANION GAP SERPL CALCULATED.3IONS-SCNC: 12 MMOL/L (ref 4–16)
BUN BLDV-MCNC: 39 MG/DL (ref 6–23)
CALCIUM IONIZED: 4.88 MG/DL (ref 4.48–5.28)
CALCIUM SERPL-MCNC: 8.2 MG/DL (ref 8.3–10.6)
CHLORIDE BLD-SCNC: 103 MMOL/L (ref 99–110)
CO2: 21 MMOL/L (ref 21–32)
CREAT SERPL-MCNC: 0.8 MG/DL (ref 0.6–1.1)
GFR AFRICAN AMERICAN: >60 ML/MIN/1.73M2
GFR NON-AFRICAN AMERICAN: >60 ML/MIN/1.73M2
GLUCOSE BLD-MCNC: 114 MG/DL (ref 70–99)
GLUCOSE BLD-MCNC: 127 MG/DL (ref 70–99)
GLUCOSE BLD-MCNC: 129 MG/DL (ref 70–99)
GLUCOSE BLD-MCNC: 190 MG/DL (ref 70–99)
GLUCOSE BLD-MCNC: 214 MG/DL (ref 70–99)
GLUCOSE BLD-MCNC: 224 MG/DL (ref 70–99)
HCT VFR BLD CALC: 28.9 % (ref 37–47)
HEMOGLOBIN: 9.4 GM/DL (ref 12.5–16)
IONIZED CA: 1.22 MMOL/L (ref 1.12–1.32)
MAGNESIUM: 2.3 MG/DL (ref 1.8–2.4)
MCH RBC QN AUTO: 28.1 PG (ref 27–31)
MCHC RBC AUTO-ENTMCNC: 32.5 % (ref 32–36)
MCV RBC AUTO: 86.5 FL (ref 78–100)
PDW BLD-RTO: 17 % (ref 11.7–14.9)
PLATELET # BLD: 65 K/CU MM (ref 140–440)
PMV BLD AUTO: 11.8 FL (ref 7.5–11.1)
POTASSIUM SERPL-SCNC: 4.7 MMOL/L (ref 3.5–5.1)
RBC # BLD: 3.34 M/CU MM (ref 4.2–5.4)
SODIUM BLD-SCNC: 136 MMOL/L (ref 135–145)
WBC # BLD: 19.8 K/CU MM (ref 4–10.5)

## 2021-09-19 PROCEDURE — 85027 COMPLETE CBC AUTOMATED: CPT

## 2021-09-19 PROCEDURE — 82962 GLUCOSE BLOOD TEST: CPT

## 2021-09-19 PROCEDURE — 6360000002 HC RX W HCPCS: Performed by: PHYSICIAN ASSISTANT

## 2021-09-19 PROCEDURE — 6370000000 HC RX 637 (ALT 250 FOR IP): Performed by: SURGERY

## 2021-09-19 PROCEDURE — 2700000000 HC OXYGEN THERAPY PER DAY

## 2021-09-19 PROCEDURE — 80048 BASIC METABOLIC PNL TOTAL CA: CPT

## 2021-09-19 PROCEDURE — 6370000000 HC RX 637 (ALT 250 FOR IP): Performed by: INTERNAL MEDICINE

## 2021-09-19 PROCEDURE — 6360000002 HC RX W HCPCS: Performed by: SURGERY

## 2021-09-19 PROCEDURE — 6370000000 HC RX 637 (ALT 250 FOR IP): Performed by: PHYSICIAN ASSISTANT

## 2021-09-19 PROCEDURE — 82330 ASSAY OF CALCIUM: CPT

## 2021-09-19 PROCEDURE — 2000000000 HC ICU R&B

## 2021-09-19 PROCEDURE — 94761 N-INVAS EAR/PLS OXIMETRY MLT: CPT

## 2021-09-19 PROCEDURE — 2580000003 HC RX 258: Performed by: PHYSICIAN ASSISTANT

## 2021-09-19 PROCEDURE — 83735 ASSAY OF MAGNESIUM: CPT

## 2021-09-19 RX ORDER — ALOGLIPTIN 12.5 MG/1
6.25 TABLET, FILM COATED ORAL DAILY
Status: DISCONTINUED | OUTPATIENT
Start: 2021-09-19 | End: 2021-09-21 | Stop reason: HOSPADM

## 2021-09-19 RX ADMIN — Medication: at 08:35

## 2021-09-19 RX ADMIN — SODIUM CHLORIDE, PRESERVATIVE FREE 10 ML: 5 INJECTION INTRAVENOUS at 08:35

## 2021-09-19 RX ADMIN — SODIUM CHLORIDE, PRESERVATIVE FREE 10 ML: 5 INJECTION INTRAVENOUS at 18:30

## 2021-09-19 RX ADMIN — AMIODARONE HYDROCHLORIDE 200 MG: 200 TABLET ORAL at 21:21

## 2021-09-19 RX ADMIN — CARVEDILOL 3.12 MG: 3.12 TABLET, FILM COATED ORAL at 08:35

## 2021-09-19 RX ADMIN — SODIUM CHLORIDE, PRESERVATIVE FREE 10 ML: 5 INJECTION INTRAVENOUS at 21:26

## 2021-09-19 RX ADMIN — APIXABAN 5 MG: 5 TABLET, FILM COATED ORAL at 21:22

## 2021-09-19 RX ADMIN — AMIODARONE HYDROCHLORIDE 200 MG: 200 TABLET ORAL at 13:44

## 2021-09-19 RX ADMIN — Medication: at 21:21

## 2021-09-19 RX ADMIN — ASPIRIN 81 MG: 81 TABLET, COATED ORAL at 08:36

## 2021-09-19 RX ADMIN — SODIUM CHLORIDE, PRESERVATIVE FREE 10 ML: 5 INJECTION INTRAVENOUS at 21:27

## 2021-09-19 RX ADMIN — SODIUM CHLORIDE, PRESERVATIVE FREE 10 ML: 5 INJECTION INTRAVENOUS at 21:21

## 2021-09-19 RX ADMIN — ALOGLIPTIN 6.25 MG: 12.5 TABLET, FILM COATED ORAL at 13:44

## 2021-09-19 RX ADMIN — PANTOPRAZOLE SODIUM 40 MG: 40 TABLET, DELAYED RELEASE ORAL at 08:35

## 2021-09-19 RX ADMIN — INSULIN LISPRO 4 UNITS: 100 INJECTION, SOLUTION INTRAVENOUS; SUBCUTANEOUS at 18:34

## 2021-09-19 RX ADMIN — APIXABAN 5 MG: 5 TABLET, FILM COATED ORAL at 13:44

## 2021-09-19 RX ADMIN — FUROSEMIDE 20 MG: 10 INJECTION, SOLUTION INTRAMUSCULAR; INTRAVENOUS at 08:35

## 2021-09-19 RX ADMIN — ACETAMINOPHEN 650 MG: 325 TABLET ORAL at 08:36

## 2021-09-19 RX ADMIN — ACETAMINOPHEN 650 MG: 325 TABLET ORAL at 14:59

## 2021-09-19 RX ADMIN — AMIODARONE HYDROCHLORIDE 200 MG: 200 TABLET ORAL at 08:35

## 2021-09-19 RX ADMIN — KETOROLAC TROMETHAMINE 15 MG: 30 INJECTION, SOLUTION INTRAMUSCULAR; INTRAVENOUS at 18:30

## 2021-09-19 RX ADMIN — ACETAMINOPHEN 650 MG: 325 TABLET ORAL at 19:14

## 2021-09-19 RX ADMIN — CARVEDILOL 3.12 MG: 3.12 TABLET, FILM COATED ORAL at 21:22

## 2021-09-19 RX ADMIN — FUROSEMIDE 20 MG: 10 INJECTION, SOLUTION INTRAMUSCULAR; INTRAVENOUS at 17:27

## 2021-09-19 ASSESSMENT — PAIN SCALES - GENERAL
PAINLEVEL_OUTOF10: 3
PAINLEVEL_OUTOF10: 3
PAINLEVEL_OUTOF10: 0
PAINLEVEL_OUTOF10: 0
PAINLEVEL_OUTOF10: 5
PAINLEVEL_OUTOF10: 0
PAINLEVEL_OUTOF10: 6

## 2021-09-19 NOTE — PROGRESS NOTES
PATIENT NAME: Lydia Player    TODAY'S DATE: 09/19/21    SUBJECTIVE:    Pt status post AVR and aortic root enlargement day #3  Episodes of atrial fibrillation rate controlled. LVH and diastolic dysfunction  OBJECTIVE:   VITALS:    Vitals:    09/19/21 1002   BP: (!) 91/49   Pulse: 70   Resp: 21   Temp:    SpO2: 95%     INTAKE/OUTPUT:    Date 09/19/21 0000 - 09/19/21 2359   Shift 8684-53360 7908-1964 4039-5154 24 Hour Total   INTAKE   P.O. 120   120   I. V.(mL/kg/hr) 81.6(0.1)   81.6   Shift Total(mL/kg) 201.6(2)   201.6(2)   OUTPUT   Urine(mL/kg/hr) 175(0.2) 175  350   Shift Total(mL/kg) 175(1.8) 175(1.8)  350(3.5)   Weight (kg) 98.9 98.9 98.9 98.9      Patient Vitals for the past 96 hrs (Last 3 readings):   Weight   09/19/21 0500 218 lb 0.6 oz (98.9 kg)   09/18/21 0638 216 lb 0.8 oz (98 kg)   09/16/21 0556 197 lb (89.4 kg)       EXAM:  Blood pressure (!) 91/49, pulse 70, temperature 98.5 °F (36.9 °C), temperature source Oral, resp. rate 21, height 4' 11\" (1.499 m), weight 218 lb 0.6 oz (98.9 kg), last menstrual period 07/09/2000, SpO2 95 %, not currently breastfeeding. General appearance: No apparent distress, appears stated age and cooperative. Skin: unremarkable  HEENT Normocephalic, atraumatic without obvious deformity. Neck: Supple, Trachea midline   Lungs: Good respiratory effort.  Clear to auscultation, bilaterally  Heart: Regular rate/ rhythm inc c/d/i  Abdomen: Soft, non-tender or non-distended   Extremities: edema warm well perfused  Neurologic: Alert, grossly intact  Mental status: normal affect      Data:  CBC:   Recent Labs     09/17/21  0630 09/18/21  0505 09/19/21  0520   WBC 17.8* 19.8* 19.8*   HGB 10.2* 9.7* 9.4*   HCT 30.4* 30.1* 28.9*    71* 65*     BMP:    Recent Labs     09/17/21  0630 09/18/21  0505 09/19/21  0520    137 136   K 4.6 5.0 4.7    104 103   CO2 20* 20* 21   BUN 14 27* 39*   CREATININE 0.8 1.2* 0.8   GLUCOSE 94 157* 129*     Hepatic: No results for input(s): AST, ALT, ALB, BILITOT, ALKPHOS in the last 72 hours. Mag:      Recent Labs     09/17/21  0630 09/18/21  0505 09/19/21  0520   MG 1.9 2.1 2.3      Phos:   No results for input(s): PHOS in the last 72 hours. INR:   Recent Labs     09/16/21  1214   INR 2.07       Radiology Review:  CXR      ASSESSMENT AND PLAN:  S/P AVR and aortic root enlargement  Patient Active Problem List   Diagnosis    Type 2 diabetes mellitus without complication ([facility]    Mixed hyperlipidemia    Tobacco dependence    Obesity, Class I, BMI 30-34.9    Recurrent major depressive disorder, in partial remission (HCC)    Murmur, cardiac    Morbidly obese (HCC)    SOB (shortness of breath)    S/P PTCA (percutaneous transluminal coronary angioplasty)    Nonrheumatic aortic valve stenosis    Chronic diastolic congestive heart failure ([facility] [facility]    LVH (left ventricular hypertrophy) due to hypertensive disease, with heart failure (HCC)    Severe aortic stenosis       Mobilize. Pulmonary toilet.  Diuresis  Monitor rhythm and treat accordingly  Anticoagulation added with Eliquis

## 2021-09-19 NOTE — CONSULTS
Endocrinology   Consult Note      Dear Doctor Lew Pena for the Consult     Pt. Was Admitted for : Severe aortic  Stenosis// aortic valve repairs/replacement done on 9/16/2021  Aortic valve replacement with Medtronic Mosaic bioprosthetic valve    Reason for Consult: post Op day 3     History Obtained From:  Patient/ EMR       HISTORY OF PRESENT ILLNESS:                The patient is a 80 y.o. female with significant past medical history of rheumatic aortic valve stenosis, diabetes mellitus, history of drug abuse, hypertension, hyperlipidemia has breast surgery was admitted to hospital after evaluation for severe symptomatic aortic stenosis. Patient underwent aortic valve replacement withMedtronic Mosaic bioprosthetic valve. I was  consulted for better control of blood glucose. ROS:   Pt's ROS done in detail. Abnormal ROS are noted in Medical and Surgical History Section below:      Other Medical History:        Diagnosis Date    Aortic valve stenosis     Arthritis     Bronchitis 06/2021    Diabetes mellitus ([address]     dx age 53's    Heart murmur     Hx of drug abuse ([address]     \"did cocaine back in 1980's    Hypertension     Irregular heart beat     \"they just say I skip a beat , I think from the heart murmur- not sure\" follow with Dr Jessica Perez LVH (left ventricular hypertrophy)     hx per old chart    Mixed hyperlipidemia 5/12/2016    Wears dentures     full upper plate    Wears glasses     to read     Surgical History:        Procedure Laterality Date    AORTIC VALVE REPLACEMENT N/A 9/16/2021    AORTIC VALVE REPLACEMENT AND AORTIC ROOT ENLARGEMENT performed by Amna Rodriguez MD at [address] [place] [address]  2019    left breast bx    CARDIAC CATHETERIZATION  06/2021   [address]  and 1980( with BPS)    x2    DILATION AND CURETTAGE OF UTERUS      HERNIA REPAIR      umbilical hernia\"think done when I was a teenager    OTHER SURGICAL HISTORY 12/10/13    Left AC lipoma excision    OTHER SURGICAL HISTORY  12/10/13    Left flank lipoma excision       Allergies:  Codeine and Hydrocodone-acetaminophen    Family History:       Problem Relation Age of Onset    Diabetes Mother     Heart Disease Mother     High Blood Pressure Father     Diabetes Sister     High Blood Pressure Sister     Stroke Sister     Breast Cancer Sister 58    Cancer Brother         Lung    High Blood Pressure Brother     Stroke Brother     Diabetes Brother     Diabetes Maternal Grandmother     Heart Disease Maternal Grandfather     Cancer Brother         Lung (smoker)    Breast Cancer Maternal Aunt      REVIEW OF SYSTEMS:  Review of System Done as noted above     PHYSICAL EXAM:      Vitals:    /67   Pulse 83   Temp 98.5 °F (36.9 °C) (Oral)   Resp 26   Ht 4' 11\" (1.499 m)   Wt 218 lb 0.6 oz (98.9 kg)   LMP 07/09/2000   SpO2 95%   BMI 44.04 kg/m²     CONSTITUTIONAL:  awake, alert, cooperative, appears stated age   EYES:  vision intact Fundoscopic Exam not performed   ENT:Normal  NECK:  Supple, No JVD. Thyroid Exam:Normal   LUNGS:  Has Vesicular Breath Sounds,   CARDIOVASCULAR:  Normal apical impulse, regular rate and rhythm, normal S1 and S2, no S3 or S4, and has systolic murmur   ABDOMEN:  No scars, normal bowel sounds, soft, non-distended, non-tender, no masses palpated, no hepatolienomegaly  Musculoskeletal: Normal  Extremities: Normal, peripheral pulses normal, , has no edema   NEUROLOGIC:  Awake, alert, oriented to name, place and time. Cranial nerves II-XII are grossly intact. Motor is  intact. Sensory is intact.  ,  and gait is normal.    DATA:    CBC:   Recent Labs     09/17/21  0630 09/18/21  0505 09/19/21  0520   WBC 17.8* 19.8* 19.8*   HGB 10.2* 9.7* 9.4*    71* 65*    CMP:  Recent Labs     09/17/21  0630 09/18/21  0505 09/19/21  0520    137 136   K 4.6 5.0 4.7    104 103   CO2 20* 20* 21   BUN 14 27* 39*   CREATININE 0.8 1.2* 0. 8   CALCIUM 8.2* 8.0* 8.2*     Lipids:   Lab Results   Component Value Date    CHOL 256 06/08/2021    CHOL 309 03/17/2021    HDL 65 06/08/2021    TRIG 104 06/08/2021     Glucose:   Recent Labs     09/18/21  2341 09/19/21  0139 09/19/21  0518   POCGLU 114* 114* 127*     Hemoglobin A1C:   Lab Results   Component Value Date    LABA1C 7.4 09/09/2021     Free T4:   Lab Results   Component Value Date    T4FREE 1.34 06/08/2021     Free T3: No results found for: FT3  TSH High Sensitivity:   Lab Results   Component Value Date    TSHHS 1.370 06/08/2021       XR CHEST PORTABLE    Result Date: 9/18/2021  EXAMINATION: ONE XRAY VIEW OF THE CHEST 9/18/2021 6:23 am COMPARISON: Chest radiograph 09/17/2021 HISTORY: ORDERING SYSTEM PROVIDED HISTORY: Post op open heart surgery    Slight interval increase in basilar predominant airspace opacities, favored to represent mildly increased interstitial pulmonary edema. Similar central pulmonary vascular congestion. Small left pleural effusion has increased slightly in size. XR CHEST PORTABLE    Result Date: 9/18/2021  EXAMINATION: ONE XRAY VIEW OF THE CHEST 9/18/2021 5:31 pm COMPARISON: Chest radiograph 11 hours prior HISTORY: ORDERING SYSTEM PROVIDED HISTORY: eval for pneumo post CT removal    1. No pneumothorax evident following pleural catheter removal. 2. No significant interval change in the pneumatization bilateral lungs. Congestive heart failure is most likely given the radiographic findings; pneumonia is also a consideration in areas of consolidation with pleural effusion. 3. Calcific atherosclerosis aorta. 4. Cardiomegaly.    .     XR CHEST PORTABLE    Result Date: 9/16/2021  EXAMINATION: ONE XRAY VIEW OF THE CHEST 9/16/2021 12:11 pm COMPARISON: 09/09/2021 HISTORY: ORDERING SYSTEM PROVIDED HISTORY: Post op open heart surgery     No pneumothorax status post median sternotomy       Scheduled Medicines   Medications:    insulin lispro  0-12 Units SubCUTAneous TID WC    insulin lispro  0-6 Units SubCUTAneous Nightly    apixaban  5 mg Oral BID    furosemide  20 mg IntraVENous BID    sodium chloride flush  10 mL IntraVENous 2 times per day    aspirin  81 mg Oral Daily    amiodarone  200 mg Oral TID    mupirocin   Nasal BID    multivitamin  1 tablet Oral Daily with breakfast    sennosides-docusate sodium  1 tablet Oral Daily    carvedilol  3.125 mg Oral BID    pantoprazole  40 mg Oral Daily    [START ON 9/20/2021] amiodarone  200 mg Oral Daily      Infusions:    sodium chloride      norepinephrine      EPINEPHrine Stopped (09/17/21 0400)    nitroGLYCERIN Stopped (09/16/21 1638)    dextrose           IMPRESSION    Patient Active Problem List   Diagnosis    Type 2 diabetes mellitus without complication (HCC)    Mixed hyperlipidemia    Tobacco dependence    Obesity, Class I, BMI 30-34.9    Recurrent major depressive disorder, in partial remission ([address]    Murmur, cardiac    Morbidly obese (HCC)    SOB (shortness of breath)    S/P PTCA (percutaneous transluminal coronary angioplasty)    Nonrheumatic aortic valve stenosis    Chronic diastolic congestive heart failure ([address]    LVH (left ventricular hypertrophy) due to hypertensive disease, with heart failure (HCC)    Severe aortic stenosis         RECOMMENDATIONS:      1. Reviewed POC blood glucose . Labs and X ray results   2. Reviewed Home and Current Medicines   3. Patient was started on post cardiac surgery protocol including IV insulin infusion drip  4. Will discontinue IV insulin drip today  5. Will Start On  Correction bolus Humalog/ Lantus Insulin regime / OHGD   6. Monitor Blood glucose frequently   7. Modify  the dose of Insulin/ OHGD  as needed        Will follow with you  Again thank you for sharing pt's care with me.      Truly yours,       Juliana Blue MD

## 2021-09-19 NOTE — PROGRESS NOTES
Wt 218 lb 0.6 oz (98.9 kg)   LMP 07/09/2000   SpO2 95%   BMI 44.04 kg/m²     Intake/Output Summary (Last 24 hours) at 9/19/2021 1105  Last data filed at 9/19/2021 0830  Gross per 24 hour   Intake 378.18 ml   Output 785 ml   Net -406.82 ml       Medications:   Scheduled Meds:   insulin lispro  0-12 Units SubCUTAneous TID WC    insulin lispro  0-6 Units SubCUTAneous Nightly    apixaban  5 mg Oral BID    alogliptin  6.25 mg Oral Daily    furosemide  20 mg IntraVENous BID    sodium chloride flush  10 mL IntraVENous 2 times per day    aspirin  81 mg Oral Daily    amiodarone  200 mg Oral TID    mupirocin   Nasal BID    multivitamin  1 tablet Oral Daily with breakfast    sennosides-docusate sodium  1 tablet Oral Daily    carvedilol  3.125 mg Oral BID    pantoprazole  40 mg Oral Daily    [START ON 9/20/2021] amiodarone  200 mg Oral Daily      Infusions:   sodium chloride      norepinephrine      EPINEPHrine Stopped (09/17/21 0400)    nitroGLYCERIN Stopped (09/16/21 1638)    dextrose        PRN Meds:  benzocaine-menthol, ipratropium, albuterol sulfate HFA, ondansetron, sodium chloride flush, sodium chloride, acetaminophen, HYDROcodone 5 mg - acetaminophen **OR** HYDROcodone 5 mg - acetaminophen, ketorolac, hydrALAZINE, metoprolol, bisacodyl, fleet, potassium chloride, magnesium sulfate, calcium gluconate, albumin human, norepinephrine, EPINEPHrine, nitroGLYCERIN, glucose, dextrose, glucagon (rDNA), dextrose       Physical Exam:  Vitals:    09/19/21 1002   BP: (!) 91/49   Pulse: 70   Resp: 21   Temp:    SpO2: 95%        General: AAO, NAD  Chest: Nontender  Cardiac: First and Second Heart Sounds are Normal, No Murmurs or Gallops noted  Lungs:Clear to auscultation and percussion. Abdomen: Soft, NT, ND, +BS  Extremities: No clubbing, no edema  Vascular:  Equal 2+ peripheral pulses.         Lab Data:  CBC:   Recent Labs     09/17/21  0630 09/18/21  0505 09/19/21  0520   WBC 17.8* 19.8* 19.8*   HGB 10.2*

## 2021-09-19 NOTE — PROGRESS NOTES
Pt back in rate controlled a fib. Encouraged pt to use ISE every 1 hour. Does not want any Tylenol at this time.

## 2021-09-20 ENCOUNTER — APPOINTMENT (OUTPATIENT)
Dept: GENERAL RADIOLOGY | Age: 71
DRG: 220 | End: 2021-09-20
Attending: SURGERY
Payer: MEDICARE

## 2021-09-20 LAB
ANION GAP SERPL CALCULATED.3IONS-SCNC: 13 MMOL/L (ref 4–16)
BUN BLDV-MCNC: 42 MG/DL (ref 6–23)
CALCIUM IONIZED: 4.8 MG/DL (ref 4.48–5.28)
CALCIUM SERPL-MCNC: 8.2 MG/DL (ref 8.3–10.6)
CHLORIDE BLD-SCNC: 104 MMOL/L (ref 99–110)
CO2: 22 MMOL/L (ref 21–32)
CREAT SERPL-MCNC: 0.8 MG/DL (ref 0.6–1.1)
GFR AFRICAN AMERICAN: >60 ML/MIN/1.73M2
GFR NON-AFRICAN AMERICAN: >60 ML/MIN/1.73M2
GLUCOSE BLD-MCNC: 114 MG/DL (ref 70–99)
GLUCOSE BLD-MCNC: 178 MG/DL (ref 70–99)
GLUCOSE BLD-MCNC: 222 MG/DL (ref 70–99)
GLUCOSE BLD-MCNC: 224 MG/DL (ref 70–99)
HCT VFR BLD CALC: 27.2 % (ref 37–47)
HEMOGLOBIN: 9 GM/DL (ref 12.5–16)
IONIZED CA: 1.2 MMOL/L (ref 1.12–1.32)
MAGNESIUM: 2.5 MG/DL (ref 1.8–2.4)
MCH RBC QN AUTO: 28.6 PG (ref 27–31)
MCHC RBC AUTO-ENTMCNC: 33.1 % (ref 32–36)
MCV RBC AUTO: 86.3 FL (ref 78–100)
PDW BLD-RTO: 16.6 % (ref 11.7–14.9)
PLATELET # BLD: 113 K/CU MM (ref 140–440)
PMV BLD AUTO: 11.8 FL (ref 7.5–11.1)
POTASSIUM SERPL-SCNC: 4.6 MMOL/L (ref 3.5–5.1)
RBC # BLD: 3.15 M/CU MM (ref 4.2–5.4)
SARS-COV-2, NAAT: NOT DETECTED
SODIUM BLD-SCNC: 139 MMOL/L (ref 135–145)
SOURCE: NORMAL
WBC # BLD: 13.9 K/CU MM (ref 4–10.5)

## 2021-09-20 PROCEDURE — 6370000000 HC RX 637 (ALT 250 FOR IP): Performed by: PHYSICIAN ASSISTANT

## 2021-09-20 PROCEDURE — 83735 ASSAY OF MAGNESIUM: CPT

## 2021-09-20 PROCEDURE — 2000000000 HC ICU R&B

## 2021-09-20 PROCEDURE — 2580000003 HC RX 258: Performed by: PHYSICIAN ASSISTANT

## 2021-09-20 PROCEDURE — 82962 GLUCOSE BLOOD TEST: CPT

## 2021-09-20 PROCEDURE — 6370000000 HC RX 637 (ALT 250 FOR IP): Performed by: SURGERY

## 2021-09-20 PROCEDURE — 97530 THERAPEUTIC ACTIVITIES: CPT

## 2021-09-20 PROCEDURE — 87635 SARS-COV-2 COVID-19 AMP PRB: CPT

## 2021-09-20 PROCEDURE — 71046 X-RAY EXAM CHEST 2 VIEWS: CPT

## 2021-09-20 PROCEDURE — 80048 BASIC METABOLIC PNL TOTAL CA: CPT

## 2021-09-20 PROCEDURE — 82330 ASSAY OF CALCIUM: CPT

## 2021-09-20 PROCEDURE — 6370000000 HC RX 637 (ALT 250 FOR IP): Performed by: INTERNAL MEDICINE

## 2021-09-20 PROCEDURE — 97116 GAIT TRAINING THERAPY: CPT

## 2021-09-20 PROCEDURE — 97110 THERAPEUTIC EXERCISES: CPT

## 2021-09-20 PROCEDURE — 93005 ELECTROCARDIOGRAM TRACING: CPT | Performed by: PHYSICIAN ASSISTANT

## 2021-09-20 PROCEDURE — 6360000002 HC RX W HCPCS: Performed by: SURGERY

## 2021-09-20 PROCEDURE — 85027 COMPLETE CBC AUTOMATED: CPT

## 2021-09-20 PROCEDURE — 94761 N-INVAS EAR/PLS OXIMETRY MLT: CPT

## 2021-09-20 RX ORDER — FUROSEMIDE 10 MG/ML
20 INJECTION INTRAMUSCULAR; INTRAVENOUS ONCE
Status: COMPLETED | OUTPATIENT
Start: 2021-09-20 | End: 2021-09-20

## 2021-09-20 RX ORDER — GLIPIZIDE 5 MG/1
5 TABLET ORAL
Status: DISCONTINUED | OUTPATIENT
Start: 2021-09-20 | End: 2021-09-21 | Stop reason: HOSPADM

## 2021-09-20 RX ADMIN — ASPIRIN 81 MG: 81 TABLET, COATED ORAL at 09:18

## 2021-09-20 RX ADMIN — SODIUM CHLORIDE, PRESERVATIVE FREE 10 ML: 5 INJECTION INTRAVENOUS at 17:31

## 2021-09-20 RX ADMIN — SODIUM CHLORIDE, PRESERVATIVE FREE 10 ML: 5 INJECTION INTRAVENOUS at 22:33

## 2021-09-20 RX ADMIN — FUROSEMIDE 20 MG: 10 INJECTION, SOLUTION INTRAMUSCULAR; INTRAVENOUS at 17:31

## 2021-09-20 RX ADMIN — APIXABAN 5 MG: 5 TABLET, FILM COATED ORAL at 09:18

## 2021-09-20 RX ADMIN — Medication: at 09:18

## 2021-09-20 RX ADMIN — APIXABAN 5 MG: 5 TABLET, FILM COATED ORAL at 22:43

## 2021-09-20 RX ADMIN — INSULIN LISPRO 2 UNITS: 100 INJECTION, SOLUTION INTRAVENOUS; SUBCUTANEOUS at 09:20

## 2021-09-20 RX ADMIN — FUROSEMIDE 20 MG: 10 INJECTION, SOLUTION INTRAMUSCULAR; INTRAVENOUS at 09:18

## 2021-09-20 RX ADMIN — SENNOSIDES AND DOCUSATE SODIUM 1 TABLET: 50; 8.6 TABLET ORAL at 09:18

## 2021-09-20 RX ADMIN — THERA TABS 1 TABLET: TAB at 09:18

## 2021-09-20 RX ADMIN — INSULIN LISPRO 4 UNITS: 100 INJECTION, SOLUTION INTRAVENOUS; SUBCUTANEOUS at 13:49

## 2021-09-20 RX ADMIN — Medication: at 22:33

## 2021-09-20 RX ADMIN — PANTOPRAZOLE SODIUM 40 MG: 40 TABLET, DELAYED RELEASE ORAL at 09:18

## 2021-09-20 RX ADMIN — CARVEDILOL 3.12 MG: 3.12 TABLET, FILM COATED ORAL at 09:18

## 2021-09-20 RX ADMIN — AMIODARONE HYDROCHLORIDE 200 MG: 200 TABLET ORAL at 09:18

## 2021-09-20 RX ADMIN — CARVEDILOL 3.12 MG: 3.12 TABLET, FILM COATED ORAL at 22:33

## 2021-09-20 RX ADMIN — ALOGLIPTIN 6.25 MG: 12.5 TABLET, FILM COATED ORAL at 09:18

## 2021-09-20 RX ADMIN — SODIUM CHLORIDE, PRESERVATIVE FREE 10 ML: 5 INJECTION INTRAVENOUS at 18:08

## 2021-09-20 RX ADMIN — FUROSEMIDE 20 MG: 10 INJECTION, SOLUTION INTRAMUSCULAR; INTRAVENOUS at 18:07

## 2021-09-20 RX ADMIN — HYDROCODONE BITARTRATE AND ACETAMINOPHEN 1 TABLET: 5; 325 TABLET ORAL at 22:55

## 2021-09-20 RX ADMIN — SODIUM CHLORIDE, PRESERVATIVE FREE 10 ML: 5 INJECTION INTRAVENOUS at 09:17

## 2021-09-20 RX ADMIN — GLIPIZIDE 5 MG: 5 TABLET ORAL at 10:57

## 2021-09-20 ASSESSMENT — PULMONARY FUNCTION TESTS
PIF_VALUE: 44
PIF_VALUE: 41

## 2021-09-20 ASSESSMENT — PAIN SCALES - GENERAL
PAINLEVEL_OUTOF10: 0
PAINLEVEL_OUTOF10: 7
PAINLEVEL_OUTOF10: 0
PAINLEVEL_OUTOF10: 0

## 2021-09-20 ASSESSMENT — PAIN DESCRIPTION - ONSET: ONSET: SUDDEN

## 2021-09-20 ASSESSMENT — PAIN DESCRIPTION - LOCATION: LOCATION: CHEST;STERNUM

## 2021-09-20 ASSESSMENT — PAIN DESCRIPTION - DESCRIPTORS: DESCRIPTORS: ACHING

## 2021-09-20 ASSESSMENT — PAIN - FUNCTIONAL ASSESSMENT: PAIN_FUNCTIONAL_ASSESSMENT: ACTIVITIES ARE NOT PREVENTED

## 2021-09-20 ASSESSMENT — PAIN DESCRIPTION - FREQUENCY: FREQUENCY: INTERMITTENT

## 2021-09-20 ASSESSMENT — PAIN DESCRIPTION - PROGRESSION: CLINICAL_PROGRESSION: NOT CHANGED

## 2021-09-20 ASSESSMENT — PAIN DESCRIPTION - PAIN TYPE: TYPE: ACUTE PAIN;SURGICAL PAIN

## 2021-09-20 ASSESSMENT — PAIN DESCRIPTION - ORIENTATION: ORIENTATION: MID

## 2021-09-20 NOTE — PROGRESS NOTES
Intake/Output Summary (Last 24 hours) at 9/20/2021 1038  Last data filed at 9/20/2021 0800  Gross per 24 hour   Intake 120 ml   Output 1425 ml   Net -1305 ml       Medications:   Scheduled Meds:   glipiZIDE  5 mg Oral QAM AC    insulin lispro  0-12 Units SubCUTAneous TID WC    insulin lispro  0-6 Units SubCUTAneous Nightly    apixaban  5 mg Oral BID    alogliptin  6.25 mg Oral Daily    furosemide  20 mg IntraVENous BID    sodium chloride flush  10 mL IntraVENous 2 times per day    aspirin  81 mg Oral Daily    mupirocin   Nasal BID    multivitamin  1 tablet Oral Daily with breakfast    sennosides-docusate sodium  1 tablet Oral Daily    carvedilol  3.125 mg Oral BID    pantoprazole  40 mg Oral Daily    amiodarone  200 mg Oral Daily      Infusions:   sodium chloride      norepinephrine      EPINEPHrine Stopped (09/17/21 0400)    nitroGLYCERIN Stopped (09/16/21 1638)    dextrose        PRN Meds:  benzocaine-menthol, ipratropium, albuterol sulfate HFA, ondansetron, sodium chloride flush, sodium chloride, acetaminophen, HYDROcodone 5 mg - acetaminophen **OR** HYDROcodone 5 mg - acetaminophen, ketorolac, hydrALAZINE, metoprolol, bisacodyl, fleet, potassium chloride, magnesium sulfate, calcium gluconate, albumin human, norepinephrine, EPINEPHrine, nitroGLYCERIN, glucose, dextrose, glucagon (rDNA), dextrose       Physical Exam:  Vitals:    09/20/21 0900   BP: 137/67   Pulse: 82   Resp: 19   Temp:    SpO2: 92%        General: AAO, NAD  Chest: Nontender  Cardiac: First and Second Heart Sounds are Normal, No Murmurs or Gallops noted  Lungs:Clear to auscultation and percussion. Abdomen: Soft, NT, ND, +BS  Extremities: No clubbing, no edema  Vascular:  Equal 2+ peripheral pulses.         Lab Data:  CBC:   Recent Labs     09/18/21  0505 09/19/21  0520 09/20/21  0625   WBC 19.8* 19.8* 13.9*   HGB 9.7* 9.4* 9.0*   HCT 30.1* 28.9* 27.2*   MCV 88.0 86.5 86.3   PLT 71* 65* 113*     BMP:   Recent Labs 09/18/21  0505 09/19/21  0520 09/20/21  0625    136 139   K 5.0 4.7 4.6    103 104   CO2 20* 21 22   BUN 27* 39* 42*   CREATININE 1.2* 0.8 0.8     LIVER PROFILE: No results for input(s): AST, ALT, LIPASE, BILIDIR, BILITOT, ALKPHOS in the last 72 hours. Invalid input(s): AMYLASE,  ALB  PT/INR: No results for input(s): PROTIME, INR in the last 72 hours. APTT: No results for input(s): APTT in the last 72 hours. BNP:  No results for input(s): BNP in the last 72 hours.       Assessment:  Patient Active Problem List    Diagnosis Date Noted    Severe aortic stenosis 09/16/2021    Nonrheumatic aortic valve stenosis 06/18/2021    Chronic diastolic congestive heart failure ([address] 06/18/2021    LVH (left ventricular hypertrophy) due to hypertensive disease, with heart failure (HCC) 06/18/2021    SOB (shortness of breath) 06/08/2021    S/P PTCA (percutaneous transluminal coronary angioplasty) 06/08/2021    Morbidly obese ([facility] 08/12/2020    Murmur, cardiac 10/25/2016    Recurrent major depressive disorder, in partial remission ([facility] 08/17/2016    Obesity, Class I, BMI 30-34.9 05/26/2016    Type 2 diabetes mellitus without complication ([facility] [address] 96/04/5733    Mixed hyperlipidemia 05/12/2016    Tobacco dependence 05/12/2016       Electronically signed by Agnieszka Whitmore PA-C on 9/20/2021 at 10:38 AM    Electronically signed by Constance Mendoza MD on 9/20/21 at 5:15 PM EDT

## 2021-09-20 NOTE — FLOWSHEET NOTE
Criteria met per clinical pathway to remove epicardial pacing wires & MD order received. Procedure explained to patient  Pacing wire site within normal limits. Cleansed site with betadine. Ventricular pacing wires removed per policy without difficulty. Dry sterile dressing applied. Vital signs will be monitored and recorded per open heart protocol (every 15 minutes X 2, every 30 minutes X 1, and every hour X 1). Patient tolerated procedure well, heart tones easily auscultated, oxygen saturation within normal limits. Signs/symtoms for cardiac tamponade will be monitored closely.

## 2021-09-20 NOTE — PROGRESS NOTES
PATIENT NAME: Ivette Madison    TODAY'S DATE: 09/20/21    SUBJECTIVE:    Pt status post AVR and aortic root enlargement day #4  Pt comfortable. LVH and diastolic dysfunction    OBJECTIVE:   VITALS:    Vitals:    09/20/21 1200   BP: 118/60   Pulse: 70   Resp: 23   Temp: 99.1 °F (37.3 °C)   SpO2: 100%     INTAKE/OUTPUT:    Date 09/20/21 0000 - 09/20/21 2359   Shift 0992-2175 6135-5118 8956-1476 24 Hour Total   INTAKE   P.O. 120   120   Shift Total(mL/kg) 120(1.2)   120(1.2)   OUTPUT   Urine(mL/kg/hr) 340(0.4)   340   Shift Total(mL/kg) 340(3.4)   340(3.4)   Weight (kg) 99.6 99.6 99.6 99.6      Patient Vitals for the past 96 hrs (Last 3 readings):   Weight   09/20/21 0600 219 lb 9.3 oz (99.6 kg)   09/19/21 0500 218 lb 0.6 oz (98.9 kg)   09/18/21 0638 216 lb 0.8 oz (98 kg)       EXAM:  Blood pressure 118/60, pulse 70, temperature 99.1 °F (37.3 °C), temperature source Oral, resp. rate 23, height 4' 11\" (1.499 m), weight 219 lb 9.3 oz (99.6 kg), last menstrual period 07/09/2000, SpO2 100 %, not currently breastfeeding. General appearance: No apparent distress, appears stated age and cooperative. Skin: unremarkable  HEENT Normocephalic, atraumatic without obvious deformity. Neck: Supple, Trachea midline   Lungs: Good respiratory effort.  Clear to auscultation, bilaterally  Heart: Regular rate/ rhythm inc c/d/i  Abdomen: Soft, non-tender or non-distended   Extremities: mild edema warm well perfused  Neurologic: Alert, grossly intact  Mental status: normal affect      Data:  CBC:   Recent Labs     09/18/21  0505 09/19/21  0520 09/20/21  0625   WBC 19.8* 19.8* 13.9*   HGB 9.7* 9.4* 9.0*   HCT 30.1* 28.9* 27.2*   PLT 71* 65* 113*     BMP:    Recent Labs     09/18/21  0505 09/19/21  0520 09/20/21  0625    136 139   K 5.0 4.7 4.6    103 104   CO2 20* 21 22   BUN 27* 39* 42*   CREATININE 1.2* 0.8 0.8   GLUCOSE 157* 129* 178*     Hepatic: No results for input(s): AST, ALT, ALB, BILITOT, ALKPHOS in the last 72 hours.  Mag:      Recent Labs     09/18/21  0505 09/19/21  0520 09/20/21  0625   MG 2.1 2.3 2.5*      Phos:   No results for input(s): PHOS in the last 72 hours. INR:   No results for input(s): INR in the last 72 hours. Radiology Review:  CXR      ASSESSMENT AND PLAN:  S/P AVR and aortic root enlargement  Patient Active Problem List   Diagnosis    Type 2 diabetes mellitus without complication ([facility] [facility]    Mixed hyperlipidemia    Tobacco dependence    Obesity, Class I, BMI 30-34.9    Recurrent major depressive disorder, in partial remission (HCC)    Murmur, cardiac    Morbidly obese (HCC)    SOB (shortness of breath)    S/P PTCA (percutaneous transluminal coronary angioplasty)    Nonrheumatic aortic valve stenosis    Chronic diastolic congestive heart failure ([facility] [facility]    LVH (left ventricular hypertrophy) due to hypertensive disease, with heart failure (HCC)    Severe aortic stenosis       Mobilize. Pulmonary toilet. Diuresis.   Give additional dose lasix  Monitor rhythm and treat accordingly  Anticoagulation added with Eliquis  ARU soon

## 2021-09-20 NOTE — PROGRESS NOTES
.  Occupational Therapy Treatment Note      Name: Jazmine Jurado MRN: [id number] :   1950   Date:  2021   Admission Date: 2021 Room:  -A     Attempted visit this date. Pt in recliner upon arrival asleep. Pt reported \"I'm too tired and would like to rest. I already did enough walking and standing for today. Come back tomorrow and I'll do more. \"    Time in:  1502  Time out:  x  Timed treatment minutes: 0  Total treatment time: 0    MC Rowe,   2021, 3:12 PM

## 2021-09-20 NOTE — CARE COORDINATION
Spoke with Jeo Nolan asking if patient would be medically ready for discharge today. Per Alyssa Rodriguez patient should be ready today and she'll request rapid COVID be ordered. Patient meets criteria and is approved to come to ARU. Patient able to admit once medically stable and after ARU Medical Director and  sign the pre-admission screen (PAS), pending rapid COVID results. 1050:  COVID negative test noted.

## 2021-09-20 NOTE — FLOWSHEET NOTE
Rounding visit. Patient denied any needs at this time but appreciated  for the visit. Patient's brother was present at the time of visitSpiritual care will continue to follow up as needed.

## 2021-09-20 NOTE — PROGRESS NOTES
nursing   Safety  Patient left safely in the bed, with call light/phone in reach with alarm applied. Gait belt and mask were used for transfers and gait. Assessment / Impression:    Patient's tolerance of treatment:  Good to fair, pt is very motivated  Adverse Reaction: none  Significant change in status and impact:  none  Barriers to improvement:  Sob with activity  Plan for Next Session:    Will cont to progress ex's and gt per cardiac protocol and educate pt on sternal precautions, S & S of ex intolerance, purpose of ex's, progression of ex's and gt at home. Time in:  0900  Time out:  1023  Timed treatment minutes:  83  Total treatment time:  80  Previously filed items:  Social/Functional History  Lives With: Alone (sister and DIL plan on providing initial support)  Type of Home: Apartment  Home Layout: One level  Home Access: Level entry  Bathroom Shower/Tub: Tub/Shower unit  Bathroom Toilet: Standard  Bathroom Equipment: Grab bars in shower  ADL Assistance: [address] [place] [address] Avenue: [address] [place] [address] Responsibilities: Yes  Ambulation Assistance: Independent (uses no AD)  Transfer Assistance: Independent  Active : Yes  Occupation: Retired  Short term goals  Time Frame for [employer] term goals: 1 week  Short term goal 1: Pt will AMB x75 ft with CW, SBA, and stable vitals. Short term goal 2: Pt will perform Sup>sit with Min A and good carryover with precautions  Short term goal 3: Pt will perform x3 STS from low surface with sternal precaution and SBA. Electronically signed by:     Brooke George PTA  9/20/2021, 10:20 AM

## 2021-09-21 ENCOUNTER — HOSPITAL ENCOUNTER (INPATIENT)
Age: 71
LOS: 10 days | Discharge: HOME HEALTH | DRG: 949 | End: 2021-10-01
Attending: PHYSICAL MEDICINE & REHABILITATION | Admitting: PHYSICAL MEDICINE & REHABILITATION
Payer: MEDICARE

## 2021-09-21 VITALS
WEIGHT: 209.22 LBS | DIASTOLIC BLOOD PRESSURE: 56 MMHG | BODY MASS INDEX: 42.18 KG/M2 | OXYGEN SATURATION: 97 % | RESPIRATION RATE: 19 BRPM | HEART RATE: 87 BPM | SYSTOLIC BLOOD PRESSURE: 116 MMHG | TEMPERATURE: 98.5 F | HEIGHT: 59 IN

## 2021-09-21 DIAGNOSIS — I97.130 CHF FOLLOWING CARDIAC SURGERY, POSTOP: Primary | ICD-10-CM

## 2021-09-21 LAB
ANION GAP SERPL CALCULATED.3IONS-SCNC: 14 MMOL/L (ref 4–16)
BUN BLDV-MCNC: 30 MG/DL (ref 6–23)
CALCIUM IONIZED: 4.68 MG/DL (ref 4.48–5.28)
CALCIUM SERPL-MCNC: 8.4 MG/DL (ref 8.3–10.6)
CHLORIDE BLD-SCNC: 104 MMOL/L (ref 99–110)
CO2: 25 MMOL/L (ref 21–32)
CREAT SERPL-MCNC: 0.5 MG/DL (ref 0.6–1.1)
GFR AFRICAN AMERICAN: >60 ML/MIN/1.73M2
GFR NON-AFRICAN AMERICAN: >60 ML/MIN/1.73M2
GLUCOSE BLD-MCNC: 137 MG/DL (ref 70–99)
GLUCOSE BLD-MCNC: 172 MG/DL (ref 70–99)
GLUCOSE BLD-MCNC: 224 MG/DL (ref 70–99)
GLUCOSE BLD-MCNC: 264 MG/DL (ref 70–99)
IONIZED CA: 1.17 MMOL/L (ref 1.12–1.32)
MAGNESIUM: 2.5 MG/DL (ref 1.8–2.4)
POTASSIUM SERPL-SCNC: 4.1 MMOL/L (ref 3.5–5.1)
POTASSIUM SERPL-SCNC: 4.2 MMOL/L (ref 3.5–5.1)
SODIUM BLD-SCNC: 143 MMOL/L (ref 135–145)

## 2021-09-21 PROCEDURE — 1280000000 HC REHAB R&B

## 2021-09-21 PROCEDURE — 6370000000 HC RX 637 (ALT 250 FOR IP): Performed by: SURGERY

## 2021-09-21 PROCEDURE — 6360000002 HC RX W HCPCS: Performed by: PHYSICIAN ASSISTANT

## 2021-09-21 PROCEDURE — 83735 ASSAY OF MAGNESIUM: CPT

## 2021-09-21 PROCEDURE — 80048 BASIC METABOLIC PNL TOTAL CA: CPT

## 2021-09-21 PROCEDURE — 6370000000 HC RX 637 (ALT 250 FOR IP): Performed by: INTERNAL MEDICINE

## 2021-09-21 PROCEDURE — 6370000000 HC RX 637 (ALT 250 FOR IP): Performed by: PHYSICIAN ASSISTANT

## 2021-09-21 PROCEDURE — 97116 GAIT TRAINING THERAPY: CPT

## 2021-09-21 PROCEDURE — 2580000003 HC RX 258: Performed by: PHYSICIAN ASSISTANT

## 2021-09-21 PROCEDURE — 97110 THERAPEUTIC EXERCISES: CPT

## 2021-09-21 PROCEDURE — 82962 GLUCOSE BLOOD TEST: CPT

## 2021-09-21 PROCEDURE — 6360000002 HC RX W HCPCS: Performed by: SURGERY

## 2021-09-21 PROCEDURE — 82330 ASSAY OF CALCIUM: CPT

## 2021-09-21 PROCEDURE — 99223 1ST HOSP IP/OBS HIGH 75: CPT | Performed by: PHYSICAL MEDICINE & REHABILITATION

## 2021-09-21 PROCEDURE — 84132 ASSAY OF SERUM POTASSIUM: CPT

## 2021-09-21 PROCEDURE — 6370000000 HC RX 637 (ALT 250 FOR IP): Performed by: PHYSICAL MEDICINE & REHABILITATION

## 2021-09-21 PROCEDURE — 97530 THERAPEUTIC ACTIVITIES: CPT

## 2021-09-21 RX ORDER — AMIODARONE HYDROCHLORIDE 200 MG/1
200 TABLET ORAL DAILY
Status: CANCELLED | OUTPATIENT
Start: 2021-09-22

## 2021-09-21 RX ORDER — FUROSEMIDE 20 MG/1
20 TABLET ORAL 2 TIMES DAILY
Status: CANCELLED | OUTPATIENT
Start: 2021-09-21

## 2021-09-21 RX ORDER — GLIPIZIDE 5 MG/1
5 TABLET ORAL
Status: DISCONTINUED | OUTPATIENT
Start: 2021-09-22 | End: 2021-10-01 | Stop reason: HOSPADM

## 2021-09-21 RX ORDER — ACETAMINOPHEN 325 MG/1
650 TABLET ORAL EVERY 4 HOURS PRN
Status: CANCELLED | OUTPATIENT
Start: 2021-09-21

## 2021-09-21 RX ORDER — GLIPIZIDE 5 MG/1
5 TABLET ORAL
Status: CANCELLED | OUTPATIENT
Start: 2021-09-22

## 2021-09-21 RX ORDER — HYDROCODONE BITARTRATE AND ACETAMINOPHEN 5; 325 MG/1; MG/1
1 TABLET ORAL EVERY 4 HOURS PRN
Status: DISCONTINUED | OUTPATIENT
Start: 2021-09-21 | End: 2021-09-23

## 2021-09-21 RX ORDER — ASPIRIN 81 MG/1
81 TABLET ORAL DAILY
Status: CANCELLED | OUTPATIENT
Start: 2021-09-22

## 2021-09-21 RX ORDER — MULTIVITAMIN WITH IRON
1 TABLET ORAL
Status: CANCELLED | OUTPATIENT
Start: 2021-09-22

## 2021-09-21 RX ORDER — ACETAMINOPHEN 325 MG/1
650 TABLET ORAL EVERY 4 HOURS PRN
Status: DISCONTINUED | OUTPATIENT
Start: 2021-09-21 | End: 2021-09-28

## 2021-09-21 RX ORDER — BISACODYL 10 MG
10 SUPPOSITORY, RECTAL RECTAL DAILY PRN
Status: DISCONTINUED | OUTPATIENT
Start: 2021-09-21 | End: 2021-10-01 | Stop reason: HOSPADM

## 2021-09-21 RX ORDER — CARVEDILOL 6.25 MG/1
3.12 TABLET ORAL 2 TIMES DAILY
Status: DISCONTINUED | OUTPATIENT
Start: 2021-09-21 | End: 2021-10-01 | Stop reason: HOSPADM

## 2021-09-21 RX ORDER — PANTOPRAZOLE SODIUM 40 MG/1
40 TABLET, DELAYED RELEASE ORAL DAILY
Status: DISCONTINUED | OUTPATIENT
Start: 2021-09-22 | End: 2021-10-01 | Stop reason: HOSPADM

## 2021-09-21 RX ORDER — ALOGLIPTIN 12.5 MG/1
6.25 TABLET, FILM COATED ORAL DAILY
Status: CANCELLED | OUTPATIENT
Start: 2021-09-22

## 2021-09-21 RX ORDER — PANTOPRAZOLE SODIUM 40 MG/1
40 TABLET, DELAYED RELEASE ORAL DAILY
Status: CANCELLED | OUTPATIENT
Start: 2021-09-22

## 2021-09-21 RX ORDER — POLYETHYLENE GLYCOL 3350 17 G/17G
17 POWDER, FOR SOLUTION ORAL DAILY PRN
Status: DISCONTINUED | OUTPATIENT
Start: 2021-09-21 | End: 2021-10-01 | Stop reason: HOSPADM

## 2021-09-21 RX ORDER — ACETAMINOPHEN 325 MG/1
650 TABLET ORAL EVERY 4 HOURS PRN
Status: DISCONTINUED | OUTPATIENT
Start: 2021-09-21 | End: 2021-09-21

## 2021-09-21 RX ORDER — MULTIVITAMIN WITH IRON
1 TABLET ORAL
Status: DISCONTINUED | OUTPATIENT
Start: 2021-09-22 | End: 2021-10-01 | Stop reason: HOSPADM

## 2021-09-21 RX ORDER — BISACODYL 10 MG
10 SUPPOSITORY, RECTAL RECTAL DAILY PRN
Status: CANCELLED | OUTPATIENT
Start: 2021-09-21

## 2021-09-21 RX ORDER — FUROSEMIDE 20 MG/1
20 TABLET ORAL 2 TIMES DAILY
Status: DISCONTINUED | OUTPATIENT
Start: 2021-09-21 | End: 2021-10-01 | Stop reason: HOSPADM

## 2021-09-21 RX ORDER — AMIODARONE HYDROCHLORIDE 200 MG/1
200 TABLET ORAL DAILY
Status: DISCONTINUED | OUTPATIENT
Start: 2021-09-22 | End: 2021-10-01 | Stop reason: HOSPADM

## 2021-09-21 RX ORDER — ASPIRIN 81 MG/1
81 TABLET ORAL DAILY
Status: DISCONTINUED | OUTPATIENT
Start: 2021-09-22 | End: 2021-10-01 | Stop reason: HOSPADM

## 2021-09-21 RX ORDER — ALOGLIPTIN 12.5 MG/1
6.25 TABLET, FILM COATED ORAL DAILY
Status: DISCONTINUED | OUTPATIENT
Start: 2021-09-22 | End: 2021-10-01 | Stop reason: HOSPADM

## 2021-09-21 RX ORDER — CARVEDILOL 3.12 MG/1
3.12 TABLET ORAL 2 TIMES DAILY
Status: CANCELLED | OUTPATIENT
Start: 2021-09-21

## 2021-09-21 RX ADMIN — CARVEDILOL 3.12 MG: 6.25 TABLET, FILM COATED ORAL at 20:18

## 2021-09-21 RX ADMIN — FUROSEMIDE 20 MG: 20 TABLET ORAL at 17:08

## 2021-09-21 RX ADMIN — SODIUM CHLORIDE, PRESERVATIVE FREE 10 ML: 5 INJECTION INTRAVENOUS at 09:31

## 2021-09-21 RX ADMIN — THERA TABS 1 TABLET: TAB at 09:31

## 2021-09-21 RX ADMIN — INSULIN LISPRO 6 UNITS: 100 INJECTION, SOLUTION INTRAVENOUS; SUBCUTANEOUS at 12:28

## 2021-09-21 RX ADMIN — ASPIRIN 81 MG: 81 TABLET, COATED ORAL at 09:30

## 2021-09-21 RX ADMIN — HYDROCODONE BITARTRATE AND ACETAMINOPHEN 1 TABLET: 5; 325 TABLET ORAL at 20:18

## 2021-09-21 RX ADMIN — PANTOPRAZOLE SODIUM 40 MG: 40 TABLET, DELAYED RELEASE ORAL at 09:31

## 2021-09-21 RX ADMIN — CALCIUM GLUCONATE 2000 MG: 20 INJECTION, SOLUTION INTRAVENOUS at 10:18

## 2021-09-21 RX ADMIN — GLIPIZIDE 5 MG: 5 TABLET ORAL at 09:31

## 2021-09-21 RX ADMIN — ALOGLIPTIN 6.25 MG: 12.5 TABLET, FILM COATED ORAL at 09:30

## 2021-09-21 RX ADMIN — FUROSEMIDE 20 MG: 10 INJECTION, SOLUTION INTRAMUSCULAR; INTRAVENOUS at 09:30

## 2021-09-21 RX ADMIN — SENNOSIDES AND DOCUSATE SODIUM 1 TABLET: 50; 8.6 TABLET ORAL at 09:30

## 2021-09-21 RX ADMIN — APIXABAN 5 MG: 5 TABLET, FILM COATED ORAL at 09:30

## 2021-09-21 RX ADMIN — AMIODARONE HYDROCHLORIDE 200 MG: 200 TABLET ORAL at 09:30

## 2021-09-21 RX ADMIN — CARVEDILOL 3.12 MG: 3.12 TABLET, FILM COATED ORAL at 09:30

## 2021-09-21 RX ADMIN — APIXABAN 5 MG: 5 TABLET, FILM COATED ORAL at 20:19

## 2021-09-21 RX ADMIN — INSULIN LISPRO 2 UNITS: 100 INJECTION, SOLUTION INTRAVENOUS; SUBCUTANEOUS at 09:32

## 2021-09-21 ASSESSMENT — PAIN SCALES - GENERAL
PAINLEVEL_OUTOF10: 0
PAINLEVEL_OUTOF10: 6
PAINLEVEL_OUTOF10: 0
PAINLEVEL_OUTOF10: 7

## 2021-09-21 ASSESSMENT — PAIN DESCRIPTION - PAIN TYPE
TYPE: ACUTE PAIN;SURGICAL PAIN
TYPE: ACUTE PAIN;SURGICAL PAIN

## 2021-09-21 ASSESSMENT — PAIN DESCRIPTION - ORIENTATION
ORIENTATION: MID
ORIENTATION: MID

## 2021-09-21 ASSESSMENT — PAIN DESCRIPTION - LOCATION
LOCATION: CHEST
LOCATION: CHEST

## 2021-09-21 ASSESSMENT — PAIN DESCRIPTION - ONSET: ONSET: ON-GOING

## 2021-09-21 ASSESSMENT — PAIN DESCRIPTION - DESCRIPTORS: DESCRIPTORS: DISCOMFORT

## 2021-09-21 ASSESSMENT — PAIN DESCRIPTION - FREQUENCY: FREQUENCY: INTERMITTENT

## 2021-09-21 NOTE — PROGRESS NOTES
Daily Progress Note    Doing ok, making a slow steady progress  She is in atrial flutter rate stable  S/p AVR  On AC   Supportive care, plan to go to rehab  Platelet are stable     Pt. Awake, alert and feeling ok  HR stable, AF in the 80s on exam, BP stable  No CP, still with CARRANZA     AV repair POD# 5    Bioprosthetic valve    CT surgery following    Worked with PT and OT again today    CT out    Per CTS     PAF    Episodes post op    When in Afib rate well controlled    Continue Amio and coreg    Eliquis started     HTN    BP Stable     Increase activity-possibly going to ARU soon  Will follow      Echo 21 Summary   Left ventricular systolic function is normal.   Ejection fraction is visually estimated at 50-55%.   Moderate left ventricular hypertrophy.   Indeterminate diastolic function; E/A flow reversal is noted.   Heavily calcified aortic valve with severe aortic stenosis; mean P   mmHG. BRITTNEE: 0.57 cm2. DVI: 0.2.   No evidence of any pericardial effusion.     Heart cath 21 IMPRESSION:  1.  Moderately elevated right heart pressure present.  PA mean is around  23, RA is around 11.  2.  Left main is patent. 3.  The circ has mild disease noted. 4.  LAD has mild disease present. 5.  RCA has mild disease noted. 6.  The patient has severe aortic stenosis noted.     PAST MEDICAL HISTORY:  Severe aortic stenosis present.  Status post  aortic valve replacement with aortic root dilatation.  History of  hypertension, hyperlipidemia, arthritis present.     PAST SURGICAL HISTORY:  Breast biopsy done. Lurena Maroa repair.  Now status  post aortic valve replacement with a bioprosthetic valve.     SOCIAL HISTORY:  Quit smoking long time ago.  No alcohol use.     ALLERGIES:  CODEINE.     MEDICATIONS:  At home, she was on lisinopril 2.5 mg a day, Lasix 20 mg  b.i.d., Januvia, Pravachol, and insulin.       Objective:   /63   Pulse 73   Temp 98.3 °F (36.8 °C) (Oral)   Resp 22   Ht 4' 11\" (1.499 m)   Wt 209 lb 3.5 oz (94.9 kg)   LMP 07/09/2000   SpO2 90%   BMI 42.26 kg/m²     Intake/Output Summary (Last 24 hours) at 9/21/2021 9956  Last data filed at 9/21/2021 0600  Gross per 24 hour   Intake 120 ml   Output --   Net 120 ml       Medications:   Scheduled Meds:   glipiZIDE  5 mg Oral QAM AC    insulin lispro  0-12 Units SubCUTAneous TID WC    insulin lispro  0-6 Units SubCUTAneous Nightly    apixaban  5 mg Oral BID    alogliptin  6.25 mg Oral Daily    furosemide  20 mg IntraVENous BID    sodium chloride flush  10 mL IntraVENous 2 times per day    aspirin  81 mg Oral Daily    multivitamin  1 tablet Oral Daily with breakfast    sennosides-docusate sodium  1 tablet Oral Daily    carvedilol  3.125 mg Oral BID    pantoprazole  40 mg Oral Daily    amiodarone  200 mg Oral Daily      Infusions:   sodium chloride      norepinephrine      EPINEPHrine Stopped (09/17/21 0400)    nitroGLYCERIN Stopped (09/16/21 1638)    dextrose        PRN Meds:  benzocaine-menthol, ipratropium, albuterol sulfate HFA, ondansetron, sodium chloride flush, sodium chloride, acetaminophen, HYDROcodone 5 mg - acetaminophen **OR** HYDROcodone 5 mg - acetaminophen, ketorolac, hydrALAZINE, metoprolol, bisacodyl, fleet, potassium chloride, magnesium sulfate, calcium gluconate, albumin human, norepinephrine, EPINEPHrine, nitroGLYCERIN, glucose, dextrose, glucagon (rDNA), dextrose       Physical Exam:  Vitals:    09/21/21 0700   BP: 123/63   Pulse: 73   Resp: 22   Temp:    SpO2: 90%        General: AAO, NAD  Chest: Nontender  Cardiac: First and Second Heart Sounds are Normal, No Murmurs or Gallops noted  Lungs:Clear to auscultation and percussion. Abdomen: Soft, NT, ND, +BS  Extremities: No clubbing, no edema  Vascular:  Equal 2+ peripheral pulses.         Lab Data:  CBC:   Recent Labs     09/19/21  0520 09/20/21  0625   WBC 19.8* 13.9*   HGB 9.4* 9.0*   HCT 28.9* 27.2*   MCV 86.5 86.3   PLT 65* 113*     BMP:   Recent Labs     09/19/21  0520 09/19/21  0520 09/20/21  0625 09/21/21  0035 09/21/21  0630     --  139  --  143   K 4.7   < > 4.6 4.1 4.2     --  104  --  104   CO2 21  --  22  --  25   BUN 39*  --  42*  --  30*   CREATININE 0.8  --  0.8  --  0.5*    < > = values in this interval not displayed. LIVER PROFILE: No results for input(s): AST, ALT, LIPASE, BILIDIR, BILITOT, ALKPHOS in the last 72 hours. Invalid input(s): AMYLASE,  ALB  PT/INR: No results for input(s): PROTIME, INR in the last 72 hours. APTT: No results for input(s): APTT in the last 72 hours. BNP:  No results for input(s): BNP in the last 72 hours.       Assessment:  Patient Active Problem List    Diagnosis Date Noted    Severe aortic stenosis 09/16/2021    Nonrheumatic aortic valve stenosis 06/18/2021    Chronic diastolic congestive heart failure ([address] 06/18/2021    LVH (left ventricular hypertrophy) due to hypertensive disease, with heart failure (HCC) 06/18/2021    SOB (shortness of breath) 06/08/2021    S/P PTCA (percutaneous transluminal coronary angioplasty) 06/08/2021    Morbidly obese ([address] 08/12/2020    Murmur, cardiac 10/25/2016    Recurrent major depressive disorder, in partial remission ([facility] 08/17/2016    Obesity, Class I, BMI 30-34.9 05/26/2016    Type 2 diabetes mellitus without complication ([address] 84/39/8608    Mixed hyperlipidemia 05/12/2016    Tobacco dependence 05/12/2016       Electronically signed by Michelle Quinonez PA-C on 9/21/2021 at 9:14 AM    Electronically signed by Hanna Gaspar MD on 9/21/21 at 12:36 PM EDT

## 2021-09-21 NOTE — PROGRESS NOTES
Progress Note( Dr. Sandy Dotson)  9/20/2021  Subjective:   Admit Date: 9/16/2021  PCP: Cezar Foy MD    Admitted For :Severe aortic  Stenosis// aortic valve repairs/replacement done on 9/16/2021  Aortic valve replacement with [employer] Mosaic bioprosthetic valve    Consulted For: Better control of blood glucose    Interval History: Postop day 4    Denies any chest pains,   Has SOB . Denies nausea or vomiting. No new bowel or bladder symptoms. Intake/Output Summary (Last 24 hours) at 9/20/2021 2316  Last data filed at 9/20/2021 0630  Gross per 24 hour   Intake 120 ml   Output 340 ml   Net -220 ml       DATA    CBC:   Recent Labs     09/18/21  0505 09/19/21  0520 09/20/21  0625   WBC 19.8* 19.8* 13.9*   HGB 9.7* 9.4* 9.0*   PLT 71* 65* 113*    CMP:  Recent Labs     09/18/21  0505 09/19/21  0520 09/20/21  0625    136 139   K 5.0 4.7 4.6    103 104   CO2 20* 21 22   BUN 27* 39* 42*   CREATININE 1.2* 0.8 0.8   CALCIUM 8.0* 8.2* 8.2*     Lipids:   Lab Results   Component Value Date    CHOL 256 06/08/2021    CHOL 309 03/17/2021    HDL 65 06/08/2021    TRIG 104 06/08/2021     Glucose:  Recent Labs     09/20/21  1153 09/20/21  1728 09/20/21  2232   POCGLU 224* 114* 222*     [id number]:  Lab Results   Component Value Date    LABA1C 7.4 09/09/2021     High Sensitivity TSH:   Lab Results   Component Value Date    TSHHS 1.370 06/08/2021     Free T3: No results found for: FT3  Free T4:  Lab Results   Component Value Date    T4FREE 1.34 06/08/2021       XR CHEST (2 VW)    Result Date: 9/20/2021  EXAMINATION: TWO XRAY VIEWS OF THE CHEST 9/20/2021 5:03 pm COMPARISON: Chest radiograph 09/18/2021. HISTORY: ORDERING SYSTEM PROVIDED HISTORY: p/o cabg     1. Small right and moderate left pleural effusions. 2. Shallow inspiration with mild bibasilar opacities compatible with atelectasis. Aspiration and pneumonia are not excluded.      XR CHEST PORTABLE    Result Date: 9/18/2021  EXAMINATION: ONE XRAY VIEW OF THE CHEST 9/18/2021 6:23 am COMPARISON: Chest radiograph 09/17/2021 HISTORY: ORDERING SYSTEM PROVIDED HISTORY: Post op open heart surgery    Slight interval increase in basilar predominant airspace opacities, favored to represent mildly increased interstitial pulmonary edema. Similar central pulmonary vascular congestion. Small left pleural effusion has increased slightly in size. Scheduled Medicines   Medications:    glipiZIDE  5 mg Oral QAM AC    insulin lispro  0-12 Units SubCUTAneous TID WC    insulin lispro  0-6 Units SubCUTAneous Nightly    apixaban  5 mg Oral BID    alogliptin  6.25 mg Oral Daily    furosemide  20 mg IntraVENous BID    sodium chloride flush  10 mL IntraVENous 2 times per day    aspirin  81 mg Oral Daily    mupirocin   Nasal BID    multivitamin  1 tablet Oral Daily with breakfast    sennosides-docusate sodium  1 tablet Oral Daily    carvedilol  3.125 mg Oral BID    pantoprazole  40 mg Oral Daily    amiodarone  200 mg Oral Daily      Infusions:    sodium chloride      norepinephrine      EPINEPHrine Stopped (09/17/21 0400)    nitroGLYCERIN Stopped (09/16/21 1638)    dextrose           Objective:   Vitals: /84   Pulse 87   Temp 99.1 °F (37.3 °C) (Oral)   Resp 29   Ht 4' 11\" (1.499 m)   Wt 219 lb 9.3 oz (99.6 kg)   LMP 07/09/2000   SpO2 91%   BMI 44.35 kg/m²   General appearance: alert and cooperative with exam  Neck: no JVD or bruit  Thyroid : Normal lobes   Lungs: Has Vesicular Breath sounds and wheezing and basilar rales  Heart:  regular rate and rhythm  Abdomen: soft, non-tender; bowel sounds normal; no masses,  no organomegaly  Musculoskeletal: Normal  Extremities: extremities normal, , no edema  Neurologic:  Awake, alert, oriented to name, place and time. Cranial nerves II-XII are grossly intact. Motor is  intact. Sensory stable neuropathy. ,  and gait is normal.    Assessment:     Patient Active Problem List:     Type 2 diabetes mellitus without

## 2021-09-21 NOTE — FLOWSHEET NOTE
Mid sternal dressing removed--mid sternal incision well approximated--cleansed with betadine--chest tube site sutures x 3 remain in until discharge from ARU

## 2021-09-21 NOTE — DISCHARGE SUMMARY
Discharge Summary     Patient ID  Melanie Oliver   1950  [id number]      Primary Care Doctor:  Saad Green MD    Admit date: 9/16/2021   Discharge date: 9/21/2021      Admitting Physician: Kenan Caldwell MD   Discharge Physician: Kenan Caldwell MD MD    Discharge Diagnoses: Active Hospital Problems    Diagnosis Date Noted    Severe aortic stenosis [I35.0] 09/16/2021     Discharged Condition: stable. Hospital Course:  Upon admission underwent surgery of AVR after discussion and preparation. Tolerated surgery well   Post op ; monitored rhythm, O2 sat, I/O, Labs, CXRs serially  Neuro status , BP and vital signs monitored  Started on diet and activity.   Uneventful recovery  At discharge, pt ambulating  Tolerating diet  Wounds clean  Had Bm  Lungs clear   NSR  VS at discharge   Vitals:    09/21/21 1220   BP: 124/83   Pulse: 83   Resp: 30   Temp: 98.5 °F (36.9 °C)   SpO2:        Disposition: ARU    Patient Instructions:      Medication List      ASK your doctor about these medications    blood glucose test strips  TIDAC and QHS     furosemide 20 MG tablet  Commonly known as: LASIX  Take 1 tablet by mouth 2 times daily     Insulin Pen Needle 30G X 8 MM Misc  1 each by Does not apply route daily     Lancets Misc  TIDAC & QHS     Levemir FlexTouch 100 UNIT/ML injection pen  Generic drug: insulin detemir  INJECT 35 UNITS UNDER THE SKIN EVERY NIGHT     lisinopril 5 MG tablet  Commonly known as: PRINIVIL;ZESTRIL  Take 0.5 tablets by mouth daily     pravastatin 80 MG tablet  Commonly known as: PRAVACHOL  Take 1 tablet by mouth daily     SITagliptin 100 MG tablet  Commonly known as: JANUVIA  Take 1 tablet by mouth daily          Activity: activity as tolerated and no lifting, Driving, or Strenuous exercise until seen by physician in office  Diet: cardiac diet  Wound Care: as directed    Follow-up as directed at discharge    Signed: Kenan Caldwell MD    Time spent on discharge 35 minutes

## 2021-09-21 NOTE — FLOWSHEET NOTE
Patient discharged to ARU per wheelchair and all belongings--patient verbalizes understanding of discharge instructions including meds, activity and follow up appointments.

## 2021-09-21 NOTE — H&P
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 History of present illness: The patient is a 69-year-old right-hand-dominant female with progressive activity intolerance and shortness of breath. Her outpatient evaluation identified cardiac valvular disease. On 2021 she underwent an aortic valve replacement with a bioprosthetic valve by Dr. Kiana Lee. Perioperatively she has suffered a drop in hemoglobin, atrial flutter and hypotension. She required medication adjustments, fluid resuscitation and aggressive pulmonary hygiene measures. Her blood sugar has also fluctuated significantly. She has become deconditioned and unable to do her own toileting, transfers and self-care following the sternal precautions. She requires inpatient rehabilitation to address these issues. Review of systems: Anterior chest wall pain as expected. Some dyspnea with activities. Dizziness with position changes. Infrequent bowel movements. No dysuria. Occasional cough. Fair appetite. Fair sleep. The remainder of their review of systems was negative except as mentioned in the history of present illness.     Social History: Lives With: Alone (sister and DIL plan on providing initial support)  Type of Home: Apartment  Home Layout: One level  Home Access: Level entry  Bathroom Shower/Tub: Tub/Shower unit  Bathroom Toilet: Standard  Bathroom Equipment: Grab bars in shower  ADL Assistance: Independent  Homemaking Assistance: Independent  Homemaking Responsibilities: Yes  Ambulation Assistance: Independent (TYLENOL) tablet 650 mg, 650 mg, Oral, Q4H PRN, Richy Tim MD    [START ON 9/22/2021] aspirin EC tablet 81 mg, 81 mg, Oral, Daily, Richy Tim MD    bisacodyl (DULCOLAX) suppository 10 mg, 10 mg, Rectal, Daily PRN, Richy Tim MD    carvedilol (COREG) tablet 3.125 mg, 3.125 mg, Oral, BID, MD Janet Bauman  [START ON 9/22/2021] multivitamin 1 tablet, 1 tablet, Oral, Daily with breakfast, MD Gilma Baumanl Evon Andino ON 9/22/2021] pantoprazole (PROTONIX) tablet 40 mg, 40 mg, Oral, Daily, MD Janet Bauman  [START ON 9/22/2021] alogliptin (NESINA) tablet 6.25 mg, 6.25 mg, Oral, Daily, MD Janet Bauman  [START ON 9/22/2021] amiodarone (CORDARONE) tablet 200 mg, 200 mg, Oral, Daily, Richy Tim MD    apixaban (ELIQUIS) tablet 5 mg, 5 mg, Oral, BID, Richy Tim MD    benzocaine-menthol (CEPACOL SORE THROAT) lozenge 1 lozenge, 1 lozenge, Oral, Q2H PRN, Richy Tim MD    furosemide (LASIX) tablet 20 mg, 20 mg, Oral, BID, Richy Tim MD  [facility] Evon  Maisabel Andino ON 9/22/2021] glipiZIDE (GLUCOTROL) tablet 5 mg, 5 mg, Oral, QAM AC, Richy Tim MD    insulin lispro (HUMALOG) injection vial 0-12 Units, 0-12 Units, SubCUTAneous, TID WC, Richy Tim MD    insulin lispro (HUMALOG) injection vial 0-6 Units, 0-6 Units, SubCUTAneous, Nightly, Richy Tim MD    acetaminophen (TYLENOL) tablet 650 mg, 650 mg, Oral, Q4H PRN, GREGORIO Trinh MD    polyethylene glycol [facility]) packet 17 g, 17 g, Oral, Daily PRN, C Abhijit Trinh MD    magnesium hydroxide (MILK OF MAGNESIA) 400 MG/5ML suspension 30 mL, 30 mL, Oral, Daily PRN, C Abhijit Trinh MD    Family History:   Family History   Problem Relation Age of Onset    Diabetes Mother     Heart Disease Mother     High Blood Pressure Father     Diabetes Sister     High Blood Pressure Sister     Stroke Sister     Breast Cancer Sister 58    Cancer Brother         Lung    High Blood Pressure Brother     Stroke Brother     Diabetes Brother     Diabetes Maternal Grandmother     Heart Disease Maternal Grandfather     Cancer Brother         Lung (smoker)    Breast Cancer Maternal Aunt        Exam:    Blood pressure 128/69, pulse 76, temperature 98.8 °F (37.1 °C), temperature source Oral, resp. rate 18, height 4' 11\" (1.499 m), weight 209 lb (94.8 kg), last menstrual period 07/09/2000, SpO2 92 %, not currently breastfeeding. General: Observed semiupright in bed. Alert. Able to participate in conversation with soft-spoken voice. In no distress. HEENT: Full visual fields. No signs of trauma to her head or neck. No adenopathy or JVD.  MMM. Soft-spoken. Pulmonary: Blunted breath sounds inferiorly. No wheezes or rales. Cardiac: Occasional premature beat. Rate is controlled. Sternal incision is well-healed but tender. Abdomen: Patient's abdomen was soft and nondistended. Bowel sounds were present throughout. There was no rebound, guarding or masses noted. Spinal exam: Moist skin without open areas. Guarded trunk rotation. Upper extremities: Slow and deliberate with bringing her hands to the top of her head. Fair  strength with normal tone. Diminished sensation about the fingers. Lower extremities: 1+ edema about the ankles and calves. No reflexes. Blunted sensation in the feet.  4 -/5 strength across the knees and ankles. Sitting balance was fair+. Standing balance was poor.     Lab Results   Component Value Date    WBC 13.9 (H) 09/20/2021    HGB 9.0 (L) 09/20/2021    HCT 27.2 (L) 09/20/2021    MCV 86.3 09/20/2021     (L) 09/20/2021     Lab Results   Component Value Date    INR 2.07 09/16/2021    INR 0.88 09/09/2021    INR 0.92 07/09/2021    PROTIME 26.9 (H) 09/16/2021    PROTIME 11.4 (L) 09/09/2021    PROTIME 11.9 07/09/2021     Lab Results   Component Value Date    CREATININE 0.5 (L) 09/21/2021    BUN 30 (H) 09/21/2021     09/21/2021    K 4.2 09/21/2021     09/21/2021    CO2 25 09/21/2021     Lab Results   Component Value Date    ALT 15 06/07/2021    AST 16 06/07/2021    ALKPHOS 92 06/07/2021    BILITOT 0.3 06/07/2021         Impression: 80-year-old female with diabetes, hypertension and cardiac valve disease who is suffered hypotension, atrial flutter and dizziness following her open heart surgery with aortic valve replacement. Strengths for the patient: Independent habits prior to admission, alertness and accessible home. Limitations/barriers for the patient: Her age, the sternal precautions and the poorly controlled nature of her pain. Recommendation: Acute inpatient rehabilitation with occupational and physical therapy 180 minutes 5 out of every 7 days. Will address basic and  advancing mobility with self-care instruction and adaptive equipment training. Caregiver education will be offered. Expected length of stay  prior to a supervised level of function for discharge home with a walker and C OT/PT is 2 weeks. Additional recommendation:    1. CHF after aortic valve replacement: The patient requires daily occupational and physical therapy. We must instruct her on techniques for self-care and mobility following sternal precautions. Daily weights will be monitored closely. She requires aggressive pulmonary hygiene measures, DVT prophylaxis and pain management. Bowel and bladder retraining. Maximizing treatment of her blood sugar and blood pressure and heart rhythm. 2. DVT prophylaxis: The Eliquis she requires for her atrial flutter will be protective against new DVT. I must monitor her hemoglobin periodically while on this medication. Weightbearing activities will be pursued daily. GI prophylaxis offered. 3. Atrial flutter: Cordarone and Coreg for rate control. Lasix to avoid deterioration of her CHF. Daily weights.   Graduated increases in physical activity to build tolerance. 4. Uncontrolled diabetes type 2 with peripheral neuropathy: Patient requires a diet modified for carbohydrates. She is on Nesina and Glucotrol with sliding scale Humalog. Blood sugars are checked at mealtime and bedtime. 5. Hypertension: Coreg and Lasix are used to control her systolic blood pressure. Vital signs are checked at rest and with activity. Target systolic blood pressure is 120-140.  6. Uncontrolled pain: Sternal precautions, cryotherapy, protected coughing and oral analgesics. Bowel intervention while on the narcotic. I personally performed a history and physical on this patient within 24 hours of admission to the rehab unit. I have reviewed the preadmission screening and concur with its findings without change. A detailed plan of care will be established by hospital day 4 and I attest the patient is appropriate for inpatient rehabilitation at this time. I have compared the patient's current functional status noted during my history and physical with that of the preadmission screen and I have found no significant differences.

## 2021-09-21 NOTE — PROGRESS NOTES
Physical Therapy    Physical Therapy Treatment Note  Name: Lydia Marrero MRN: [id number] :   1950   Date:  2021   Admission Date: 2021 Room:  [address] [place], [place] [address]   Restrictions/Precautions:        Sternal, no pushing, pulling or lifting more than 5 pounds for the first 2 weeks and then 10 pounds up to 3 months,   Arms are to support chest when changing position, coughing or sneezing. No lifting arms above 90 degrees except with the ex's  Communication with other providers:  Latoya Barrios RN states pt is ok to see for therapy  Subjective:  Patient states:  She feels better than yesterday  Pain:   Location, Type, Intensity (0/10 to 10/10):  1//10 chest  Objective:    Observation:  Pt was resting in the chair  Treatment, including education/measures:  Vital Signs  HR: 89, B/P 110/71, O2 97% on room air  Education:  Pt was instructed in Sternal Precautions, Purpose of Exercise Program, Rachel Scale and Signs and Symptoms of Exercise Intolerance per Cardiac Protocol  Pt was instructed in Intermediate Cardiac ex program:sitting  Overhead Side Stretch x 10  Ankle Pumps/ Heel Raises x 10  Marching Seated x 10  Forward Arm Raises x 10  Side Arm Raises x 10  Arm Crosses x 10  Arm Circles Forwards and Backwards x 10  SittingTrunkTwist x 10  Transfers with line management of tele  Nursing reports min to mod A to trans sit<>supine  Scooting :SBA and VC's for sternal precautions  Sit to stand :SBA and VC's for sternal precautions from chair, CGA from toilet  Stand to sit :SBA and VC's for sternal precautions to chair and toilet  Pt needed assist with personal care  Gait 1:  Pt amb with no AD for 10 ft and 15 ft with min A of 1  Pt needed VC's for PLB, pathway  Gait 2  Pt amb with CW for 110 ft with CGA  Pt needed VC's for pathway, PLB and posture  Safety  Patient left safely in the chair with her breakfast in front of her, with call light/phone in reach with alarm applied.  Gait belt and mask were used for transfers and gait.  Assessment / Impression:    Patient's tolerance of treatment:  Good, O2 sats were steady, needs constant cues to smell into her nose, pt wants to guppy breathe   Adverse Reaction: none  Significant change in status and impact:  none  Barriers to improvement:  mouth breathing, endurance  Plan for Next Session:    Will cont to progress ex's and gt per cardiac protocol and educate pt on sternal precautions, S & S of ex intolerance, purpose of ex's, progression of ex's and gt at home. Time in:  0825  Time out:  0934  Timed treatment minutes:  69  Total treatment time:  71  Previously filed items:  Social/Functional History  Lives With: Alone (sister and DIL plan on providing initial support)  Type of Home: Apartment  Home Layout: One level  Home Access: Level entry  Bathroom Shower/Tub: Tub/Shower unit  Bathroom Toilet: Standard  Bathroom Equipment: Grab bars in shower  ADL Assistance: [address] [place] [address] Avenue: [address] [place] [address] Responsibilities: Yes  Ambulation Assistance: Independent (uses no AD)  Transfer Assistance: Independent  Active : Yes  Occupation: Retired  Short term goals  Time Frame for Johnnyolee Loveless term goals: 1 week  Short term goal 1: Pt will AMB x75 ft with CW, SBA, and stable vitals. Short term goal 2: Pt will perform Sup>sit with Min A and good carryover with precautions  Short term goal 3: Pt will perform x3 STS from low surface with sternal precaution and SBA. Electronically signed by:     Trena Hernández PTA  9/21/2021, 9:23 AM

## 2021-09-21 NOTE — CARE COORDINATION
Patient did not admit to ARU yesterday. Unable to hold bed for patient today. Notified Lety Polo. After ARU care conference this afternoon will know when bed will be available for patient. Will keep LSW informed. 1345:  ARU had a bed open up. Notified Lety Polo. Per MD patient medically ready for discharge to ARU. Notified ARU charge nurse of admission.

## 2021-09-22 LAB
ABO/RH: NORMAL
ANTIBODY SCREEN: NEGATIVE
COMMENT: NORMAL
COMPONENT: NORMAL
CROSSMATCH RESULT: NORMAL
EKG ATRIAL RATE: 80 BPM
EKG DIAGNOSIS: NORMAL
EKG P AXIS: 51 DEGREES
EKG P-R INTERVAL: 212 MS
EKG Q-T INTERVAL: 380 MS
EKG QRS DURATION: 72 MS
EKG QTC CALCULATION (BAZETT): 438 MS
EKG R AXIS: 32 DEGREES
EKG T AXIS: 148 DEGREES
EKG VENTRICULAR RATE: 80 BPM
GLUCOSE BLD-MCNC: 156 MG/DL (ref 70–99)
GLUCOSE BLD-MCNC: 173 MG/DL (ref 70–99)
GLUCOSE BLD-MCNC: 188 MG/DL (ref 70–99)
GLUCOSE BLD-MCNC: 217 MG/DL (ref 70–99)
STATUS: NORMAL
THERMAL AMPLITUDE STUDY: NORMAL
TRANSFUSION STATUS: NORMAL
UNIT DIVISION: 0
UNIT NUMBER: NORMAL

## 2021-09-22 PROCEDURE — 97162 PT EVAL MOD COMPLEX 30 MIN: CPT

## 2021-09-22 PROCEDURE — 97542 WHEELCHAIR MNGMENT TRAINING: CPT

## 2021-09-22 PROCEDURE — 93010 ELECTROCARDIOGRAM REPORT: CPT | Performed by: INTERNAL MEDICINE

## 2021-09-22 PROCEDURE — 97166 OT EVAL MOD COMPLEX 45 MIN: CPT

## 2021-09-22 PROCEDURE — 97535 SELF CARE MNGMENT TRAINING: CPT

## 2021-09-22 PROCEDURE — 6370000000 HC RX 637 (ALT 250 FOR IP): Performed by: PHYSICAL MEDICINE & REHABILITATION

## 2021-09-22 PROCEDURE — 6370000000 HC RX 637 (ALT 250 FOR IP): Performed by: SURGERY

## 2021-09-22 PROCEDURE — 1280000000 HC REHAB R&B

## 2021-09-22 PROCEDURE — 94150 VITAL CAPACITY TEST: CPT

## 2021-09-22 PROCEDURE — 82962 GLUCOSE BLOOD TEST: CPT

## 2021-09-22 PROCEDURE — 99232 SBSQ HOSP IP/OBS MODERATE 35: CPT | Performed by: PHYSICAL MEDICINE & REHABILITATION

## 2021-09-22 PROCEDURE — 94761 N-INVAS EAR/PLS OXIMETRY MLT: CPT

## 2021-09-22 PROCEDURE — 97116 GAIT TRAINING THERAPY: CPT

## 2021-09-22 PROCEDURE — 2700000000 HC OXYGEN THERAPY PER DAY

## 2021-09-22 PROCEDURE — 97530 THERAPEUTIC ACTIVITIES: CPT

## 2021-09-22 RX ADMIN — FUROSEMIDE 20 MG: 20 TABLET ORAL at 07:46

## 2021-09-22 RX ADMIN — CARVEDILOL 3.12 MG: 6.25 TABLET, FILM COATED ORAL at 07:45

## 2021-09-22 RX ADMIN — HYDROCODONE BITARTRATE AND ACETAMINOPHEN 1 TABLET: 5; 325 TABLET ORAL at 20:26

## 2021-09-22 RX ADMIN — INSULIN LISPRO 2 UNITS: 100 INJECTION, SOLUTION INTRAVENOUS; SUBCUTANEOUS at 12:36

## 2021-09-22 RX ADMIN — FUROSEMIDE 20 MG: 20 TABLET ORAL at 17:26

## 2021-09-22 RX ADMIN — CARVEDILOL 3.12 MG: 6.25 TABLET, FILM COATED ORAL at 20:29

## 2021-09-22 RX ADMIN — INSULIN LISPRO 2 UNITS: 100 INJECTION, SOLUTION INTRAVENOUS; SUBCUTANEOUS at 09:02

## 2021-09-22 RX ADMIN — GLIPIZIDE 5 MG: 5 TABLET ORAL at 07:46

## 2021-09-22 RX ADMIN — HYDROCODONE BITARTRATE AND ACETAMINOPHEN 1 TABLET: 5; 325 TABLET ORAL at 05:58

## 2021-09-22 RX ADMIN — ALOGLIPTIN 6.25 MG: 12.5 TABLET, FILM COATED ORAL at 07:45

## 2021-09-22 RX ADMIN — ASPIRIN 81 MG: 81 TABLET, COATED ORAL at 07:46

## 2021-09-22 RX ADMIN — PANTOPRAZOLE SODIUM 40 MG: 40 TABLET, DELAYED RELEASE ORAL at 05:57

## 2021-09-22 RX ADMIN — AMIODARONE HYDROCHLORIDE 200 MG: 200 TABLET ORAL at 07:46

## 2021-09-22 RX ADMIN — APIXABAN 5 MG: 5 TABLET, FILM COATED ORAL at 07:46

## 2021-09-22 RX ADMIN — INSULIN LISPRO 4 UNITS: 100 INJECTION, SOLUTION INTRAVENOUS; SUBCUTANEOUS at 17:26

## 2021-09-22 RX ADMIN — THERA TABS 1 TABLET: TAB at 07:46

## 2021-09-22 RX ADMIN — APIXABAN 5 MG: 5 TABLET, FILM COATED ORAL at 20:29

## 2021-09-22 ASSESSMENT — PAIN DESCRIPTION - LOCATION
LOCATION: CHEST
LOCATION: ARM;CHEST
LOCATION: CHEST

## 2021-09-22 ASSESSMENT — PAIN DESCRIPTION - FREQUENCY
FREQUENCY: INTERMITTENT
FREQUENCY: INTERMITTENT

## 2021-09-22 ASSESSMENT — PAIN DESCRIPTION - PAIN TYPE
TYPE: ACUTE PAIN;SURGICAL PAIN
TYPE: SURGICAL PAIN
TYPE: ACUTE PAIN;SURGICAL PAIN

## 2021-09-22 ASSESSMENT — PAIN DESCRIPTION - ONSET
ONSET: ON-GOING
ONSET: ON-GOING

## 2021-09-22 ASSESSMENT — PAIN DESCRIPTION - DESCRIPTORS
DESCRIPTORS: DISCOMFORT
DESCRIPTORS: DISCOMFORT

## 2021-09-22 ASSESSMENT — PAIN SCALES - GENERAL
PAINLEVEL_OUTOF10: 4
PAINLEVEL_OUTOF10: 6
PAINLEVEL_OUTOF10: 0
PAINLEVEL_OUTOF10: 6

## 2021-09-22 ASSESSMENT — PAIN DESCRIPTION - ORIENTATION: ORIENTATION: MID

## 2021-09-22 NOTE — PROGRESS NOTES
Comprehensive Nutrition Assessment    Type and Reason for Visit:  Initial, Consult (oral nutrition supplement)    Nutrition Recommendations/Plan:   Continue current low sodium diet   Will offer glucerna oral nutrition supplement during stay  Encourage consistent meal intake  Will continue to follow up during stay     Nutrition Assessment:  Rehab admit with recent cardiac surgery, AV replacement. Currently on low sodium diet. Reports fair appetite at this time, intake not yet consistent. Provided copy of sodium restricted menu to assist with ordering meals. Patient agreeable to oral nutrition supplement, has had glucerna before. Moderate nutrition risk at this time with intake not yet consistent, increased needs. Malnutrition Assessment:  Malnutrition Status:  No malnutrition    Context:  Acute Illness       Estimated Daily Nutrient Needs:  Energy (kcal):  5084-6435; Weight Used for Energy Requirements:  Current     Protein (g):  52-61 (1.2-1.4 g/kg); Weight Used for Protein Requirements:  Ideal        Fluid (ml/day):  1500; Method Used for Fluid Requirements:  1 ml/kcal      Nutrition Related Findings:  resting in chair, hx DM HbA1c 7.4%, meds noted: multivitamin, lasix, OHA and insulin      Wounds:  Surgical Incision       Current Nutrition Therapies:    ADULT DIET; Regular;  Low Sodium (2 gm)    Anthropometric Measures:  · Height: 4' 11\" (149.9 cm)  · Current Body Weight: 204 lb 5.9 oz (92.7 kg)   · Admission Body Weight: 204 lb 5.9 oz (92.7 kg)    · Usual Body Weight: 194 lb (88 kg) (wt in July)     · Ideal Body Weight: 95 lbs; % Ideal Body Weight 215.1 %   · BMI: 41.3  · Adjusted Body Weight:  ; No Adjustment    · BMI Categories: Obese Class 3 (BMI 40.0 or greater)       Nutrition Diagnosis:   · Predicted inadequate energy intake related to increase demand for energy/nutrients as evidenced by other (comment) (recovery from cardiac surgery)      Nutrition Interventions:   Food and/or Nutrient Delivery: Continue Current Diet, Start Oral Nutrition Supplement  Nutrition Education/Counseling:  Education initiated   Coordination of Nutrition Care:  Continue to monitor while inpatient    Goals:  Patient will consume at least 50-75% at meals during stay       Nutrition Monitoring and Evaluation:   Behavioral-Environmental Outcomes:  None Identified   Food/Nutrient Intake Outcomes:  Food and Nutrient Intake, Supplement Intake  Physical Signs/Symptoms Outcomes:  Biochemical Data, Meal Time Behavior, Skin, Weight     Discharge Planning:    Continue current diet     Electronically signed by Karuna Mercado RD, LD on 9/22/21 at 3:20 PM EDT    Contact: [phone]

## 2021-09-22 NOTE — PATIENT CARE CONFERENCE
ACUTE REHAB TEAM CONFERENCE SUMMARY   [place]    NAME: Jamil Levine  : 1950 ADMIT DATE: 2021    Rehab Admitting Dx:Nonrheumatic aortic (valve) stenosis [I35.0]  CHF following cardiac surgery, postop [I97.130]  Patient Comorbid Conditions: Active Hospital Problems    Diagnosis Date Noted    Generalized weakness [R53.1]     Uncontrolled pain [R52]     Gait disturbance [R26.9]     Acute blood loss anemia [D62]     Uncontrolled type 2 diabetes mellitus with peripheral neuropathy (HCC) [E11.42, E11.65]     Atrial flutter (HCC) [I48.92]     Essential hypertension [I10]     Status post aortic valve replacement [Z95.2]     CHF following cardiac surgery, postop [I97.130] 2021     Date: 2021    CASE MANAGEMENT  Current issues/needs regarding patient and family discharge status:   Patient plans dc 1-level home alone. No MARÍA. 2 local sisters and extended family.     PHYSICAL THERAPY (Updated in QI)  Short term goals  Time Frame for Short term goals: 7-10 days  Short term goal 1: pt will perform supine <-> sit, scooting from flat HOB at mod ind while maintaining sternal precautions  Short term goal 2: pt will perform sit <-> stand, bed <-> chair, car transfers at mod ind while maintaining sternal precautions  Short term goal 3: pt will ambulate 150 ft on level surfaces using LRAD at mod ind including over uneven surfaces, 2 turns  Short term goal 4: pt will ascend/descend curb step at mod ind using LRAD and complete up to 6 steps w/o UE support at sup level while maintaining sternal precautions  Short term goal 5: pt will retrieve light item from floor at mod ind from LRAD          Impairments/deficits, barriers:                   Equipment needed at discharge:TBD      PT IRF-CHELSEY scores since initial assessment  Bed Mobility:   Sit to Lying  Assistance Needed: Partial/moderate assistance (for eccentric control over trunk transition into supine)  CARE Score: 3  Discharge Goal: Independent    Roll Left and Right  Assistance Needed: Supervision or touching assistance (inc time and min vc to maintain sternal precautions)  CARE Score: 4  Discharge Goal: Independent    Lying to Sitting on Side of Bed  Assistance Needed: Partial/moderate assistance (min A for initiation of trunk transition)  CARE Score: 3  Discharge Goal: Independent    Transfers:    Sit to Stand  Assistance Needed: Supervision or touching assistance  Comment: CGA with RW per PTA report today  CARE Score: 4  Discharge Goal: Independent    Chair/Bed-to-Chair Transfer  Assistance Needed: Supervision or touching assistance (CGA for safety; progressing to SBA)  CARE Score: 4  Discharge Goal: Independent    Toilet Transfer  Assistance Needed: Partial/moderate assistance (min a for initiation from toilet while maintaining sternal precautions)  CARE Score: 3  Discharge Goal: Independent    Car Transfer  Assistance Needed: Partial/moderate assistance (per supporting document of evaluating PT)  Comment: Min A  CARE Score: 3  Discharge Goal: Independent    Ambulation:    Walking Ability  Does the Patient Walk?: Yes     Walk 10 Feet  Assistance Needed: Supervision or touching assistance (CGA progressing to SBA)  CARE Score: 4  Discharge Goal: Independent (w/ LRAD; normally ambulates w/o AD)     Walk 50 Feet with Two Turns  Assistance Needed: Supervision or touching assistance (progressing to SBA 2/2 sturdy gait and good use of AD while maintaining precautions)  CARE Score: 4  Discharge Goal: Independent (w/ LRAD)     Walk 150 Feet  Assistance Needed:  (evaluating PT used cardiac walker for mobility assessment versus RW today)  Comment: pt able to ambulate 105 ft using RW with SBA per PTA report today  CARE Score: 4  Discharge Goal: Independent     Walking 10 Feet on Uneven Surfaces  Assistance Needed: Supervision or touching assistance  Comment: CGA using RW per PTA report today  CARE Score: 4  Discharge Goal: Independent     1 Step (Curb)  Assistance Needed: Supervision or touching assistance  Comment: CGA using RW per PTA report today  CARE Score: 4  Discharge Goal: Independent     4 Steps  Reason if not Attempted: Not attempted due to medical condition or safety concerns (pt w/ inc SOB and noting fatigue limiting performance at this time; not tested for safety concerns)  CARE Score: 88  Discharge Goal: Independent     12 Steps  Reason if not Attempted: Not applicable (pt does not normally navigate stairs; endorses only ever doing stairs when she visits her dad, 3-4 steps to enter)  CARE Score: 9  Discharge Goal: Not Applicable           Wheelchair:  w/c Ability: Wheelchair Ability  Uses a Wheelchair and/or Scooter?: Yes    Wheel 50 Feet with Two Turns  Assistance Needed: Partial/moderate assistance (min A to maintain momentum; pt is of short stature feet, barely reach floor, fatiguing quickly)  CARE Score: 3  Discharge Goal: Independent    Wheel 150 Feet  Assistance Needed: Partial/moderate assistance (pt using only BL LE's to maintain sternal precautions; min A to maintain momentum; short stature limiting)  CARE Score: 3  Discharge Goal: Supervision or touching assistance (using BL LE's to maintain sternal precautions; min a to maintain momentum; anticipate pt home w/o w/c on DC)              Balance:    Balance  Posture: Good  Sitting - Static: Good  Standing - Static: Good   Object: Picking Up Object  Assistance Needed: Supervision or touching assistance (CGA to steady)  CARE Score: 4  Discharge Goal: Independent    Fall Risk: []  Yes  []  No    OCCUPATIONAL THERAPY  (Updated in QI)  Short term goals  Time Frame for Short term goals: STG=LTG :   Long term goals  Time Frame for Long term goals : 10-12 days or until d/c  Long term goal 1: Pt will complete feeding/grooming/oral care task c MOD I by d/c  Long term goal 2: Pt will complete total body bathing c MOD I c use of AE by d/c  Long term goal 3: Pt will complete total body dressing (UB/LB) c MOD I by d/c  Long term goal 4: Pt will complete toileting c MIN A by d/c  Long term goal 5: Pt will complete functional transfer (toilet, tub, shower) c MOD I by d/c. Long term goals 6: Pt will perform therex/therax to facilitate increased strength/endurance/ax tolerance (c emphasis on dynamic standing balance/tolerance >8 mins and BUE endurance) c MOD I in order to complete ADLs. Long term goal 7: Pt will complete footwear c MIN A by d/c :                                       OT IRF-CHELSEY scores and goals since initial assessment:    ADLs:    Eating: Eating  Assistance Needed: Independent  Comment: X  CARE Score: 6  Discharge Goal: Independent       Oral Hygiene: Oral Hygiene  Assistance Needed: Independent  Comment: Sup in stance  CARE Score: 6  Discharge Goal: Independent    UB/LB Bathing: Shower/Bathe Self  Assistance Needed: Supervision or touching assistance  Comment: CGA in stance uses LHS for distal B LE at sink declines full shower this date stating \"i will take a shower tomorrow\"  CARE Score: 4  Discharge Goal: Supervision or touching assistance    UB Dressing: Upper Body Dressing  Assistance Needed: Partial/moderate assistance  Comment: MIn A to assist pulling down in back education of sternal precautions during dressing tasks  CARE Score: 3  Discharge Goal: Independent         LB Dressing: Lower Body Dressing  Assistance Needed: Partial/moderate assistance  Comment: Min A using reacher to thread feet through pants requiring light min A to bring fully over hips  CARE Score: 3  Discharge Goal: Independent    Donning and [place] Footwear: Putting On/Taking Off Footwear  Assistance Needed: Independent  Comment: MOd I using sock aide; declines wearing shoes this date  CARE Score: 6  Discharge Goal: Partial/moderate assistance      Toileting:  Toileting Hygiene  Assistance Needed: Supervision or touching assistance  Comment: CGA during clothing management; compensatory techniques for hygiene following BM  CARE Score: 4  Discharge Goal: Partial/moderate assistance      Toilet Transfers: Toilet Transfer  Assistance Needed: Supervision or touching assistance  Comment: CGA 2* to feeling \"somewhat lightheaded\"  CARE Score: 4  Discharge Goal: Independent      Impairments/deficits, barriers:  Sternal precautions  Assessment  Performance deficits / Impairments: Decreased functional mobility , Decreased endurance, Decreased strength, Decreased high-level IADLs, Decreased ROM, Decreased balance, Decreased safe awareness, Decreased ADL status  Decision Making: Medium Complexity  REQUIRES OT FOLLOW UP: Yes  Equipment needed at discharge:SC vs TTB      COGNITIVE FUNCTION/SPEECH THERAPY (AS INDICATED)  LTG                                 Nursing Current Medical Status:   [x] Is continent of bowel and bladder     [] Is incontinent of bowel and bladder    [x] Has had an adequate number of bowel movements   [x] Urinates with no urinary retention >300ml in bladder   [] Targeting bladder protocol with garza removal   [x] Maintaining O2 SATs at 92% or greater   [x] Has pain managed while on ARU         [x] Has had no skin breakdown while on ARU   [] Has improved skin integrity via wound measurements   [] Has no signs/symptoms of infection at the wound site   [] Pressure wounds Stage/Location:    [] Arrived on unit with pressure wound  [x] Has been free from injury during hospitalization   [] Has experienced a fall during hospitalization  [x] Ongoing education with patient/family with understanding demonstrated for:  [] Receives IV Fluids  [x] Other: wean O2, SOB, abdominal sutures        NUTRITION  Weight: 205 lb 11 oz (93.3 kg) / Body mass index is 41.54 kg/m². Current diet: ADULT DIET; Regular;  Low Sodium (2 gm)  Adult Oral Nutrition Supplement; Diabetic Oral Supplement  Intake: Reduced appetite, varying intake %, agreed to oral nutrition supplement      Medical improvements/barriers: sternal precautions, min to mod A for bathing and dressing, wean O2        Team goals for next treatment period/Intervention for current barriers:   [x] Pt will increase activity tolerance for daily tasks. [] Pt will improve bed mobility with reduced assist.  [x] Pt will improve safety in fx tasks with reduced cues/assist  [x] Pt will improve transfers with reduced assist  [x] Pt will improve toileting with reduced assist  [] Pt will improve ADL's with use of adaptive equipment with reduced assist  [] Pt will improve pain mgmt for maximum participation in tx program  [] Pt will improve communication to get basic needs met on unit  [] Pt will improve swallowing for safe diet advancement with use of strategies  []  Plan for discharge to home. Patient Strengths: Motivated, Cooperative and Pleasant    Justification for Continued Stay  Based on my medical assessment of the patient and review of information from the interdisciplinary team as part of this weekly team conference, the patient continues to meet the following criteria for IRF level of care:   The patient requires active and ongoing intervention of multiple therapy disciplines   The patient requires and intensive rehabilitation therapy program   The patient requires continued physician supervision by a rehabilitation physician   The patient requires 24 hours rehab nursing care   The patient requires an intensive and coordinated interdisciplinary team approach to the delivery of rehabilitative care.      Assessment/Plan   [x]  The patient is making good progression towards their long term goals and is actively participating in and has a reasonable expectation to continue to benefit from the intensive rehabilitation therapy program   []  The estimated discharge date has been changed from initial team conference due to:   []  The estimated discharge destination has been changed from initial team conference due to:         Ongoing tx following discharge: [x]C OT, PT NSG    []OUTPATIENT     [] No Further Treatment     [] Family/Caregiver Training  []  Transitional Living Arrangement   [] Home Assessment (date  )     [] Family Conference   []  Therapeutic Pass       []  Other: (specify)    Estimated Discharge Date: 10/1/21    Estimated Discharge Destination: []home alone   [x]home alone with assist prn  []Continuous supervision []Return home with s/o/spouse/family   [] Assisted living    []SNF     Team members participating in today's conference. [x] Marco A Jim, Medical Director  [x] Randal Taylor,    [] Kendal Lindquist, Nurse Mgr [x] Charol Alpers, Nurse Supervisor   [x]  Lillian Peña, PT   [x] Lexa Austin, OT   []  Jayna Lovell, PT  [] Patricia Rowell, OT      [x]  Viki Petersen, SLP    []  Ida Elder, CYNTHIA   [] Jadon Bello, CYNTHIA   []  Jai Krishnamurthy, KIM     [x] Mariah Singh,     [x]Annabella Sotomayor,     [] Diann Kelley RN[] Maximo Canavan RN     [] Brittany Nagel RN    [] Dameon Sánchez, ROYA    [] Mehul Briones,  ROYA  [] Nedra Dukes RN    []     I have led this Team Conference and agree with the plan, Bogdan Helton MD, 9/23/2021, 12:47 PM  Goals have been updated to reflect recent status.     Team conference note transcribed this date by: Cecy Pena MA, [id number] [facility], Therapy Coordinator

## 2021-09-22 NOTE — PROGRESS NOTES
Physical Therapy  [facility] ARU PHYSICAL THERAPY EVALUATION    Chart Review:  Past Medical History:   Diagnosis Date    Aortic valve stenosis     Arthritis     Bronchitis 06/2021    Diabetes mellitus ([facility] [address]     dx age 53's    Heart murmur     Hx of drug abuse ([facility] [address]     \"did cocaine back in 1980's    Hypertension     Irregular heart beat     \"they just say I skip a beat , I think from the heart murmur- not sure\" follow with Dr Fifi Palmer LVH (left ventricular hypertrophy)     hx per old chart    Mixed hyperlipidemia 5/12/2016    Wears dentures     full upper plate    Wears glasses     to read     Past Surgical History:   Procedure Laterality Date    AORTIC VALVE REPLACEMENT N/A 9/16/2021    AORTIC VALVE REPLACEMENT AND AORTIC ROOT ENLARGEMENT performed by Atilio Barron MD at [address] [place] [address]  2019    left breast bx    CARDIAC CATHETERIZATION  06/2021   [address]  and 1980( with BPS)    x2    DILATION AND CURETTAGE OF UTERUS      HERNIA REPAIR      umbilical hernia\"think done when I was a teenager    OTHER SURGICAL HISTORY  12/10/13    Left AC lipoma excision    OTHER SURGICAL HISTORY  12/10/13    Left flank lipoma excision     Fall History: 0 falls over last 12 months  Social History:  Social/Functional History  Lives With: Alone  Type of Home: Apartment  Home Layout: One level  Home Access: Level entry  Bathroom Shower/Tub: Tub/Shower unit  Bathroom Toilet: Standard  Bathroom Equipment: Grab bars in shower  Bathroom Accessibility: Accessible  ADL Assistance: Independent  Homemaking Assistance: Independent  Homemaking Responsibilities: Yes  Meal Prep Responsibility: Primary  Cleaning Responsibility: Primary  Bill Paying/Finance Responsibility: Primary  Shopping Responsibility: Primary  Dependent Care Responsibility: Primary  Health Care Management: Primary  Ambulation Assistance: Independent (no AD at baseline)  Transfer Assistance: Independent  Active Goal: Independent  Toilet Transfer  Assistance Needed: Partial/moderate assistance (min a for initiation from toilet while maintaining sternal precautions)  CARE Score: 3  Discharge Goal: Independent  Car Transfer  Assistance Needed: Supervision or touching assistance (min a for steadying assist and w/ R LE transition to complete transfer)  CARE Score: 4  Discharge Goal: Independent    Ambulation:   Device used PTA: No AD   Walking Ability  Does the Patient Walk?: Yes     Walk 10 Feet  Assistance Needed: Supervision or touching assistance (CGA progressing to SBA)  CARE Score: 4  Discharge Goal: Independent (w/ LRAD; normally ambulates w/o AD)     Walk 50 Feet with Two Turns  Assistance Needed: Supervision or touching assistance (progressing to SBA 2/2 sturdy gait and good use of AD while maintaining precautions)  CARE Score: 4  Discharge Goal: Independent (w/ LRAD)     Walk 150 Feet  Assistance Needed: Supervision or touching assistance (SBA; standing rest breaks x 2; inc time to complete 2/2 poor endurance)  CARE Score: 4  Discharge Goal: Independent     Walking 10 Feet on Uneven Surfaces  Assistance Needed: Supervision or touching assistance (CGA 2/2 inc postural sway)  CARE Score: 4  Discharge Goal: Independent     1 Step (Curb)  Assistance Needed: Supervision or touching assistance (CGA for steady)  CARE Score: 4  Discharge Goal: Independent     4 Steps  Reason if not Attempted: Not attempted due to medical condition or safety concerns (pt w/ inc SOB and noting fatigue limiting performance at this time; not tested for safety concerns)  CARE Score: 88  Discharge Goal: Independent     12 Steps  Reason if not Attempted: Not applicable (pt does not normally navigate stairs; endorses only ever doing stairs when she visits her dad, 3-4 steps to enter)  CARE Score: 9  Discharge Goal: Not Applicable    Gait Deviations: []None [x]Slow justen  [x] Increased JUAN  [] Staggers []Deviated Path  [x] Decreased step length  [] Decreased step height  []Decreased arm swing  [] Shuffles  [] Decreased head and trunk rotation  []other:   Pt ambulates 10 ft, 50 ft + 2 turns, 150 ft, 10 ft over uneven surfaces using cardiac walker this session. Pt demonstrates stable gait w/ limited deviations as noted above. Normally Avril uses no AD during ambulation; 2 trails of 10 - 15 ft w/o AD were initiated following completion of treatment session as pt was assisted to and from bathroom; gait was more noticably unsteady w/ inc postural sway, dec step length, and 1 observed stagger. Gait cont to improve after pt found her equilibrium; cont to progress away from AD as pt tolerance allows. Wheelchair:  w/c Ability: Wheelchair Ability  Uses a Wheelchair and/or Scooter?: Yes  Wheel 50 Feet with Two Turns  Assistance Needed: Partial/moderate assistance (min A to maintain momentum; pt is of short stature feet, barely reach floor, fatiguing quickly)  CARE Score: 3  Discharge Goal: Independent  Wheel 150 Feet  Assistance Needed: Partial/moderate assistance (pt using only BL LE's to maintain sternal precautions; min A to maintain momentum; short stature limiting)  CARE Score: 3  Discharge Goal: Supervision or touching assistance (using BL LE's to maintain sternal precautions; min a to maintain momentum; anticipate pt home w/o w/c on DC)          Balance:    Balance  Posture: Good  Sitting - Static: Good  Standing - Static: Good   Object: Picking Up Object  Assistance Needed: Supervision or touching assistance (CGA to steady)  CARE Score: 4  Discharge Goal: Independent    Assessment:   The patient is a 80year old female admitted onto ARU after hospitalization for TAVR 2/2 nonrheumatic stenosis on 9/16. Pt is previously from home, in an apartment w/ no stairs to enter, where she lives alone, normally using no AD during functional mobility, and endorses ind PLOF. Medical hx includes CHF, DM 2, HLD, arthritis, HTN.  She presents w/ dec functional mobility; impaired balance, strength, endurance; impaired transfers, transitions, bed mobility; inc pain. She is primarily limited by SOB, dec activity tolerance, supplemental O2 needs that we will cont to wean (3 L during functional mobility this session). Pt demonstrates and verbalizes understanding of sternal precautions t/o treatment session, will cont to need additional functional mobility education to ensure pt is able to complete bed mobility and car transfers at ind level. Pt has limited AE at home, however, she has good family support in the area to help ease the transition. Pt denies hx of falls over the course of past 12 months, demonstrates relatively stable functional t/o session, however, has been utilizing cardiac walker w/o course of admission; will need to begin to wean pt away from AD, see gait details. Pt will cont to benefit from intensive skilled therapy in the inpatient rehab setting. Recommending DC home w/ initial sup/assist as needed to ease transition.         Patient education:   ARU schedule, ARU expectations for participation, plan of care  Treatment Initiated:  Functional mobility training, gait training, patient education, bed mobility, transfer training  Barriers to Improvement:  SOB  Discharge Recommendations:  Home w/ sup/assist PRN  Equipment Recommendations:  Pend at this time; none anticipated    Goals:  Patient Goals   Patient goals : return to ind PLOF  Short term goals  Time Frame for Short term goals: 7-10 days  Short term goal 1: pt will perform supine <-> sit, scooting from flat HOB at mod ind while maintaining sternal precautions  Short term goal 2: pt will perform sit <-> stand, bed <-> chair, car transfers at mod ind while maintaining sternal precautions  Short term goal 3: pt will ambulate 150 ft on level surfaces using LRAD at mod ind including over uneven surfaces, 2 turns  Short term goal 4: pt will ascend/descend curb step at mod ind using LRAD and complete up to 6 steps w/o UE support at sup level while maintaining sternal precautions  Short term goal 5: pt will retrieve light item from floor at mod ind from [address]:       Pt will be seen at least 60 minutes per day for a minimum of 5 days per week, plus group therapy as appropriate  Plan  Times per day: Daily  Plan weeks: 7-10 days  Current Treatment Recommendations: Strengthening, Transfer Training, Endurance Training, Balance Training, Gait Training, Functional Mobility Training, Stair training, ADL/Self-care Training, Patient/Caregiver Education & Training, Equipment Evaluation, Education, & procurement, Positioning, Safety Education & Training, Pain Management, Wheelchair Mobility Training      Number of Minutes/Billable Intervention    Time in: 6793  Time out: 3200    PT Evaluation 10   Gait Training 30   Therapeutic Exercise    Neuro Re-Ed    Therapeutic Activity 40   Wheelchair Propulsion 10   Group    Other:    TOTAL        Electronically signed by:    Braulio Gonzalez, PT   9/22/2021, 6510

## 2021-09-22 NOTE — PROGRESS NOTES
Occupational Therapy                              [facility] OCCUPATIONAL THERAPY EVALUATION    Chart Review:  Past Medical History:   Diagnosis Date    Aortic valve stenosis     Arthritis     Bronchitis 06/2021    Diabetes mellitus ([facility] [address]     dx age 53's    Heart murmur     Hx of drug abuse ([facility] [address]     \"did cocaine back in 1980's    Hypertension     Irregular heart beat     \"they just say I skip a beat , I think from the heart murmur- not sure\" follow with Dr Yanely Hart LVH (left ventricular hypertrophy)     hx per old chart    Mixed hyperlipidemia 5/12/2016    Wears dentures     full upper plate    Wears glasses     to read     Past Surgical History:   Procedure Laterality Date    AORTIC VALVE REPLACEMENT N/A 9/16/2021    AORTIC VALVE REPLACEMENT AND AORTIC ROOT ENLARGEMENT performed by Alex Tate MD at [address] [place] [address]  2019    left breast bx    CARDIAC CATHETERIZATION  06/2021   [address] and [address]( with BPS)    x2    DILATION AND CURETTAGE OF UTERUS      HERNIA REPAIR      umbilical hernia\"think done when I was a teenager    OTHER SURGICAL HISTORY  12/10/13    Left AC lipoma excision    OTHER SURGICAL HISTORY  12/10/13    Left flank lipoma excision     Social History:  Social/Functional History  Lives With: Alone  Type of Home: Apartment  Home Layout: One level  Home Access: Level entry  Bathroom Shower/Tub: Tub/Shower unit  Bathroom Toilet: Standard  Bathroom Equipment: Grab bars in shower  Bathroom Accessibility: Accessible  ADL Assistance: Independent  Homemaking Assistance: Independent  Homemaking Responsibilities: Yes  Meal Prep Responsibility: Primary  Cleaning Responsibility: Primary  Bill Paying/Finance Responsibility: Primary  Shopping Responsibility: Primary  Dependent Care Responsibility: Primary  Health Care Management: Primary  Ambulation Assistance: Independent (no AD at baseline)  Transfer Assistance: Independent  Active :  Yes (sister, son, DIL)  Occupation: Retired  Leisure & Hobbies: Pt enjoys traveling and spending time at [place] father's home. Pt reports assisting pt's father with help of sisters. Additional Comments: Pt denies any falls this year. Pt has full sized bed at home. Restrictions:  Restrictions/Precautions  Restrictions/Precautions: Fall Risk, General Precautions        Position Activity Restriction  Sternal Precautions: No Pushing, 5# Lifting Restrictions, No Pulling  Other position/activity restrictions: Pt c 2L O2         Pain Level: 6 (3 at rest; 6 during activity)  Pain Location: Chest    Objective:  Observation/Palpation  Posture: Good  Observation: Pt seated, awake in recliner on arrival. Alert, oriented agreeable to therapy. Pt w/ gait belt, blanket bracing incisional site. Orientation  Overall Orientation Status: Within Functional Limits        Vision  Vision: Impaired  Vision Exceptions: Wears glasses for reading     Hearing  Hearing: Within functional limits    ROM:      LUE AROM (degrees)  LUE AROM : Exceptions  LUE General AROM: Limited shld flex/ext 2* sternal precautions     Left Hand AROM (degrees)  Left Hand AROM: WFL     RUE AROM (degrees)  RUE AROM : Exceptions  RUE General AROM: Limited shld flex/ext 2* sternal precautions     Right Hand AROM (degrees)  Right Hand AROM: WFL    Strength:    LUE Strength  Gross LUE Strength: Exceptions to [facility]  L Hand General: 4+/5  LUE Strength Comment: Pt  c ROM restrictions 2* sternal precautions  RUE Strength  Gross RUE Strength: Exceptions to [facility] [place]  R Hand General: 4+/5  RUE Strength Comment: Pt  c ROM restrictions 2* sternal precautions    Quality of Movement:   Tone RUE  RUE Tone: Normotonic  Tone LUE  LUE Tone: Normotonic          Sensation:    Sensation  Overall Sensation Status: WFL     ADLs:  Eating: Eating  Assistance Needed: Setup or clean-up assistance  CARE Score: 5  Discharge Goal: Independent       Oral Hygiene: Oral Hygiene  Assistance Needed: Supervision or touching assistance  CARE Score: 4  Discharge Goal: Independent    UB/LB Bathing: Shower/Bathe Self  Assistance Needed: Partial/moderate assistance  Comment: Mod A for LB washing/drying, pt able to demo washing/drying UB  CARE Score: 3  Discharge Goal: Supervision or touching assistance    UB Dressing: Upper Body Dressing  Assistance Needed: Partial/moderate assistance  Comment: Min A for compliance of sternal precautions and education on compensatory strategies to don/doff shirt  CARE Score: 3  Discharge Goal: Independent         LB Dressing:   Lower Body Dressing  Assistance Needed: Substantial/maximal assistance  Comment: Max A to don/doff pants and depends, pt assisted c threading pants over BLE. Required assist to fully manage pants over BLE and hips. CARE Score: 2  Discharge Goal: Independent    Donning and [place] Footwear: Putting On/Taking Off Footwear  Assistance Needed: Dependent  Comment: Total A to don/doff footwear  CARE Score: 1  Discharge Goal: Partial/moderate assistance      Toileting:  Toileting Hygiene  Assistance Needed: Substantial/maximal assistance  Comment: Max A for posterior marcelina-area hygiene and CM  CARE Score: 2  Discharge Goal: Partial/moderate assistance      Bed Mobility:    Bed mobility  Supine to Sit: Minimal assistance (Min A c trunk and rolling to R side, cues to comply c sternal precautions, bed at high fowlers position)    Transfers:    Transfers  Stand Pivot Transfers: Contact guard assistance  Sit to stand: Contact guard assistance  Stand to sit: Contact guard assistance           Toilet Transfer  Assistance Needed: Partial/moderate assistance (min a for initiation from toilet while maintaining sternal precautions)  Comment: Min A from toilet, cues for sternal precaution compliance  CARE Score: 3  Discharge Goal: Independent    Functional Mobility:    Balance  Sitting Balance: Contact guard assistance  Standing Balance: Contact guard assistance     Functional Mobility  Functional - Mobility Device: Rolling Walker  Activity: To/from bathroom  Assist Level: Contact guard assistance     Cognition:  Cognition  Overall Cognitive Status: Exceptions  Arousal/Alertness: Appropriate responses to stimuli  Following Commands: Follows all commands without difficulty  Attention Span: Appears intact  Memory: Appears intact  Safety Judgement: Decreased awareness of need for assistance  Problem Solving: Assistance required to identify errors made  Insights: Decreased awareness of deficits  Initiation: Does not require cues  Sequencing: Does not require cues    Perception:  Perception  Overall Perceptual Status: WFL      Assessment:     Performance deficits / Impairments: Decreased functional mobility , Decreased endurance, Decreased strength, Decreased high-level IADLs, Decreased ROM, Decreased balance, Decreased safe awareness, Decreased ADL status    The patient is a 80year old female admitted onto ARU after hospitalization for CHF after cardiac surgery. Per pt chart review, pt a past medical history of Aortic valve stenosis, Arthritis, Bronchitis, Diabetes mellitus ([facility] [address] Heart murmur, Hx of drug abuse ([facility] [address] Hypertension, Irregular heart beat, LVH (left ventricular hypertrophy), Mixed hyperlipidemia, Wears dentures, and Wears glasses. Pt admitted with aortic stenosis and underwent AV replacement on 9-16. Pt lives at home alone and at baseline is fully independent with ADLs, IADLs, mobility, and driving. Pt has sister and DIL that are available for assistance upon d/c. Pt reports performing ADL/IADL tasks independently at [facility]. Today pt presents c sternal precautions and required CGA-Total A (distal BLE required increased assistance as compared to UB ADLs) for completion of ADLs as a result of above performance deficits/impairments. Pt demo increased fatigue, labored breathing, and required pacing of ax. The QI, MMT, and ROM standardized assessments were used this date to determine the above performance deficits, which compromise pt's ability to safely complete ADLs/IADLs/mobility. Pt will benefit from ARU OT services to increase functional performance, safety, and achieve the highest level of independence with ADLs. Decision Making: Medium Complexity  Clinical Presentation:  Evolving c changing characteristics  Patient education:   ARU procotol, Role of O.T., O.T. plan of care,   []   Patient goal was established and reviewed in Re[facility]er with patient and/or family this date. REQUIRES OT FOLLOW UP: Yes  Discharge Recommendations:  Anticipating home c family and [address] OT  Equipment Recommendations:  Anticipating SC vs TTB. Goals:  Patient Goals   Patient goals : \"I'd like to be able to be as independent as possible. \"  Short term goals  Time Frame for Short term goals: STG=LTG  Long term goals  Time Frame for Long term goals : 10-12 days or until d/c  Long term goal 1: Pt will complete feeding/grooming/oral care task c MOD I by d/c  Long term goal 2: Pt will complete total body bathing c MOD I c use of AE by d/c  Long term goal 3: Pt will complete total body dressing (UB/LB) c MOD I by d/c  Long term goal 4: Pt will complete toileting c MIN A by d/c  Long term goal 5: Pt will complete functional transfer (toilet, tub, shower) c MOD I by d/c. Long term goals 6: Pt will perform therex/therax to facilitate increased strength/endurance/ax tolerance (c emphasis on dynamic standing balance/tolerance >8 mins and BUE endurance) c MOD I in order to complete ADLs.   Long term goal 7: Pt will complete footwear c MIN A by d/c    Plan:    Pt will be seen at least 60 minutes per day for a minimum of 5 days per week, plus group therapy as appropriate  Current Treatment Recommendations: Strengthening, Functional Mobility Training, Patient/Caregiver Education & Training, ROM, Endurance Training, Equipment Evaluation, Education, & procurement, Balance Training, Safety Education & Training, Self-Care / ADL, Home Management Training    OT Individual Minutes  Time In: 8680  Time Out: 9656  Minutes: 90                Number of Minutes/Billable Intervention      OT Evaluation 25   Therapeutic Exercise    ADL Self-care 65   Neuro Re-Ed    Therapeutic Activity    Group    Other:    TOTAL 90     Electronically signed by:    Cecy Tim OT,   9/22/2021, 1:03 PM

## 2021-09-22 NOTE — CARE COORDINATION
Case Management Admission Note      Patient:Avril Flores      [facility]:4[birth year]78/0275  CWI:[id number]  Rehab Dx/Hx: Nonrheumatic aortic (valve) stenosis [I35.0]  CHF following cardiac surgery, postop [I97.130]    Chief Complaint:   Past Medical History:   Diagnosis Date    Aortic valve stenosis     Arthritis     Bronchitis 06/2021    Diabetes mellitus ([facility] [address]     dx age 53's    Heart murmur     Hx of drug abuse ([facility] [address]     \"did cocaine back in 1980's    Hypertension     Irregular heart beat     \"they just say I skip a beat , I think from the heart murmur- not sure\" follow with Dr Yanely Hart LVH (left ventricular hypertrophy)     hx per old chart    Mixed hyperlipidemia 5/12/2016    Wears dentures     full upper plate    Wears glasses     to read     Past Surgical History:   Procedure Laterality Date    AORTIC VALVE REPLACEMENT N/A 9/16/2021    AORTIC VALVE REPLACEMENT AND AORTIC ROOT ENLARGEMENT performed by Alex Tate MD at [address] [place] [address]  2019    left breast bx    CARDIAC CATHETERIZATION  06/2021   [address]  and [address]( with BPS)    x2    DILATION AND CURETTAGE OF UTERUS      HERNIA REPAIR      umbilical hernia\"think done when I was a teenager    OTHER SURGICAL HISTORY  12/10/13    Left AC lipoma excision    OTHER SURGICAL HISTORY  12/10/13    Left flank lipoma excision     Allergies   Allergen Reactions    Codeine Rash    Hydrocodone-Acetaminophen Nausea And Vomiting     Precautions: falls    Date of Admit: 9/21/2021  Room #: 1009/1009-A      Current functional status at time of admit:        Home Living/DME Available:      Type of Home: Apartment  Home Access: Level entry  Bathroom Shower/Tub: Tub/Shower unit  [employer]: Standard  Bathroom Equipment: Grab bars in shower          IADL Hx:   Homemaking Responsibilities: Yes  Active : Yes (sister, son, DIL)     Occupation: Retired  Leisure & Hobbies: Pt enjoys traveling and spending time at pt's father's home. Pt reports assisting pt's father with help of sisters. Spouse: None. Family: Son Tremayne Sanchez - local, help and drive to appointments. 2 sisters - local, help as well. Comments: LSW met with patient for admission assessment. Patient lives alone in a 1-level apartment in [place] [address]. There are 0 steps to enter and no steps inside. Patient reports tub/shower for bathing and bedroom and bathroom are on the main level. Patient has PCP, was independent in ADLs PTA, and uses [facility] in [place] [address] for prescriptions. Patient states she has no DME at home and no pre-existing HHC. Patient reports access to food, utilities, and shelter and feels safe in her environment. BIMS completed in initial assessment. Plan is to D/C home alone, with HHC and DME as recommended by therapy staff. F/U to be set with Dr. Soumya Izaguirre ([phone]) and family will transport home.     PEARL Daugherty, ASHLY, 9/22/2021, 1:00 PM

## 2021-09-22 NOTE — PROGRESS NOTES
Progress Note( Dr. Jose David Randolph)  9/21/2021  Subjective:   Admit Date: 9/16/2021  PCP: Nereida Castañeda MD    Admitted For :Severe aortic  Stenosis// aortic valve repairs/replacement done on 9/16/2021  Aortic valve replacement with [employer] Mosaic bioprosthetic valve    Consulted For: Better control of blood glucose    Interval History: Postop day 5    Denies any chest pains,   Has SOB . Denies nausea or vomiting. No new bowel or bladder symptoms. Intake/Output Summary (Last 24 hours) at 9/21/2021 2313  Last data filed at 9/21/2021 0600  Gross per 24 hour   Intake 120 ml   Output --   Net 120 ml       DATA    CBC:   Recent Labs     09/19/21 0520 09/20/21 0625   WBC 19.8* 13.9*   HGB 9.4* 9.0*   PLT 65* 113*    CMP:  Recent Labs     09/19/21  0520 09/19/21  0520 09/20/21  0625 09/21/21  0035 09/21/21 0630     --  139  --  143   K 4.7   < > 4.6 4.1 4.2     --  104  --  104   CO2 21  --  22  --  25   BUN 39*  --  42*  --  30*   CREATININE 0.8  --  0.8  --  0.5*   CALCIUM 8.2*  --  8.2*  --  8.4    < > = values in this interval not displayed. Lipids:   Lab Results   Component Value Date    CHOL 256 06/08/2021    CHOL 309 03/17/2021    HDL 65 06/08/2021    TRIG 104 06/08/2021     Glucose:  Recent Labs     09/21/21  1142 09/21/21  1657 09/21/21 2036   POCGLU 264* 137* 224*     HsougtrffcY2B:  Lab Results   Component Value Date    LABA1C 7.4 09/09/2021     High Sensitivity TSH:   Lab Results   Component Value Date    TSHHS 1.370 06/08/2021     Free T3: No results found for: FT3  Free T4:  Lab Results   Component Value Date    T4FREE 1.34 06/08/2021       XR CHEST (2 VW)    Result Date: 9/20/2021  EXAMINATION: TWO XRAY VIEWS OF THE CHEST 9/20/2021 5:03 pm COMPARISON: Chest radiograph 09/18/2021. HISTORY: ORDERING SYSTEM PROVIDED HISTORY: p/o cabg     1. Small right and moderate left pleural effusions. 2. Shallow inspiration with mild bibasilar opacities compatible with atelectasis.   Aspiration and pneumonia are not excluded. XR CHEST PORTABLE    Result Date: 9/18/2021  EXAMINATION: ONE XRAY VIEW OF THE CHEST 9/18/2021 6:23 am COMPARISON: Chest radiograph 09/17/2021 HISTORY: ORDERING SYSTEM PROVIDED HISTORY: Post op open heart surgery    Slight interval increase in basilar predominant airspace opacities, favored to represent mildly increased interstitial pulmonary edema. Similar central pulmonary vascular congestion. Small left pleural effusion has increased slightly in size. Scheduled Medicines   Medications:      Infusions:         Objective:   Vitals: BP (!) 116/56   Pulse 87   Temp 98.5 °F (36.9 °C) (Oral)   Resp 19   Ht 4' 11\" (1.499 m)   Wt 209 lb 3.5 oz (94.9 kg)   LMP 07/09/2000   SpO2 97%   BMI 42.26 kg/m²   General appearance: alert and cooperative with exam  Neck: no JVD or bruit  Thyroid : Normal lobes   Lungs: Has Vesicular Breath sounds and wheezing and basilar rales  Heart:  regular rate and rhythm  Abdomen: soft, non-tender; bowel sounds normal; no masses,  no organomegaly  Musculoskeletal: Normal  Extremities: extremities normal, , no edema  Neurologic:  Awake, alert, oriented to name, place and time. Cranial nerves II-XII are grossly intact. Motor is  intact. Sensory stable neuropathy. ,  and gait is normal.    Assessment:     Patient Active Problem List:     Type 2 diabetes mellitus without complication (HCC)     Mixed hyperlipidemia     Tobacco dependence     Obesity, Class I, BMI 30-34.9     Recurrent major depressive disorder, in partial remission (HCC)     Murmur, cardiac     Morbidly obese (HCC)     SOB (shortness of breath)     S/P PTCA (percutaneous transluminal coronary angioplasty)     Nonrheumatic aortic valve stenosis     Chronic diastolic congestive heart failure (HCC)     LVH (left ventricular hypertrophy) due to hypertensive disease, with heart failure (HCC)     Severe aortic stenosis      Plan:     1. Reviewed POC blood glucose .  Labs and X ray results

## 2021-09-22 NOTE — PROGRESS NOTES
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 Prior (baseline) level of function: Independent. Current level of function:         Current  IRF-CHELSEY and Goals:   Occupational Therapy:    Short term goals  Time Frame for Short term goals: STG=LTG :   Long term goals  Time Frame for Long term goals : 10-12 days or until d/c  Long term goal 1: Pt will complete feeding/grooming/oral care task c MOD I by d/c  Long term goal 2: Pt will complete total body bathing c MOD I c use of AE by d/c  Long term goal 3: Pt will complete total body dressing (UB/LB) c MOD I by d/c  Long term goal 4: Pt will complete toileting c MIN A by d/c  Long term goal 5: Pt will complete functional transfer (toilet, tub, shower) c MOD I by d/c. Long term goals 6: Pt will perform therex/therax to facilitate increased strength/endurance/ax tolerance (c emphasis on dynamic standing balance/tolerance >8 mins and BUE endurance) c MOD I in order to complete ADLs. Long term goal 7: Pt will complete footwear c MIN A by d/c :                                       Eating: Eating  Assistance Needed: Setup or clean-up assistance  CARE Score: 5  Discharge Goal: Independent       Oral Hygiene: Oral Hygiene  Assistance Needed: Supervision or touching assistance  CARE Score: 4  Discharge Goal: Independent    UB/LB Bathing: Shower/Bathe Self  Assistance Needed: Partial/moderate assistance  Comment:  Mod A for LB washing/drying, pt able to demo washing/drying UB  CARE Score: 3  Discharge Goal: Supervision or touching assistance    UB Dressing: Upper Body Dressing  Assistance Needed: Partial/moderate assistance  Comment: Min A for compliance of sternal precautions and education on compensatory strategies to don/doff shirt  CARE Score: 3  Discharge Goal: Independent         LB Dressing: Lower Body Dressing  Assistance Needed: Substantial/maximal assistance  Comment: Max A to don/doff pants and depends, pt assisted c threading pants over BLE. Required assist to fully manage pants over BLE and hips. CARE Score: 2  Discharge Goal: Independent    Donning and [place] Footwear: Putting On/Taking Off Footwear  Assistance Needed: Dependent  Comment: Total A to don/doff footwear  CARE Score: 1  Discharge Goal: Partial/moderate assistance      Toileting: Toileting Hygiene  Assistance Needed: Substantial/maximal assistance  Comment: Max A for posterior [place]-area hygiene and CM  CARE Score: 2  Discharge Goal: Partial/moderate assistance      Toilet Transfers:   Toilet Transfer  Assistance Needed: Partial/moderate assistance (min a for initiation from toilet while maintaining sternal precautions)  Comment: Min A from toilet, cues for sternal precaution compliance  CARE Score: 3  Discharge Goal: Independent    Physical Therapy:   Short term goals  Time Frame for Short term goals: 7-10 days  Short term goal 1: pt will perform supine <-> sit, scooting from flat HOB at mod ind while maintaining sternal precautions  Short term goal 2: pt will perform sit <-> stand, bed <-> chair, car transfers at mod ind while maintaining sternal precautions  Short term goal 3: pt will ambulate 150 ft on level surfaces using LRAD at mod ind including over uneven surfaces, 2 turns  Short term goal 4: pt will ascend/descend curb step at mod ind using LRAD and complete up to 6 steps w/o UE support at sup level while maintaining sternal precautions  Short term goal 5: pt will retrieve light item from floor at mod ind from LRAD            Bed Mobility:   Sit to (CGA 2/2 inc postural sway)  CARE Score: 4  Discharge Goal: Independent     1 Step (Curb)  Assistance Needed: Supervision or touching assistance (CGA for steady)  CARE Score: 4  Discharge Goal: Independent     4 Steps  Reason if not Attempted: Not attempted due to medical condition or safety concerns (pt w/ inc SOB and noting fatigue limiting performance at this time; not tested for safety concerns)  CARE Score: 88  Discharge Goal: Independent     12 Steps  Reason if not Attempted: Not applicable (pt does not normally navigate stairs; endorses only ever doing stairs when she visits her dad, 3-4 steps to enter)  CARE Score: 9  Discharge Goal: Not Applicable       Wheelchair:  w/c Ability: Wheelchair Ability  Uses a Wheelchair and/or Scooter?: Yes  Wheel 50 Feet with Two Turns  Assistance Needed: Partial/moderate assistance (min A to maintain momentum; pt is of short stature feet, barely reach floor, fatiguing quickly)  CARE Score: 3  Discharge Goal: Independent  Wheel 150 Feet  Assistance Needed: Partial/moderate assistance (pt using only BL LE's to maintain sternal precautions; min A to maintain momentum; short stature limiting)  CARE Score: 3  Discharge Goal: Supervision or touching assistance (using BL LE's to maintain sternal precautions; min a to maintain momentum; anticipate pt home w/o w/c on DC)          Balance:    Balance  Posture: Good  Sitting - Static: Good  Standing - Static: Good   Object: Picking Up Object  Assistance Needed: Supervision or touching assistance (CGA to steady)  CARE Score: 4  Discharge Goal: Independent    I      Exam:    Blood pressure 124/62, pulse 80, temperature 97.9 °F (36.6 °C), temperature source Oral, resp. rate 16, height 4' 11\" (1.499 m), weight 204 lb 5.9 oz (92.7 kg), last menstrual period 07/09/2000, SpO2 96 %, not currently breastfeeding. General: Observed sitting up in a bedside chair with legs semielevated. Oxygen per nasal cannula. Alert and oriented. Soft-spoken. HEENT: Full visual field. Clear speech. MMM. No JVD. Pulmonary: Symmetric air exchange without wheezes, rales or accessory muscle use. Cardiac: Infrequent premature beats. Rate controlled. Pansystolic murmur. Abdomen: Patient's abdomen is soft and nondistended. Bowel sounds were present throughout. There was no rebound, guarding or masses noted. Upper extremities: Able to bring both hands up to meet mine with functional  strength. Mildly diminished sensation in the fingertips. No bruising. Lower extremities: No signs of DVT. Trace edema. Give way with MMT. Sitting balance was good. Standing balance was poor. Lab Results   Component Value Date    WBC 13.9 (H) 09/20/2021    HGB 9.0 (L) 09/20/2021    HCT 27.2 (L) 09/20/2021    MCV 86.3 09/20/2021     (L) 09/20/2021     Lab Results   Component Value Date    INR 2.07 09/16/2021    INR 0.88 09/09/2021    INR 0.92 07/09/2021    PROTIME 26.9 (H) 09/16/2021    PROTIME 11.4 (L) 09/09/2021    PROTIME 11.9 07/09/2021     Lab Results   Component Value Date    CREATININE 0.5 (L) 09/21/2021    BUN 30 (H) 09/21/2021     09/21/2021    K 4.2 09/21/2021     09/21/2021    CO2 25 09/21/2021     Lab Results   Component Value Date    ALT 15 06/07/2021    AST 16 06/07/2021    ALKPHOS 92 06/07/2021    BILITOT 0.3 06/07/2021       Expected length of stay  prior to a supervised level of function for discharge home with a walker and C OT/PT is 2 weeks. Recommendations:    1. CHF after aortic valve replacement: Developing the plans for her daily occupational and physical therapy. Instructing her on techniques for self-care and mobility following sternal precautions. Daily weights are monitored closely. Ongoing aggressive pulmonary hygiene measures, DVT prophylaxis and pain management. Bowel and bladder retraining. Maximizing treatment of her blood sugar and blood pressure and heart rhythm.   Verbal cues and moderate physical assistance for transfers. 2. DVT prophylaxis: The Eliquis she requires for her atrial flutter is protective against new DVT. I must monitor her hemoglobin periodically while on this medication. Weightbearing activities are pursued daily. GI prophylaxis offered. No signs of acute blood loss. 3. Atrial flutter: Cordarone and Coreg for rate control. Lasix to avoid deterioration of her CHF. Daily weights do not reveal any decompensation. Graduated increases in physical activity to build tolerance are planned. 4. Uncontrolled diabetes type 2 with peripheral neuropathy: Patient requires a diet modified for carbohydrates. She is on Nesina and Glucotrol with sliding scale Humalog. Blood sugars are checked at mealtime and bedtime. 5. Hypertension: Coreg and Lasix are used to control her systolic blood pressure. Vital signs are checked at rest and with activity. Target systolic blood pressure is 120-140. Blood pressure is within target range today. 6. Uncontrolled pain: Sternal precautions, cryotherapy, protected coughing and oral analgesics. Bowel intervention while on the narcotic.

## 2021-09-23 LAB
GLUCOSE BLD-MCNC: 178 MG/DL (ref 70–99)
GLUCOSE BLD-MCNC: 197 MG/DL (ref 70–99)
GLUCOSE BLD-MCNC: 197 MG/DL (ref 70–99)
GLUCOSE BLD-MCNC: 203 MG/DL (ref 70–99)

## 2021-09-23 PROCEDURE — 97110 THERAPEUTIC EXERCISES: CPT

## 2021-09-23 PROCEDURE — 6370000000 HC RX 637 (ALT 250 FOR IP): Performed by: SURGERY

## 2021-09-23 PROCEDURE — 97530 THERAPEUTIC ACTIVITIES: CPT

## 2021-09-23 PROCEDURE — 94150 VITAL CAPACITY TEST: CPT

## 2021-09-23 PROCEDURE — 6370000000 HC RX 637 (ALT 250 FOR IP): Performed by: PHYSICAL MEDICINE & REHABILITATION

## 2021-09-23 PROCEDURE — 97116 GAIT TRAINING THERAPY: CPT

## 2021-09-23 PROCEDURE — 1280000000 HC REHAB R&B

## 2021-09-23 PROCEDURE — 99233 SBSQ HOSP IP/OBS HIGH 50: CPT | Performed by: PHYSICAL MEDICINE & REHABILITATION

## 2021-09-23 PROCEDURE — 97535 SELF CARE MNGMENT TRAINING: CPT

## 2021-09-23 PROCEDURE — 94761 N-INVAS EAR/PLS OXIMETRY MLT: CPT

## 2021-09-23 PROCEDURE — 82962 GLUCOSE BLOOD TEST: CPT

## 2021-09-23 RX ORDER — ACETAMINOPHEN 325 MG/1
650 TABLET ORAL
Status: DISCONTINUED | OUTPATIENT
Start: 2021-09-23 | End: 2021-09-23

## 2021-09-23 RX ORDER — ACETAMINOPHEN 325 MG/1
650 TABLET ORAL EVERY 4 HOURS PRN
Status: DISCONTINUED | OUTPATIENT
Start: 2021-09-23 | End: 2021-10-01 | Stop reason: HOSPADM

## 2021-09-23 RX ORDER — OXYCODONE HYDROCHLORIDE 5 MG/1
5 TABLET ORAL EVERY 6 HOURS PRN
Status: DISCONTINUED | OUTPATIENT
Start: 2021-09-23 | End: 2021-09-23

## 2021-09-23 RX ORDER — HYDROCODONE BITARTRATE AND ACETAMINOPHEN 5; 325 MG/1; MG/1
1 TABLET ORAL EVERY 6 HOURS PRN
Status: DISCONTINUED | OUTPATIENT
Start: 2021-09-23 | End: 2021-10-01 | Stop reason: HOSPADM

## 2021-09-23 RX ADMIN — APIXABAN 5 MG: 5 TABLET, FILM COATED ORAL at 08:21

## 2021-09-23 RX ADMIN — GLIPIZIDE 5 MG: 5 TABLET ORAL at 08:39

## 2021-09-23 RX ADMIN — HYDROCODONE BITARTRATE AND ACETAMINOPHEN 1 TABLET: 5; 325 TABLET ORAL at 18:47

## 2021-09-23 RX ADMIN — HYDROCODONE BITARTRATE AND ACETAMINOPHEN 1 TABLET: 5; 325 TABLET ORAL at 12:14

## 2021-09-23 RX ADMIN — THERA TABS 1 TABLET: TAB at 08:23

## 2021-09-23 RX ADMIN — INSULIN LISPRO 2 UNITS: 100 INJECTION, SOLUTION INTRAVENOUS; SUBCUTANEOUS at 08:24

## 2021-09-23 RX ADMIN — AMIODARONE HYDROCHLORIDE 200 MG: 200 TABLET ORAL at 08:20

## 2021-09-23 RX ADMIN — ALOGLIPTIN 6.25 MG: 12.5 TABLET, FILM COATED ORAL at 08:15

## 2021-09-23 RX ADMIN — FUROSEMIDE 20 MG: 20 TABLET ORAL at 08:22

## 2021-09-23 RX ADMIN — INSULIN LISPRO 2 UNITS: 100 INJECTION, SOLUTION INTRAVENOUS; SUBCUTANEOUS at 17:06

## 2021-09-23 RX ADMIN — INSULIN LISPRO 2 UNITS: 100 INJECTION, SOLUTION INTRAVENOUS; SUBCUTANEOUS at 12:14

## 2021-09-23 RX ADMIN — ASPIRIN 81 MG: 81 TABLET, COATED ORAL at 08:17

## 2021-09-23 RX ADMIN — HYDROCODONE BITARTRATE AND ACETAMINOPHEN 1 TABLET: 5; 325 TABLET ORAL at 06:13

## 2021-09-23 RX ADMIN — PANTOPRAZOLE SODIUM 40 MG: 40 TABLET, DELAYED RELEASE ORAL at 06:13

## 2021-09-23 RX ADMIN — CARVEDILOL 3.12 MG: 6.25 TABLET, FILM COATED ORAL at 20:09

## 2021-09-23 RX ADMIN — FUROSEMIDE 20 MG: 20 TABLET ORAL at 17:07

## 2021-09-23 RX ADMIN — CARVEDILOL 3.12 MG: 6.25 TABLET, FILM COATED ORAL at 08:18

## 2021-09-23 RX ADMIN — APIXABAN 5 MG: 5 TABLET, FILM COATED ORAL at 20:09

## 2021-09-23 RX ADMIN — ACETAMINOPHEN 650 MG: 325 TABLET ORAL at 10:29

## 2021-09-23 ASSESSMENT — PAIN DESCRIPTION - LOCATION
LOCATION: CHEST
LOCATION: CHEST;INCISION
LOCATION: CHEST

## 2021-09-23 ASSESSMENT — PAIN SCALES - GENERAL
PAINLEVEL_OUTOF10: 4
PAINLEVEL_OUTOF10: 3
PAINLEVEL_OUTOF10: 4
PAINLEVEL_OUTOF10: 3
PAINLEVEL_OUTOF10: 8
PAINLEVEL_OUTOF10: 7

## 2021-09-23 ASSESSMENT — PAIN DESCRIPTION - FREQUENCY
FREQUENCY: INTERMITTENT
FREQUENCY: INTERMITTENT

## 2021-09-23 ASSESSMENT — PAIN DESCRIPTION - ONSET
ONSET: ON-GOING
ONSET: ON-GOING

## 2021-09-23 ASSESSMENT — PAIN DESCRIPTION - PAIN TYPE
TYPE: ACUTE PAIN
TYPE: ACUTE PAIN

## 2021-09-23 ASSESSMENT — PAIN DESCRIPTION - ORIENTATION
ORIENTATION: UPPER
ORIENTATION: OTHER (COMMENT)
ORIENTATION: UPPER
ORIENTATION: UPPER

## 2021-09-23 ASSESSMENT — PAIN DESCRIPTION - DIRECTION: RADIATING_TOWARDS: NON

## 2021-09-23 ASSESSMENT — PAIN DESCRIPTION - DESCRIPTORS
DESCRIPTORS: DISCOMFORT
DESCRIPTORS: DISCOMFORT

## 2021-09-23 NOTE — CARE COORDINATION
Patient reviewed at today's care conference. Patient will dc home alone 10/4. Isaac Sanchez pt/ot/RN recommended. Likely SC & RW at discharge. 1430:  Case mgt updated patient in room. Patient agreeable to dc date and plan. Patient hasn't had HHC previously. Patient confirmed need for RW & TTB at discharge. Reports one of her sisters will provide dc transport.

## 2021-09-23 NOTE — PROGRESS NOTES
Physical Rehabilitation: OCCUPATIONAL THERAPY     [x] daily progress note       [] discharge       Patient Name:  Jose Wu   :  1950 MRN: [id number]  Room:  [address] [place] [address] Date of Admission: 2021  Rehabilitation Diagnosis:   Nonrheumatic aortic (valve) stenosis [I35.0]  CHF following cardiac surgery, postop [I97.130]       Date 2021       Day of ARU Week:  3   Time IN//1015; 1300/1335   Individual Tx Minutes 85 + 35   Group Tx Minutes    Co-Treat Minutes    Concurrent Tx Minutes    TOTAL Tx Time Mins 120   Variance Time    Variance Time []   Refusal due to:     []   Medical hold/reason:    []   Illness   []   Off Unit for test/procedure  []   Extra time needed to complete task  []   Therapeutic need  []   Other (specify):   Restrictions Restrictions/Precautions: Fall Risk, General Precautions         Communication with other providers: [x]   OK to see per nursing:     []   Spoke with team member regarding:      Subjective observations and cognitive status: Patient on toilet upon entering, nursing student leaves this therapist takes over rest of ADL plan, patient pleasant and agreeable to therapy, following toilet ing task patient c/o slight dizziness, lightheaded. /52 patient drinks some water and takes rest break vitals reassessed at  111/62     Pain level/location:    /10       Location: per patient no pain noted   Discharge recommendations  Anticipated discharge date:  TBD  Destination: [x]home alone   []home alone w assist prn   [] home w/ family    [] Continuous supervision       []SNF    [] Assisted living     [] Other:   Continued therapy: [x]HHC OT  []OUTPATIENT  OT   [] No Further OT  Equipment needs: SC ot TTB?         ADLs:    Eating: Eating  Assistance Needed: Independent  Comment: X  CARE Score: 6  Discharge Goal: Independent       Oral Hygiene: Oral Hygiene  Assistance Needed: Independent  Comment: Sup in stance  CARE Score: 6  Discharge Goal: Independent    UB/LB Bathing: Shower/Bathe Self  Assistance Needed: Supervision or touching assistance  Comment: CGA in stance uses LHS for distal B LE at sink declines full shower this date stating \"i will take a shower tomorrow\"  CARE Score: 4  Discharge Goal: Supervision or touching assistance    UB Dressing: Upper Body Dressing  Assistance Needed: Partial/moderate assistance  Comment: MIn A to assist pulling down in back education of sternal precautions during dressing tasks  CARE Score: 3  Discharge Goal: Independent         LB Dressing: Lower Body Dressing  Assistance Needed: Partial/moderate assistance  Comment: Min A using reacher to thread feet through pants requiring light min A to bring fully over hips  CARE Score: 3  Discharge Goal: Independent    Donning and [place] Footwear: Putting On/Taking Off Footwear  Assistance Needed: Independent  Comment: MOd I using sock aide; declines wearing shoes this date  CARE Score: 6  Discharge Goal: Partial/moderate assistance      Toileting: Toileting Hygiene  Assistance Needed: Supervision or touching assistance  Comment: CGA during clothing management; compensatory techniques for hygiene following BM  CARE Score: 4  Discharge Goal: Partial/moderate assistance      Toilet Transfers:     Toilet Transfer  Assistance Needed: Supervision or touching assistance  Comment: CGA 2* to feeling \"somewhat lightheaded\"  CARE Score: 4  Discharge Goal: Independent  Device Used:    [x]   Standard Toilet         []   Grab Bars           []  Bedside Commode       []   Elevated Toilet          []   Other:        Bed Mobility:           [x]   Pt received out of bed   Rolling R/L:    Scooting:    Supine --> Sit:    Sit --> Supine:      Transfers:    Sit--> Stand:  CGA  Stand --> Sit:   CGA  Stand-Pivot:   CGA/light Min A at end of session   Other:    Assistive device required for transfer:   RW      Functional Mobility:  Throughout room/bathroom; throughout ARU c focus on good breathing techniques, required 3 seated rest breaks, O   Assistance:  CGA  Device:   [x]   Rolling Walker     []   Standard Walker []   Wheelchair        []   Baljinder Gorman       []   Anamaria Max         []   Cardiac Walker       []   Other:          Additional Therapeutic activities/exercises completed this date:     [x]   ADL Training   [x]   Balance/Postural training     [x]   Bed/Transfer Training   []   Endurance Training   []   Neuromuscular Re-ed   []   Nu-step:  Time:        Level:         #Steps:       []   Rebounder:    []  Seated     []  Standing        []   Supine Ther Ex (reps/sets):     []   Seated Ther Ex (reps/sets):     []   Standing Ther Ex (reps/sets):     []   Other:      Comments: patient requires multiple lengthy seated rest breaks throughout ADL task this date with some SOB and fatigue, c/o dizziness times two at beginning of tx session following BM. Educated patient at  Length on PLB techniques and energy conservation techniques to reduce SOB      Patient/Caregiver Education and Training:   [x]   Adaptive Equipment Use  [x]   Bed Mobility/Transfer Technique/Safety  [x]   Energy Conservation Tips  []   Family training  [x]   Postural Awareness  [x]   Safety During Functional Activities  [x]   Reinforced Patient's Precautions   []   Progress was updated and reviewed in Rehabtracker with patient and/or family this         date.     Treatment Plan for Next Session: POC to continue as tolerated         Treatment/Activity Tolerance:   [x] Tolerated treatment with no adverse effects    [] Patient limited by fatigue  [] Patient limited by pain   [] Patient limited by medical complications:    [] Adverse reaction to Tx:   [] Significant change in status    Safety:       []  bed alarm set    []  chair alarm set    []  Pt refused alarms                []  Telesitter activated      [x]  Gait belt used during tx session      []other:       Number of Minutes/Billable Intervention  Therapeutic Exercise    ADL Self-care 55   Neuro Re-Ed Therapeutic Activity 30 + 35   Group    Other:    TOTAL 120       Social History  Social/Functional History  Lives With: Alone  Type of Home: Apartment  Home Layout: One level  Home Access: Level entry  Bathroom Shower/Tub: Tub/Shower unit  Bathroom Toilet: Standard  Bathroom Equipment: Grab bars in shower  Bathroom Accessibility: Accessible  ADL Assistance: Independent  Homemaking Assistance: Independent  Homemaking Responsibilities: Yes  Meal Prep Responsibility: Primary  Cleaning Responsibility: Primary  Bill Paying/Finance Responsibility: Primary  Shopping Responsibility: Primary  Dependent Care Responsibility: Primary  Health Care Management: Primary  Ambulation Assistance: Independent (no AD at baseline)  Transfer Assistance: Independent  Active : Yes (sister, son, DIL)  Occupation: Retired  Leisure & Hobbies: Pt enjoys traveling and spending time at [employer] father's home. Pt reports assisting pt's father with help of sisters. Additional Comments: Pt denies any falls this year. Pt has full sized bed at home. Objective                                                                                    Goals:  (Update in navigator)  Short term goals  Time Frame for Short term goals: STG=LTG:  Long term goals  Time Frame for Long term goals : 10-12 days or until d/c  Long term goal 1: Pt will complete feeding/grooming/oral care task c MOD I by d/c  Long term goal 2: Pt will complete total body bathing c MOD I c use of AE by d/c  Long term goal 3: Pt will complete total body dressing (UB/LB) c MOD I by d/c  Long term goal 4: Pt will complete toileting c MIN A by d/c  Long term goal 5: Pt will complete functional transfer (toilet, tub, shower) c MOD I by d/c. Long term goals 6: Pt will perform therex/therax to facilitate increased strength/endurance/ax tolerance (c emphasis on dynamic standing balance/tolerance >8 mins and BUE endurance) c MOD I in order to complete ADLs.   Long term goal 7: Pt will complete footwear c MIN A by d/c:        Plan of Care                                                                              Times per week: 5 days per week for a minimum of 60 minutes/day plus group as appropriate for 60 minutes.   Treatment to include Plan  Times per day: Daily  Current Treatment Recommendations: Strengthening, Functional Mobility Training, Patient/Caregiver Education & Training, ROM, Endurance Training, Equipment Evaluation, Education, & procurement, Balance Training, Safety Education & Training, Self-Care / ADL, Home Management Training    Electronically signed by   CM Trevizo,  9/23/2021, 10:01 AM

## 2021-09-23 NOTE — FLOWSHEET NOTE
[x] daily progress note       [] discharge       Patient Name:  Kev Aguirre   :  1950 MRN: [id number]  Room:  [address] [place] [address] Date of Admission: 2021  Rehabilitation Diagnosis:   Nonrheumatic aortic (valve) stenosis [I35.0]  CHF following cardiac surgery, postop [I97.130]       Date 2021       Day of ARU Week:  3   Time IN/OUT 9615-1511   Individual Tx Minutes 60   TOTAL Tx Time Mins 60   Restrictions Restrictions/Precautions  Restrictions/Precautions: Fall Risk, General Precautions  Position Activity Restriction  Sternal Precautions: No Pushing, 8# Lifting Restrictions, No Pulling  Other position/activity restrictions: Pt c 2L O2   Communication with other providers: [x]   OK to see per nursing:     []   Spoke with team member regarding:      Subjective observations and cognitive status: Pt seen sitting up in recliner at beginning of treatment. Pt agreeable to therapy. Pt reported more labored breathing today. Pt on room air O2 upon entering room. O2 sats 91-95% during session. Pain level/location:   4 /10       Location: chest incision   Discharge recommendations  Anticipated discharge date:  TBD  Destination: []home alone   []home alone with assist PRN     [] home w/ family      [] Continuous supervision  []SNF    [] Assisted living     [] Other:   Continued therapy: []C PT  []OUTPATIENT  PT   [] No Further PT  Equipment needs: TBD      [x]   Pt received out of bed     Transfers:    Sit--> Stand:  CGA  Stand --> Sit:  SBA  Stand pivot transfers:  CGA  Assistive device required for transfer:  RW   Min vcs for proper hand placement and sternal precautions. Gait:    Distance:  105'   Assistance:  CGA  Device:  RW  Gait Quality:  Reciprocal pattern; standing rest breaks; Vcs for pursed lip breathing.       Uneven Surfaces:       Assistance:  CGA   Device:  RW  Surfaces Completed:   [x]  Carpeted Surface with bean bags beneath      []  Throw rugs       []  Ramp       []  Outdoor pavements []  Grass             []  Loose gravel        []  Other:         Pt amb up/down 4\" curb step CGA with RW. Vcs for walker safety. Additional Therapeutic activities/exercises completed this date:     []   Nu-step:  Time:        Level:         #Steps:       []   Rebounder:    []  Seated     []  Standing        []   Balance training         []   Postural training    []   Supine ther ex (reps/sets):     [x]   Seated ther ex (reps/sets):  Overhead side stretch, ankle pumps, marching, fwd arm raises x 10 reps each for strengthening. []   Standing ther ex (reps/sets):     []   Picking up object from floor (standing):                   []   Reacher used   []   Other:    []   Other:    Patient/Caregiver Education and Training:   [x]   Bed Mobility/Transfer technique/safety  [x]   Gait technique/sequencing  [x]   Proper use of assistive device  [x]   Advanced mobility safety and technique  []   Reinforced patient's precautions/mobility while maintaining precautions  []   Postural awareness  []   Family training    Treatment Plan for Next Session: gait; transfers; advanced gait; stair training; exercises; balance training     Assessment: This pt demonstrated a positive response to today's treatment as evidenced by improved mobility. The patient is making progress toward established goals as evidenced by QI scores. Ongoing deficits are observed in the areas of strength, balance, and endurance and continued focus on strengthening, balance training, and endurance training is recommended.      Treatment/Activity Tolerance:   [x] Tolerated treatment with no adverse effects    [] Patient limited by fatigue  [] Patient limited by pain   [] Patient limited by medical complications:    [] Adverse reaction to Tx:   [] Significant change in status    Safety:       []  bed alarm set    [x]  chair alarm set    []  Pt refused alarms                []  Telesitter activated      [x]  Gait belt used during tx session      [x]other: sternal precautions  Short term goal 5: pt will retrieve light item from floor at mod ind from LRAD:   :        Plan of Care                                                                              Times per week: 5 days per week for a minimum of 60 minutes/day plus group as appropriate for 60 minutes.   Treatment to include Current Treatment Recommendations: Strengthening, Transfer Training, Endurance Training, Balance Training, Gait Training, Functional Mobility Training, Stair training, ADL/Self-care Training, Patient/Caregiver Education & Training, Equipment Evaluation, Education, & procurement, Positioning, Safety Education & Training, Pain Management, Wheelchair Mobility Training    Electronically signed by   Marquis Corrales, [id number]  9/23/2021, 11:06 AM

## 2021-09-24 LAB
GLUCOSE BLD-MCNC: 166 MG/DL (ref 70–99)
GLUCOSE BLD-MCNC: 175 MG/DL (ref 70–99)
GLUCOSE BLD-MCNC: 191 MG/DL (ref 70–99)
GLUCOSE BLD-MCNC: 228 MG/DL (ref 70–99)

## 2021-09-24 PROCEDURE — 97116 GAIT TRAINING THERAPY: CPT

## 2021-09-24 PROCEDURE — 6370000000 HC RX 637 (ALT 250 FOR IP): Performed by: PHYSICAL MEDICINE & REHABILITATION

## 2021-09-24 PROCEDURE — 6370000000 HC RX 637 (ALT 250 FOR IP): Performed by: SURGERY

## 2021-09-24 PROCEDURE — 2700000000 HC OXYGEN THERAPY PER DAY

## 2021-09-24 PROCEDURE — 97530 THERAPEUTIC ACTIVITIES: CPT

## 2021-09-24 PROCEDURE — 99232 SBSQ HOSP IP/OBS MODERATE 35: CPT | Performed by: PHYSICAL MEDICINE & REHABILITATION

## 2021-09-24 PROCEDURE — 1280000000 HC REHAB R&B

## 2021-09-24 PROCEDURE — 82962 GLUCOSE BLOOD TEST: CPT

## 2021-09-24 PROCEDURE — 94761 N-INVAS EAR/PLS OXIMETRY MLT: CPT

## 2021-09-24 PROCEDURE — 97110 THERAPEUTIC EXERCISES: CPT

## 2021-09-24 PROCEDURE — 94150 VITAL CAPACITY TEST: CPT

## 2021-09-24 PROCEDURE — 97535 SELF CARE MNGMENT TRAINING: CPT

## 2021-09-24 RX ADMIN — PANTOPRAZOLE SODIUM 40 MG: 40 TABLET, DELAYED RELEASE ORAL at 05:53

## 2021-09-24 RX ADMIN — GLIPIZIDE 5 MG: 5 TABLET ORAL at 08:56

## 2021-09-24 RX ADMIN — ASPIRIN 81 MG: 81 TABLET, COATED ORAL at 08:34

## 2021-09-24 RX ADMIN — FUROSEMIDE 20 MG: 20 TABLET ORAL at 08:35

## 2021-09-24 RX ADMIN — APIXABAN 5 MG: 5 TABLET, FILM COATED ORAL at 21:05

## 2021-09-24 RX ADMIN — INSULIN LISPRO 2 UNITS: 100 INJECTION, SOLUTION INTRAVENOUS; SUBCUTANEOUS at 08:57

## 2021-09-24 RX ADMIN — THERA TABS 1 TABLET: TAB at 08:34

## 2021-09-24 RX ADMIN — FUROSEMIDE 20 MG: 20 TABLET ORAL at 18:02

## 2021-09-24 RX ADMIN — ACETAMINOPHEN 650 MG: 325 TABLET ORAL at 09:40

## 2021-09-24 RX ADMIN — INSULIN LISPRO 2 UNITS: 100 INJECTION, SOLUTION INTRAVENOUS; SUBCUTANEOUS at 12:11

## 2021-09-24 RX ADMIN — ACETAMINOPHEN 650 MG: 325 TABLET ORAL at 18:01

## 2021-09-24 RX ADMIN — HYDROCODONE BITARTRATE AND ACETAMINOPHEN 1 TABLET: 5; 325 TABLET ORAL at 05:53

## 2021-09-24 RX ADMIN — INSULIN LISPRO 2 UNITS: 100 INJECTION, SOLUTION INTRAVENOUS; SUBCUTANEOUS at 18:02

## 2021-09-24 RX ADMIN — ALOGLIPTIN 6.25 MG: 12.5 TABLET, FILM COATED ORAL at 08:34

## 2021-09-24 RX ADMIN — CARVEDILOL 3.12 MG: 6.25 TABLET, FILM COATED ORAL at 21:05

## 2021-09-24 RX ADMIN — AMIODARONE HYDROCHLORIDE 200 MG: 200 TABLET ORAL at 08:34

## 2021-09-24 RX ADMIN — CARVEDILOL 3.12 MG: 6.25 TABLET, FILM COATED ORAL at 08:34

## 2021-09-24 RX ADMIN — APIXABAN 5 MG: 5 TABLET, FILM COATED ORAL at 08:34

## 2021-09-24 ASSESSMENT — PAIN DESCRIPTION - LOCATION
LOCATION: CHEST
LOCATION: CHEST

## 2021-09-24 ASSESSMENT — PAIN DESCRIPTION - ORIENTATION: ORIENTATION: UPPER

## 2021-09-24 ASSESSMENT — PAIN DESCRIPTION - DESCRIPTORS
DESCRIPTORS: DISCOMFORT
DESCRIPTORS: DISCOMFORT

## 2021-09-24 ASSESSMENT — PAIN SCALES - GENERAL
PAINLEVEL_OUTOF10: 3
PAINLEVEL_OUTOF10: 7
PAINLEVEL_OUTOF10: 7
PAINLEVEL_OUTOF10: 6

## 2021-09-24 ASSESSMENT — PAIN DESCRIPTION - PROGRESSION
CLINICAL_PROGRESSION: NOT CHANGED
CLINICAL_PROGRESSION: GRADUALLY IMPROVING

## 2021-09-24 ASSESSMENT — PAIN DESCRIPTION - ONSET
ONSET: ON-GOING
ONSET: ON-GOING

## 2021-09-24 ASSESSMENT — PAIN DESCRIPTION - FREQUENCY
FREQUENCY: INTERMITTENT
FREQUENCY: INTERMITTENT

## 2021-09-24 ASSESSMENT — PAIN - FUNCTIONAL ASSESSMENT: PAIN_FUNCTIONAL_ASSESSMENT: PREVENTS OR INTERFERES SOME ACTIVE ACTIVITIES AND ADLS

## 2021-09-24 ASSESSMENT — PAIN DESCRIPTION - PAIN TYPE
TYPE: ACUTE PAIN;SURGICAL PAIN
TYPE: ACUTE PAIN;SURGICAL PAIN

## 2021-09-24 NOTE — PROGRESS NOTES
Physical Rehabilitation: OCCUPATIONAL THERAPY     [x] daily progress note       [] discharge       Patient Name:  Chantel Weems   :  1950 MRN: [id number]  Room:  [address] [place] [address] Date of Admission: 2021  Rehabilitation Diagnosis:   Nonrheumatic aortic (valve) stenosis [I35.0]  CHF following cardiac surgery, postop [I97.130]       Date 2021       Day of ARU Week:  4   Time IN/OUT 1000/1100   Individual Tx Minutes 60   Group Tx Minutes    Co-Treat Minutes    Concurrent Tx Minutes    TOTAL Tx Time Mins 60   Variance Time    Variance Time []   Refusal due to:     []   Medical hold/reason:    []   Illness   []   Off Unit for test/procedure  []   Extra time needed to complete task  []   Therapeutic need  []   Other (specify):   Restrictions Restrictions/Precautions: Fall Risk, General Precautions         Communication with other providers: [x]   OK to see per nursing:     []   Spoke with team member regarding:      Subjective observations and cognitive status: Patient in wc upon arrival pleasant and agreeable to full ADL      Pain level/location:    /10       Location: per patient no pain noted    Discharge recommendations  Anticipated discharge date:  10/1  Destination: [x]home alone   []home alone w assist prn   [] home w/ family    [] Continuous supervision       []SNF    [] Assisted living     [] Other:   Continued therapy: [x][facility]HC OT  []OUTPATIENT  OT   [] No Further OT  Equipment needs: Chantel Weems requires the assistance of a tub transfer bench to successfully and safely complete bathing activities necessary due to reduced balance, endurance, need for ADL assist, and LE weakness and reduced ROM. These tasks cannot be safely completed without this device and would place Chantel Weems at greater risk of falls.        ADLs:         Oral Hygiene: Oral Hygiene  Assistance Needed: Independent  Comment: X  CARE Score: 6  Discharge Goal: Independent    UB/LB Bathing: Shower/Bathe Self  Assistance Needed: Supervision or touching assistance  Comment: SBA in stance to wash B LE uses LHS for distal B LE  CARE Score: 4  Discharge Goal: Supervision or touching assistance    UB Dressing: Upper Body Dressing  Assistance Needed: Partial/moderate assistance  Comment: MIn A to assist pulling down in back education of sternal precautions during dressing tasks  CARE Score: 3  Discharge Goal: Independent         LB Dressing: Lower Body Dressing  Assistance Needed: Supervision or touching assistance  Comment: SBA  CARE Score: 4  Discharge Goal: Independent    Donning and [place] Footwear: Putting On/Taking Off Footwear  Assistance Needed: Independent  Comment: Mod I using sock aide declines shoes this date  CARE Score: 6  Discharge Goal: Partial/moderate assistance      Toileting: Toileting Hygiene  Assistance Needed: Supervision or touching assistance  Comment: SBA using compensatory techniques to complete hygiene following BM and cues for followng sternal precautions when managing pants over hips  CARE Score: 4  Discharge Goal: Partial/moderate assistance      Toilet Transfers:     Toilet Transfer  Assistance Needed: Supervision or touching assistance  Comment: Sup, cues for sternal precautions  CARE Score: 4  Discharge Goal: Independent  Device Used:    [x]   Standard Toilet         []   Grab Bars           []  Bedside Commode       []   Elevated Toilet          []   Other:        Bed Mobility:           [x]   Pt received out of bed   Rolling R/L:    Scooting:    Supine --> Sit:    Sit --> Supine:      Transfers:    Sit--> Stand:  SB/CGA  Stand --> Sit:   SB  Stand-Pivot:   SC/CGA   Other:    Assistive device required for transfer:   RW      Functional Mobility:  Throughout room/bathroom   Assistance:  SB/CGA  Device:   []   Rolling Walker     []   Standard Walker []   Wheelchair        []   Carmen Peals       []   4-Wheeled Candie Che         []   Cardiac Candie Che       []   Other:        Homemaking Tasks:   Retrieves clothing from closet and transports to bathroom at [facility] level for prep of ADL       Additional Therapeutic activities/exercises completed this date:     [x]   ADL Training   [x]   Balance/Postural training     [x]   Bed/Transfer Training   []   Endurance Training   []   Neuromuscular Re-ed   []   Nu-step:  Time:        Level:         #Steps:       []   Rebounder:    []  Seated     []  Standing        []   Supine Ther Ex (reps/sets):     []   Seated Ther Ex (reps/sets):     []   Standing Ther Ex (reps/sets):     []   Other:      Comments:  Patients O2 stats remain 94-97% on room air throughout session     Patient/Caregiver Education and Training:   [x]   Adaptive Equipment Use  [x]   Bed Mobility/Transfer Technique/Safety  [x]   Energy Conservation Tips  []   Family training  [x]   Postural Awareness  [x]   Safety During Functional Activities  [x]   Reinforced Patient's Precautions   []   Progress was updated and reviewed in Rehabtracker with patient and/or family this         date.     Treatment Plan for Next Session: POC to continue as tolerated         Treatment/Activity Tolerance:   [x] Tolerated treatment with no adverse effects    [] Patient limited by fatigue  [] Patient limited by pain   [] Patient limited by medical complications:    [] Adverse reaction to Tx:   [] Significant change in status    Safety:       []  bed alarm set    []  chair alarm set    []  Pt refused alarms                []  Telesitter activated      [x]  Gait belt used during tx session      []other:       Number of Minutes/Billable Intervention  Therapeutic Exercise    ADL Self-care 45   Neuro Re-Ed    Therapeutic Activity 15   Group    Other:    TOTAL 60       Social History  Social/Functional History  Lives With: Alone  Type of Home: Apartment  Home Layout: One level  Home Access: Level entry  Bathroom Shower/Tub: Tub/Shower unit  Bathroom Toilet: Standard  Bathroom Equipment: Grab bars in shower  Bathroom Accessibility: Accessible  ADL Assistance: Independent  Homemaking Assistance: Independent  Homemaking Responsibilities: Yes  Meal Prep Responsibility: Primary  Cleaning Responsibility: Primary  Bill Paying/Finance Responsibility: Primary  Shopping Responsibility: Primary  Dependent Care Responsibility: Primary  Health Care Management: Primary  Ambulation Assistance: Independent (no AD at baseline)  Transfer Assistance: Independent  Active : Yes (sister, son, DIL)  Occupation: Retired  Leisure & Hobbies: Pt enjoys traveling and spending time at [place] father's home. Pt reports assisting pt's father with help of sisters. Additional Comments: Pt denies any falls this year. Pt has full sized bed at home. Objective                                                                                    Goals:  (Update in navigator)  Short term goals  Time Frame for Short term goals: STG=LTG:  Long term goals  Time Frame for Long term goals : 10-12 days or until d/c  Long term goal 1: Pt will complete feeding/grooming/oral care task c MOD I by d/c  Long term goal 2: Pt will complete total body bathing c MOD I c use of AE by d/c  Long term goal 3: Pt will complete total body dressing (UB/LB) c MOD I by d/c  Long term goal 4: Pt will complete toileting c MIN A by d/c  Long term goal 5: Pt will complete functional transfer (toilet, tub, shower) c MOD I by d/c. Long term goals 6: Pt will perform therex/therax to facilitate increased strength/endurance/ax tolerance (c emphasis on dynamic standing balance/tolerance >8 mins and BUE endurance) c MOD I in order to complete ADLs. Long term goal 7: Pt will complete footwear c MIN A by d/c:        Plan of Care                                                                              Times per week: 5 days per week for a minimum of 60 minutes/day plus group as appropriate for 60 minutes.   Treatment to include Plan  Times per day: Daily  Current Treatment Recommendations: Strengthening, Functional Mobility Training, Patient/Caregiver Education & Training, ROM, Endurance Training, Equipment Evaluation, Education, & procurement, Balance Training, Safety Education & Training, Self-Care / ADL, Home Management Training    Electronically signed by   MC Reyes,  9/24/2021, 10:48 AM

## 2021-09-24 NOTE — PROGRESS NOTES
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 Chest wall pain as expected. Prior (baseline) level of function: Independent. Current level of function:         Current  IRF-CHELSEY and Goals:   Occupational Therapy:    Short term goals  Time Frame for Short term goals: STG=LTG :   Long term goals  Time Frame for Long term goals : 10-12 days or until d/c  Long term goal 1: Pt will complete feeding/grooming/oral care task c MOD I by d/c  Long term goal 2: Pt will complete total body bathing c MOD I c use of AE by d/c  Long term goal 3: Pt will complete total body dressing (UB/LB) c MOD I by d/c  Long term goal 4: Pt will complete toileting c MIN A by d/c  Long term goal 5: Pt will complete functional transfer (toilet, tub, shower) c MOD I by d/c. Long term goals 6: Pt will perform therex/therax to facilitate increased strength/endurance/ax tolerance (c emphasis on dynamic standing balance/tolerance >8 mins and BUE endurance) c MOD I in order to complete ADLs.   Long term goal 7: Pt will complete footwear c MIN A by d/c :                                       Eating: Eating  Assistance Needed: Independent  Comment: X  CARE Score: 6  Discharge Goal: Independent       Oral Hygiene: Oral Hygiene  Assistance Needed: Independent  Comment: Sup in stance  CARE Score: 6  Discharge Goal: Independent    UB/LB Bathing: Shower/Bathe Self  Assistance Needed: Supervision or touching assistance  Comment: CGA in stance uses LHS for distal B LE at sink declines full shower this date stating \"i will take a shower tomorrow\"  CARE Score: 4  Discharge Goal: Supervision or touching assistance    UB Dressing: Upper Body Dressing  Assistance Needed: Partial/moderate assistance  Comment: MIn A to assist pulling down in back education of sternal precautions during dressing tasks  CARE Score: 3  Discharge Goal: Independent         LB Dressing: Lower Body Dressing  Assistance Needed: Partial/moderate assistance  Comment: Min A using reacher to thread feet through pants requiring light min A to bring fully over hips  CARE Score: 3  Discharge Goal: Independent    Donning and [place] Footwear: Putting On/Taking Off Footwear  Assistance Needed: Independent  Comment: MOd I using sock aide; declines wearing shoes this date  CARE Score: 6  Discharge Goal: Partial/moderate assistance      Toileting: Toileting Hygiene  Assistance Needed: Supervision or touching assistance  Comment: CGA during clothing management; compensatory techniques for hygiene following BM  CARE Score: 4  Discharge Goal: Partial/moderate assistance      Toilet Transfers:   Toilet Transfer  Assistance Needed: Supervision or touching assistance  Comment: CGA 2* to feeling \"somewhat lightheaded\"  CARE Score: 4  Discharge Goal: Independent    Physical Therapy:   Short term goals  Time Frame for Short term goals: 7-10 days  Short term goal 1: pt will perform supine <-> sit, scooting from flat HOB at mod ind while maintaining sternal precautions  Short term goal 2: pt will perform sit <-> stand, bed <-> chair, car transfers at mod ind while maintaining sternal precautions  Short term goal 3: pt will ambulate 150 ft on level surfaces using LRAD at mod ind including over uneven surfaces, 2 turns  Short term goal 4: pt will ascend/descend curb step at mod ind using LRAD and complete up to 6 steps w/o UE support at sup level while maintaining sternal precautions  Short term goal 5: pt will retrieve light item from floor at mod ind from LRAD            Bed Mobility:   Sit to Lying  Assistance Needed: Partial/moderate assistance (for eccentric control over trunk transition into supine)  CARE Score: 3  Discharge Goal: Independent  Roll Left and Right  Assistance Needed: Supervision or touching assistance (inc time and min vc to maintain sternal precautions)  CARE Score: 4  Discharge Goal: Independent  Lying to Sitting on Side of Bed  Assistance Needed: Partial/moderate assistance (min A for initiation of trunk transition)  CARE Score: 3  Discharge Goal: Independent    Transfers:    Sit to Stand  Assistance Needed: Supervision or touching assistance  Comment: CGA with RW per PTA report today  CARE Score: 4  Discharge Goal: Independent  Chair/Bed-to-Chair Transfer  Assistance Needed: Supervision or touching assistance (CGA for safety; progressing to SBA)  CARE Score: 4  Discharge Goal: Independent  Toilet Transfer  Assistance Needed: Partial/moderate assistance (min a for initiation from toilet while maintaining sternal precautions)  CARE Score: 3  Discharge Goal: Independent  Car Transfer  Assistance Needed: Partial/moderate assistance (per supporting document of evaluating PT)  Comment: Min A  CARE Score: 3  Discharge Goal: Independent    Ambulation:    Walking Ability  Does the Patient Walk?: Yes     Walk 10 Feet  Assistance Needed: Supervision or touching assistance (CGA progressing to SBA)  CARE Score: 4  Discharge Goal: Independent (w/ LRAD; normally ambulates w/o AD)     Walk 50 Feet with Two Turns  Assistance Needed: Supervision or touching assistance (progressing to SBA 2/2 sturdy gait and good use of AD while maintaining precautions)  CARE Score: 4  Discharge Goal: Independent (w/ LRAD)     Walk 150 Feet  Assistance Needed:  (evaluating PT used cardiac walker for mobility assessment versus RW today)  Comment: pt able to ambulate 105 ft using RW with SBA per PTA report today  CARE Score: 4  Discharge Goal: Independent     Walking 10 Feet on Uneven Surfaces  Assistance Needed: Supervision or touching Soft, slow speech. Follows directions. MMM. No JVD. Pulmonary: Shallow respirations with blunted breath sounds in the bases. Cardiac: Soft pansystolic murmur. Occasional premature beat. Controlled rate. Abdomen: Patient's abdomen is soft and nondistended. Bowel sounds were present throughout. There was no rebound, guarding or masses noted. Drain site sutures intact. Upper extremities: Slow and deliberate with bringing her hands up to meet mine. Chest wall pain inhibits exertion across to her shoulders. Fair  strength. No signs of DVT. Lower extremities: 1+ edema. Heels clear. No signs of DVT. Sitting balance was good. Standing balance was poor. Lab Results   Component Value Date    WBC 13.9 (H) 09/20/2021    HGB 9.0 (L) 09/20/2021    HCT 27.2 (L) 09/20/2021    MCV 86.3 09/20/2021     (L) 09/20/2021     Lab Results   Component Value Date    INR 2.07 09/16/2021    INR 0.88 09/09/2021    INR 0.92 07/09/2021    PROTIME 26.9 (H) 09/16/2021    PROTIME 11.4 (L) 09/09/2021    PROTIME 11.9 07/09/2021     Lab Results   Component Value Date    CREATININE 0.5 (L) 09/21/2021    BUN 30 (H) 09/21/2021     09/21/2021    K 4.2 09/21/2021     09/21/2021    CO2 25 09/21/2021     Lab Results   Component Value Date    ALT 15 06/07/2021    AST 16 06/07/2021    ALKPHOS 92 06/07/2021    BILITOT 0.3 06/07/2021       Expected length of stay  prior to a supervised level of function for discharge home with a walker and Isaac [facility] OT/PT is 10/1/2021. Recommendations:    1. CHF after aortic valve replacement:  Although she is very slow and deliberate with her movements, she is responding to verbal cues to engage in the daily occupational and physical therapy.      Focusing on instructing her on techniques for self-care and mobility following sternal precautions.  Daily weights are minimally variable.    Ongoing aggressive pulmonary hygiene measures, DVT prophylaxis and pain management.  Generally she is continent of bowel and bladder with infrequent bowel movements.  Maximizing treatment of her blood sugar and blood pressure and heart rhythm. Verbal cues and moderate physical assistance for transfers again today. 2. DVT prophylaxis: The Eliquis she requires for her atrial flutter is protective against new DVT.  I must monitor her hemoglobin periodically while on this medication.  Weightbearing activities are slowly progressing daily.  GI prophylaxis offered. No new bruising or swelling. 3. Atrial flutter: Cordarone and Coreg for rate control. Twice daily Lasix to avoid decompensation of her CHF.  Daily weights do not reveal any concerns.  Graduated increases in physical activity to build tolerance are planned. 4. Uncontrolled diabetes type 2 with peripheral neuropathy: Patient requires a diet modified for carbohydrates.  She is on Nesina and Glucotrol with sliding scale Humalog.  Blood sugars are checked at mealtime and bedtime. 5. Hypertension: Coreg and Lasix are used to control her systolic blood pressure.  Vital signs are checked at rest and with activity.  Target systolic blood pressure is 120-140. Blood pressure is within target range today. 6. Uncontrolled pain: Sternal precautions, cryotherapy, protected coughing and oral analgesics.  Bowel intervention while on the narcotic.      Counseling and Coordination of Care: In care conference today I met with the patient's OT, PT, RN and . We discussed the patient's problems, progress and prognosis. Disposition issues were clarified and plans were established for ongoing rehabilitation efforts beyond the ARU stay. I reviewed this information with the patient during a second distinct visit with the patient. More than half of the total time of 32 minutes spent with the patient involved counseling and coordination of care.

## 2021-09-24 NOTE — FLOWSHEET NOTE
[x] daily progress note       [] discharge       Patient Name:  Wolf Colón   :  1950 MRN: [id number]  Room:  [address] [place] [address] Date of Admission: 2021  Rehabilitation Diagnosis:   Nonrheumatic aortic (valve) stenosis [I35.0]  CHF following cardiac surgery, postop [I97.130]       Date 2021       Day of ARU Week:  4   Time IN/-1000  [id number]   Individual Tx Minutes 120   TOTAL Tx Time Mins 120   Restrictions Restrictions/Precautions  Restrictions/Precautions: Fall Risk, General Precautions  Position Activity Restriction  Sternal Precautions: No Pushing, 8# Lifting Restrictions, No Pulling  Other position/activity restrictions: Pt c 2L O2   Communication with other providers: [x]   OK to see per nursing:     []   Spoke with team member regarding:      Subjective observations and cognitive status: 1st AM session: Pt seen sitting up in recliner at beginning of treatment. Agreeable to therapy. 2nd AM session: pt seen sitting up in w/c and passed from [address] [place] [address] at beginning of session. Pt agreeable to therapy. Pt on room air O2 and O2 sats were 91-95%   Pain level/location: 6/10       Location: chest incision   Discharge recommendations  Anticipated discharge date: Expected Discharge Date: 10/01/21   Destination: []?home alone   [x]? home alone with assist PRN     []? home w/ family      []? Continuous supervision  []? SNF    []? Assisted living     []? Other:   Continued therapy: [x]? Isaac Sanchez PT  []? OUTPATIENT  PT   []? No Further PT  Equipment needs: Wolf Colón requires the assistance of a wheeled walker to successfully ambulate from room to room at home to allow completion of daily living tasks such as: bathing, toileting, dressing and grooming. A wheeled walker is necessary due to the patient's unsteady gait, upper body weakness, inability to  a standard walker. This patient can ambulate only by pushing a walker instead of using a lesser assistive device such as a cane or crutch. [x]   Pt received out of bed     Transfers:    Sit--> Stand: SBA  Stand --> Sit:   SBA  Stand pivot transfers:   SBA  Assistive device required for transfer:  RW    Gait:    Distance: 1st AM session: 118'+132'    Assistance:  SBA  Device:  RW  Gait Quality:  Reciprocal pattern     Stairs (2nd AM session)  # Completed:  5  Assistance: CGA  Supportive Device:  2 rails    Uneven Surfaces:  (2nd AM session)     Assistance:  SBA  Device:  RW   Surfaces Completed:   [x]  Carpeted Surfaces with bean bags beneath      []  Throw rugs       []  Ramp       []  Outdoor pavements        []  Grass             []  Loose gravel        []  Other:    2nd AM session: Pt amb up/down 4\" curb step SBA with RW (Vcs for walker safety)        Additional Therapeutic activities/exercises completed this date:     [x]   Nu-step: 2nd AM session:  Time: 5 minutes       Level:  1   (pt used BLEs only d/t sternal precautions)     []   Rebounder:    []  Seated     []  Standing        []   Balance training         []   Postural training    []   Supine ther ex (reps/sets):     [x]   Seated ther ex (reps/sets): 1st AM session: overhead side stretch, ankle pumps, marching, fwd arm raises, side arm raises, arm crosses, arm circles (fwd/bwd), trunk twists x 10 reps each for strengthening. 2nd AM session: LAQs, ankle circles, knee bends, hip adduction isometric ball squeezes, hip abduction (blue thera-band resist) x 10 reps each for strengthening.     []   Standing ther ex (reps/sets):     [x]   Picking up object from floor (standing): 2nd AM session: SBA with RW               [x]   Reacher used    []   Other:   []   Other:    Patient/Caregiver Education and Training:   [x]   Bed Mobility/Transfer technique/safety  [x]   Gait technique/sequencing  [x]   Proper use of assistive device  [x]   Advanced mobility safety and technique  []   Reinforced patient's precautions/mobility while maintaining precautions  []   Postural awareness  []   Family training    Treatment Plan for Next Session: gait; transfers; advanced gait; stair training; exercises; balance training     Assessment: This pt demonstrated a positive response to today's treatment as evidenced by improved mobility. The patient is making progress toward established goals as evidenced by QI scores. Ongoing deficits are observed in the areas of strength, balance, and endurance and continued focus on strengthening, balance training, and endurance training is recommended. Treatment/Activity Tolerance:   [x] Tolerated treatment with no adverse effects    [] Patient limited by fatigue  [] Patient limited by pain   [] Patient limited by medical complications:    [] Adverse reaction to Tx:   [] Significant change in status    Safety:       []  bed alarm set    [x]  chair alarm set    []  Pt refused alarms                []  Telesitter activated      [x]  Gait belt used during tx session      [x]other: After 1st AM session: pt left sitting up in w/c with call light at end of treatment. After 2nd AM session: pt left sitting up in recliner with call light at end of treatment.           Number of Minutes/Billable Intervention  Gait Training 75   Therapeutic Exercise 30   Neuro Re-Ed    Therapeutic Activity 15   Wheelchair Propulsion    Group    Other:    TOTAL 120         Social History  Social/Functional History  Lives With: Alone  Type of Home: Apartment  Home Layout: One level  Home Access: Level entry  Bathroom Shower/Tub: Tub/Shower unit  Bathroom Toilet: Standard  Bathroom Equipment: Grab bars in shower  Bathroom Accessibility: Accessible  ADL Assistance: Independent  Homemaking Assistance: Independent  Homemaking Responsibilities: Yes  Meal Prep Responsibility: Primary  Cleaning Responsibility: Primary  Bill Paying/Finance Responsibility: Primary  Shopping Responsibility: Primary  Dependent Care Responsibility: Primary  Health Care Management: Primary  Ambulation Assistance: Independent (no AD at

## 2021-09-25 LAB
GLUCOSE BLD-MCNC: 174 MG/DL (ref 70–99)
GLUCOSE BLD-MCNC: 232 MG/DL (ref 70–99)
GLUCOSE BLD-MCNC: 232 MG/DL (ref 70–99)
GLUCOSE BLD-MCNC: 256 MG/DL (ref 70–99)

## 2021-09-25 PROCEDURE — 94150 VITAL CAPACITY TEST: CPT

## 2021-09-25 PROCEDURE — 6370000000 HC RX 637 (ALT 250 FOR IP): Performed by: SURGERY

## 2021-09-25 PROCEDURE — 2700000000 HC OXYGEN THERAPY PER DAY

## 2021-09-25 PROCEDURE — 97110 THERAPEUTIC EXERCISES: CPT

## 2021-09-25 PROCEDURE — 94761 N-INVAS EAR/PLS OXIMETRY MLT: CPT

## 2021-09-25 PROCEDURE — 97150 GROUP THERAPEUTIC PROCEDURES: CPT

## 2021-09-25 PROCEDURE — 82962 GLUCOSE BLOOD TEST: CPT

## 2021-09-25 PROCEDURE — 1280000000 HC REHAB R&B

## 2021-09-25 PROCEDURE — 97112 NEUROMUSCULAR REEDUCATION: CPT

## 2021-09-25 PROCEDURE — 97530 THERAPEUTIC ACTIVITIES: CPT

## 2021-09-25 PROCEDURE — 97535 SELF CARE MNGMENT TRAINING: CPT

## 2021-09-25 PROCEDURE — 94664 DEMO&/EVAL PT USE INHALER: CPT

## 2021-09-25 PROCEDURE — 6370000000 HC RX 637 (ALT 250 FOR IP): Performed by: PHYSICAL MEDICINE & REHABILITATION

## 2021-09-25 PROCEDURE — 97116 GAIT TRAINING THERAPY: CPT

## 2021-09-25 RX ADMIN — FUROSEMIDE 20 MG: 20 TABLET ORAL at 09:32

## 2021-09-25 RX ADMIN — APIXABAN 5 MG: 5 TABLET, FILM COATED ORAL at 09:31

## 2021-09-25 RX ADMIN — THERA TABS 1 TABLET: TAB at 09:31

## 2021-09-25 RX ADMIN — ALOGLIPTIN 6.25 MG: 12.5 TABLET, FILM COATED ORAL at 09:32

## 2021-09-25 RX ADMIN — GLIPIZIDE 5 MG: 5 TABLET ORAL at 09:32

## 2021-09-25 RX ADMIN — HYDROCODONE BITARTRATE AND ACETAMINOPHEN 1 TABLET: 5; 325 TABLET ORAL at 22:17

## 2021-09-25 RX ADMIN — FUROSEMIDE 20 MG: 20 TABLET ORAL at 16:41

## 2021-09-25 RX ADMIN — CARVEDILOL 3.12 MG: 6.25 TABLET, FILM COATED ORAL at 22:17

## 2021-09-25 RX ADMIN — INSULIN LISPRO 2 UNITS: 100 INJECTION, SOLUTION INTRAVENOUS; SUBCUTANEOUS at 09:38

## 2021-09-25 RX ADMIN — PANTOPRAZOLE SODIUM 40 MG: 40 TABLET, DELAYED RELEASE ORAL at 05:37

## 2021-09-25 RX ADMIN — INSULIN LISPRO 4 UNITS: 100 INJECTION, SOLUTION INTRAVENOUS; SUBCUTANEOUS at 13:07

## 2021-09-25 RX ADMIN — ASPIRIN 81 MG: 81 TABLET, COATED ORAL at 09:31

## 2021-09-25 RX ADMIN — HYDROCODONE BITARTRATE AND ACETAMINOPHEN 1 TABLET: 5; 325 TABLET ORAL at 05:37

## 2021-09-25 RX ADMIN — AMIODARONE HYDROCHLORIDE 200 MG: 200 TABLET ORAL at 09:32

## 2021-09-25 RX ADMIN — INSULIN LISPRO 6 UNITS: 100 INJECTION, SOLUTION INTRAVENOUS; SUBCUTANEOUS at 16:42

## 2021-09-25 RX ADMIN — APIXABAN 5 MG: 5 TABLET, FILM COATED ORAL at 22:18

## 2021-09-25 ASSESSMENT — PAIN DESCRIPTION - ONSET
ONSET: ON-GOING
ONSET: ON-GOING

## 2021-09-25 ASSESSMENT — PAIN DESCRIPTION - LOCATION
LOCATION: CHEST
LOCATION: GENERALIZED

## 2021-09-25 ASSESSMENT — PAIN DESCRIPTION - PROGRESSION: CLINICAL_PROGRESSION: GRADUALLY IMPROVING

## 2021-09-25 ASSESSMENT — PAIN SCALES - GENERAL
PAINLEVEL_OUTOF10: 0
PAINLEVEL_OUTOF10: 2
PAINLEVEL_OUTOF10: 6
PAINLEVEL_OUTOF10: 7
PAINLEVEL_OUTOF10: 0

## 2021-09-25 ASSESSMENT — PAIN DESCRIPTION - FREQUENCY
FREQUENCY: INTERMITTENT
FREQUENCY: INTERMITTENT

## 2021-09-25 ASSESSMENT — PAIN - FUNCTIONAL ASSESSMENT: PAIN_FUNCTIONAL_ASSESSMENT: PREVENTS OR INTERFERES SOME ACTIVE ACTIVITIES AND ADLS

## 2021-09-25 ASSESSMENT — PAIN DESCRIPTION - DESCRIPTORS
DESCRIPTORS: DISCOMFORT
DESCRIPTORS: DISCOMFORT;ACHING

## 2021-09-25 ASSESSMENT — PAIN DESCRIPTION - PAIN TYPE
TYPE: ACUTE PAIN;SURGICAL PAIN
TYPE: ACUTE PAIN

## 2021-09-25 NOTE — PROGRESS NOTES
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 Occasional cough. Sternal pain as expected. Prior (baseline) level of function: Independent. Current level of function:         Current  IRF-CHELSEY and Goals:   Occupational Therapy:    Short term goals  Time Frame for Short term goals: STG=LTG :   Long term goals  Time Frame for Long term goals : 10-12 days or until d/c  Long term goal 1: Pt will complete feeding/grooming/oral care task c MOD I by d/c  Long term goal 2: Pt will complete total body bathing c MOD I c use of AE by d/c  Long term goal 3: Pt will complete total body dressing (UB/LB) c MOD I by d/c  Long term goal 4: Pt will complete toileting c MIN A by d/c  Long term goal 5: Pt will complete functional transfer (toilet, tub, shower) c MOD I by d/c. Long term goals 6: Pt will perform therex/therax to facilitate increased strength/endurance/ax tolerance (c emphasis on dynamic standing balance/tolerance >8 mins and BUE endurance) c MOD I in order to complete ADLs.   Long term goal 7: Pt will complete footwear c MIN A by d/c :                                       Eating: Eating  Assistance Needed: Independent  Comment: X  CARE Score: 6  Discharge Goal: Independent       Oral Hygiene: Oral Hygiene  Assistance Needed: Independent  Comment: X  CARE Score: 6  Discharge Goal: Independent    UB/LB Bathing: Shower/Bathe Self  Assistance Needed: Supervision or touching assistance  Comment: SBA in stance to wash B LE uses LHS for distal B LE  CARE Score: 4  Discharge Goal: Supervision or touching assistance    UB Dressing: Upper Body Dressing  Assistance Needed: Partial/moderate assistance  Comment: MIn A to assist pulling down in back education of sternal precautions during dressing tasks  CARE Score: 3  Discharge Goal: Independent         LB Dressing: Lower Body Dressing  Assistance Needed: Supervision or touching assistance  Comment: SBA  CARE Score: 4  Discharge Goal: Independent    Donning and [place] Footwear: Putting On/Taking Off Footwear  Assistance Needed: Independent  Comment: Mod I using sock aide declines shoes this date  CARE Score: 6  Discharge Goal: Partial/moderate assistance      Toileting: Toileting Hygiene  Assistance Needed: Supervision or touching assistance  Comment: SBA using compensatory techniques to complete hygiene following BM and cues for followng sternal precautions when managing pants over hips  CARE Score: 4  Discharge Goal: Partial/moderate assistance      Toilet Transfers:   Toilet Transfer  Assistance Needed: Supervision or touching assistance  Comment: Sup, cues for sternal precautions  CARE Score: 4  Discharge Goal: Independent    Physical Therapy:   Short term goals  Time Frame for Short term goals: 7-10 days  Short term goal 1: pt will perform supine <-> sit, scooting from flat HOB at mod ind while maintaining sternal precautions  Short term goal 2: pt will perform sit <-> stand, bed <-> chair, car transfers at mod ind while maintaining sternal precautions  Short term goal 3: pt will ambulate 150 ft on level surfaces using LRAD at mod ind including over uneven surfaces, 2 turns  Short term goal 4: pt will ascend/descend curb step at mod ind using LRAD and complete up to 6 steps w/o UE support at sup level while maintaining sternal precautions  Short term goal 5: pt will retrieve light item from floor at mod ind from LRAD            Bed Mobility:   Sit to Lying  Assistance Needed: Partial/moderate assistance (for eccentric control over trunk transition into supine)  CARE Score: 3  Discharge Goal: Independent  Roll Left and Right  Assistance Needed: Supervision or touching assistance (inc time and min vc to maintain sternal precautions)  CARE Score: 4  Discharge Goal: Independent  Lying to Sitting on Side of Bed  Assistance Needed: Partial/moderate assistance (min A for initiation of trunk transition)  CARE Score: 3  Discharge Goal: Independent    Transfers:    Sit to Stand  Assistance Needed: Supervision or touching assistance  Comment: CGA with RW per PTA report today  CARE Score: 4  Discharge Goal: Independent  Chair/Bed-to-Chair Transfer  Assistance Needed: Supervision or touching assistance (CGA for safety; progressing to SBA)  CARE Score: 4  Discharge Goal: Independent  Toilet Transfer  Assistance Needed: Partial/moderate assistance (min a for initiation from toilet while maintaining sternal precautions)  CARE Score: 3  Discharge Goal: Independent  Car Transfer  Assistance Needed: Partial/moderate assistance (per supporting document of evaluating PT)  Comment: Min A  CARE Score: 3  Discharge Goal: Independent    Ambulation:    Walking Ability  Does the Patient Walk?: Yes     Walk 10 Feet  Assistance Needed: Supervision or touching assistance (CGA progressing to SBA)  CARE Score: 4  Discharge Goal: Independent (w/ LRAD; normally ambulates w/o AD)     Walk 50 Feet with Two Turns  Assistance Needed: Supervision or touching assistance (progressing to SBA 2/2 sturdy gait and good use of AD while maintaining precautions)  CARE Score: 4  Discharge Goal: Independent (w/ LRAD)     Walk 150 Feet  Assistance Needed:  (evaluating PT used cardiac walker for mobility assessment versus RW today)  Comment: pt able to ambulate 105 ft using RW with SBA per PTA report today  CARE Score: 4  Discharge Goal: Independent     Walking 10 Feet on Uneven Surfaces  Assistance Needed: Supervision or touching assistance  Comment: CGA using RW per PTA report today  CARE Score: 4  Discharge Goal: Independent     1 Step (Curb)  Assistance Needed: Supervision or touching assistance  Comment: CGA using RW per PTA report today  CARE Score: 4  Discharge Goal: Independent     4 Steps  Reason if not Attempted: Not attempted due to medical condition or safety concerns (pt w/ inc SOB and noting fatigue limiting performance at this time; not tested for safety concerns)  CARE Score: 88  Discharge Goal: Independent     12 Steps  Reason if not Attempted: Not applicable (pt does not normally navigate stairs; endorses only ever doing stairs when she visits her dad, 3-4 steps to enter)  CARE Score: 9  Discharge Goal: Not Applicable       Wheelchair:  w/c Ability: Wheelchair Ability  Uses a Wheelchair and/or Scooter?: Yes  Wheel 50 Feet with Two Turns  Assistance Needed: Partial/moderate assistance (min A to maintain momentum; pt is of short stature feet, barely reach floor, fatiguing quickly)  CARE Score: 3  Discharge Goal: Independent  Wheel 150 Feet  Assistance Needed: Partial/moderate assistance (pt using only BL LE's to maintain sternal precautions; min A to maintain momentum; short stature limiting)  CARE Score: 3  Discharge Goal: Supervision or touching assistance (using BL LE's to maintain sternal precautions; min a to maintain momentum; anticipate pt home w/o w/c on DC)          Balance:    Balance  Posture: Good  Sitting - Static: Good  Standing - Static: Good   Object: Picking Up Object  Assistance Needed: Supervision or touching assistance (CGA to steady)  CARE Score: 4  Discharge Goal: Independent    I      Exam:    Blood pressure (!) 103/55, pulse 83, temperature 98.3 °F (36.8 °C), temperature source Oral, resp. rate 14, height 4' 11\" (1.499 m), weight 203 lb 4.2 oz (92.2 kg), last menstrual period 07/09/2000, SpO2 95 %, not currently breastfeeding. General: Semirecumbent in bed. Good eye contact. Alert. HEENT: Soft-spoken. MMM. No JVD or adenopathy.     Pulmonary: Unlabored breathing without wheezes or rales. Cardiac: Occasional premature beat. Soft systolic murmur. Abdomen: Patient's abdomen is soft and nondistended. Bowel sounds were present throughout. There was no rebound, guarding or masses noted. Upper extremities: Slow and deliberate with arm movements. Functional  strength. Lower extremities: 1+ pitting edema as before. Heels clear. No signs of DVT. Sitting balance was good. Standing balance was poor. Lab Results   Component Value Date    WBC 13.9 (H) 09/20/2021    HGB 9.0 (L) 09/20/2021    HCT 27.2 (L) 09/20/2021    MCV 86.3 09/20/2021     (L) 09/20/2021     Lab Results   Component Value Date    INR 2.07 09/16/2021    INR 0.88 09/09/2021    INR 0.92 07/09/2021    PROTIME 26.9 (H) 09/16/2021    PROTIME 11.4 (L) 09/09/2021    PROTIME 11.9 07/09/2021     Lab Results   Component Value Date    CREATININE 0.5 (L) 09/21/2021    BUN 30 (H) 09/21/2021     09/21/2021    K 4.2 09/21/2021     09/21/2021    CO2 25 09/21/2021     Lab Results   Component Value Date    ALT 15 06/07/2021    AST 16 06/07/2021    ALKPHOS 92 06/07/2021    BILITOT 0.3 06/07/2021       Expected length of stay  prior to a supervised level of function for discharge home with a walker and Isaac Sanchez OT/PT is 10/1/2021. Recommendations:    1. CHF after aortic valve replacement:   Slowly improving tolerance for her daily occupational and physical therapy.      Focusing on instructing her on techniques for self-care and mobility following sternal precautions.  Daily weights are minimally variable.    Ongoing aggressive pulmonary hygiene measures, DVT prophylaxis and pain management.  Generally, she is continent of bowel and bladder with infrequent bowel movements.   Verbal cues and minimum to moderate physical assistance for transfers today. 2. DVT prophylaxis: The Eliquis she requires for her atrial flutter is protective against new DVT.   I must monitor her hemoglobin periodically while on this

## 2021-09-25 NOTE — PROGRESS NOTES
mobility, safety, and general social & communication opportunities with other group members.       Functional areas targeted:   [] Communication [x] Memory  [] Coordination    [] Kitchen Safety [x] Problem Solving  [x] Strengthening     [] Attention  [x] Endurance   [] Mobility    [] Awareness/Insight [x] Balance  [] Transfers  [] Social/Pragmatics [x] Sequencing  [] Equipment Use    [] Safety   [] Other (specify)    Participation of Rolanda Spine:  [x] Actively participated    [] Required  cues for   [] Other (specify)    Education completed: Educated on Purse lip breathing techniques  Cognitive fx during this group: Intact  Family present: Present                 Assessment / Impression                                                          Treatment/Activity Tolerance:   [x] Tolerated Treatment well:     [] Patient limited by fatigue/pain:       [] Patient limited by medical complications:    [] Adverse Reaction to Tx:   [] Significant change in status      Number of Minutes/Billable Intervention  Gait Training    Therapeutic Exercise    Neuro Re-Ed    Therapeutic Activity    Wheelchair Propulsion    Group 60   Other:    TOTAL 60         Social History  Social/Functional History  Lives With: Alone  Type of Home: Apartment  Home Layout: One level  Home Access: Level entry  Bathroom Shower/Tub: Tub/Shower unit  Bathroom Toilet: Standard  Bathroom Equipment: Grab bars in shower  Bathroom Accessibility: Accessible  ADL Assistance: Independent  Homemaking Assistance: Independent  Homemaking Responsibilities: Yes  Meal Prep Responsibility: Primary  Cleaning Responsibility: Primary  Bill Paying/Finance Responsibility: Primary  Shopping Responsibility: Primary  Dependent Care Responsibility: Primary  Health Care Management: Primary  Ambulation Assistance: Independent (no AD at baseline)  Transfer Assistance: Independent  Active : Yes (sister, son, DIL)  Occupation: Retired  Leisure & Hobbies: Pt enjoys traveling and spending time at pt's father's home. Pt reports assisting pt's father with help of sisters. Additional Comments: Pt denies any falls this year. Pt has full sized bed at home. Objective                                                                                    Goals:  (Update in navigator)  Short term goals  Time Frame for Short term goals: 7-10 days  Short term goal 1: pt will perform supine <-> sit, scooting from flat HOB at mod ind while maintaining sternal precautions  Short term goal 2: pt will perform sit <-> stand, bed <-> chair, car transfers at mod ind while maintaining sternal precautions  Short term goal 3: pt will ambulate 150 ft on level surfaces using LRAD at mod ind including over uneven surfaces, 2 turns  Short term goal 4: pt will ascend/descend curb step at mod ind using LRAD and complete up to 6 steps w/o UE support at sup level while maintaining sternal precautions  Short term goal 5: pt will retrieve light item from floor at mod ind from LRAD:   :        Plan of Care                                                                              Times per week: Pt to be seen at least  5x per week for a minimum of 60 minutes as                               appropriate.   Treatment to include Current Treatment Recommendations: Strengthening, Transfer Training, Endurance Training, Balance Training, Gait Training, Functional Mobility Training, Stair training, ADL/Self-care Training, Patient/Caregiver Education & Training, Equipment Evaluation, Education, & procurement, Positioning, Safety Education & Training, Pain Management, Wheelchair Mobility Training      Electronically signed by   Nafisa Henry PTA,  [id number]  9/25/2021, 8:36 AM

## 2021-09-25 NOTE — PROGRESS NOTES
Occupational Therapy    Physical Rehabilitation: OCCUPATIONAL THERAPY     [x] daily progress note       [] discharge       Patient Name:  Rossi Urena   :  1950 MRN: [id number]  Room:  [address] [place] [address] Date of Admission: 2021  Rehabilitation Diagnosis:   Nonrheumatic aortic (valve) stenosis [I35.0]  CHF following cardiac surgery, postop [I97.130]       Date 2021       Day of ARU Week:  5   Time IN/ - 8:19  13:05 - 13:59   Individual Tx Minutes 19 + 54 = 73   Group Tx Minutes    Co-Treat Minutes    Concurrent Tx Minutes    TOTAL Tx Time Mins 73 Minutes   Variance Time +13 Minutes   Variance Time []   Refusal due to:     []   Medical hold/reason:    []   Illness   []   Off Unit for test/procedure  [x]   Extra time needed to complete task  [x]   Therapeutic need  []   Other (specify):   Restrictions Restrictions/Precautions: Fall Risk, General Precautions         Communication with other providers: [x]   OK to see per nursing:     []   Spoke with team member regarding:      Subjective observations and cognitive status: 1st Attempt: Pt stated \"Breakfast just got here. I can't participate in therapy right now, but can you help me to the EOB so I can eat sitting up? \"    2nd Attempt: Pt in recliner chair with nursing receiving medication. She shared she is ready for a shower. Pain level/location: 1st Attempt - Bed In:   0/10      Comment: Pt shared that she is currently not experiencing pain, but she typically starts feeling pain upon movement.      2nd Attempt - Sitting In Recliner: 0/10 Comment: 'Not yet'   Discharge recommendations  Anticipated discharge date: 10/1  Destination: [x]home alone   []home alone w assist prn   [] home w/ family    [] Continuous supervision       []SNF    [] Assisted living     [] Other:   Continued therapy: [x]HHC OT  []OUTPATIENT  OT   [] No Further OT  Equipment needs: Rossi Urena requires the assistance of a tub transfer bench to successfully and safely complete bathing activities necessary due to reduced balance, endurance, need for ADL assist, and LE weakness and reduced ROM. These tasks cannot be safely completed without this device and would place [facility] at greater risk of falls. ADLs:    Eating: Eating  Assistance Needed: Independent  Comment: Pt struggled to open syrup packet, but was able to use fork to open packet.   CARE Score: 6  Discharge Goal: Independent       Oral Hygiene: Oral Hygiene  Assistance Needed: Independent  Comment: Brushing teeth while sitting in wheelchair  CARE Score: 6  Discharge Goal: Independent    UB/LB Bathing: Shower/Bathe Self  Assistance Needed: Partial/moderate assistance  Comment: with use of LHS for BLE washing; Pt needed Min A drying BLEs  CARE Score: 3  Discharge Goal: Supervision or touching assistance    UB Dressing: Upper Body Dressing  Assistance Needed: Supervision or touching assistance  Comment: SBA for donning pull-over shirt  CARE Score: 4  Discharge Goal: Independent         LB Dressing: Lower Body Dressing  Assistance Needed: Supervision or touching assistance  Comment: SBA for donning pants and depends  CARE Score: 4  Discharge Goal: Independent    Donning and [place] Footwear: Putting On/Taking Off Footwear  Assistance Needed: Supervision or touching assistance  Comment: SBA with use of sock aide  CARE Score: 4  Discharge Goal: Partial/moderate assistance      Toileting:     Pt refused d/t just going to the restroom with nursing aide     Bed Mobility:           Scooting: SBA  Supine --> Sit: CGA with HOB elevated 75 degrees    Transfers:    Sit--> Stand:  SBA with occasional verbal reminders for hand placement to follow sternal precautions  Stand --> Sit:   SBA   Assistive device required for transfer:  RW    Functional Mobility:  (throughout room)  Assistance:  SBA  Device:   [x]   Rolling Walker     []   Standard Walker []   Wheelchair        []   Madison Rogers       []   4-Wheeled [facility]         [] chair alarm set    []  Pt refused alarms                []  Telesitter activated      [x]  Gait belt used during tx session      [x]other: Hospital phone and remote      Number of Minutes/Billable Intervention  Therapeutic Exercise    ADL Self-care 39 Minutes   Neuro Re-Ed    Therapeutic Activity 34 MInutes   Group    Other:    TOTAL 73 Minutes       Social History  Social/Functional History  Lives With: Alone  Type of Home: Apartment  Home Layout: One level  Home Access: Level entry  Bathroom Shower/Tub: Tub/Shower unit  Bathroom Toilet: Standard  Bathroom Equipment: Grab bars in shower  Bathroom Accessibility: Accessible  ADL Assistance: Independent  Homemaking Assistance: Independent  Homemaking Responsibilities: Yes  Meal Prep Responsibility: Primary  Cleaning Responsibility: Primary  Bill Paying/Finance Responsibility: Primary  Shopping Responsibility: Primary  Dependent Care Responsibility: Primary  Health Care Management: Primary  Ambulation Assistance: Independent (no AD at baseline)  Transfer Assistance: Independent  Active : Yes (sister, son, DIL)  Occupation: Retired  Leisure & Hobbies: Pt enjoys traveling and spending time at [place] father's home. Pt reports assisting pt's father with help of sisters. Additional Comments: Pt denies any falls this year. Pt has full sized bed at home.     Objective                                                                                    Goals:  (Update in navigator)  Short term goals  Time Frame for Short term goals: STG=LTG:  Long term goals  Time Frame for Long term goals : 10-12 days or until d/c  Long term goal 1: Pt will complete feeding/grooming/oral care task c MOD I by d/c  Long term goal 2: Pt will complete total body bathing c MOD I c use of AE by d/c  Long term goal 3: Pt will complete total body dressing (UB/LB) c MOD I by d/c  Long term goal 4: Pt will complete toileting c MIN A by d/c  Long term goal 5: Pt will complete functional transfer (toilet, tub, shower) c MOD I by d/c. Long term goals 6: Pt will perform therex/therax to facilitate increased strength/endurance/ax tolerance (c emphasis on dynamic standing balance/tolerance >8 mins and BUE endurance) c MOD I in order to complete ADLs. Long term goal 7: Pt will complete footwear c MIN A by d/c:        Plan of Care                                                                              Times per week: 5 days per week for a minimum of 60 minutes/day plus group as appropriate for 60 minutes.   Treatment to include Plan  Times per day: Daily  Current Treatment Recommendations: Strengthening, Functional Mobility Training, Patient/Caregiver Education & Training, ROM, Endurance Training, Equipment Evaluation, Education, & procurement, Balance Training, Safety Education & Training, Self-Care / ADL, Home Management Training    Electronically signed by   APOLONIA Cotrtell OTR/L  9/25/2021, 2:07 PM

## 2021-09-25 NOTE — PROGRESS NOTES
Physical Therapy  [x] daily progress note       [] discharge       Patient Name:  Vlad Cantor   :  1950 MRN: [id number]  Room:  [address] [place] [address] Date of Admission: 2021  Rehabilitation Diagnosis:   Nonrheumatic aortic (valve) stenosis [I35.0]  CHF following cardiac surgery, postop [I97.130]       Date 2021       Day of ARU Week:  5   Time IN/-1036   Individual Tx Minutes 60   Group Tx Minutes    Co-Treat Minutes    Concurrent Tx Minutes    TOTAL Tx Time Mins 60   Variance Time    Variance Time []   Refusal due to:     []   Medical hold/reason:    []   Illness   []   Off Unit for test/procedure  []   Extra time needed to complete task  []   Therapeutic need  []   Other (specify):   Restrictions Restrictions/Precautions  Restrictions/Precautions: Fall Risk, General Precautions  Position Activity Restriction  Sternal Precautions: No Pushing, 8# Lifting Restrictions, No Pulling  Other position/activity restrictions: Pt c 2L O2   Communication with other providers: [x]   OK to see per nursing:     []   Spoke with team member regarding:      Subjective observations and cognitive status: Pt states she is tired this am- reports minor  R UE ache at start of session. observed sitting EOB upon PTA arrival     Pain level/location:  3  /10       Location: R UE ache   Discharge recommendations  Anticipated discharge date: Expected Discharge Date: 10/01/21   Destination: []??home alone   [x]? ?home alone with assist PRN     []? ? home w/ family      []? ? Continuous supervision  []? ?SNF    []? ? Assisted living     []? ? Other:   Continued therapy: [x]? ?[facility]  []??OUTPATIENT  PT   []? ? No Further PT  Equipment needs: Avril De Guzman requires the assistance of a wheeled walker to successfully ambulate from room to room at home to allow completion of daily living tasks such as: bathing, toileting, dressing and grooming.   A wheeled walker is necessary due to the patient's unsteady gait, upper body weakness, inability to  a standard walker. This patient can ambulate only by pushing a walker instead of using a lesser assistive device such as a cane or crutch. Bed Mobility:           []   Pt received out of bed     Transfers:    Sit--> Stand:  SBA  Stand --> Sit:   SBA  Chair-->Bed/Bed --> Chair:   SBA  Assistive device required for transfer:       Gait:    Distance:  78', 2x56', 34', 2x 28'  Assistance:  SBA/sup  Device:  fww  Gait Quality:  90% continuous reciprocal pattern, decreased heel toe progression and min cues to improve AD management c turns         Additional Therapeutic activities/exercises completed this date:     [x]   Nu-step:  Time:   7min     Level: 5   LE only to maintain sternal precautions   []   Rebounder:    []  Seated     []  Standing        []   Balance training         []   Postural training    []   Supine ther ex (reps/sets):     [x]   Seated ther ex (reps/sets):  UE 3 way hallelujah x 5 reps ea, fwd/bwd arm circles x 10 reps ea sm and lg., hands clasped figure 8, arms crossed trunk rot x 5 reps ea BL. Seated marches x 20 reps,   [x]   Standing ther ex (reps/sets):  standing HK/BK and mini squat x 10 reps     [x]   Picking up object from floor (standing): SBA with RW    [x]     Reacher used   []   Other:   []   Other:    Comments:      Patient/Caregiver Education and Training:   []   Bed Mobility/Transfer technique/safety  [x]   Gait technique/sequencing  [x]   Proper use of assistive device  [x]   Advanced mobility safety and technique  []   Reinforced patient's precautions/mobility while maintaining precautions  [x]   Postural awareness  []   Family training  []   Progress was updated and reviewed in Rehabtracker with patient and/or family this date. Treatment Plan for Next Session: gait; transfers; advanced gait; stair training; exercises; balance training     Assessment:   This pt demonstrated a positive  response to today's treatment as evidenced by progressed gait quality and endurance  The patient is making steady progress toward established goals as evidenced by QI scores. Ongoing deficits are observed in the areas of strength, balance and decreased activity endurance and continued focus on above deficits is recommended. Treatment/Activity Tolerance:   [x] Tolerated treatment with no adverse effects    [] Patient limited by fatigue  [] Patient limited by pain   [] Patient limited by medical complications:    [] Adverse reaction to Tx:   [] Significant change in status    Safety:       [x]  bed alarm set    []  chair alarm set    []  Pt refused alarms                []  Telesitter activated      [x]  Gait belt used during tx session      []other:         Number of Minutes/Billable Intervention  Gait Training 28   Therapeutic Exercise 16   Neuro Re-Ed 16   Therapeutic Activity    Wheelchair Propulsion    Group    Other:    TOTAL 60         Social History  Social/Functional History  Lives With: Alone  Type of Home: Apartment  Home Layout: One level  Home Access: Level entry  Bathroom Shower/Tub: Tub/Shower unit  Bathroom Toilet: Standard  Bathroom Equipment: Grab bars in shower  Bathroom Accessibility: Accessible  ADL Assistance: Independent  Homemaking Assistance: Independent  Homemaking Responsibilities: Yes  Meal Prep Responsibility: Primary  Cleaning Responsibility: Primary  Bill Paying/Finance Responsibility: Primary  Shopping Responsibility: Primary  Dependent Care Responsibility: Primary  Health Care Management: Primary  Ambulation Assistance: Independent (no AD at baseline)  Transfer Assistance: Independent  Active : Yes (sister, son, DIL)  Occupation: Retired  Leisure & Hobbies: Pt enjoys traveling and spending time at [employer] father's home. Pt reports assisting pt's father with help of sisters. Additional Comments: Pt denies any falls this year. Pt has full sized bed at home.     Objective Goals:  (Update in navigator)  Short term goals  Time Frame for Short term goals: 7-10 days  Short term goal 1: pt will perform supine <-> sit, scooting from flat HOB at [facility] while maintaining sternal precautions  Short term goal 2: pt will perform sit <-> stand, bed <-> chair, car transfers at [facility] while maintaining sternal precautions  Short term goal 3: pt will ambulate 150 ft on level surfaces using LRAD at mod ind including over uneven surfaces, 2 turns  Short term goal 4: pt will ascend/descend curb step at mod ind using LRAD and complete up to 6 steps w/o UE support at sup level while maintaining sternal precautions  Short term goal 5: pt will retrieve light item from floor at [facility] from LRAD:   :        Plan of Care                                                                              Times per week: 5 days per week for a minimum of 60 minutes/day plus group as appropriate for 60 minutes.   Treatment to include Current Treatment Recommendations: Strengthening, Transfer Training, Endurance Training, Balance Training, Gait Training, Functional Mobility Training, Stair training, ADL/Self-care Training, Patient/Caregiver Education & Training, Equipment Evaluation, Education, & procurement, Positioning, Safety Education & Training, Pain Management, Wheelchair Mobility Training    Electronically signed by   Herlinda Rock, PTA [id number]  9/25/2021, 9:41 AM

## 2021-09-26 LAB
GLUCOSE BLD-MCNC: 186 MG/DL (ref 70–99)
GLUCOSE BLD-MCNC: 197 MG/DL (ref 70–99)
GLUCOSE BLD-MCNC: 259 MG/DL (ref 70–99)
GLUCOSE BLD-MCNC: 282 MG/DL (ref 70–99)

## 2021-09-26 PROCEDURE — 6370000000 HC RX 637 (ALT 250 FOR IP): Performed by: PHYSICAL MEDICINE & REHABILITATION

## 2021-09-26 PROCEDURE — 6370000000 HC RX 637 (ALT 250 FOR IP): Performed by: SURGERY

## 2021-09-26 PROCEDURE — 94761 N-INVAS EAR/PLS OXIMETRY MLT: CPT

## 2021-09-26 PROCEDURE — 82962 GLUCOSE BLOOD TEST: CPT

## 2021-09-26 PROCEDURE — 1280000000 HC REHAB R&B

## 2021-09-26 RX ADMIN — INSULIN LISPRO 2 UNITS: 100 INJECTION, SOLUTION INTRAVENOUS; SUBCUTANEOUS at 18:22

## 2021-09-26 RX ADMIN — FUROSEMIDE 20 MG: 20 TABLET ORAL at 18:21

## 2021-09-26 RX ADMIN — INSULIN LISPRO 6 UNITS: 100 INJECTION, SOLUTION INTRAVENOUS; SUBCUTANEOUS at 13:25

## 2021-09-26 RX ADMIN — PANTOPRAZOLE SODIUM 40 MG: 40 TABLET, DELAYED RELEASE ORAL at 06:07

## 2021-09-26 RX ADMIN — AMIODARONE HYDROCHLORIDE 200 MG: 200 TABLET ORAL at 08:27

## 2021-09-26 RX ADMIN — FUROSEMIDE 20 MG: 20 TABLET ORAL at 08:29

## 2021-09-26 RX ADMIN — CARVEDILOL 3.12 MG: 6.25 TABLET, FILM COATED ORAL at 08:26

## 2021-09-26 RX ADMIN — ALOGLIPTIN 6.25 MG: 12.5 TABLET, FILM COATED ORAL at 08:26

## 2021-09-26 RX ADMIN — CARVEDILOL 3.12 MG: 6.25 TABLET, FILM COATED ORAL at 20:31

## 2021-09-26 RX ADMIN — GLIPIZIDE 5 MG: 5 TABLET ORAL at 08:26

## 2021-09-26 RX ADMIN — ASPIRIN 81 MG: 81 TABLET, COATED ORAL at 08:25

## 2021-09-26 RX ADMIN — HYDROCODONE BITARTRATE AND ACETAMINOPHEN 1 TABLET: 5; 325 TABLET ORAL at 06:07

## 2021-09-26 RX ADMIN — APIXABAN 5 MG: 5 TABLET, FILM COATED ORAL at 20:31

## 2021-09-26 RX ADMIN — INSULIN LISPRO 2 UNITS: 100 INJECTION, SOLUTION INTRAVENOUS; SUBCUTANEOUS at 08:30

## 2021-09-26 RX ADMIN — THERA TABS 1 TABLET: TAB at 08:26

## 2021-09-26 RX ADMIN — APIXABAN 5 MG: 5 TABLET, FILM COATED ORAL at 08:26

## 2021-09-26 RX ADMIN — HYDROCODONE BITARTRATE AND ACETAMINOPHEN 1 TABLET: 5; 325 TABLET ORAL at 23:32

## 2021-09-26 ASSESSMENT — PAIN DESCRIPTION - PAIN TYPE
TYPE: SURGICAL PAIN

## 2021-09-26 ASSESSMENT — PAIN DESCRIPTION - ONSET
ONSET: ON-GOING

## 2021-09-26 ASSESSMENT — PAIN DESCRIPTION - DESCRIPTORS
DESCRIPTORS: ACHING

## 2021-09-26 ASSESSMENT — PAIN DESCRIPTION - ORIENTATION
ORIENTATION: UPPER
ORIENTATION: UPPER

## 2021-09-26 ASSESSMENT — PAIN SCALES - GENERAL
PAINLEVEL_OUTOF10: 8
PAINLEVEL_OUTOF10: 0
PAINLEVEL_OUTOF10: 0
PAINLEVEL_OUTOF10: 7
PAINLEVEL_OUTOF10: 5

## 2021-09-26 ASSESSMENT — PAIN DESCRIPTION - LOCATION
LOCATION: CHEST
LOCATION: RIB CAGE
LOCATION: CHEST

## 2021-09-26 ASSESSMENT — PAIN DESCRIPTION - FREQUENCY
FREQUENCY: INTERMITTENT

## 2021-09-26 ASSESSMENT — PAIN DESCRIPTION - PROGRESSION: CLINICAL_PROGRESSION: GRADUALLY IMPROVING

## 2021-09-26 ASSESSMENT — PAIN - FUNCTIONAL ASSESSMENT
PAIN_FUNCTIONAL_ASSESSMENT: PREVENTS OR INTERFERES SOME ACTIVE ACTIVITIES AND ADLS

## 2021-09-26 NOTE — PROGRESS NOTES
Ambulated pt @ 40 ft per her request. Back to bedside chair- 1 L O2 per nasal cannula applied per pt request. O2 sat 96% pre oxygen Pt wt on standing scale per Dr. García Plater order. Wt 199.8 lb. Hola Parrish RN

## 2021-09-27 LAB
GLUCOSE BLD-MCNC: 134 MG/DL (ref 70–99)
GLUCOSE BLD-MCNC: 208 MG/DL (ref 70–99)
GLUCOSE BLD-MCNC: 235 MG/DL (ref 70–99)
GLUCOSE BLD-MCNC: 238 MG/DL (ref 70–99)

## 2021-09-27 PROCEDURE — 97530 THERAPEUTIC ACTIVITIES: CPT

## 2021-09-27 PROCEDURE — 94761 N-INVAS EAR/PLS OXIMETRY MLT: CPT

## 2021-09-27 PROCEDURE — 99232 SBSQ HOSP IP/OBS MODERATE 35: CPT | Performed by: PHYSICAL MEDICINE & REHABILITATION

## 2021-09-27 PROCEDURE — 97535 SELF CARE MNGMENT TRAINING: CPT

## 2021-09-27 PROCEDURE — 97116 GAIT TRAINING THERAPY: CPT

## 2021-09-27 PROCEDURE — 6370000000 HC RX 637 (ALT 250 FOR IP): Performed by: SURGERY

## 2021-09-27 PROCEDURE — 82962 GLUCOSE BLOOD TEST: CPT

## 2021-09-27 PROCEDURE — 97110 THERAPEUTIC EXERCISES: CPT

## 2021-09-27 PROCEDURE — 1280000000 HC REHAB R&B

## 2021-09-27 RX ADMIN — GLIPIZIDE 5 MG: 5 TABLET ORAL at 08:53

## 2021-09-27 RX ADMIN — APIXABAN 5 MG: 5 TABLET, FILM COATED ORAL at 08:41

## 2021-09-27 RX ADMIN — PANTOPRAZOLE SODIUM 40 MG: 40 TABLET, DELAYED RELEASE ORAL at 08:55

## 2021-09-27 RX ADMIN — APIXABAN 5 MG: 5 TABLET, FILM COATED ORAL at 20:00

## 2021-09-27 RX ADMIN — INSULIN LISPRO 4 UNITS: 100 INJECTION, SOLUTION INTRAVENOUS; SUBCUTANEOUS at 12:34

## 2021-09-27 RX ADMIN — ALOGLIPTIN 6.25 MG: 12.5 TABLET, FILM COATED ORAL at 08:42

## 2021-09-27 RX ADMIN — INSULIN LISPRO 4 UNITS: 100 INJECTION, SOLUTION INTRAVENOUS; SUBCUTANEOUS at 08:45

## 2021-09-27 RX ADMIN — CARVEDILOL 3.12 MG: 6.25 TABLET, FILM COATED ORAL at 20:00

## 2021-09-27 RX ADMIN — FUROSEMIDE 20 MG: 20 TABLET ORAL at 08:41

## 2021-09-27 RX ADMIN — AMIODARONE HYDROCHLORIDE 200 MG: 200 TABLET ORAL at 08:42

## 2021-09-27 RX ADMIN — FUROSEMIDE 20 MG: 20 TABLET ORAL at 16:44

## 2021-09-27 RX ADMIN — ASPIRIN 81 MG: 81 TABLET, COATED ORAL at 08:42

## 2021-09-27 RX ADMIN — CARVEDILOL 3.12 MG: 6.25 TABLET, FILM COATED ORAL at 08:41

## 2021-09-27 RX ADMIN — THERA TABS 1 TABLET: TAB at 08:41

## 2021-09-27 ASSESSMENT — PAIN SCALES - GENERAL
PAINLEVEL_OUTOF10: 0
PAINLEVEL_OUTOF10: 4

## 2021-09-27 NOTE — FLOWSHEET NOTE
[x] daily progress note       [] discharge       Patient Name:  Trang Rojas   :  1950 MRN: [id number]  Room:  [address] [place] [address] Date of Admission: 2021  Rehabilitation Diagnosis:   Nonrheumatic aortic (valve) stenosis [I35.0]  CHF following cardiac surgery, postop [I97.130]       Date 2021       Day of ARU Week:  7   Time IN/-1008  6620-2697   Individual Tx Minutes 120   TOTAL Tx Time Mins 120   Restrictions Restrictions/Precautions  Restrictions/Precautions: Fall Risk, General Precautions  Position Activity Restriction  Sternal Precautions: No Pushing, 8# Lifting Restrictions, No Pulling  Other position/activity restrictions: Pt c 2L O2   Communication with other providers: [x]   OK to see per nursing:     [x]   Spoke with JESICA Kat) regarding pt's symptoms and BP values. JESICA Kat) reported that the same symptoms appeared during OT session and JESICA reported findings to ROYA Avalos). Subjective observations and cognitive status: AM session: pt seen sitting up in recliner at beginning of treatment. Agreeable to therapy. Pt reported dizziness towards end of session. O2 sats 96%; HR 86 bpm; /50; 108/59; 103/57; 100/68; provided patient with water and BP was then 108/59. PM session: pt seen sitting up in recliner at beginning of treatment. Agreeable to therapy. Pt reported that she still felt dizziness at times. O2 sats 94%; HR 77 bpm; /59; 137/68 after walk; 102/83 seated; 118/59 seated; /62 standing; BP seated after rest was 131/59. Pain level/location: 0/10      Discharge recommendations  Anticipated discharge date: Expected Discharge Date: 10/01/21   Destination: []??home alone   [x]? ?home alone with assist PRN     []? ? home w/ family      []? ? Continuous supervision  []? ?SNF    []? ? Assisted living     []? ? Other:   Continued therapy: [x]? ?[facility]  []??OUTPATIENT  PT   []? ? No Further PT  Equipment needs: Avril Dumont requires the assistance of a wheeled walker to successfully ambulate from room to room at home to allow completion of daily living tasks such as: bathing, toileting, dressing and grooming. A wheeled walker is necessary due to the patient's unsteady gait, upper body weakness, inability to  a standard walker. This patient can ambulate only by pushing a walker instead of using a lesser assistive device such as a cane or crutch. [x]   Pt received out of bed     Transfers:    Sit--> Stand: supervision   Stand --> Sit:   Supervision   Stand pivot transfers:   Supervision   Toilet Transfer: supervision   Assistive device required for transfer:  RW  Vcs for proper hand placement with sternal precautions    Gait:    Distance:  AM session: 101'+98'+133'; PM session: 67'+74'+50'   Assistance: Mod I   Device:  RW  Gait Quality:  Reciprocal pattern     Additional Therapeutic activities/exercises completed this date:     []   Nu-step:  Time:        Level:         #Steps:       []   Rebounder:    []  Seated     []  Standing        [x]   Balance training: AM session: pt stood at sink and performed washing and drying of hands mod I for balance with RW.         []   Postural training    []   Supine ther ex (reps/sets):     [x]   Seated ther ex (reps/sets):  AM session: overhead side stretch x 10 reps; ankle pumps x 20 reps; marching x 10 reps; fwd arm raises x 10 reps; side arm raises x 10 reps; arm crosses x 10 reps (for strengthening)    []   Standing ther ex (reps/sets):     []   Picking up object from floor (standing):                   []   Reacher used   [x]   Other: AM and PM sessions: Encouraged patient to drink water throughout the day for hydration for healthy BP values.     []   Other:     Patient/Caregiver Education and Training:   [x]   Transfer technique/safety  [x]   Gait technique/sequencing  [x]   Proper use of assistive device  []   Advanced mobility safety and technique  [x]   Reinforced patient's precautions/mobility while maintaining precautions  []   Postural awareness  []   Family training    Treatment Plan for Next Session: gait; transfers; advanced gait; stair training; exercises; balance training     Assessment: This pt demonstrated a negative response to today's treatment as evidenced by dizziness. Vitals WNL, but BP dropping at times (20 points) (Leveling out with hydration). The patient is making progress toward established goals as evidenced by QI scores. Ongoing deficits are observed in the areas of strength, balance, and endurance and continued focus on strengthening, balance training, and endurance training is recommended. Treatment/Activity Tolerance:   [] Tolerated treatment with no adverse effects    [x] Patient limited by dizziness at times. [] Patient limited by pain   [] Patient limited by medical complications:    [] Adverse reaction to Tx:   [] Significant change in status    Safety:       []  bed alarm set    [x]  chair alarm set    []  Pt refused alarms                []  Telesitter activated      [x]  Gait belt used during tx session      [x]other:  After AM and PM sessions: pt left sitting up in recliner with call light and BLEs elevated (at end of treatment).        Number of Minutes/Billable Intervention  Gait Training 75   Therapeutic Exercise 30   Neuro Re-Ed    Therapeutic Activity 15   Wheelchair Propulsion    Group    Other:    TOTAL 120         Social History  Social/Functional History  Lives With: Alone  Type of Home: Apartment  Home Layout: One level  Home Access: Level entry  Bathroom Shower/Tub: Tub/Shower unit  Bathroom Toilet: Standard  Bathroom Equipment: Grab bars in shower  Bathroom Accessibility: Accessible  ADL Assistance: Independent  Homemaking Assistance: Independent  Homemaking Responsibilities: Yes  Meal Prep Responsibility: Primary  Cleaning Responsibility: Primary  Bill Paying/Finance Responsibility: Primary  Shopping Responsibility: Primary  Dependent Care Responsibility: Primary  Health Care Management: Primary  Ambulation Assistance: Independent (no AD at baseline)  Transfer Assistance: Independent  Active : Yes (sister, son, DIL)  Occupation: Retired  Leisure & Hobbies: Pt enjoys traveling and spending time at [employer] father's home. Pt reports assisting pt's father with help of sisters. Additional Comments: Pt denies any falls this year. Pt has full sized bed at home. Objective                                                                                    Goals:  (Update in navigator)  Short term goals  Time Frame for Short term goals: 7-10 days  Short term goal 1: pt will perform supine <-> sit, scooting from flat HOB at mod ind while maintaining sternal precautions  Short term goal 2: pt will perform sit <-> stand, bed <-> chair, car transfers at mod ind while maintaining sternal precautions  Short term goal 3: pt will ambulate 150 ft on level surfaces using LRAD at mod ind including over uneven surfaces, 2 turns  Short term goal 4: pt will ascend/descend curb step at mod ind using LRAD and complete up to 6 steps w/o UE support at sup level while maintaining sternal precautions  Short term goal 5: pt will retrieve light item from floor at mod ind from LRAD:   :        Plan of Care                                                                              Times per week: 5 days per week for a minimum of 60 minutes/day plus group as appropriate for 60 minutes.   Treatment to include Current Treatment Recommendations: Strengthening, Transfer Training, Endurance Training, Balance Training, Gait Training, Functional Mobility Training, Stair training, ADL/Self-care Training, Patient/Caregiver Education & Training, Equipment Evaluation, Education, & procurement, Positioning, Safety Education & Training, Pain Management, Wheelchair Mobility Training    Electronically signed by   Jyotsna Mcbride [id number]  9/27/2021, 9:04 AM

## 2021-09-27 NOTE — PROGRESS NOTES
Physical Rehabilitation: OCCUPATIONAL THERAPY     [x] daily progress note       [] discharge       Patient Name:  Mehdi Cao   :  1950 MRN: [id number]  Room:  [address] [place], [place] [address]A Date of Admission: 2021  Rehabilitation Diagnosis:   Nonrheumatic aortic (valve) stenosis [I35.0]  CHF following cardiac surgery, postop [I97.130]       Date 2021       Day of ARU Week:  7   Time IN/OUT 1040/1145   Individual Tx Minutes 65   Group Tx Minutes    Co-Treat Minutes    Concurrent Tx Minutes    TOTAL Tx Time Mins 65   Variance Time +5   Variance Time []   Refusal due to:     []   Medical hold/reason:    []   Illness   []   Off Unit for test/procedure  []   Extra time needed to complete task  []   Therapeutic need  [x]   Other (specify):required vitals to be taken at end of treatment session to assess    Restrictions Restrictions/Precautions: Fall Risk, General Precautions         Communication with other providers: [x]   OK to see per nursing:     []   Spoke with team member regarding:      Subjective observations and cognitive status: Patient up in recliner pleasant and agreeable to therapy, requesting shower this date      Pain level/location:    /10       Location: per patient no pain noted   Discharge recommendations  Anticipated discharge date:  10/1  Destination: [x]home alone   []home alone w assist prn   [] home w/ family    [] Continuous supervision       []SNF    [] Assisted living     [] Other:   Continued therapy: [x][facility] OT  []OUTPATIENT  OT   [] No Further OT  Equipment needs:Avril King requires the assistance of a tub transfer bench to successfully and safely complete bathing activities necessary due to reduced balance, endurance, need for ADL assist, and LE weakness and reduced ROM.  These tasks cannot be safely completed without this device and would place Avril King at greater risk of falls.        ADLs:    Eating: Eating  Assistance Needed: Independent  Comment: X  CARE Score: Standard Walker []   Wheelchair        []   Stephanie beach       []   Lethea Canard         []   Cardiac Frederich Gu       []   Other:        Homemaking Tasks:   Retrieves clothing from closet and transports to bathroom at [employer] level for prep of ADL       Additional Therapeutic activities/exercises completed this date:     [x]   ADL Training   [x]   Balance/Postural training     [x]   Bed/Transfer Training   []   Endurance Training   []   Neuromuscular Re-ed   []   Nu-step:  Time:        Level:         #Steps:       []   Rebounder:    []  Seated     []  Standing        []   Supine Ther Ex (reps/sets):     []   Seated Ther Ex (reps/sets):     []   Standing Ther Ex (reps/sets):     []   Other:      Comments:  Patient BP throughout session:   113/57 following shower while seated   119/61 standing  Patient c drop in BP during ambulation and sit to stand transfers this date this therapist notifies Nursing Aissatou Rosado) nurse states BP will fluctuate 2* to diagnosis and recent surgery and is normal; this therapist states that patient has not had  Fluctuations c BP prior to this date and O2 stats have been improving working on room air . Nursing made aware  This therapist applies KATHIE hose to patient and educates patient on importance of drinking water, completing glute isometric contractions to assist as well     Patient/Caregiver Education and Training:   [x]   Adaptive Equipment Use  [x]   Bed Mobility/Transfer Technique/Safety  [x]   Energy Conservation Tips  []   Family training  [x]   Postural Awareness  [x]   Safety During Functional Activities  [x]   Reinforced Patient's Precautions   []   Progress was updated and reviewed in Rehabtracker with patient and/or family this         date.     Treatment Plan for Next Session: POC to continue as tolerated           Treatment/Activity Tolerance:   [x] Tolerated treatment with no adverse effects    [] Patient limited by fatigue  [] Patient limited by pain   [] Patient limited by medical complications:    [] Adverse reaction to Tx:   [] Significant change in status    Safety:       []  bed alarm set    [x]  chair alarm set    []  Pt refused alarms                []  Telesitter activated      [x]  Gait belt used during tx session      []other:       Number of Minutes/Billable Intervention  Therapeutic [id number]    ADL Self-care 40   Neuro Re-Ed    Therapeutic Activity 25   Group    Other:    TOTAL 65       Social History  Social/Functional History  Lives With: Alone  Type of Home: Apartment  Home Layout: One level  Home Access: Level entry  Bathroom Shower/Tub: Tub/Shower unit  Bathroom Toilet: Standard  Bathroom Equipment: Grab bars in shower  Bathroom Accessibility: Accessible  ADL Assistance: Independent  Homemaking Assistance: Independent  Homemaking Responsibilities: Yes  Meal Prep Responsibility: Primary  Cleaning Responsibility: Primary  Bill Paying/Finance Responsibility: Primary  Shopping Responsibility: Primary  Dependent Care Responsibility: Primary  Health Care Management: Primary  Ambulation Assistance: Independent (no AD at baseline)  Transfer Assistance: Independent  Active : Yes (sister, son, DIL)  Occupation: Retired  Leisure & Hobbies: Pt enjoys traveling and spending time at [employer] father's home. Pt reports assisting pt's father with help of sisters. Additional Comments: Pt denies any falls this year. Pt has full sized bed at home.     Objective                                                                                    Goals:  (Update in navigator)  Short term goals  Time Frame for Short term goals: STG=LTG:  Long term goals  Time Frame for Long term goals : 10-12 days or until d/c  Long term goal 1: Pt will complete feeding/grooming/oral care task c MOD I by d/c  Long term goal 2: Pt will complete total body bathing c MOD I c use of AE by d/c  Long term goal 3: Pt will complete total body dressing (UB/LB) c MOD I by d/c  Long term goal 4: Pt will complete toileting

## 2021-09-27 NOTE — PLAN OF CARE
Problem: Falls - Risk of:  Goal: Will remain free from falls  Description: Will remain free from falls  9/27/2021 1014 by Demetrice Gonzalez RN  Outcome: Ongoing  9/27/2021 0345 by Nadine Jacobo RN  Outcome: Ongoing  Goal: Absence of physical injury  Description: Absence of physical injury  9/27/2021 1014 by Demetrice Gonzalez RN  Outcome: Ongoing  9/27/2021 0345 by Nadine Jacobo RN  Outcome: Ongoing     Problem: Infection:  Goal: Will remain free from infection  Description: Will remain free from infection  9/27/2021 1014 by Demetrice Gonzalez RN  Outcome: Ongoing  9/27/2021 0345 by Nadine Jacobo RN  Outcome: Ongoing     Problem: Safety:  Goal: Free from accidental physical injury  Description: Free from accidental physical injury  9/27/2021 1014 by Demetrice Gonzalez RN  Outcome: Ongoing  9/27/2021 0345 by Nadine Jacobo RN  Outcome: Ongoing  Goal: Free from intentional harm  Description: Free from intentional harm  9/27/2021 1014 by Demetrice Gonzalez RN  Outcome: Ongoing  9/27/2021 0345 by Nadine Jacobo RN  Outcome: Ongoing     Problem: Daily Care:  Goal: Daily care needs are met  Description: Daily care needs are met  9/27/2021 1014 by Demetrice Gonzalez RN  Outcome: Ongoing  9/27/2021 0345 by Nadine Jacobo RN  Outcome: Ongoing     Problem: Pain:  Goal: Patient's pain/discomfort is manageable  Description: Patient's pain/discomfort is manageable  9/27/2021 1014 by Demetrice Gonzalez RN  Outcome: Ongoing  9/27/2021 0345 by Nadine Jacobo RN  Outcome: Ongoing  Goal: Pain level will decrease  Description: Pain level will decrease  9/27/2021 1014 by Demetrice Gonzalez RN  Outcome: Ongoing  9/27/2021 0345 by Nadine Jacobo RN  Outcome: Ongoing  Goal: Control of acute pain  Description: Control of acute pain  9/27/2021 1014 by Demetrice Gonzalez RN  Outcome: Ongoing  9/27/2021 0345 by Nadine Jacobo RN  Outcome: Ongoing  Goal: Control of chronic pain  Description: Control of chronic pain  9/27/2021 1014 by Demetrice Gonzalez RN  Outcome: Ongoing  9/27/2021 0345 by Nadine Jacobo RN  Outcome: Ongoing     Problem: Skin Integrity:  Goal: Skin integrity will stabilize  Description: Skin integrity will stabilize  9/27/2021 1014 by Demetrice Gonzalez RN  Outcome: Ongoing  9/27/2021 0345 by Nadine Jacobo RN  Outcome: Ongoing     Problem: Discharge Planning:  Goal: Patients continuum of care needs are met  Description: Patients continuum of care needs are met  9/27/2021 1014 by Demetrice Gonzalez RN  Outcome: Ongoing  9/27/2021 0345 by Nadine Jacobo RN  Outcome: Ongoing

## 2021-09-28 LAB
GLUCOSE BLD-MCNC: 119 MG/DL (ref 70–99)
GLUCOSE BLD-MCNC: 190 MG/DL (ref 70–99)
GLUCOSE BLD-MCNC: 201 MG/DL (ref 70–99)
GLUCOSE BLD-MCNC: 282 MG/DL (ref 70–99)

## 2021-09-28 PROCEDURE — 97530 THERAPEUTIC ACTIVITIES: CPT

## 2021-09-28 PROCEDURE — 99232 SBSQ HOSP IP/OBS MODERATE 35: CPT | Performed by: PHYSICAL MEDICINE & REHABILITATION

## 2021-09-28 PROCEDURE — 97110 THERAPEUTIC EXERCISES: CPT

## 2021-09-28 PROCEDURE — 94761 N-INVAS EAR/PLS OXIMETRY MLT: CPT

## 2021-09-28 PROCEDURE — 1280000000 HC REHAB R&B

## 2021-09-28 PROCEDURE — 6370000000 HC RX 637 (ALT 250 FOR IP): Performed by: SURGERY

## 2021-09-28 PROCEDURE — 94150 VITAL CAPACITY TEST: CPT

## 2021-09-28 PROCEDURE — 97116 GAIT TRAINING THERAPY: CPT

## 2021-09-28 PROCEDURE — 82962 GLUCOSE BLOOD TEST: CPT

## 2021-09-28 PROCEDURE — 6370000000 HC RX 637 (ALT 250 FOR IP): Performed by: PHYSICAL MEDICINE & REHABILITATION

## 2021-09-28 PROCEDURE — 97535 SELF CARE MNGMENT TRAINING: CPT

## 2021-09-28 RX ADMIN — INSULIN LISPRO 4 UNITS: 100 INJECTION, SOLUTION INTRAVENOUS; SUBCUTANEOUS at 12:41

## 2021-09-28 RX ADMIN — APIXABAN 5 MG: 5 TABLET, FILM COATED ORAL at 21:40

## 2021-09-28 RX ADMIN — CARVEDILOL 3.12 MG: 6.25 TABLET, FILM COATED ORAL at 21:39

## 2021-09-28 RX ADMIN — ASPIRIN 81 MG: 81 TABLET, COATED ORAL at 10:06

## 2021-09-28 RX ADMIN — THERA TABS 1 TABLET: TAB at 10:05

## 2021-09-28 RX ADMIN — ALOGLIPTIN 6.25 MG: 12.5 TABLET, FILM COATED ORAL at 10:05

## 2021-09-28 RX ADMIN — CARVEDILOL 3.12 MG: 6.25 TABLET, FILM COATED ORAL at 10:07

## 2021-09-28 RX ADMIN — GLIPIZIDE 5 MG: 5 TABLET ORAL at 10:05

## 2021-09-28 RX ADMIN — HYDROCODONE BITARTRATE AND ACETAMINOPHEN 1 TABLET: 5; 325 TABLET ORAL at 00:32

## 2021-09-28 RX ADMIN — HYDROCODONE BITARTRATE AND ACETAMINOPHEN 1 TABLET: 5; 325 TABLET ORAL at 06:46

## 2021-09-28 RX ADMIN — AMIODARONE HYDROCHLORIDE 200 MG: 200 TABLET ORAL at 10:08

## 2021-09-28 RX ADMIN — APIXABAN 5 MG: 5 TABLET, FILM COATED ORAL at 10:05

## 2021-09-28 RX ADMIN — FUROSEMIDE 20 MG: 20 TABLET ORAL at 17:39

## 2021-09-28 RX ADMIN — FUROSEMIDE 20 MG: 20 TABLET ORAL at 10:06

## 2021-09-28 RX ADMIN — PANTOPRAZOLE SODIUM 40 MG: 40 TABLET, DELAYED RELEASE ORAL at 06:04

## 2021-09-28 RX ADMIN — INSULIN LISPRO 2 UNITS: 100 INJECTION, SOLUTION INTRAVENOUS; SUBCUTANEOUS at 10:07

## 2021-09-28 ASSESSMENT — PAIN DESCRIPTION - FREQUENCY
FREQUENCY: INTERMITTENT
FREQUENCY: INTERMITTENT

## 2021-09-28 ASSESSMENT — PAIN DESCRIPTION - PROGRESSION: CLINICAL_PROGRESSION: GRADUALLY IMPROVING

## 2021-09-28 ASSESSMENT — PAIN SCALES - GENERAL
PAINLEVEL_OUTOF10: 0
PAINLEVEL_OUTOF10: 0
PAINLEVEL_OUTOF10: 5
PAINLEVEL_OUTOF10: 7
PAINLEVEL_OUTOF10: 0

## 2021-09-28 ASSESSMENT — PAIN DESCRIPTION - LOCATION
LOCATION: CHEST
LOCATION: CHEST

## 2021-09-28 ASSESSMENT — PAIN - FUNCTIONAL ASSESSMENT: PAIN_FUNCTIONAL_ASSESSMENT: PREVENTS OR INTERFERES SOME ACTIVE ACTIVITIES AND ADLS

## 2021-09-28 ASSESSMENT — PAIN DESCRIPTION - ONSET
ONSET: ON-GOING
ONSET: ON-GOING

## 2021-09-28 ASSESSMENT — PAIN DESCRIPTION - ORIENTATION: ORIENTATION: UPPER

## 2021-09-28 ASSESSMENT — PAIN DESCRIPTION - DESCRIPTORS
DESCRIPTORS: ACHING
DESCRIPTORS: ACHING

## 2021-09-28 ASSESSMENT — PAIN DESCRIPTION - PAIN TYPE
TYPE: SURGICAL PAIN
TYPE: SURGICAL PAIN

## 2021-09-28 NOTE — PROGRESS NOTES
Physical Rehabilitation: OCCUPATIONAL THERAPY     [x] daily progress note       [] discharge       Patient Name:  Rossi Urena   :  1950 MRN: [id number]  Room:  [address] [place] [address] Date of Admission: 2021  Rehabilitation Diagnosis:   Nonrheumatic aortic (valve) stenosis [I35.0]  CHF following cardiac surgery, postop [I97.130]       Date 2021       Day of ARU Week:  1   Time IN/OUT 1000/1115   Individual Tx Minutes 75   Group Tx Minutes    Co-Treat Minutes    Concurrent Tx Minutes    TOTAL Tx Time Mins 75   Variance Time    Variance Time []   Refusal due to:     []   Medical hold/reason:    []   Illness   []   Off Unit for test/procedure  []   Extra time needed to complete task  []   Therapeutic need  []   Other (specify):   Restrictions Restrictions/Precautions: Fall Risk, General Precautions         Communication with other providers: [x]   OK to see per nursing:     []   Spoke with team member regarding:      Subjective observations and cognitive status: Patient seated up in recliner pleasant and requesting shower this morning therapist acknowledges      Pain level/location:    /10       Location: per patient no pain noted    Discharge recommendations  Anticipated discharge date:  10/1  Destination: [x]home alone   []home alone w assist prn   [] home w/ family    [] Continuous supervision       []SNF    [] Assisted living     [] Other:   Continued therapy: [x]HHC OT  []OUTPATIENT  OT   [] No Further OT  Equipment needs: Avril King requires the assistance of a tub transfer bench to successfully and safely complete bathing activities necessary due to reduced balance, endurance, need for ADL assist, and LE weakness and reduced ROM.  These tasks cannot be safely completed without this device and would place Avril King at greater risk of falls.        ADLs:    Eating: Eating  Assistance Needed: Independent  Comment: X  CARE Score: 6  Discharge Goal: Independent       Oral Hygiene: Oral Hygiene  Assistance Needed: Independent  Comment: X  CARE Score: 6  Discharge Goal: Independent    UB/LB Bathing: Shower/Bathe Self  Assistance Needed: Supervision or touching assistance  Comment: SBA when in stance to wash buttucks, LB  CARE Score: 4  Discharge Goal: Supervision or touching assistance    UB Dressing: Upper Body Dressing  Assistance Needed: Independent  Comment: X  CARE Score: 6  Discharge Goal: Independent         LB Dressing: Lower Body Dressing  Assistance Needed: Supervision or touching assistance  Comment: Sup when in stance managing fully over hips  CARE Score: 4  Discharge Goal: Independent    Donning and [place] Footwear: Putting On/Taking Off Footwear  Assistance Needed: Partial/moderate assistance  Comment: MIn A neha hose, able to hermann shoes independently; does not wear socks at home per patient  CARE Score: 3  Discharge Goal: Partial/moderate assistance      Toileting: Toileting Hygiene  Assistance Needed: Supervision or touching assistance  Comment: Sup  CARE Score: 4  Discharge Goal: Partial/moderate assistance      Toilet Transfers: Toilet Transfer  Assistance Needed: Independent  Comment: MOd I  CARE Score: 6  Discharge Goal: Independent  Device Used:    [x]   Standard Toilet         []   Grab Bars           []  Bedside Commode       []   Elevated Toilet          []   Other:        Bed Mobility:           [x]   Pt received out of bed   Rolling R/L:    Scooting:    Supine --> Sit:    Sit --> Supine:      Transfers:    Sit--> Stand:   Mod I   Stand --> Sit:   Mod I   Stand-Pivot:   Sup for body placement within RW during directional change   Other:    Assistive device required for transfer:   RW      Functional Mobility:  Throughout room/bathroom during ADL Assistance:  Sup 2* to light dizziness c ambulation, /58 nursing notified morning medications administered   Device:   [x]   Rolling Walker     []   Standard Walker []   Wheelchair        []   [employer]       []   Saran Felipe []   Cardiac [place]       []   Other:        Homemaking Tasks:   Patient retrieves and transports clothing from closet to bathroom at [address] [place] [address] level       Additional Therapeutic activities/exercises completed this date:     [x]   ADL Training   [x]   Balance/Postural training     [x]   Bed/Transfer Training   []   Endurance Training   []   Neuromuscular Re-ed   []   Nu-step:  Time:        Level:         #Steps:       []   Rebounder:    []  Seated     []  Standing        []   Supine Ther Ex (reps/sets):     []   Seated Ther Ex (reps/sets):     []   Standing Ther Ex (reps/sets):     []   Other:      Comments:      Patient/Caregiver Education and Training:   [x]   [employer] Equipment Use  [x]   Bed Mobility/Transfer Technique/Safety  [x]   Energy Conservation Tips  []   Family training  [x]   Postural Awareness  [x]   Safety During Functional Activities  [x]   Reinforced Patient's Precautions   []   Progress was updated and reviewed in Rehabtracker with patient and/or family this         date.     Treatment Plan for Next Session: POC to continue as tolerated         Treatment/Activity Tolerance:   [x] Tolerated treatment with no adverse effects    [] Patient limited by fatigue  [] Patient limited by pain   [] Patient limited by medical complications:    [] Adverse reaction to Tx:   [] Significant change in status    Safety:       []  bed alarm set    []  chair alarm set    []  Pt refused alarms                []  Telesitter activated      [x]  Gait belt used during tx session      []other:       Number of Minutes/Billable Intervention  Therapeutic Exercise    ADL Self-care 45   Neuro Re-Ed    Therapeutic Activity 30   Group    Other:    TOTAL 75       Social History  Social/Functional History  Lives With: Alone  Type of Home: Apartment  Home Layout: One level  Home Access: Level entry  Bathroom Shower/Tub: Tub/Shower unit  Bathroom Toilet: Standard  Bathroom Equipment: Grab bars in shower  Bathroom Accessibility: Functional Mobility Training, Patient/Caregiver Education & Training, ROM, Endurance Training, Equipment Evaluation, Education, & procurement, Balance Training, Safety Education & Training, Self-Care / ADL, Home Management Training    Electronically signed by   MC Patiño,  9/28/2021, 10:07 AM

## 2021-09-28 NOTE — PROGRESS NOTES
Rolanda Spine    : 1950  Acct #: [de-identified]  MRN: [id number]              PM&R Progress Note      Admitting diagnosis: CHF after cardiac surgery ( 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 Occasional moist cough over the weekend. No chills. Same chest wall pain. Prior (baseline) level of function: Independent. Current level of function:         Current  IRF-CHELSEY and Goals:   Occupational Therapy:    Short term goals  Time Frame for Short term goals: STG=LTG :   Long term goals  Time Frame for Long term goals : 10-12 days or until d/c  Long term goal 1: Pt will complete feeding/grooming/oral care task c MOD I by d/c  Long term goal 2: Pt will complete total body bathing c MOD I c use of AE by d/c  Long term goal 3: Pt will complete total body dressing (UB/LB) c MOD I by d/c  Long term goal 4: Pt will complete toileting c MIN A by d/c  Long term goal 5: Pt will complete functional transfer (toilet, tub, shower) c MOD I by d/c. Long term goals 6: Pt will perform therex/therax to facilitate increased strength/endurance/ax tolerance (c emphasis on dynamic standing balance/tolerance >8 mins and BUE endurance) c MOD I in order to complete ADLs.   Long term goal 7: Pt will complete footwear c MIN A by d/c :                                       Eating: Eating  Assistance Needed: Independent  Comment: X  CARE Score: 6  Discharge Goal: Independent       Oral Hygiene: Oral Hygiene  Assistance Needed: Independent  Comment: X  CARE Score: 6  Discharge Goal: Independent    UB/LB Bathing: Shower/Bathe Self  Assistance Needed: Supervision or touching assistance  Comment: SBA when in stance to wash buttucks, LB  CARE Score: 4  Discharge Goal: Supervision or touching assistance    UB Dressing: Upper Body Dressing  Assistance Needed: Independent  Comment: X  CARE Score: 6  Discharge Goal: Independent         LB Dressing: Lower Body Dressing  Assistance Needed: Supervision or touching assistance  Comment: Sup when in stance managing fully over hips  CARE Score: 4  Discharge Goal: Independent    Donning and [place] Footwear: Putting On/Taking Off Footwear  Assistance Needed: Partial/moderate assistance  Comment: MIn A neha hose, able to hermann shoes independently; does not wear socks at home per patient  CARE Score: 3  Discharge Goal: Partial/moderate assistance      Toileting: Toileting Hygiene  Assistance Needed: Independent  Comment: Sup  Reason if not Attempted: Patient refused (Pt used restroom with aide prior to therapist coming in)  CARE Score: 6  Discharge Goal: Partial/moderate assistance      Toilet Transfers:   Toilet Transfer  Assistance Needed: Independent  Comment: MOd I  CARE Score: 6  Discharge Goal: Independent    Physical Therapy:   Short term goals  Time Frame for Short term goals: 7-10 days  Short term goal 1: pt will perform supine <-> sit, scooting from flat HOB at mod ind while maintaining sternal precautions  Short term goal 2: pt will perform sit <-> stand, bed <-> chair, car transfers at mod ind while maintaining sternal precautions  Short term goal 3: pt will ambulate 150 ft on level surfaces using LRAD at mod ind including over uneven surfaces, 2 turns  Short term goal 4: pt will ascend/descend curb step at mod ind using LRAD and complete up to 6 steps w/o UE support at sup level while maintaining sternal precautions  Short term goal 5: pt will retrieve light item from floor at mod ind from LRAD            Bed Mobility:   Sit to Lying  Assistance Needed: Partial/moderate assistance (for eccentric control over trunk transition into supine)  CARE Score: 3  Discharge Goal: Independent  Roll Left and Right  Assistance Needed: Supervision or touching assistance (inc time and min vc to maintain sternal precautions)  CARE Score: 4  Discharge Goal: Independent  Lying to Sitting on Side of Bed  Assistance Needed: Partial/moderate assistance (min A for initiation of trunk transition)  CARE Score: 3  Discharge Goal: Independent    Transfers:    Sit to Stand  Assistance Needed: Supervision or touching assistance  Comment: CGA with RW per PTA report today  CARE Score: 4  Discharge Goal: Independent  Chair/Bed-to-Chair Transfer  Assistance Needed: Supervision or touching assistance (CGA for safety; progressing to SBA)  CARE Score: 4  Discharge Goal: Independent  Toilet Transfer  Assistance Needed: Partial/moderate assistance (min a for initiation from toilet while maintaining sternal precautions)  CARE Score: 3  Discharge Goal: Independent  Car Transfer  Assistance Needed: Partial/moderate assistance (per supporting document of evaluating PT)  Comment: Min A  CARE Score: 3  Discharge Goal: Independent    Ambulation:    Walking Ability  Does the Patient Walk?: Yes     Walk 10 Feet  Assistance Needed: Supervision or touching assistance (CGA progressing to SBA)  CARE Score: 4  Discharge Goal: Independent (w/ LRAD; normally ambulates w/o AD)     Walk 50 Feet with Two Turns  Assistance Needed: Supervision or touching assistance (progressing to SBA 2/2 sturdy gait and good use of AD while maintaining precautions)  CARE Score: 4  Discharge Goal: Independent (w/ LRAD)     Walk 150 Feet  Assistance Needed:  (evaluating PT used cardiac walker for mobility assessment versus RW today)  Comment: 88 (peer Theone Bench PT states patient unable to ambulate 150' )  CARE Score: 4  Discharge Goal: Independent     Walking 10 Feet on Uneven Surfaces  Assistance Needed: Supervision or touching assistance  Comment: CGA using RW per PTA report today  CARE Score: 4  Discharge Goal: Independent     1 Step (Curb)  Assistance Needed: Supervision or touching assistance  Comment: CGA using RW per PTA report today  CARE Score: 4  Discharge Goal: Independent     4 Steps  Reason if not Attempted: Not attempted due to medical condition or safety concerns (pt w/ inc SOB and noting fatigue limiting performance at this time; not tested for safety concerns)  CARE Score: 88  Discharge Goal: Independent     12 Steps  Reason if not Attempted: Not applicable (pt does not normally navigate stairs; endorses only ever doing stairs when she visits her dad, 3-4 steps to enter)  CARE Score: 9  Discharge Goal: Not Applicable       Wheelchair:  w/c Ability: Wheelchair Ability  Uses a Wheelchair and/or Scooter?: Yes  Wheel 50 Feet with Two Turns  Assistance Needed: Partial/moderate assistance (min A to maintain momentum; pt is of short stature feet, barely reach floor, fatiguing quickly)  CARE Score: 3  Discharge Goal: Independent  Wheel 150 Feet  Assistance Needed: Partial/moderate assistance (pt using only BL LE's to maintain sternal precautions; min A to maintain momentum; short stature limiting)  CARE Score: 3  Discharge Goal: Supervision or touching assistance (using BL LE's to maintain sternal precautions; min a to maintain momentum; anticipate pt home w/o w/c on DC)          Balance:    Balance  Posture: Good  Sitting - Static: Good  Standing - Static: Good   Object: Picking Up Object  Assistance Needed: Supervision or touching assistance (CGA to steady)  CARE Score: 4  Discharge Goal: Independent    I      Exam:    Blood pressure (!) 107/58, pulse 75, temperature 97.4 °F (36.3 °C), temperature source Oral, resp. rate 16, height 4' 11\" (1.499 m), weight 192 lb 0.3 oz (87.1 kg), last menstrual period 07/09/2000, SpO2 97 %, not currently breastfeeding. General: Up in a bedside chair. Legs elevated. Bright and alert. Remains soft-spoken. HEENT: No JVD or adenopathy. MMM. Pulmonary: Unlabored respirations without wheezes or rales. Cardiac: Sternal incision is tender to palpation but well approximated.   Staples have been removed from her drain sites in the upper abdomen. Occasional premature beat. Soft systolic murmur. Abdomen: Patient's abdomen is soft and nondistended. Bowel sounds were present throughout. There was no rebound, guarding or masses noted. Upper extremities: Raises her hands up overhead slowly and deliberately. Fair  strength and normal tone. Lower extremities: Persistent 1+ edema with some pitting. Heels clear. Sitting balance was good. Standing balance was fair-.    Lab Results   Component Value Date    WBC 13.9 (H) 09/20/2021    HGB 9.0 (L) 09/20/2021    HCT 27.2 (L) 09/20/2021    MCV 86.3 09/20/2021     (L) 09/20/2021     Lab Results   Component Value Date    INR 2.07 09/16/2021    INR 0.88 09/09/2021    INR 0.92 07/09/2021    PROTIME 26.9 (H) 09/16/2021    PROTIME 11.4 (L) 09/09/2021    PROTIME 11.9 07/09/2021     Lab Results   Component Value Date    CREATININE 0.5 (L) 09/21/2021    BUN 30 (H) 09/21/2021     09/21/2021    K 4.2 09/21/2021     09/21/2021    CO2 25 09/21/2021     Lab Results   Component Value Date    ALT 15 06/07/2021    AST 16 06/07/2021    ALKPHOS 92 06/07/2021    BILITOT 0.3 06/07/2021       Expected length of stay  prior to a supervised level of function for discharge home with a walker and Isaac 79 OT/PT is 10/1/2021. Recommendations:    1. CHF after aortic valve replacement:   Improving balance and endurance noted during her daily occupational and physical therapy.    Each day we practice techniques for self-care and mobility following sternal precautions.  Daily weights remain minimally variable.    Ongoing aggressive pulmonary hygiene measures, DVT prophylaxis and pain management.  Generally, she is continent of bowel and bladder with occasional bowel movements.   Verbal cues and CGA-minimum physical assistance for transfers today. Nursing removed her drain sutures yesterday. Continuing the twice daily Lasix.   Cannot  increase her dose because her systolic pressure is already low. 2. DVT prophylaxis: The Eliquis she requires for her atrial flutter is protective against new DVT.  I must monitor her hemoglobin periodically while on this medication.  Weightbearing activities are steadily progressing daily.  GI prophylaxis offered.  No clinical signs of blood loss. Checking labs in a. m. Atrial flutter: Cordarone and Coreg for rate control.  Twice daily Lasix to avoid decompensation of her CHF.  Daily weights do not reveal any concerns.  Graduated increases in physical activity to build tolerance noted. 3. Uncontrolled diabetes type 2 with peripheral neuropathy: Patient requires a diet modified for carbohydrates.  She is on Nesina and Glucotrol with sliding scale Humalog.  Blood sugars are checked at mealtime and bedtime. Blood sugars occasionally greater than 200.  4. Hypertension: Coreg and Lasix are used to control her systolic blood pressure.  Vital signs are checked at rest and with activity.  Target systolic blood pressure is 120-140.  Blood pressure is just below the target range today.  Monitoring this issue closely.  Continue the twice daily diuretic for now.   5. Uncontrolled pain: Sternal precautions, cryotherapy, protected coughing and oral analgesics.  Bowel intervention while on the narcotic.

## 2021-09-28 NOTE — FLOWSHEET NOTE
[x] daily progress note       [] discharge       Patient Name:  Jamil Levine   :  1950 MRN: [id number]  Room:  [address] [place] [address] Date of Admission: 2021  Rehabilitation Diagnosis:   Nonrheumatic aortic (valve) stenosis [I35.0]  CHF following cardiac surgery, postop [I97.130]       Date 2021       Day of ARU Week:  1   Time IN/OUT 5523-4785   Individual Tx Minutes 105   TOTAL Tx Time Mins 105   Variance Time -15 minutes (MOONEY (Corrina Marylou)) reported that 15 extra minutes were obtained during OT session.)   Restrictions Restrictions/Precautions  Restrictions/Precautions: Fall Risk, General Precautions  Position Activity Restriction  Sternal Precautions: No Pushing, 8# Lifting Restrictions, No Pulling  Other position/activity restrictions: Pt c 2L O2   Communication with other providers: [x]   OK to see per nursing:     []   Spoke with team member regarding:      Subjective observations and cognitive status: Pt seen sitting up in recliner at beginning of treatment. Agreeable to therapy. O2 sats 91%-93%; /57. No reports of dizziness during session. Pain level/location: 0/10        Discharge recommendations  Anticipated discharge date: Expected Discharge Date: 10/01/21   Destination: []???home alone   [x]? ??home alone with assist PRN     []? ?? home w/ family      []? ?? Continuous supervision  []? ??SNF    []??? Assisted living     []? ?? Other:   Continued therapy: [x]? ? ? C PT  []???OUTPATIENT  PT   []??? No Further PT  Equipment needs: Avril King requires the assistance of a wheeled walker to successfully ambulate from room to room at home to allow completion of daily living tasks such as: bathing, toileting, dressing and grooming.  A wheeled walker is necessary due to the patient's unsteady gait, upper body weakness, inability to  a standard walker.  This patient can ambulate only by pushing a walker instead of using a lesser assistive device such as a cane or crutch.        [x]   Pt received out of bed     Transfers:    Sit--> Stand:  Supervision   Stand --> Sit:   Supervision   Stand pivot transfers:   Supervision  Assistive device required for transfer:  RW    Gait:    Distance:   114'+102'+89'   Assistance: Mod I   Device:  RW  Gait Quality:  Reciprocal pattern     Stairs   # Completed:  5  Assistance: Supervision   Supportive Device:  2 rails     Uneven Surfaces:       Assistance:  Supervision   Device:    RW  Surfaces Completed:   [x]  Carpeted Surface with bean bags beneath      []  Throw rugs       []  Ramp       []  Outdoor pavements        []  Grass             []  Loose gravel        []  Other:       Pt amb up/down 4\" curb step supervision with RW. Additional Therapeutic activities/exercises completed this date:     [x]   Nu-step:  Time: 7 minutse       Level: 2        (for strengthening and endurance training)    []   Rebounder:    []  Seated     []  Standing        []   Balance training         []   Postural training    []   Supine ther ex (reps/sets):     [x]   Seated ther ex (reps/sets):  Cardiac exercises: Overhead side stretch x 10 reps, ankle pumps x 20 reps, marching x 10 reps, fwd arm raises x 10 reps, side arm raises x 10 reps, arm circles (fwd/bwd) x 10 reps, arm crosses x 10 reps, trunk twists x 10 reps (for strengthening). LAQs, knee bends x 10 reps each; ankle circles (clockwise and counter clockwise) x 20 reps (for strengthening). []   Standing ther ex (reps/sets):     [x]   Picking up object from floor (standing):   Mod I with RW                 [x]   Reacher used   []   Other:   []   Other:    Patient/Caregiver Education and Training:   [x]   Bed Mobility/Transfer technique/safety  [x]   Gait technique/sequencing  [x]   Proper use of assistive device  [x]   Advanced mobility safety and technique  [x]   Reinforced patient's precautions/mobility while maintaining precautions  []   Postural awareness  []   Family training    Treatment Plan for Next Session: gait; transfers; advanced gait; stair training; exercises; balance training     Assessment: This pt demonstrated a positive response to today's treatment as evidenced by improved mobility. The patient is making progress toward established goals as evidenced by QI scores. Ongoing deficits are observed in the areas of strength, balance, and endurance and continued focus on strengthening, balance training, and endurance training is recommended. Treatment/Activity Tolerance:   [x] Tolerated treatment with no adverse effects    [] Patient limited by fatigue  [] Patient limited by pain   [] Patient limited by medical complications:    [] Adverse reaction to Tx:   [] Significant change in status    Safety:       []  bed alarm set    [x]  chair alarm set    []  Pt refused alarms                []  Telesitter activated      [x]  Gait belt used during tx session      [x]other: pt left up in recliner with call light at end of treatment.           Number of Minutes/Billable Intervention  Gait Training 60   Therapeutic Exercise 30   Neuro Re-Ed    Therapeutic Activity 15   Wheelchair Propulsion    Group    Other:    TOTAL 105         Social History  Social/Functional History  Lives With: Alone  Type of Home: Apartment  Home Layout: One level  Home Access: Level entry  Bathroom Shower/Tub: Tub/Shower unit  Bathroom Toilet: Standard  Bathroom Equipment: Grab bars in shower  Bathroom Accessibility: Accessible  ADL Assistance: Independent  Homemaking Assistance: Independent  Homemaking Responsibilities: Yes  Meal Prep Responsibility: Primary  Cleaning Responsibility: Primary  Bill Paying/Finance Responsibility: Primary  Shopping Responsibility: Primary  Dependent Care Responsibility: Primary  Health Care Management: Primary  Ambulation Assistance: Independent (no AD at baseline)  Transfer Assistance: Independent  Active : Yes (sister, son, DIL)  Occupation: Retired  Leisure & Hobbies: Pt enjoys traveling and spending time at pt's father's home. Pt reports assisting pt's father with help of sisters. Additional Comments: Pt denies any falls this year. Pt has full sized bed at home. Objective                                                                                    Goals:  (Update in navigator)  Short term goals  Time Frame for Short term goals: 7-10 days  Short term goal 1: pt will perform supine <-> sit, scooting from flat HOB at mod ind while maintaining sternal precautions  Short term goal 2: pt will perform sit <-> stand, bed <-> chair, car transfers at mod ind while maintaining sternal precautions  Short term goal 3: pt will ambulate 150 ft on level surfaces using LRAD at mod ind including over uneven surfaces, 2 turns  Short term goal 4: pt will ascend/descend curb step at mod ind using LRAD and complete up to 6 steps w/o UE support at sup level while maintaining sternal precautions  Short term goal 5: pt will retrieve light item from floor at mod ind from LRAD:   :        Plan of Care                                                                              Times per week: 5 days per week for a minimum of 60 minutes/day plus group as appropriate for 60 minutes.   Treatment to include Current Treatment Recommendations: Strengthening, Transfer Training, Endurance Training, Balance Training, Gait Training, Functional Mobility Training, Stair training, ADL/Self-care Training, Patient/Caregiver Education & Training, Equipment Evaluation, Education, & procurement, Positioning, Safety Education & Training, Pain Management, Wheelchair Mobility Training    Electronically signed by   Jennifer Hernandez, [id number]  9/28/2021, 1:10 PM

## 2021-09-28 NOTE — PROGRESS NOTES
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 Occasional cough. No dizziness. Prior (baseline) level of function: Independent. Current level of function:         Current  IRF-CHELSEY and Goals:   Occupational Therapy:    Short term goals  Time Frame for Short term goals: STG=LTG :   Long term goals  Time Frame for Long term goals : 10-12 days or until d/c  Long term goal 1: Pt will complete feeding/grooming/oral care task c MOD I by d/c  Long term goal 2: Pt will complete total body bathing c MOD I c use of AE by d/c  Long term goal 3: Pt will complete total body dressing (UB/LB) c MOD I by d/c  Long term goal 4: Pt will complete toileting c MIN A by d/c  Long term goal 5: Pt will complete functional transfer (toilet, tub, shower) c MOD I by d/c. Long term goals 6: Pt will perform therex/therax to facilitate increased strength/endurance/ax tolerance (c emphasis on dynamic standing balance/tolerance >8 mins and BUE endurance) c MOD I in order to complete ADLs.   Long term goal 7: Pt will complete footwear c MIN A by d/c :                                       Eating: Eating  Assistance Needed: Independent  Comment: X  CARE Score: 6  Discharge Goal: Independent       Oral Hygiene: Oral Hygiene  Assistance Needed: Independent  Comment: X  CARE Score: 6  Discharge Goal: Independent    UB/LB Bathing: Shower/Bathe Self  Assistance Needed: Supervision or touching assistance  Comment: SBA when in stance to wash buttucks, LB  CARE Score: 4  Discharge Goal: Supervision or touching assistance    UB Supervision or touching assistance (inc time and min vc to maintain sternal precautions)  CARE Score: 4  Discharge Goal: Independent  Lying to Sitting on Side of Bed  Assistance Needed: Partial/moderate assistance (min A for initiation of trunk transition)  CARE Score: 3  Discharge Goal: Independent    Transfers:    Sit to Stand  Assistance Needed: Supervision or touching assistance  Comment: CGA with RW per PTA report today  CARE Score: 4  Discharge Goal: Independent  Chair/Bed-to-Chair Transfer  Assistance Needed: Supervision or touching assistance (CGA for safety; progressing to SBA)  CARE Score: 4  Discharge Goal: Independent  Toilet Transfer  Assistance Needed: Partial/moderate assistance (min a for initiation from toilet while maintaining sternal precautions)  CARE Score: 3  Discharge Goal: Independent  Car Transfer  Assistance Needed: Partial/moderate assistance (per supporting document of evaluating PT)  Comment: Min A  CARE Score: 3  Discharge Goal: Independent    Ambulation:    Walking Ability  Does the Patient Walk?: Yes     Walk 10 Feet  Assistance Needed: Supervision or touching assistance (CGA progressing to SBA)  CARE Score: 4  Discharge Goal: Independent (w/ LRAD; normally ambulates w/o AD)     Walk 50 Feet with Two Turns  Assistance Needed: Supervision or touching assistance (progressing to SBA 2/2 sturdy gait and good use of AD while maintaining precautions)  CARE Score: 4  Discharge Goal: Independent (w/ LRAD)     Walk 150 Feet  Assistance Needed:  (evaluating PT used cardiac walker for mobility assessment versus RW today)  Comment: 88 (peer Solitario Newberry PT states patient unable to ambulate 150' )  CARE Score: 4  Discharge Goal: Independent     Walking 10 Feet on Uneven Surfaces  Assistance Needed: Supervision or touching assistance  Comment: CGA using RW per PTA report today  CARE Score: 4  Discharge Goal: Independent     1 Step (Curb)  Assistance Needed: Supervision or touching assistance  Comment: CGA using RW per PTA report today  CARE Score: 4  Discharge Goal: Independent     4 Steps  Reason if not Attempted: Not attempted due to medical condition or safety concerns (pt w/ inc SOB and noting fatigue limiting performance at this time; not tested for safety concerns)  CARE Score: 88  Discharge Goal: Independent     12 Steps  Reason if not Attempted: Not applicable (pt does not normally navigate stairs; endorses only ever doing stairs when she visits her dad, 3-4 steps to enter)  CARE Score: 9  Discharge Goal: Not Applicable       Wheelchair:  w/c Ability: Wheelchair Ability  Uses a Wheelchair and/or Scooter?: Yes  Wheel 50 Feet with Two Turns  Assistance Needed: Partial/moderate assistance (min A to maintain momentum; pt is of short stature feet, barely reach floor, fatiguing quickly)  CARE Score: 3  Discharge Goal: Independent  Wheel 150 Feet  Assistance Needed: Partial/moderate assistance (pt using only BL LE's to maintain sternal precautions; min A to maintain momentum; short stature limiting)  CARE Score: 3  Discharge Goal: Supervision or touching assistance (using BL LE's to maintain sternal precautions; min a to maintain momentum; anticipate pt home w/o w/c on DC)          Balance:    Balance  Posture: Good  Sitting - Static: Good  Standing - Static: Good   Object: Picking Up Object  Assistance Needed: Supervision or touching assistance (CGA to steady)  CARE Score: 4  Discharge Goal: Independent    I      Exam:    Blood pressure 126/60, pulse 85, temperature 98.2 °F (36.8 °C), temperature source Oral, resp. rate 18, height 4' 11\" (1.499 m), weight 204 lb (92.5 kg), last menstrual period 07/09/2000, SpO2 95 %, not currently breastfeeding. General: Sitting up in a bedside chair. Alert. In no distress. HEENT: MMM. Clear speech. No JVD. Pulmonary: Blunted breath sounds inferiorly. No wheezes or rales. Cardiac: Controlled rate but there are premature beats.   Soft pansystolic murmur. Abdomen: Patient's abdomen is soft and nondistended. Bowel sounds were present throughout. There was no rebound, guarding or masses noted. Retention sutures in place. Upper extremities: Able to bring her hands up to meet mine. Fair  strength. No new bruising or swelling. Lower extremities: Trace edema. Calves soft. 4+/5 strength across the ankles. Sitting balance was good. Standing balance was fair-.    Lab Results   Component Value Date    WBC 13.9 (H) 09/20/2021    HGB 9.0 (L) 09/20/2021    HCT 27.2 (L) 09/20/2021    MCV 86.3 09/20/2021     (L) 09/20/2021     Lab Results   Component Value Date    INR 2.07 09/16/2021    INR 0.88 09/09/2021    INR 0.92 07/09/2021    PROTIME 26.9 (H) 09/16/2021    PROTIME 11.4 (L) 09/09/2021    PROTIME 11.9 07/09/2021     Lab Results   Component Value Date    CREATININE 0.5 (L) 09/21/2021    BUN 30 (H) 09/21/2021     09/21/2021    K 4.2 09/21/2021     09/21/2021    CO2 25 09/21/2021     Lab Results   Component Value Date    ALT 15 06/07/2021    AST 16 06/07/2021    ALKPHOS 92 06/07/2021    BILITOT 0.3 06/07/2021       Expected length of stay  prior to a supervised level of function for discharge home with a walker and Isaac [facility] OT/PT is 10/1/2021. Recommendations:    1. CHF after aortic valve replacement:    Demonstrating some benefit from participating in the daily occupational and physical therapy.    Reinforcing techniques for self-care and mobility following sternal precautions.  Daily weights remain minimally variable.    Ongoing aggressive pulmonary hygiene measures, DVT prophylaxis and pain management.  Generally, she is continent of bowel and bladder with occasional bowel movements.   Verbal cues and minimum physical assistance for transfers today. Nursing to remove her drain sutures today. 2. DVT prophylaxis: The Eliquis she requires for her atrial flutter is protective against new DVT.   I must monitor her hemoglobin

## 2021-09-28 NOTE — PROGRESS NOTES
Comprehensive Nutrition Assessment    Type and Reason for Visit:  Reassess    Nutrition Recommendations/Plan:   · Continue current diet and supplements   · Will send nutrition educational handout per pt request    Nutrition Assessment:  Pt consuming more than half of meals consistently. Pt dislikes Low Sodium taste of hospital foods. Will send SF seasoning handout for home use per request. C/o nausea over the last few days, likely related to new meds per pt. Pt's family brought her mame chews, educated patient of mame benefits r/t nausea, discussed snacks between meals, pt drinks glucerna in the afternoons, encouraged high fiber/protein intake for heart health/healing. Low nutrition risk at this time. Malnutrition Assessment:  Malnutrition Status:  No malnutrition    Context:  Acute Illness       Estimated Daily Nutrient Needs:  Energy (kcal):  3331-7573; Weight Used for Energy Requirements:  Current     Protein (g):  52-61 (1.2-1.4 g/kg); Weight Used for Protein Requirements:  Ideal        Fluid (ml/day):  1500; Method Used for Fluid Requirements:  1 ml/kcal      Nutrition Related Findings:  rx: lasix, humalog, glipizide, protonix, MVI      Wounds:  Surgical Incision       Current Nutrition Therapies:    ADULT DIET; Regular;  Low Sodium (2 gm)  Adult Oral Nutrition Supplement; Diabetic Oral Supplement    Anthropometric Measures:  · Height: 4' 11\" (149.9 cm)  · Current Body Weight: 192 lb 0.3 oz (87.1 kg)   · Admission Body Weight: 204 lb 5.9 oz (92.7 kg)    · Usual Body Weight: 194 lb (88 kg) (wt in July)     · Ideal Body Weight: 95 lbs; % Ideal Body Weight 215.1 %   · BMI: 38.8  · Adjusted Body Weight:  ; No Adjustment   · BMI Categories: Obese Class 3 (BMI 40.0 or greater)       Nutrition Diagnosis:   · Predicted inadequate energy intake related to increase demand for energy/nutrients as evidenced by other (comment) (recovery from cardiac surgery)    Nutrition Interventions:   Food and/or Nutrient

## 2021-09-29 LAB
ANION GAP SERPL CALCULATED.3IONS-SCNC: 9 MMOL/L (ref 4–16)
BUN BLDV-MCNC: 10 MG/DL (ref 6–23)
CALCIUM SERPL-MCNC: 8.4 MG/DL (ref 8.3–10.6)
CHLORIDE BLD-SCNC: 100 MMOL/L (ref 99–110)
CO2: 30 MMOL/L (ref 21–32)
CREAT SERPL-MCNC: 0.8 MG/DL (ref 0.6–1.1)
GFR AFRICAN AMERICAN: >60 ML/MIN/1.73M2
GFR NON-AFRICAN AMERICAN: >60 ML/MIN/1.73M2
GLUCOSE BLD-MCNC: 185 MG/DL (ref 70–99)
GLUCOSE BLD-MCNC: 193 MG/DL (ref 70–99)
GLUCOSE BLD-MCNC: 196 MG/DL (ref 70–99)
GLUCOSE BLD-MCNC: 214 MG/DL (ref 70–99)
GLUCOSE BLD-MCNC: 253 MG/DL (ref 70–99)
HCT VFR BLD CALC: 28.7 % (ref 37–47)
HEMOGLOBIN: 9.3 GM/DL (ref 12.5–16)
MCH RBC QN AUTO: 27.9 PG (ref 27–31)
MCHC RBC AUTO-ENTMCNC: 32.4 % (ref 32–36)
MCV RBC AUTO: 86.2 FL (ref 78–100)
PDW BLD-RTO: 15.8 % (ref 11.7–14.9)
PLATELET # BLD: 75 K/CU MM (ref 140–440)
PMV BLD AUTO: 11.6 FL (ref 7.5–11.1)
POTASSIUM SERPL-SCNC: 3.7 MMOL/L (ref 3.5–5.1)
RBC # BLD: 3.33 M/CU MM (ref 4.2–5.4)
SODIUM BLD-SCNC: 139 MMOL/L (ref 135–145)
WBC # BLD: 12.6 K/CU MM (ref 4–10.5)

## 2021-09-29 PROCEDURE — 6370000000 HC RX 637 (ALT 250 FOR IP): Performed by: SURGERY

## 2021-09-29 PROCEDURE — 1280000000 HC REHAB R&B

## 2021-09-29 PROCEDURE — 36415 COLL VENOUS BLD VENIPUNCTURE: CPT

## 2021-09-29 PROCEDURE — 85027 COMPLETE CBC AUTOMATED: CPT

## 2021-09-29 PROCEDURE — 99232 SBSQ HOSP IP/OBS MODERATE 35: CPT | Performed by: PHYSICAL MEDICINE & REHABILITATION

## 2021-09-29 PROCEDURE — 97530 THERAPEUTIC ACTIVITIES: CPT

## 2021-09-29 PROCEDURE — 97116 GAIT TRAINING THERAPY: CPT

## 2021-09-29 PROCEDURE — 80048 BASIC METABOLIC PNL TOTAL CA: CPT

## 2021-09-29 PROCEDURE — 94761 N-INVAS EAR/PLS OXIMETRY MLT: CPT

## 2021-09-29 PROCEDURE — 82962 GLUCOSE BLOOD TEST: CPT

## 2021-09-29 PROCEDURE — 97535 SELF CARE MNGMENT TRAINING: CPT

## 2021-09-29 PROCEDURE — 97110 THERAPEUTIC EXERCISES: CPT

## 2021-09-29 RX ADMIN — PANTOPRAZOLE SODIUM 40 MG: 40 TABLET, DELAYED RELEASE ORAL at 08:52

## 2021-09-29 RX ADMIN — APIXABAN 5 MG: 5 TABLET, FILM COATED ORAL at 21:27

## 2021-09-29 RX ADMIN — FUROSEMIDE 20 MG: 20 TABLET ORAL at 08:53

## 2021-09-29 RX ADMIN — INSULIN LISPRO 4 UNITS: 100 INJECTION, SOLUTION INTRAVENOUS; SUBCUTANEOUS at 17:17

## 2021-09-29 RX ADMIN — INSULIN LISPRO 2 UNITS: 100 INJECTION, SOLUTION INTRAVENOUS; SUBCUTANEOUS at 09:03

## 2021-09-29 RX ADMIN — ASPIRIN 81 MG: 81 TABLET, COATED ORAL at 08:51

## 2021-09-29 RX ADMIN — INSULIN LISPRO 2 UNITS: 100 INJECTION, SOLUTION INTRAVENOUS; SUBCUTANEOUS at 12:25

## 2021-09-29 RX ADMIN — CARVEDILOL 3.12 MG: 6.25 TABLET, FILM COATED ORAL at 08:57

## 2021-09-29 RX ADMIN — APIXABAN 5 MG: 5 TABLET, FILM COATED ORAL at 08:51

## 2021-09-29 RX ADMIN — FUROSEMIDE 20 MG: 20 TABLET ORAL at 17:17

## 2021-09-29 RX ADMIN — GLIPIZIDE 5 MG: 5 TABLET ORAL at 08:57

## 2021-09-29 RX ADMIN — THERA TABS 1 TABLET: TAB at 08:53

## 2021-09-29 RX ADMIN — ALOGLIPTIN 6.25 MG: 12.5 TABLET, FILM COATED ORAL at 08:53

## 2021-09-29 RX ADMIN — CARVEDILOL 3.12 MG: 6.25 TABLET, FILM COATED ORAL at 21:27

## 2021-09-29 RX ADMIN — AMIODARONE HYDROCHLORIDE 200 MG: 200 TABLET ORAL at 08:52

## 2021-09-29 ASSESSMENT — PAIN SCALES - GENERAL
PAINLEVEL_OUTOF10: 3
PAINLEVEL_OUTOF10: 5
PAINLEVEL_OUTOF10: 5

## 2021-09-29 ASSESSMENT — PAIN DESCRIPTION - PROGRESSION: CLINICAL_PROGRESSION: GRADUALLY IMPROVING

## 2021-09-29 ASSESSMENT — PAIN DESCRIPTION - DESCRIPTORS
DESCRIPTORS: ACHING;CONSTANT
DESCRIPTORS: ACHING

## 2021-09-29 ASSESSMENT — PAIN DESCRIPTION - LOCATION
LOCATION: CHEST
LOCATION: CHEST

## 2021-09-29 ASSESSMENT — PAIN DESCRIPTION - ONSET
ONSET: ON-GOING
ONSET: ON-GOING

## 2021-09-29 ASSESSMENT — PAIN DESCRIPTION - ORIENTATION
ORIENTATION: UPPER
ORIENTATION: UPPER

## 2021-09-29 ASSESSMENT — PAIN DESCRIPTION - PAIN TYPE
TYPE: SURGICAL PAIN
TYPE: SURGICAL PAIN

## 2021-09-29 ASSESSMENT — PAIN DESCRIPTION - FREQUENCY
FREQUENCY: CONTINUOUS
FREQUENCY: CONTINUOUS

## 2021-09-29 ASSESSMENT — PAIN - FUNCTIONAL ASSESSMENT: PAIN_FUNCTIONAL_ASSESSMENT: PREVENTS OR INTERFERES SOME ACTIVE ACTIVITIES AND ADLS

## 2021-09-29 NOTE — PATIENT CARE CONFERENCE
ACUTE REHAB TEAM CONFERENCE SUMMARY   [facility]    NAME: Sydney Lloyd  : 1950 ADMIT DATE: 2021    Rehab Admitting Dx:Nonrheumatic aortic (valve) stenosis [I35.0]  CHF following cardiac surgery, postop [I97.130]  Patient Comorbid Conditions: Active Hospital Problems    Diagnosis Date Noted    Generalized weakness [R53.1]     Uncontrolled pain [R52]     Gait disturbance [R26.9]     Acute blood loss anemia [D62]     Uncontrolled type 2 diabetes mellitus with peripheral neuropathy (HCC) [E11.42, E11.65]     Atrial flutter (HCC) [I48.92]     Essential hypertension [I10]     Status post aortic valve replacement [Z95.2]     CHF following cardiac surgery, postop [I97.130] 2021     Date: 2021    CASE MANAGEMENT  Current issues/needs regarding patient and family discharge status:   Patient plans dc 1-level home alone. Indep & drive PTA.       PHYSICAL THERAPY (Updated in QI)  Short term goals  Time Frame for Short term goals: 7-10 days  Short term goal 1: pt will perform supine <-> sit, scooting from flat HOB at mod ind while maintaining sternal precautions  Short term goal 2: pt will perform sit <-> stand, bed <-> chair, car transfers at mod ind while maintaining sternal precautions  Short term goal 3: pt will ambulate 150 ft on level surfaces using LRAD at mod ind including over uneven surfaces, 2 turns  Short term goal 4: pt will ascend/descend curb step at mod ind using LRAD and complete up to 6 steps w/o UE support at sup level while maintaining sternal precautions  Short term goal 5: pt will retrieve light item from floor at mod ind from LRAD          Impairments/deficits, barriers:                   Equipment needed at discharge:2ww      PT IRF-CHELSEY scores since initial assessment  Bed Mobility:   Sit to Lying  Assistance Needed: Independent  Comment: Independent  CARE Score: 6  Discharge Goal: Independent    Roll Left and Right  Assistance Needed: Independent  Comment: Independent  CARE Score: 6  Discharge Goal: Independent    Lying to Sitting on Side of Bed  Assistance Needed: Independent  Comment: mod I with HOB slightly raised. CARE Score: 6  Discharge Goal: Independent    Transfers:    Sit to Stand  Assistance Needed: Independent  Comment: mod I with RW  CARE Score: 6  Discharge Goal: Independent    Chair/Bed-to-Chair Transfer  Assistance Needed: Independent  Comment: mod I with RW  CARE Score: 6  Discharge Goal: Independent        Car Transfer  Assistance Needed: Independent  Comment: mod I with RW  CARE Score: 6  Discharge Goal: Independent    Ambulation:    Walking Ability  Does the Patient Walk?: Yes     Walk 10 Feet  Assistance Needed: Independent  Comment: mod I with RW  CARE Score: 6  Discharge Goal: Independent (w/ LRAD; normally ambulates w/o AD)     Walk 50 Feet with Two Turns  Assistance Needed: Independent  Comment: mod I with RW  CARE Score: 6  Discharge Goal: Independent (w/ LRAD)     Walk 150 Feet  Assistance Needed: Independent  Comment: mod I with RW  CARE Score: 6  Discharge Goal: Independent     Walking 10 Feet on Uneven Surfaces  Assistance Needed: Independent  Comment: mod I with RW  CARE Score: 6  Discharge Goal: Independent     1 Step (Curb)  Assistance Needed: Supervision or touching assistance  Comment: supervision with RW. Vcs for proper walker safety and positioning. CARE Score: 4  Discharge Goal: Independent     4 Steps  Assistance Needed: Independent  Comment:  Mod I with 2 rails  CARE Score: 6  Discharge Goal: Independent     12 Steps  Assistance Needed: Independent  Comment: mod I with 2 rails  CARE Score: 6  Discharge Goal: Not Applicable           Wheelchair:  w/c Ability: Wheelchair Ability  Uses a Wheelchair and/or Scooter?: No (pt ambulating with RW as usual performance at this time.)    Wheel 50 Feet with Two Turns  Assistance Needed: Partial/moderate assistance (min A to maintain momentum; pt is of short stature feet, barely reach floor, fatiguing quickly)  CARE Score: 3  Discharge Goal: Independent    Wheel 150 Feet  Assistance Needed: Partial/moderate assistance (pt using only BL LE's to maintain sternal precautions; min A to maintain momentum; short stature limiting)  CARE Score: 3  Discharge Goal: Supervision or touching assistance (using BL LE's to maintain sternal precautions; min a to maintain momentum; anticipate pt home w/o w/c on DC)              Balance:    Balance  Posture: Good  Sitting - Static: Good  Standing - Static: Good   Object: Picking Up Object  Assistance Needed: Independent  Comment: mod I with reacher and RW  CARE Score: 6  Discharge Goal: Independent    Fall Risk: [x]  Yes  []  No    OCCUPATIONAL THERAPY  (Updated in QI)  Short term goals  Time Frame for Short term goals: STG=LTG :   Long term goals  Time Frame for Long term goals : 10-12 days or until d/c  Long term goal 1: Pt will complete feeding/grooming/oral care task c MOD I by d/c  Long term goal 2: Pt will complete total body bathing c MOD I c use of AE by d/c  Long term goal 3: Pt will complete total body dressing (UB/LB) c MOD I by d/c  Long term goal 4: Pt will complete toileting c MIN A by d/c  Long term goal 5: Pt will complete functional transfer (toilet, tub, shower) c MOD I by d/c. Long term goals 6: Pt will perform therex/therax to facilitate increased strength/endurance/ax tolerance (c emphasis on dynamic standing balance/tolerance >8 mins and BUE endurance) c MOD I in order to complete ADLs.   Long term goal 7: Pt will complete footwear c MIN A by d/c :                                       OT IRF-CHELSEY scores and goals since initial assessment:    ADLs:    Eating: Eating  Assistance Needed: Independent  Comment: X  CARE Score: 6  Discharge Goal: Independent       Oral Hygiene: Oral Hygiene  Assistance Needed: Independent  Comment: X  CARE Score: 6  Discharge Goal: Independent    UB/LB Bathing: Shower/Bathe Self  Assistance is making good progression towards their long term goals and is actively participating in and has a reasonable expectation to continue to benefit from the intensive rehabilitation therapy program   []  The estimated discharge date has been changed from initial team conference due to:   []  The estimated discharge destination has been changed from initial team conference due to:         Ongoing tx following discharge: [x]HHC OT PT NSG   []OUTPATIENT     [] No Further Treatment     [] Family/Caregiver Training  []  Transitional Living Arrangement   [] Home Assessment (date  )     [] Family Conference   []  Therapeutic Pass       []  Other: (specify)    Estimated Discharge Date: 10/1/21    Estimated Discharge Destination: []home alone   [x]home alone with assist prn  []Continuous supervision []Return home with s/o/spouse/family   [] Assisted living    []SNF     Team members participating in today's conference. [x] Ora Frias, Medical Director  [x] Leigh Kelsey,    [] Armond Apple, Nurse Mgr [x] Nash Martinez, Nurse Supervisor   []  Treva Hayward, PT   [x] Jazmyn Saldana, OT   [x]  Shantel Salazar, PT  [] Han Hernandez, OT      [x]  Maribeth Lloyd, SLP    []  Tye Adams, SLP   [] Cele Ng, SLP   []  Kitty Gonzalez RD     [x] Myles Conner,     [x]Annabella Richmond,     [] Amy Cleveland RN[] Iris Andrea RN     [] Margaret Vail RN    [] Adriana Jarvis RN    [] Radha Villegas RN  [] Mary Ascencio RN    []     I have led this Team Conference and agree with the plan, Esther Pace MD, 9/30/2021, 12:39 PM  Goals have been updated to reflect recent status.     Team conference note transcribed this date by: Carlos Greenberg MA, [id number] [facility], Therapy Coordinator

## 2021-09-29 NOTE — PROGRESS NOTES
Physical Rehabilitation: OCCUPATIONAL THERAPY     [x] daily progress note       [] discharge       Patient Name:  Kev Aguirre   :  1950 MRN: [id number]  Room:  [address] [place] [address] Date of Admission: 2021  Rehabilitation Diagnosis:   Nonrheumatic aortic (valve) stenosis [I35.0]  CHF following cardiac surgery, postop [I97.130]       Date 2021       Day of ARU Week:  2   Time IN//1000; 1300/1350   Individual Tx Minutes 79 + 50   Group Tx Minutes    Co-Treat Minutes    Concurrent Tx Minutes    TOTAL Tx Time Mins 120   Variance Time    Variance Time []   Refusal due to:     []   Medical hold/reason:    []   Illness   []   Off Unit for test/procedure  []   Extra time needed to complete task  []   Therapeutic need  []   Other (specify):   Restrictions Restrictions/Precautions: Fall Risk, General Precautions         Communication with other providers: [x]   OK to see per nursing:     []   Spoke with team member regarding:      Subjective observations and cognitive status: Patient sitting up at EOB, pleasant and agreeable to therapy      Pain level/location:    /10       Location: no pain noted per patient/ PM SESSION: tightness at surgical site 2/10     Discharge recommendations  Anticipated discharge date:  10/1  Destination: []home alone   [x]home alone w assist prn  Sister will assist PRN   [] home w/ family    [] Continuous supervision       []SNF    [] Assisted living     [] Other:   Continued therapy: [x]HHC OT  []OUTPATIENT  OT   [] No Further OT  Equipment needs: Avril King requires the assistance of a tub transfer bench to successfully and safely complete bathing activities necessary due to reduced balance, endurance, need for ADL assist, and LE weakness and reduced ROM.  These tasks cannot be safely completed without this device and would place Avril King at greater risk of falls       ADLs:    Eating: Eating  Assistance Needed: Independent  Comment: X  CARE Score: 6  Discharge Goal: Independent       Oral Hygiene: Oral Hygiene  Assistance Needed: Independent  Comment: X  CARE Score: 6  Discharge Goal: Independent    UB/LB Bathing: Shower/Bathe Self  Assistance Needed: Independent  Comment: X  CARE Score: 6  Discharge Goal: Supervision or touching assistance    UB Dressing: Upper Body Dressing  Assistance Needed: Independent  Comment: X  CARE Score: 6  Discharge Goal: Independent         LB Dressing: Lower Body Dressing  Assistance Needed: Independent  Comment: X  CARE Score: 6  Discharge Goal: Independent    Donning and [place] Footwear: Putting On/Taking Off Footwear  Assistance Needed: Independent  Comment: X  CARE Score: 6  Discharge Goal: Partial/moderate assistance      Toileting: Toileting Hygiene  Assistance Needed: Independent  Comment: X  Reason if not Attempted: Patient refused (Pt used restroom with aide prior to therapist coming in)  CARE Score: 6  Discharge Goal: Partial/moderate assistance      Toilet Transfers: Toilet Transfer  Assistance Needed: Independent  Comment: X  CARE Score: 6  Discharge Goal: Independent  Device Used:    [x]   Standard Toilet         []   Devendra Rick           []  Bedside Commode       []   Elevated Toilet          []   Other: Toileting   PM session: transfers and hygiene c CM: Mod I       Bed Mobility:           [x]   Pt received out of bed   Rolling R/L:    Scooting:    Supine --> Sit:    Sit --> Supine:      Transfers:    Sit--> Stand: Mod I   Stand --> Sit:   Mod I   Stand-Pivot: Mod I   Other:    Assistive device required for transfer:   RW       Functional Mobility:  Throughout room/bathroom; throughout courtyard and ARU  PM SESSION    Assistance:   Mod I   Device:   [x]   Rolling Walker     []   Standard Walker []   Wheelchair        []   [place]       []   4-Wheeled Anne Kaufman         []   Cardiac Anne Kaufman       []   Other:        Homemaking Tasks:   Patient retrieves and transports clothing at [facility] level in prep for ADL       Additional Therapeutic activities/exercises completed this date:     [x]   ADL Training   [x]   Balance/Postural training     [x]   Bed/Transfer Training patient instructed in sit to stand transfers 3 sets of 5 c focus on maintaining precautions, strengthening to B LE and endurance    []   Endurance Training patient instructed in bilateral reciprocal patterned movement  With min resistance while standing for 5 minutes times two to increase endurance and activity tolerance for facilitation of ADL and mobility tasks     []   Neuromuscular Re-ed   []   Nu-step:  Time:        Level:         #Steps:       []   Rebounder:    []  Seated     []  Standing        []   Supine Ther Ex (reps/sets):     []   Seated Ther Ex (reps/sets):     []   Standing Ther Ex (reps/sets):     []   Other:      Comments:Spoke c PTA following two sessions of this date agreeable to making patient Mod I in room to use restroom on her own as patient ias Mod I for OT rthroughout bnoth sessions this date       Patient/Caregiver Education and Training:   [x]   Adaptive Equipment Use  [x]   Bed Mobility/Transfer Technique/Safety  [x]   Energy Conservation Tips  []   Family training  [x]   Postural Awareness  [x]   Safety During Functional Activities  [x]   Reinforced Patient's Precautions   []   Progress was updated and reviewed in Rehabtracker with patient and/or family this         date.     Treatment Plan for Next Session: POC to continue as tolerated          Treatment/Activity Tolerance:   [x] Tolerated treatment with no adverse effects    [] Patient limited by fatigue  [] Patient limited by pain   [] Patient limited by medical complications:    [] Adverse reaction to Tx:   [] Significant change in status    Safety:       []  bed alarm set    [x]  chair alarm set    []  Pt refused alarms                []  Telesitter activated      [x]  Gait belt used during tx session      []other:       Number of Minutes/Billable Intervention  Therapeutic Exercise    ADL term goal 7: Pt will complete footwear c MIN A by d/c:        Plan of Care                                                                              Times per week: 5 days per week for a minimum of 60 minutes/day plus group as appropriate for 60 minutes.   Treatment to include Plan  Times per day: Daily  Current Treatment Recommendations: Strengthening, Functional Mobility Training, Patient/Caregiver Education & Training, ROM, Endurance Training, Equipment Evaluation, Education, & procurement, Balance Training, Safety Education & Training, Self-Care / ADL, Home Management Training    Electronically signed by   Durel Cabot, OTA,  9/29/2021, 9:26 AM

## 2021-09-29 NOTE — FLOWSHEET NOTE
[x] daily progress note       [] discharge       Patient Name:  Alessandra Murphy   :  1950 MRN: [id number]  Room:  [address] [place], [place] [address] Date of Admission: 2021  Rehabilitation Diagnosis:   Nonrheumatic aortic (valve) stenosis [I35.0]  CHF following cardiac surgery, postop [I97.130]       Date 2021       Day of ARU Week:  2   Time IN/OUT 5215-3328   Individual Tx Minutes 60   TOTAL Tx Time Mins 60   Restrictions Restrictions/Precautions  Restrictions/Precautions: Fall Risk, General Precautions  Position Activity Restriction  Sternal Precautions: No Pushing, 8# Lifting Restrictions, No Pulling  Other position/activity restrictions: Pt c 2L O2   Communication with other providers: [x]   OK to see per nursing:     [x]   Spoke with JESICA Gómez, ROYA Flores about patient being mod I in room with RW. All in agreement. Wrote on patient communication board. Subjective observations and cognitive status: Pt seen sitting up in recliner at beginning of treatment. Agreeable to therapy. Pain level/location:   2 /10       Location: chest incision   Discharge recommendations  Anticipated discharge date: Expected Discharge Date: 10/01/21   Destination: []? ???home alone   [x]? ? ??home alone with assist PRN     []???? home w/ family      []???? Continuous supervision  []????SNF    []???? Assisted living     []???? Other:   Continued therapy: [x]????[facility] PT  []????OUTPATIENT  PT   []???? No Further PT  Equipment needs: Avril King requires the assistance of a wheeled walker to successfully ambulate from room to room at home to allow completion of daily living tasks such as: bathing, toileting, dressing and grooming.  A wheeled walker is necessary due to the patient's unsteady gait, upper body weakness, inability to  a standard walker.  This patient can ambulate only by pushing a walker instead of using a lesser assistive device such as a cane or crutch.        [x]   Pt received out of bed     Transfers:    Sit--> Stand: mod I   Stand --> Sit:  Mod I   Chair-->Bed/Bed --> Chair:  Mod I   Toilet Transfer: Mod I   Assistive device required for transfer:  RW    Gait:    Distance: 114'+100'+96'   Assistance: Mod I   Device:  RW  Gait Quality:  Reciprocal pattern     Additional Therapeutic activities/exercises completed this date:     []   Nu-step:  Time:        Level:         #Steps:       []   Rebounder:    []  Seated     []  Standing        [x]   Balance training: pt stood at toilet and performed pull-up and pull-down of underwear and pants mod I for balance with RW. Pt stood at sink and performed washing and drying of hands mod I for balance with RW.         []   Postural training    []   Supine ther ex (reps/sets):     []   Seated ther ex (reps/sets):     [x]   Standing ther ex (reps/sets): Cardiac exercises: marching, heel raises, overhead side stretch, fwd arm raises x 10 reps each for strengthening. []   Picking up object from floor (standing):                   []   Reacher used   []   Other:   []   Other:    Patient/Caregiver Education and Training:   [x]   Bed Mobility/Transfer technique/safety  [x]   Gait technique/sequencing  [x]   Proper use of assistive device  []   Advanced mobility safety and technique  [x]   Reinforced patient's precautions/mobility while maintaining precautions  []   Postural awareness  []   Family training    Treatment Plan for Next Session:  QI      Assessment: This pt demonstrated a positive response to today's treatment as evidenced by improved mobility. The patient is making progress toward established goals as evidenced by QI scores. Ongoing deficits are observed in the areas of strength, balance, and endurance and continued focus on strengthening, balance training, and endurance training is recommended.      Treatment/Activity Tolerance:   [x] Tolerated treatment with no adverse effects    [] Patient limited by fatigue  [] Patient limited by pain   [] Patient limited by medical complications:    [] Adverse reaction to Tx:   [] Significant change in status    Safety:       []  bed alarm set    []  chair alarm set    []  Pt refused alarms  (pt approved to be independent in room with RW)             []  Telesitter activated      [x]  Gait belt used during tx session      [x]other: pt left sitting up in recliner with call light and RW at end of treatment. Number of Minutes/Billable Intervention  Gait Training 30   Therapeutic Exercise 15   Neuro Re-Ed    Therapeutic Activity 15   Wheelchair Propulsion    Group    Other:    TOTAL 60         Social History  Social/Functional History  Lives With: Alone  Type of Home: Apartment  Home Layout: One level  Home Access: Level entry  Bathroom Shower/Tub: Tub/Shower unit  Bathroom Toilet: Standard  Bathroom Equipment: Grab bars in shower  Bathroom Accessibility: Accessible  ADL Assistance: Independent  Homemaking Assistance: Independent  Homemaking Responsibilities: Yes  Meal Prep Responsibility: Primary  Cleaning Responsibility: Primary  Bill Paying/Finance Responsibility: Primary  Shopping Responsibility: Primary  Dependent Care Responsibility: Primary  Health Care Management: Primary  Ambulation Assistance: Independent (no AD at baseline)  Transfer Assistance: Independent  Active : Yes (sister, son, DIL)  Occupation: Retired  Leisure & Hobbies: Pt enjoys traveling and spending time at [place] father's home. Pt reports assisting pt's father with help of sisters. Additional Comments: Pt denies any falls this year. Pt has full sized bed at home.     Objective                                                                                    Goals:  (Update in navigator)  Short term goals  Time Frame for Short term goals: 7-10 days  Short term goal 1: pt will perform supine <-> sit, scooting from flat HOB at mod ind while maintaining sternal precautions  Short term goal 2: pt will perform sit <-> stand, bed <-> chair, car transfers at mod ind while maintaining sternal precautions  Short term goal 3: pt will ambulate 150 ft on level surfaces using LRAD at mod ind including over uneven surfaces, 2 turns  Short term goal 4: pt will ascend/descend curb step at mod ind using LRAD and complete up to 6 steps w/o UE support at sup level while maintaining sternal precautions  Short term goal 5: pt will retrieve light item from floor at mod ind from LRAD:   :        Plan of Care                                                                              Times per week: 5 days per week for a minimum of 60 minutes/day plus group as appropriate for 60 minutes.   Treatment to include Current Treatment Recommendations: Strengthening, Transfer Training, Endurance Training, Balance Training, Gait Training, Functional Mobility Training, Stair training, ADL/Self-care Training, Patient/Caregiver Education & Training, Equipment Evaluation, Education, & procurement, Positioning, Safety Education & Training, Pain Management, Wheelchair Mobility Training    Electronically signed by   Destin Butterfield [id number]  9/29/2021, 3:10 PM

## 2021-09-30 LAB
GLUCOSE BLD-MCNC: 158 MG/DL (ref 70–99)
GLUCOSE BLD-MCNC: 166 MG/DL (ref 70–99)
GLUCOSE BLD-MCNC: 176 MG/DL (ref 70–99)
GLUCOSE BLD-MCNC: 273 MG/DL (ref 70–99)

## 2021-09-30 PROCEDURE — 97110 THERAPEUTIC EXERCISES: CPT

## 2021-09-30 PROCEDURE — 6370000000 HC RX 637 (ALT 250 FOR IP): Performed by: PHYSICAL MEDICINE & REHABILITATION

## 2021-09-30 PROCEDURE — 97535 SELF CARE MNGMENT TRAINING: CPT

## 2021-09-30 PROCEDURE — 94761 N-INVAS EAR/PLS OXIMETRY MLT: CPT

## 2021-09-30 PROCEDURE — 1280000000 HC REHAB R&B

## 2021-09-30 PROCEDURE — 94150 VITAL CAPACITY TEST: CPT

## 2021-09-30 PROCEDURE — 82962 GLUCOSE BLOOD TEST: CPT

## 2021-09-30 PROCEDURE — 97530 THERAPEUTIC ACTIVITIES: CPT

## 2021-09-30 PROCEDURE — 6370000000 HC RX 637 (ALT 250 FOR IP): Performed by: SURGERY

## 2021-09-30 PROCEDURE — 97116 GAIT TRAINING THERAPY: CPT

## 2021-09-30 RX ADMIN — APIXABAN 5 MG: 5 TABLET, FILM COATED ORAL at 21:22

## 2021-09-30 RX ADMIN — INSULIN LISPRO 2 UNITS: 100 INJECTION, SOLUTION INTRAVENOUS; SUBCUTANEOUS at 17:34

## 2021-09-30 RX ADMIN — INSULIN LISPRO 2 UNITS: 100 INJECTION, SOLUTION INTRAVENOUS; SUBCUTANEOUS at 08:39

## 2021-09-30 RX ADMIN — AMIODARONE HYDROCHLORIDE 200 MG: 200 TABLET ORAL at 08:37

## 2021-09-30 RX ADMIN — CARVEDILOL 3.12 MG: 6.25 TABLET, FILM COATED ORAL at 08:45

## 2021-09-30 RX ADMIN — ACETAMINOPHEN 650 MG: 325 TABLET ORAL at 17:47

## 2021-09-30 RX ADMIN — THERA TABS 1 TABLET: TAB at 08:38

## 2021-09-30 RX ADMIN — CARVEDILOL 3.12 MG: 6.25 TABLET, FILM COATED ORAL at 21:22

## 2021-09-30 RX ADMIN — PANTOPRAZOLE SODIUM 40 MG: 40 TABLET, DELAYED RELEASE ORAL at 08:35

## 2021-09-30 RX ADMIN — FUROSEMIDE 20 MG: 20 TABLET ORAL at 17:48

## 2021-09-30 RX ADMIN — INSULIN LISPRO 2 UNITS: 100 INJECTION, SOLUTION INTRAVENOUS; SUBCUTANEOUS at 12:35

## 2021-09-30 RX ADMIN — ACETAMINOPHEN 650 MG: 325 TABLET ORAL at 08:50

## 2021-09-30 RX ADMIN — ALOGLIPTIN 6.25 MG: 12.5 TABLET, FILM COATED ORAL at 08:48

## 2021-09-30 RX ADMIN — ASPIRIN 81 MG: 81 TABLET, COATED ORAL at 08:42

## 2021-09-30 RX ADMIN — FUROSEMIDE 20 MG: 20 TABLET ORAL at 08:34

## 2021-09-30 RX ADMIN — GLIPIZIDE 5 MG: 5 TABLET ORAL at 08:40

## 2021-09-30 RX ADMIN — HYDROCODONE BITARTRATE AND ACETAMINOPHEN 1 TABLET: 5; 325 TABLET ORAL at 21:21

## 2021-09-30 RX ADMIN — APIXABAN 5 MG: 5 TABLET, FILM COATED ORAL at 08:36

## 2021-09-30 ASSESSMENT — PAIN DESCRIPTION - PAIN TYPE
TYPE: SURGICAL PAIN

## 2021-09-30 ASSESSMENT — PAIN DESCRIPTION - LOCATION
LOCATION: BACK
LOCATION: BACK
LOCATION: CHEST
LOCATION: CHEST

## 2021-09-30 ASSESSMENT — PAIN DESCRIPTION - FREQUENCY
FREQUENCY: CONTINUOUS
FREQUENCY: INTERMITTENT

## 2021-09-30 ASSESSMENT — PAIN DESCRIPTION - ORIENTATION: ORIENTATION: UPPER

## 2021-09-30 ASSESSMENT — PAIN DESCRIPTION - DESCRIPTORS
DESCRIPTORS: DISCOMFORT
DESCRIPTORS: ACHING
DESCRIPTORS: DISCOMFORT

## 2021-09-30 ASSESSMENT — PAIN DESCRIPTION - PROGRESSION
CLINICAL_PROGRESSION: GRADUALLY IMPROVING
CLINICAL_PROGRESSION: NOT CHANGED
CLINICAL_PROGRESSION: NOT CHANGED

## 2021-09-30 ASSESSMENT — PAIN SCALES - GENERAL
PAINLEVEL_OUTOF10: 3
PAINLEVEL_OUTOF10: 7
PAINLEVEL_OUTOF10: 4
PAINLEVEL_OUTOF10: 2
PAINLEVEL_OUTOF10: 4
PAINLEVEL_OUTOF10: 3

## 2021-09-30 ASSESSMENT — PAIN - FUNCTIONAL ASSESSMENT: PAIN_FUNCTIONAL_ASSESSMENT: PREVENTS OR INTERFERES SOME ACTIVE ACTIVITIES AND ADLS

## 2021-09-30 ASSESSMENT — PAIN DESCRIPTION - ONSET
ONSET: ON-GOING
ONSET: ON-GOING

## 2021-09-30 NOTE — PROGRESS NOTES
Independent  Comment: X  CARE Score: 6  Discharge Goal: Independent    UB/LB Bathing: Shower/Bathe Self  Assistance Needed: Independent  Comment: MOd I  CARE Score: 6  Discharge Goal: Supervision or touching assistance    UB Dressing: Upper Body Dressing  Assistance Needed: Independent  Comment: X  CARE Score: 6  Discharge Goal: Independent         LB Dressing: Lower Body Dressing  Assistance Needed: Independent  Comment: X  CARE Score: 6  Discharge Goal: Independent    Donning and [place] Footwear: Putting On/Taking Off Footwear  Assistance Needed: Independent  Comment: X  CARE Score: 6  Discharge Goal: Partial/moderate assistance      Toileting: Toileting Hygiene  Assistance Needed: Independent  Comment: X  Reason if not Attempted: Patient refused (Pt used restroom with aide prior to therapist coming in)  CARE Score: 6  Discharge Goal: Partial/moderate assistance      Toilet Transfers: Toilet Transfer  Assistance Needed: Independent  Comment: X  CARE Score: 6  Discharge Goal: Independent  Device Used:    [x]   Standard Toilet         []   Grab Bars           []  Bedside Commode       []   Elevated Toilet          []   Other:        Bed Mobility:           [x]   Pt received out of bed   Rolling R/L:    Scooting:     Supine --> Sit  Sit --> Supine:      Transfers:    Sit--> Stand: Mod I   Stand --> Sit:   Mod I   Stand-Pivot: Mod I   Other:    Assistive device required for transfer:   RW       Functional Mobility:  Throughout room/bathroom   Assistance:   Mod I   Device:   [x]   Rolling Walker     []   Standard Walker []   Wheelchair        []   [employer]       []   4-Wheeled Vonda Shi         []   Cardiac Vonda Shi       []   Other:        Homemaking Tasks:   Patient retrieves and transports clothing from closet to bathroom prior to ADL       Additional Therapeutic activities/exercises completed this date:     [x]   ADL Training   [x]   Balance/Postural training     [x]   Bed/Transfer Training   []   Endurance Training []   Neuromuscular Re-ed   []   Nu-step:  Time:        Level:         #Steps:       []   Rebounder:    []  Seated     []  Standing        []   Supine Ther Ex (reps/sets):     []   Seated Ther Ex (reps/sets):     []   Standing Ther Ex (reps/sets):     []   Other:      Comments:      Patient/Caregiver Education and Training:   [x]   [employer] Equipment Use  [x]   Bed Mobility/Transfer Technique/Safety  [x]   Energy Conservation Tips  []   Family training  [x]   Postural Awareness  [x]   Safety During Functional Activities  [x]   Reinforced Patient's Precautions   []   Progress was updated and reviewed in Rehabtracker with patient and/or family this         date.     Treatment Plan for Next Session: POC to continue as tolerated         Treatment/Activity Tolerance:   [x] Tolerated treatment with no adverse effects    [] Patient limited by fatigue  [] Patient limited by pain   [] Patient limited by medical complications:    [] Adverse reaction to Tx:   [] Significant change in status    Safety:       []  bed alarm set    []  chair alarm set    []  Pt refused alarms                []  Telesitter activated      [x]  Gait belt used during tx session      []other:       Number of Minutes/Billable Intervention  Therapeutic Exercise    ADL Self-care 45   Neuro Re-Ed    Therapeutic Activity 15   Group    Other:    TOTAL 60       Social History  Social/Functional History  Lives With: Alone  Type of Home: Apartment  Home Layout: One level  Home Access: Level entry  Bathroom Shower/Tub: Tub/Shower unit  Bathroom Toilet: Standard  Bathroom Equipment: Grab bars in shower  Bathroom Accessibility: Accessible  ADL Assistance: Independent  Homemaking Assistance: Independent  Homemaking Responsibilities: Yes  Meal Prep Responsibility: Primary  Cleaning Responsibility: Primary  Bill Paying/Finance Responsibility: Primary  Shopping Responsibility: Primary  Dependent Care Responsibility: Primary  Health Care Management: Primary  Ambulation Assistance: Independent (no AD at baseline)  Transfer Assistance: Independent  Active : Yes (sister, son, DIL)  Occupation: Retired  Leisure & Hobbies: Pt enjoys traveling and spending time at [place] father's home. Pt reports assisting pt's father with help of sisters. Additional Comments: Pt denies any falls this year. Pt has full sized bed at home. Objective                                                                                    Goals:  (Update in navigator)  Short term goals  Time Frame for Short term goals: STG=LTG:  Long term goals  Time Frame for Long term goals : 10-12 days or until d/c  Long term goal 1: Pt will complete feeding/grooming/oral care task c MOD I by d/c  Long term goal 2: Pt will complete total body bathing c MOD I c use of AE by d/c  Long term goal 3: Pt will complete total body dressing (UB/LB) c MOD I by d/c  Long term goal 4: Pt will complete toileting c MIN A by d/c  Long term goal 5: Pt will complete functional transfer (toilet, tub, shower) c MOD I by d/c. Long term goals 6: Pt will perform therex/therax to facilitate increased strength/endurance/ax tolerance (c emphasis on dynamic standing balance/tolerance >8 mins and BUE endurance) c MOD I in order to complete ADLs. Long term goal 7: Pt will complete footwear c MIN A by d/c:        Plan of Care                                                                              Times per week: 5 days per week for a minimum of 60 minutes/day plus group as appropriate for 60 minutes.   Treatment to include Plan  Times per day: Daily  Current Treatment Recommendations: Strengthening, Functional Mobility Training, Patient/Caregiver Education & Training, ROM, Endurance Training, Equipment Evaluation, Education, & procurement, Balance Training, Safety Education & Training, Self-Care / ADL, Home Management Training    Electronically signed by   MC Aleman,  9/30/2021, 8:21 AM

## 2021-09-30 NOTE — PROGRESS NOTES
Physical Therapy    [x] daily progress note       [] discharge       Patient Name:  Mayra Mckeon   :  1950 MRN: [id number]  Room:  [address] [place] [address] Date of Admission: 2021  Rehabilitation Diagnosis:   Nonrheumatic aortic (valve) stenosis [I35.0]  CHF following cardiac surgery, postop [I97.130]       Date 2021       Day of ARU Week:  3   Time IN/OUT [id number]   Individual Tx Minutes 60   Group Tx Minutes    Co-Treat Minutes    Concurrent Tx Minutes    TOTAL Tx Time Mins 60   Variance Time    Variance Time []   Refusal due to:     []   Medical hold/reason:    []   Illness   []   Off Unit for test/procedure  []   Extra time needed to complete task  []   Therapeutic need  []   Other (specify):   Restrictions Restrictions/Precautions  Restrictions/Precautions: Fall Risk, General Precautions  Position Activity Restriction  Sternal Precautions: No Pushing, 8# Lifting Restrictions, No Pulling  Other position/activity restrictions: Pt c 2L O2   Communication with other providers: [x]   OK to see per nursing:     []   Spoke with team member regarding:      Subjective observations and cognitive status: Pt sitting u pin recliner awake. She is agreeable to  Therapy.      Pain level/location: 10       Location: tightness in chest region   Discharge recommendations  Anticipated discharge date:  10/01/2021  Destination: []home alone   [x]home alone with assist PRN     [] home w/ family      [] Continuous supervision  []SNF    [] Assisted living     [] Other:   Continued therapy: [x]HHC PT  []OUTPATIENT  PT   [] No Further PT  Equipment needs: Nikolas Morton the assistance of a wheeled walker to successfully ambulate from room to room at home to allow completion of daily living tasks such as: bathing, toileting, dressing and grooming.  A wheeled walker is necessary due to the patient's unsteady gait, upper body weakness, inability to  a standard walker.  This patient can ambulate only by pushing a walker instead of using a lesser assistive device such as a cane or crutch. Bed Mobility:           [x]   Pt received out of bed     Transfers:    Sit--> Stand: Mod ind  Stand --> Sit:  ind  Toilet Transfer (if applicable): mod ind  Assistive device required for transfer:   FWW    Gait:    Distance:  15 ft x 2   Assistance: mod ind  Device:  RW  Gait Quality:  Slow recip steps    Additional Therapeutic activities/exercises completed this date:     []   Nu-step:  Time:        Level:         #Steps:       []   Rebounder:    []  Seated     []  Standing        [x]   Balance training standing unsupported for cardiac there ex        []   Postural training    []   Supine ther ex (reps/sets):     []   Seated ther ex (reps/sets):     [x]   Standing ther ex (reps/sets): with fww support and sba; cardiac  There ex; overheadside stretch, pf, df, forward arm raises, side arm raises, arm circles, trunk twists x 10 reps each  arm crosses, marching x 10 reps each. Pt did require 3 rest breaks during session. []   Picking up object from floor (standing):                   []   Reacher used   []   Other:   [x]   Other:review of cardiac/sternal precautions  Comments:      Patient/Caregiver Education and Training:   []   Bed Mobility/Transfer technique/safety  []   Gait technique/sequencing  []   Proper use of assistive device  []   Advanced mobility safety and technique  [x]   Reinforced patient's precautions/mobility while maintaining precautions  [x]   Postural awareness  []   Family training  []   Progress was updated and reviewed in Rehabtracker with patient and/or family this date. Treatment Plan for Next Session: discharge to home    Assessment: This pt demonstrated a positive  response to today's treatment as evidenced by standimg cardiac there ex. The patient is making progress toward established goals as evidenced by QI scores.  Ongoing deficits are observed in the areas of strength and bal and continued [facility] is recommended. Treatment/Activity Tolerance:   [] Tolerated treatment with no adverse effects    [x] Patient limited by fatigue  [] Patient limited by pain   [] Patient limited by medical complications:    [] Adverse reaction to Tx:   [] Significant change in status    Safety:       []  bed alarm set    [x]  chair alarm set    []  Pt refused alarms                []  Telesitter activated      [x]  Gait belt used during tx session      []other:         Number of Minutes/Billable Intervention  Gait Training 15   Therapeutic Exercise 30   Neuro Re-Ed    Therapeutic Activity 15   Wheelchair Propulsion    Group    Other:    TOTAL 60         Social History  Social/Functional History  Lives With: Alone  Type of Home: Apartment  Home Layout: One level  Home Access: Level entry  Bathroom Shower/Tub: Tub/Shower unit  Bathroom Toilet: Standard  Bathroom Equipment: Grab bars in shower  Bathroom Accessibility: Accessible  ADL Assistance: Independent  Homemaking Assistance: Independent  Homemaking Responsibilities: Yes  Meal Prep Responsibility: Primary  Cleaning Responsibility: Primary  Bill Paying/Finance Responsibility: Primary  Shopping Responsibility: Primary  Dependent Care Responsibility: Primary  Health Care Management: Primary  Ambulation Assistance: Independent (no AD at baseline)  Transfer Assistance: Independent  Active : Yes (sister, son, DIL)  Occupation: Retired  Leisure & Hobbies: Pt enjoys traveling and [facility]. Pt reports assisting pt's father with help of sisters. Additional Comments: Pt denies any falls this year. Pt has full sized bed at home.     Objective                                                                                    Goals:  (Update in navigator)  Short term goals  Time Frame for Short term goals: 7-10 days  Short term goal 1: pt will perform supine <-> sit, scooting from flat HOB at mod ind while maintaining sternal precautions  Short term goal 2: pt will perform sit <-> stand, bed <-> chair, car transfers at mod ind while maintaining sternal precautions  Short term goal 3: pt will ambulate 150 ft on level surfaces using LRAD at mod ind including over uneven surfaces, 2 turns  Short term goal 4: pt will ascend/descend curb step at mod ind using LRAD and complete up to 6 steps w/o UE support at sup level while maintaining sternal precautions  Short term goal 5: pt will retrieve light item from floor at mod ind from LRAD:   :        Plan of Care                                                                              Times per week: 5 days per week for a minimum of 60 minutes/day plus group as appropriate for 60 minutes.   Treatment to include Current Treatment Recommendations: Strengthening, Transfer Training, Endurance Training, Balance Training, Gait Training, Functional Mobility Training, Stair training, ADL/Self-care Training, Patient/Caregiver Education & Training, Equipment Evaluation, Education, & procurement, Positioning, Safety Education & Training, Pain Management, Wheelchair Mobility Training    Electronically signed by   MOODY Pena,[id number]  9/30/2021, 8:23 AM

## 2021-09-30 NOTE — PROGRESS NOTES
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 Looking forward to discharge soon. No chills or cough. Prior (baseline) level of function: Independent. Current level of function:         Current  IRF-CHELSEY and Goals:   Occupational Therapy:    Short term goals  Time Frame for Short term goals: STG=LTG :   Long term goals  Time Frame for Long term goals : 10-12 days or until d/c  Long term goal 1: Pt will complete feeding/grooming/oral care task c MOD I by d/c  Long term goal 2: Pt will complete total body bathing c MOD I c use of AE by d/c  Long term goal 3: Pt will complete total body dressing (UB/LB) c MOD I by d/c  Long term goal 4: Pt will complete toileting c MIN A by d/c  Long term goal 5: Pt will complete functional transfer (toilet, tub, shower) c MOD I by d/c. Long term goals 6: Pt will perform therex/therax to facilitate increased strength/endurance/ax tolerance (c emphasis on dynamic standing balance/tolerance >8 mins and BUE endurance) c MOD I in order to complete ADLs.   Long term goal 7: Pt will complete footwear c MIN A by d/c :                                       Eating: Eating  Assistance Needed: Independent  Comment: X  CARE Score: 6  Discharge Goal: Independent       Oral Hygiene: Oral Hygiene  Assistance Needed: Independent  Comment: X  CARE Score: 6  Discharge Goal: Independent    UB/LB Bathing: Shower/Bathe Self  Assistance Needed: Independent  Comment: X  CARE Score: 6  Discharge Goal: Supervision or touching assistance    UB Dressing: Upper Body Dressing  Assistance Needed: Independent  Comment: X  CARE Score: 6  Discharge Goal: Independent         LB Dressing: Lower Body Dressing  Assistance Needed: Independent  Comment: X  CARE Score: 6  Discharge Goal: Independent    Donning and [place] Footwear: Putting On/Taking Off Footwear  Assistance Needed: Independent  Comment: X  CARE Score: 6  Discharge Goal: Partial/moderate assistance      Toileting: Toileting Hygiene  Assistance Needed: Independent  Comment: X  Reason if not Attempted: Patient refused (Pt used restroom with aide prior to therapist coming in)  CARE Score: 6  Discharge Goal: Partial/moderate assistance      Toilet Transfers:   Toilet Transfer  Assistance Needed: Independent  Comment: X  CARE Score: 6  Discharge Goal: Independent    Physical Therapy:   Short term goals  Time Frame for Short term goals: 7-10 days  Short term goal 1: pt will perform supine <-> sit, scooting from flat HOB at mod ind while maintaining sternal precautions  Short term goal 2: pt will perform sit <-> stand, bed <-> chair, car transfers at mod ind while maintaining sternal precautions  Short term goal 3: pt will ambulate 150 ft on level surfaces using LRAD at mod ind including over uneven surfaces, 2 turns  Short term goal 4: pt will ascend/descend curb step at mod ind using LRAD and complete up to 6 steps w/o UE support at sup level while maintaining sternal precautions  Short term goal 5: pt will retrieve light item from floor at mod ind from LRAD            Bed Mobility:   Sit to Lying  Assistance Needed: Partial/moderate assistance (for eccentric control over trunk transition into supine)  CARE Score: 3  Discharge Goal: Independent  Roll Left and Right  Assistance Needed: Supervision or touching assistance (inc time and min vc to maintain sternal precautions)  CARE Score: 4  Discharge Goal: Independent  Lying to Sitting on Side of Bed  Assistance Needed: Partial/moderate assistance (min A for initiation of trunk transition)  CARE Score: 3  Discharge Goal: Independent    Transfers:    Sit to Stand  Assistance Needed: Supervision or touching assistance  Comment: CGA with RW per PTA report today  CARE Score: 4  Discharge Goal: Independent  Chair/Bed-to-Chair Transfer  Assistance Needed: Supervision or touching assistance (CGA for safety; progressing to SBA)  CARE Score: 4  Discharge Goal: Independent  Toilet Transfer  Assistance Needed: Partial/moderate assistance (min a for initiation from toilet while maintaining sternal precautions)  CARE Score: 3  Discharge Goal: Independent  Car Transfer  Assistance Needed: Partial/moderate assistance (per supporting document of evaluating PT)  Comment: Min A  CARE Score: 3  Discharge Goal: Independent    Ambulation:    Walking Ability  Does the Patient Walk?: Yes     Walk 10 Feet  Assistance Needed: Supervision or touching assistance (CGA progressing to SBA)  CARE Score: 4  Discharge Goal: Independent (w/ LRAD; normally ambulates w/o AD)     Walk 50 Feet with Two Turns  Assistance Needed: Supervision or touching assistance (progressing to SBA 2/2 sturdy gait and good use of AD while maintaining precautions)  CARE Score: 4  Discharge Goal: Independent (w/ LRAD)     Walk 150 Feet  Assistance Needed:  (evaluating PT used cardiac walker for mobility assessment versus RW today)  Comment: 88 (peer Susana Dial PT states patient unable to ambulate 150' )  CARE Score: 4  Discharge Goal: Independent     Walking 10 Feet on Uneven Surfaces  Assistance Needed: Supervision or touching assistance  Comment: CGA using RW per PTA report today  CARE Score: 4  Discharge Goal: Independent     1 Step (Curb)  Assistance Needed: Supervision or touching assistance  Comment: CGA using RW per PTA report today  CARE Score: 4  Discharge Goal: Independent     4 Steps  Reason if not Attempted: Not attempted due to medical condition or safety concerns (pt w/ inc SOB and noting fatigue limiting performance at this time; not tested for safety concerns)  CARE Score: 88  Discharge Goal: Independent     12 Steps  Reason if not Attempted: Not applicable (pt does not normally navigate stairs; endorses only ever doing stairs when she visits her dad, 3-4 steps to enter)  CARE Score: 9  Discharge Goal: Not Applicable       Wheelchair:  w/c Ability: Wheelchair Ability  Uses a Wheelchair and/or Scooter?: Yes  Wheel 50 Feet with Two Turns  Assistance Needed: Partial/moderate assistance (min A to maintain momentum; pt is of short stature feet, barely reach floor, fatiguing quickly)  CARE Score: 3  Discharge Goal: Independent  Wheel 150 Feet  Assistance Needed: Partial/moderate assistance (pt using only BL LE's to maintain sternal precautions; min A to maintain momentum; short stature limiting)  CARE Score: 3  Discharge Goal: Supervision or touching assistance (using BL LE's to maintain sternal precautions; min a to maintain momentum; anticipate pt home w/o w/c on DC)          Balance:    Balance  Posture: Good  Sitting - Static: Good  Standing - Static: Good   Object: Picking Up Object  Assistance Needed: Supervision or touching assistance (CGA to steady)  CARE Score: 4  Discharge Goal: Independent    I      Exam:    Blood pressure 127/61, pulse 84, temperature 98.2 °F (36.8 °C), temperature source Oral, resp. rate 18, height 4' 11\" (1.499 m), weight 205 lb 7.5 oz (93.2 kg), last menstrual period 07/09/2000, SpO2 94 %, not currently breastfeeding. General: Up in a bedside chair. Eating a meal. Alert. HEENT: No choking or coughing. No JVD. Pulmonary: Shallow respirations without wheezes or rales. Cardiac: Sternal incision well-healed. Regular rate and rhythm. Pansystolic murmur. Abdomen: Patient's abdomen is soft and nondistended. Bowel sounds were present throughout. There was no rebound, guarding or masses noted. Upper extremities: Functional coordination and strength. No new bruising. Lower extremities: Trace edema. Calves soft.  No signs of DVT. Sitting balance was good. Standing balance was fair-.    Lab Results   Component Value Date    WBC 12.6 (H) 09/29/2021    HGB 9.3 (L) 09/29/2021    HCT 28.7 (L) 09/29/2021    MCV 86.2 09/29/2021    PLT 75 (L) 09/29/2021     Lab Results   Component Value Date    INR 2.07 09/16/2021    INR 0.88 09/09/2021    INR 0.92 07/09/2021    PROTIME 26.9 (H) 09/16/2021    PROTIME 11.4 (L) 09/09/2021    PROTIME 11.9 07/09/2021     Lab Results   Component Value Date    CREATININE 0.8 09/29/2021    BUN 10 09/29/2021     09/29/2021    K 3.7 09/29/2021     09/29/2021    CO2 30 09/29/2021     Lab Results   Component Value Date    ALT 15 06/07/2021    AST 16 06/07/2021    ALKPHOS 92 06/07/2021    BILITOT 0.3 06/07/2021       Expected length of stay  prior to a supervised level of function for discharge home with a walker and [address] OT/PT is 10/1/2021. Recommendations:    1. CHF after aortic valve replacement: She has clearly benefited from her participation in the daily occupational and physical therapy.   Reinforcing her techniques for self-care and mobility following sternal precautions.  Daily weights remain minimally variable.    Benefiting from aggressive pulmonary hygiene measures, DVT prophylaxis and pain management.  Generally, she is continent of bowel and bladder with occasional bowel movements.   Verbal cues and SBA-CGA for transfers today.  Continuing the twice daily Lasix. Cannot  increase her dose because her systolic pressure is already low. 2. DVT prophylaxis: The Eliquis she requires for her atrial flutter is protective against new DVT.  I must monitor her hemoglobin periodically while on this medication.  Weightbearing activities are steadily progressing daily.  GI prophylaxis offered.  No new bruising or worsening of her swelling.    Hemoglobin steady at 9.3.  3. Atrial flutter: Cordarone and Coreg for rate control.  Twice daily Lasix to avoid decompensation of her CHF. Jewel Camel weights do not reveal any concerns.  Graduated increases in physical activity to build tolerance noted. Creatinine holding at 0.8.  4. Uncontrolled diabetes type 2 with peripheral neuropathy: Patient requires a diet modified for carbohydrates.  She is on Nesina and Glucotrol with sliding scale Humalog.  Blood sugars are checked at mealtime and bedtime.  Blood sugars occasionally greater than 200.  5. Hypertension: Coreg and Lasix are used to control her systolic blood pressure.  Vital signs are checked at rest and with activity.  Target systolic blood pressure is 120-140.  Blood pressure is within the target range today.  Monitoring this issue closely.  Continue the twice daily diuretic for now.   6. Uncontrolled pain: Sternal precautions, cryotherapy, protected coughing and limited oral analgesics.  Bowel intervention while on the narcotic.

## 2021-09-30 NOTE — CARE COORDINATION
Case mgt met with patient in room. Patient looking forward to dc home tomorrow. She now plans to dc to her father's more accessible home. Once of patient's many family members will provide dc transport. Patient unfamiliar with [place] [address] agencies. Reviewed that RW & TTB will be delivered to her room 10/1 morning. Patient provided with list of Medicare participating [place] [address] in the geographic area of the patient served. Patient selected [facility] and was provided with a comparative data handout from [address] [place] [address] website. The patient (and/or Family) was educated on the quality outcomes for each provider. Patient (and/or Family) demonstrated understanding. Per patient/family request, referral made to [facility].

## 2021-09-30 NOTE — FLOWSHEET NOTE
degrees). Pt reported that she will be sleeping in a hospital bed at her father's house.)   Sit --> lying:  Independent     Transfers:    Sit--> Stand: Mod I   Stand --> Sit:   Mod I   Chair-->Bed/Bed --> Chair:   Mod I   Car Transfers: Mod I   Assistive device required for transfer:  RW    Gait:    Walk 10 feet: Mod I   Walk 50 feet with 2 turns: Mod I   Walk 150 feet: Mod I   Device:  RW  Gait Quality:  Reciprocal pattern     Stairs   Curb: supervision   Supportive Device:  RW  Height:   4\"  Vcs for proper walker safety and positioning. 4 steps: Mod I   12 steps: Mod I   Supportive Device:  2 rails  4\" step height     Uneven Surfaces:       Assistance: Mod I  Device:    RW  Surfaces Completed:   [x]  Carpeted Surface with bean bags beneath      []  Throw rugs       []  Ramp       []  Outdoor pavements        []  Grass             []  Loose gravel        []  Other:       Picking Up Object From Floor (must be standing position):  Assistance: Mod I with RW   [x]   Reacher used    Patient/Caregiver Education and Training:   [x]   Bed Mobility/Transfer technique/safety  [x]   Gait technique/sequencing  [x]   Proper use of assistive device  [x]   Advanced mobility safety and technique  [x]   Reinforced patient's precautions/mobility while maintaining precautions  []   Postural awareness  []   Family training    Treatment Plan for Next Session: pt to discharge tomorrow (10-1-2021) from ARU.      Assessment:    Treatment/Activity Tolerance:   [x] Tolerated treatment with no adverse effects    [] Patient limited by fatigue  [] Patient limited by pain   [] Patient limited by medical complications:    [] Adverse reaction to Tx:   [] Significant change in status    Safety:       []  bed alarm set    []  chair alarm set    []  Pt refused alarms   (Pt approved to be mod independent in room with RW)              []  Telesitter activated      [x]  Gait belt used during tx session      [x]other: pt left sitting up in recliner with call light at end of treatment. Number of Minutes/Billable Intervention  Gait Training 45   Therapeutic Exercise    Neuro Re-Ed    Therapeutic Activity 15   Wheelchair Propulsion    Group    Other:    TOTAL 60         Social History  Social/Functional History  Lives With: Alone  Type of Home: Apartment  Home Layout: One level  Home Access: Level entry  Bathroom Shower/Tub: Tub/Shower unit  Bathroom Toilet: Standard  Bathroom Equipment: Grab bars in shower  Bathroom Accessibility: Accessible  ADL Assistance: Independent  Homemaking Assistance: Independent  Homemaking Responsibilities: Yes  Meal Prep Responsibility: Primary  Cleaning Responsibility: Primary  Bill Paying/Finance Responsibility: Primary  Shopping Responsibility: Primary  Dependent Care Responsibility: Primary  Health Care Management: Primary  Ambulation Assistance: Independent (no AD at baseline)  Transfer Assistance: Independent  Active : Yes (sister, son, DIL)  Occupation: Retired  Leisure & Hobbies: Pt enjoys traveling and spending time at [place] father's home. Pt reports assisting pt's father with help of sisters. Additional Comments: Pt denies any falls this year. Pt has full sized bed at home.     Objective                                                                                    Goals:  (Update in navigator)  Short term goals  Time Frame for Short term goals: 7-10 days  Short term goal 1: pt will perform supine <-> sit, scooting from flat HOB at mod ind while maintaining sternal precautions  Short term goal 2: pt will perform sit <-> stand, bed <-> chair, car transfers at mod ind while maintaining sternal precautions  Short term goal 3: pt will ambulate 150 ft on level surfaces using LRAD at mod ind including over uneven surfaces, 2 turns  Short term goal 4: pt will ascend/descend curb step at mod ind using LRAD and complete up to 6 steps w/o UE support at sup level while maintaining sternal precautions  Short term goal 5: pt will retrieve light item from floor at mod ind from LRAD:   :        Plan of Care                                                                              Times per week: 5 days per week for a minimum of 60 minutes/day plus group as appropriate for 60 minutes.   Treatment to include Current Treatment Recommendations: Strengthening, Transfer Training, Endurance Training, Balance Training, Gait Training, Functional Mobility Training, Stair training, ADL/Self-care Training, Patient/Caregiver Education & Training, Equipment Evaluation, Education, & procurement, Positioning, Safety Education & Training, Pain Management, Wheelchair Mobility Training    Electronically signed by   Karen Grove [id number]  9/30/2021, 12:00 PM

## 2021-10-01 VITALS
BODY MASS INDEX: 40.28 KG/M2 | TEMPERATURE: 97.9 F | HEIGHT: 59 IN | RESPIRATION RATE: 16 BRPM | SYSTOLIC BLOOD PRESSURE: 96 MMHG | WEIGHT: 199.8 LBS | DIASTOLIC BLOOD PRESSURE: 52 MMHG | OXYGEN SATURATION: 96 % | HEART RATE: 63 BPM

## 2021-10-01 DIAGNOSIS — Z79.4 TYPE 2 DIABETES MELLITUS WITHOUT COMPLICATION, WITH LONG-TERM CURRENT USE OF INSULIN (HCC): ICD-10-CM

## 2021-10-01 DIAGNOSIS — E11.9 TYPE 2 DIABETES MELLITUS WITHOUT COMPLICATION, WITH LONG-TERM CURRENT USE OF INSULIN (HCC): ICD-10-CM

## 2021-10-01 LAB
GLUCOSE BLD-MCNC: 200 MG/DL (ref 70–99)
GLUCOSE BLD-MCNC: 229 MG/DL (ref 70–99)

## 2021-10-01 PROCEDURE — 99239 HOSP IP/OBS DSCHRG MGMT >30: CPT | Performed by: PHYSICAL MEDICINE & REHABILITATION

## 2021-10-01 PROCEDURE — 82962 GLUCOSE BLOOD TEST: CPT

## 2021-10-01 PROCEDURE — 94664 DEMO&/EVAL PT USE INHALER: CPT

## 2021-10-01 PROCEDURE — 94761 N-INVAS EAR/PLS OXIMETRY MLT: CPT

## 2021-10-01 PROCEDURE — 6370000000 HC RX 637 (ALT 250 FOR IP): Performed by: SURGERY

## 2021-10-01 PROCEDURE — 6370000000 HC RX 637 (ALT 250 FOR IP): Performed by: PHYSICAL MEDICINE & REHABILITATION

## 2021-10-01 PROCEDURE — 94150 VITAL CAPACITY TEST: CPT

## 2021-10-01 RX ORDER — AMIODARONE HYDROCHLORIDE 200 MG/1
200 TABLET ORAL DAILY
Qty: 30 TABLET | Refills: 0 | Status: SHIPPED | OUTPATIENT
Start: 2021-10-02 | End: 2021-10-27 | Stop reason: SDUPTHER

## 2021-10-01 RX ORDER — ASPIRIN 81 MG/1
81 TABLET ORAL DAILY
Qty: 30 TABLET | Refills: 3 | COMMUNITY
Start: 2021-10-02

## 2021-10-01 RX ORDER — HYDROCODONE BITARTRATE AND ACETAMINOPHEN 5; 325 MG/1; MG/1
1 TABLET ORAL EVERY 6 HOURS PRN
Qty: 14 TABLET | Refills: 0 | Status: SHIPPED | OUTPATIENT
Start: 2021-10-01 | End: 2021-10-08

## 2021-10-01 RX ORDER — MULTIVITAMIN WITH IRON
1 TABLET ORAL
Refills: 0 | COMMUNITY
Start: 2021-10-02

## 2021-10-01 RX ORDER — CARVEDILOL 3.12 MG/1
3.12 TABLET ORAL 2 TIMES DAILY
Qty: 60 TABLET | Refills: 0 | Status: SHIPPED | OUTPATIENT
Start: 2021-10-01 | End: 2021-10-27 | Stop reason: SDUPTHER

## 2021-10-01 RX ADMIN — GLIPIZIDE 5 MG: 5 TABLET ORAL at 10:05

## 2021-10-01 RX ADMIN — CARVEDILOL 3.12 MG: 6.25 TABLET, FILM COATED ORAL at 10:02

## 2021-10-01 RX ADMIN — HYDROCODONE BITARTRATE AND ACETAMINOPHEN 1 TABLET: 5; 325 TABLET ORAL at 05:39

## 2021-10-01 RX ADMIN — ALOGLIPTIN 6.25 MG: 12.5 TABLET, FILM COATED ORAL at 10:01

## 2021-10-01 RX ADMIN — AMIODARONE HYDROCHLORIDE 200 MG: 200 TABLET ORAL at 10:02

## 2021-10-01 RX ADMIN — APIXABAN 5 MG: 5 TABLET, FILM COATED ORAL at 10:01

## 2021-10-01 RX ADMIN — INSULIN LISPRO 4 UNITS: 100 INJECTION, SOLUTION INTRAVENOUS; SUBCUTANEOUS at 12:29

## 2021-10-01 RX ADMIN — PANTOPRAZOLE SODIUM 40 MG: 40 TABLET, DELAYED RELEASE ORAL at 05:39

## 2021-10-01 RX ADMIN — ASPIRIN 81 MG: 81 TABLET, COATED ORAL at 10:01

## 2021-10-01 RX ADMIN — HYDROCODONE BITARTRATE AND ACETAMINOPHEN 1 TABLET: 5; 325 TABLET ORAL at 14:10

## 2021-10-01 RX ADMIN — THERA TABS 1 TABLET: TAB at 10:04

## 2021-10-01 RX ADMIN — FUROSEMIDE 20 MG: 20 TABLET ORAL at 10:05

## 2021-10-01 ASSESSMENT — PAIN SCALES - GENERAL
PAINLEVEL_OUTOF10: 5
PAINLEVEL_OUTOF10: 0
PAINLEVEL_OUTOF10: 4

## 2021-10-01 NOTE — DISCHARGE INSTR - COC
Continuity of Care Form    Patient Name: Natali Rea   :  1950  MRN:  [id number]    [facility] date:  2021  Discharge date:  ***    Code Status Order: Full Code   Advance Directives:      Admitting Physician:  Jean Crockett MD  PCP: Lisette García MD    Discharging Nurse: [facility] Unit/Room#: 1009/1009-A  Discharging Unit Phone Number: ***    Emergency Contact:   Extended Emergency Contact Information  Primary Emergency Contact: Joselyn Ngo, [address] [place] [address] Phone: [phone]  Mobile Phone: [phone]  Relation: Brother/Sister  Secondary Emergency Contact: Dot Irene0 Phone: [phone]  Mobile Phone: [phone]  Relation: Brother/Sister    Past Surgical History:  Past Surgical History:   Procedure Laterality Date    AORTIC VALVE REPLACEMENT N/A 2021    AORTIC VALVE REPLACEMENT AND AORTIC ROOT ENLARGEMENT performed by Leti Oconnor MD at [address]      left breast bx    CARDIAC CATHETERIZATION  2021    [facility] and ( with BPS)    x2    DILATION AND CURETTAGE OF UTERUS      HERNIA REPAIR      umbilical hernia\"think done when I was a teenager    OTHER SURGICAL HISTORY  12/10/13    Left AC lipoma excision    OTHER SURGICAL HISTORY  12/10/13    Left flank lipoma excision       Immunization History:   Immunization History   Administered Date(s) Administered    Pneumococcal Conjugate 13-valent (Nyoka Saber) 2017    Pneumococcal Polysaccharide (Sroxfkynq09) 2016       Active Problems:  Patient Active Problem List   Diagnosis Code    Type 2 diabetes mellitus without complication ([facility] Q13.8    Mixed hyperlipidemia E78.2    Tobacco dependence F17.200    Obesity, Class I, BMI 30-34.9 E66.9    Recurrent major depressive disorder, in partial remission ([facility] F33.41    Murmur, cardiac R01.1    Morbidly obese (HCC) E66.01    SOB (shortness of breath) R06.02    S/P PTCA (percutaneous transluminal coronary angioplasty) Z98.61    Nonrheumatic aortic valve stenosis I35.0    Chronic diastolic congestive heart failure (HCC) I50.32    LVH (left ventricular hypertrophy) due to hypertensive disease, with heart failure (HCC) I11.0    Severe aortic stenosis I35.0    CHF following cardiac surgery, postop I97.130    Generalized weakness R53.1    Uncontrolled pain R52    Gait disturbance R26.9    Acute blood loss anemia D62    Uncontrolled type 2 diabetes mellitus with peripheral neuropathy (HCC) E11.42, E11.65    Atrial flutter (HCC) I48.92    Essential hypertension I10    Status post aortic valve replacement Z95.2       Isolation/Infection:   Isolation            No Isolation          Patient Infection Status       Infection Onset Added Last Indicated Last Indicated By Review Planned Expiration Resolved Resolved By    None active    Resolved    COVID-19 Rule Out 06/07/21 06/07/21 06/07/21 COVID-19, Rapid (Ordered)   06/07/21 Rule-Out Test Resulted            Nurse Assessment:  Last Vital Signs: BP (!) 114/56   Pulse 72   Temp 98.1 °F (36.7 °C) (Oral)   Resp 20   Ht 4' 11\" (1.499 m)   Wt 199 lb 12.8 oz (90.6 kg)   LMP 07/09/2000   SpO2 95%   BMI 40.35 kg/m²     Last documented pain score (0-10 scale): Pain Level: 0  Last Weight:   Wt Readings from Last 1 Encounters:   10/01/21 199 lb 12.8 oz (90.6 kg)     Mental Status:  {IP PT MENTAL STATUS:20030:::0}    IV Access:  { TERESA IV ACCESS:599368997:::0}    Nursing Mobility/ADLs:  Walking   {CHP DME ADLs:014764126:::0}  Transfer  {CHP DME ADLs:510867522:::0}  Bathing  {P DME ADLs:349065791:::0}  Dressing  {CHP DME ADLs:183376781:::0}  Toileting  {P DME ADLs:253504144:::0}  Feeding  {P DME ADLs:624813116:::0}  Med Admin  {P DME ADLs:936793923:::0}  Med Delivery   { TERESA MED Delivery:824148381:::0}    Wound Care Documentation and Therapy:        Elimination:  Continence:    Bowel: {YES / VK:07162}  Bladder: {YES / VQ:55588}  Urinary Catheter: {Urinary Catheter:489500434:::0}   Colostomy/Ileostomy/Ileal Conduit: {YES / MW:04707}       Date of Last BM: ***    Intake/Output Summary (Last 24 hours) at 10/1/2021 1341  Last data filed at 10/1/2021 1310  Gross per 24 hour   Intake 860 ml   Output --   Net 860 ml     I/O last 3 completed shifts:   In: 56 [P.O.:1156]  Out: -     Safety Concerns:     [address] [employer] Safety Concerns:429737530:::0}    Impairments/Disabilities:      [employer] Impairments/Disabilities:352769339:::0}    Nutrition Therapy:  Current Nutrition Therapy:   [employer] Diet List:076633455:::0}    Routes of Feeding: {CHP DME Other Feedings:406739129:::0}  Liquids: {Slp liquid thickness:38026}  Daily Fluid Restriction: {CHP DME Yes amt example:381710062:::0}  Last Modified Barium Swallow with Video (Video Swallowing Test): {Done Not Done IFA:::5}    Treatments at the Time of Hospital Discharge:   Respiratory Treatments: ***  Oxygen Therapy:  {Therapy; copd oxygen:45582:::0}  Ventilator:    {[facility] Vent List:637869336:::0}    Rehab Therapies: {THERAPEUTIC INTERVENTION:9168823005}  Weight Bearing Status/Restrictions: [address] [employer] Soft Weight Bearin:::0}  Other Medical Equipment (for information only, NOT a DME order):  {EQUIPMENT:306311800}  Other Treatments: ***    Patient's personal belongings (please select all that are sent with patient):  {CHP DME Belongings:130351015:::0}    RN SIGNATURE:  {Esignature:204619420:::0}    CASE MANAGEMENT/SOCIAL WORK SECTION    Inpatient Status Date: ***    Readmission Risk Assessment Score:  Readmission Risk              Risk of Unplanned Readmission:  14           Discharging to Facility/ Agency   Name:   Address:  Phone:  Fax:    Dialysis Facility (if applicable)   Name:  Address:  Dialysis Schedule:  Phone:  Fax:    / signature: {Esignature:451415486:::0}    PHYSICIAN SECTION    Prognosis: {Prognosis:2162324822:::0}    Condition at Discharge: [address] Patient Condition:866008645:::0}    Rehab Potential (if transferring to Rehab): {Prognosis:0954129680:::0}    Recommended Labs or Other Treatments After Discharge: ***    Physician Certification: I certify the above information and transfer of Kev Aguirre  is necessary for the continuing treatment of the diagnosis listed and that she requires {Admit to Appropriate Level of Care:79221:::0} for {GREATER/LESS:241303752} 30 days.      Update Admission H&P: {CHP DME Changes in HandP:981303367:::0}    PHYSICIAN SIGNATURE:  {Esignature:956400902:::0}

## 2021-10-01 NOTE — CARE COORDINATION
Case mgt met with patient in room. Patient will  Dc to her dad's home at [address] [facility]. Patient's DME is in room. Patient looking forward to dc this afternoon and she'll call family for dc transport when she's ready. Patient selected [address] [place] [address] Rd. Rebeka @ [address] [place] [address] Rd off today. Case mgt faxed referral to [facility] office.           ARU  Discharge Summary    D/C Date: 10/1/21    Patient discharged to: father's home    Transported by:   family    Referrals made to: [address] [place] [address] Taiwo, SHAKIR      Additional information:     Caregiver training:   None recommended

## 2021-10-04 NOTE — PROGRESS NOTES
Physician Progress Note      PATIENT:               More Ruvalcaba  CSN #:                  [id number]  :                       1950  ADMIT DATE:       2021 5:34 AM  DISCH DATE:        2021 3:11 PM  RESPONDING  PROVIDER #:        Cherelle Cardenas PA-C          QUERY TEXT:    Pt admitted with aortic stenosis and has CHF documented. If possible, please   document in progress notes and discharge summary further specificity regarding   the type and acuity of CHF:    The medical record reflects the following:  Risk Factors: aortic stenosis  Clinical Indicators: Pt admitted for planned aortic valve replacement. Pt has   known PMH of chronic diastoic CHF. No BNP drawn this admission. CXR post-op   shows bilateral pleural effusions, \"No significant interval change in the   pneumatization bilateral lungs. Congestive heart failure is most likely given   the radiographic findings; pneumonia is also a consideration in areas of   consolidation with pleural effusion. \" Exam by both cardiovascular surgeon and   cardiologist on , , and  indicate lungs were CTA bilaterally, no   edema present. Dr. Mio Plasencia PN dated  and   Treatment: Pt given Lasix 20mg IV BID during admission. Pt d/c with   instructions to ask PCP about Lasix after d/c. Thank you,  Sagar De La Fuente, RN  [phone]  Options provided:  -- Acute on Chronic Diastolic CHF/HFpEF  -- Chronic Diastolic CHF/HFpEF  -- Other - I will add my own diagnosis  -- Disagree - Not applicable / Not valid  -- Disagree - Clinically unable to determine / Unknown  -- Refer to Clinical Documentation Reviewer    PROVIDER RESPONSE TEXT:    This patient has chronic diastolic CHF/HFpEF.     Query created by: Lacie Gudino on 10/4/2021 10:12 AM      Electronically signed by:  Cherelle Cardenas PA-C 10/4/2021 2:29 PM

## 2021-10-08 DIAGNOSIS — E11.9 TYPE 2 DIABETES MELLITUS WITHOUT COMPLICATION, UNSPECIFIED WHETHER LONG TERM INSULIN USE (HCC): ICD-10-CM

## 2021-10-26 NOTE — DISCHARGE SUMMARY
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 Her outpatient evaluation identified cardiac valvular disease. On 2021 she underwent an aortic valve replacement with a bioprosthetic valve by Dr. Gigi Jim. Perioperatively she has suffered a drop in hemoglobin, atrial flutter and hypotension. She required medication adjustments, fluid resuscitation and aggressive pulmonary hygiene measures. Her blood sugars fluctuated significantly. She became deconditioned. Prior (baseline) level of function: Independent.     Current level of function:         Current  IRF-CHEL[facility] and Goals:   Occupational Therapy:    Short term goals  Time Frame for Short term goals: STG=LTG :   Long term goals  Time Frame for Long term goals : 10-12 days or until d/c  Long term goal 1: Pt will complete feeding/grooming/oral care task c MOD I by d/c  Long term goal 2: Pt will complete total body bathing c MOD I c use of AE by d/c  Long term goal 3: Pt will complete total body dressing (UB/LB) c MOD I by d/c  Long term goal 4: Pt will complete toileting c MIN A by d/c  Long term goal 5: Pt will complete functional transfer (toilet, tub, shower) c MOD I by d/c. Long term goals 6: Pt will perform therex/therax to facilitate increased strength/endurance/ax tolerance (c emphasis on dynamic standing balance/tolerance >8 mins and BUE endurance) c MOD I in order to complete ADLs. Long term goal 7: Pt will complete footwear c MIN A by d/c :                                       Eating: Eating  Assistance Needed: Independent  Comment: X  CARE Score: 6  Discharge Goal: Independent       Oral Hygiene: Oral Hygiene  Assistance Needed: Independent  Comment: X  CARE Score: 6  Discharge Goal: Independent    UB/LB Bathing: Shower/Bathe Self  Assistance Needed: Independent  Comment: MOd I  CARE Score: 6  Discharge Goal: Supervision or touching assistance    UB Dressing: Upper Body Dressing  Assistance Needed: Independent  Comment: X  CARE Score: 6  Discharge Goal: Independent         LB Dressing: Lower Body Dressing  Assistance Needed: Independent  Comment: X  CARE Score: 6  Discharge Goal: Independent    Donning and [place] Footwear: Putting On/Taking Off Footwear  Assistance Needed: Independent  Comment: X  CARE Score: 6  Discharge Goal: Partial/moderate assistance      Toileting: Toileting Hygiene  Assistance Needed: Independent  Comment: X  Reason if not Attempted: Patient refused (Pt used restroom with aide prior to therapist coming in)  CARE Score: 6  Discharge Goal: Partial/moderate assistance      Toilet Transfers:   Toilet Transfer  Assistance Needed: Independent  Comment: X  CARE Score: 6  Discharge Goal: Independent    Physical Therapy:   Short term goals  Time Frame for Short term goals: 7-10 days  Short term goal 1: pt will perform supine <-> sit, scooting from flat HOB at mod ind while maintaining sternal precautions  Short term goal 2: pt will perform sit <-> stand, bed <-> chair, car transfers at mod ind while maintaining sternal precautions  Short term goal 3: pt will ambulate 150 ft on level surfaces using LRAD at mod ind including over uneven surfaces, 2 turns  Short term goal 4: pt will ascend/descend curb step at mod ind using LRAD and complete up to 6 steps w/o UE support at sup level while maintaining sternal precautions  Short term goal 5: pt will retrieve light item from floor at mod ind from LRAD            Bed Mobility:   Sit to Lying  Assistance Needed: Independent  Comment: Independent  CARE Score: 6  Discharge Goal: Independent  Roll Left and Right  Assistance Needed: Independent  Comment: Independent  CARE Score: 6  Discharge Goal: Independent  Lying to Sitting on Side of Bed  Assistance Needed: Independent  Comment: mod I with HOB slightly raised.   CARE Score: 6  Discharge Goal: Independent    Transfers:    Sit to Stand  Assistance Needed: Independent  Comment: mod I with RW  CARE Score: 6  Discharge Goal: Independent  Chair/Bed-to-Chair Transfer  Assistance Needed: Independent  Comment: mod I with RW  CARE Score: 6  Discharge Goal: Independent  Toilet Transfer  Assistance Needed: Partial/moderate assistance (min a for initiation from toilet while maintaining sternal precautions)  CARE Score: 3  Discharge Goal: Independent  Car Transfer  Assistance Needed: Independent  Comment: mod I with RW  CARE Score: 6  Discharge Goal: Independent    Ambulation:    Walking Ability  Does the Patient Walk?: Yes     Walk 10 Feet  Assistance Needed: Independent  Comment: mod I with RW  CARE Score: 6  Discharge Goal: Independent (w/ LRAD; normally ambulates w/o AD)     Walk 50 Feet with Two Turns  Assistance Needed: Independent  Comment: mod I with RW  CARE Score: 6  Discharge Goal: Independent (w/ LRAD)     Walk 150 Feet  Assistance Needed: Independent  Comment: mod I with RW  CARE Score: 6  Discharge Goal: Independent     Walking 10 Feet on Uneven Surfaces  Assistance Needed: Independent  Comment: mod I with RW  CARE Score: 6  Discharge Goal: Independent     1 Step (Curb)  Assistance Needed: Supervision or touching assistance  Comment: supervision with RW. Vcs for proper walker safety and positioning. CARE Score: 4  Discharge Goal: Independent     4 Steps  Assistance Needed: Independent  Comment: Mod I with 2 rails  Reason if not Attempted: Not attempted due to medical condition or safety concerns (pt w/ inc SOB and noting fatigue limiting performance at this time; not tested for safety concerns)  CARE Score: 6  Discharge Goal: Independent     12 Steps  Assistance Needed: Independent  Comment: mod I with 2 rails  Reason if not Attempted: Not applicable (pt does not normally navigate stairs; endorses only ever doing stairs when she visits her dad, 3-4 steps to enter)  CARE Score: 6  Discharge Goal: Not Applicable       Wheelchair:  w/c Ability: Wheelchair Ability  Uses a Wheelchair and/or Scooter?: No (pt ambulating with RW as usual performance at this time.)  Wheel 50 Feet with Two Turns  Assistance Needed: Partial/moderate assistance (min A to maintain momentum; pt is of short stature feet, barely reach floor, fatiguing quickly)  CARE Score: 3  Discharge Goal: Independent  Wheel 150 Feet  Assistance Needed: Partial/moderate assistance (pt using only BL LE's to maintain sternal precautions; min A to maintain momentum; short stature limiting)  CARE Score: 3  Discharge Goal: Supervision or touching assistance (using BL LE's to maintain sternal precautions; min a to maintain momentum; anticipate pt home w/o w/c on DC)          Balance:    Balance  Posture: Good  Sitting - Static: Good  Standing - Static: Good   Object: Picking Up Object  Assistance Needed: Independent  Comment: mod I with reacher and RW  CARE Score: 6  Discharge Goal: Independent    I      Exam:    Blood pressure (!) 96/52, pulse 63, temperature 97.9 °F (36.6 °C), temperature source Oral, resp. rate 16, height 4' 11\" (1.499 m), weight 199 lb 12.8 oz (90.6 kg), last menstrual period 07/09/2000, SpO2 96 %, not currently breastfeeding.     General: Sitting up in a wheelchair moving it some with her legs. Alert. In no distress. Good insight. HEENT: MMM. Speech clear. No choking or coughing. No JVD. Pulmonary: Unlabored respirations without wheezes or rales. Cardiac: Sternal incision well-healed. Regular rate and rhythm. Pansystolic murmur. Abdomen: Patient's abdomen is soft and nondistended. Bowel sounds were present throughout. There was no rebound, guarding or masses noted. Upper extremities: Functional coordination and strength. No new bruising. Lower extremities: Trace edema. Calves soft. No signs of DVT. 4+/5 strength throughout. Sitting balance was good. Standing balance was fair-.    Lab Results   Component Value Date    WBC 12.6 (H) 09/29/2021    HGB 9.3 (L) 09/29/2021    HCT 28.7 (L) 09/29/2021    MCV 86.2 09/29/2021    PLT 75 (L) 09/29/2021     Lab Results   Component Value Date    INR 2.07 09/16/2021    INR 0.88 09/09/2021    INR 0.92 07/09/2021    PROTIME 26.9 (H) 09/16/2021    PROTIME 11.4 (L) 09/09/2021    PROTIME 11.9 07/09/2021     Lab Results   Component Value Date    CREATININE 0.8 09/29/2021    BUN 10 09/29/2021     09/29/2021    K 3.7 09/29/2021     09/29/2021    CO2 30 09/29/2021     Lab Results   Component Value Date    ALT 15 06/07/2021    AST 16 06/07/2021    ALKPHOS 92 06/07/2021    BILITOT 0.3 06/07/2021         The patient presented to the ARU with the above history requiring a multidisciplinary treatment plan including close medical supervision by the [facility] Director. The patient participated in the prescribed therapy treatment plan with reasonable compliance and progressive tolerance. They avoided significant medical complications. By the time of discharge the patient had become safer with adaptive equipment to transfer and toilet with a FWW with the supervision of family/caregivers.  The patient was tolerating an oral diet without choking/coughing and was back to their baseline with regards to bowel and bladder control. Discharge instructions were reviewed with the patient and family. The patient is to follow up with the PCP and her surgeon in 1 week. No driving. [facility] PT/OT/RN will be arranged. Dimple Friendly   Home Medication Instructions NL    Printed on:10/26/21 2664   Medication Information                      amiodarone (CORDARONE) 200 MG tablet  Take 1 tablet by mouth daily             apixaban (ELIQUIS) 5 MG TABS tablet  Take 1 tablet by mouth 2 times daily             aspirin 81 MG EC tablet  Take 1 tablet by mouth daily             blood glucose monitor strips  TIDAC and QHS             carvedilol (COREG) 3.125 MG tablet  Take 1 tablet by mouth 2 times daily             Lancets MISC  TIDAC & QHS             LEVEMIR FLEXTOUCH 100 UNIT/ML injection pen  INJECT 35 UNITS UNDER THE SKIN EVERY NIGHT             Multiple Vitamin (MULTIVITAMIN) TABS tablet  Take 1 tablet by mouth daily (with breakfast)             pravastatin (PRAVACHOL) 80 MG tablet  Take 1 tablet by mouth daily             SITagliptin (JANUVIA) 100 MG tablet  Take 1 tablet by mouth daily                 CONDITION ON DISCHARGE: Stable. The prognosis is fair for further improvements in ADL's and safety with adapted gait/transfers. Record review, patient exam, discharge instructions, medication reconciliation and summary for this discharge visit took more than 30 minutes.

## 2021-10-27 RX ORDER — AMIODARONE HYDROCHLORIDE 200 MG/1
200 TABLET ORAL DAILY
Qty: 30 TABLET | Refills: 5 | Status: SHIPPED | OUTPATIENT
Start: 2021-10-27

## 2021-10-27 RX ORDER — CARVEDILOL 3.12 MG/1
3.12 TABLET ORAL 2 TIMES DAILY
Qty: 60 TABLET | Refills: 5 | Status: SHIPPED | OUTPATIENT
Start: 2021-10-27 | End: 2022-05-24 | Stop reason: SDUPTHER

## 2021-10-27 NOTE — TELEPHONE ENCOUNTER
----- Message from Ayla Green sent at 10/27/2021 12:10 PM EDT -----  Subject: Refill Request    QUESTIONS  Name of Medication? apixaban (ELIQUIS) 5 MG TABS tablet  Patient-reported dosage and instructions? one pill, twice a day  How many days do you have left? 12  Preferred Pharmacy? [facility] phone number (if available)? [phone]  Additional Information for Provider? Needs refills please  ---------------------------------------------------------------------------  --------------,  Name of Medication? amiodarone (CORDARONE) 200 MG tablet  Patient-reported dosage and instructions? one pill, once a day   How many days do you have left? 3  Preferred Pharmacy? [facility] phone number (if available)? [phone]  ---------------------------------------------------------------------------  --------------,  Name of Medication? carvedilol (COREG) 3.125 MG tablet  Patient-reported dosage and instructions? one pill, twice a day  How many days do you have left? 10  Preferred Pharmacy? [facility] phone number (if available)? [phone]  ---------------------------------------------------------------------------  --------------  CALL BACK INFO  What is the best way for the office to contact you? OK to leave message on   voicemail  Preferred Call Back Phone Number?  [phone]

## 2021-11-05 ENCOUNTER — HOSPITAL ENCOUNTER (OUTPATIENT)
Dept: GENERAL RADIOLOGY | Age: 71
Discharge: HOME | End: 2021-11-05
Payer: MEDICARE

## 2021-11-05 ENCOUNTER — HOSPITAL ENCOUNTER (OUTPATIENT)
Age: 71
Discharge: HOME | End: 2021-11-05
Payer: MEDICARE

## 2021-11-05 DIAGNOSIS — R06.02 SHORTNESS OF BREATH: ICD-10-CM

## 2021-11-05 PROCEDURE — 71046 X-RAY EXAM CHEST 2 VIEWS: CPT

## 2021-11-08 ENCOUNTER — HOSPITAL ENCOUNTER (OUTPATIENT)
Age: 71
Discharge: HOME | End: 2021-11-08
Payer: MEDICARE

## 2021-11-08 LAB
ANION GAP SERPL CALCULATED.3IONS-SCNC: 13 MMOL/L (ref 4–16)
BASOPHILS ABSOLUTE: 0.1 K/CU MM
BASOPHILS RELATIVE PERCENT: 0.7 % (ref 0–1)
BUN BLDV-MCNC: 9 MG/DL (ref 6–23)
CALCIUM SERPL-MCNC: 8.6 MG/DL (ref 8.3–10.6)
CHLORIDE BLD-SCNC: 94 MMOL/L (ref 99–110)
CO2: 31 MMOL/L (ref 21–32)
CREAT SERPL-MCNC: 0.7 MG/DL (ref 0.6–1.1)
DIFFERENTIAL TYPE: ABNORMAL
EOSINOPHILS ABSOLUTE: 0.2 K/CU MM
EOSINOPHILS RELATIVE PERCENT: 2.7 % (ref 0–3)
GFR AFRICAN AMERICAN: >60 ML/MIN/1.73M2
GFR NON-AFRICAN AMERICAN: >60 ML/MIN/1.73M2
GLUCOSE BLD-MCNC: 70 MG/DL (ref 70–99)
HCT VFR BLD CALC: 36.4 % (ref 37–47)
HEMOGLOBIN: 11.7 GM/DL (ref 12.5–16)
IMMATURE NEUTROPHIL %: 0.2 % (ref 0–0.43)
LYMPHOCYTES ABSOLUTE: 2.4 K/CU MM
LYMPHOCYTES RELATIVE PERCENT: 29.6 % (ref 24–44)
MCH RBC QN AUTO: 26.3 PG (ref 27–31)
MCHC RBC AUTO-ENTMCNC: 32.1 % (ref 32–36)
MCV RBC AUTO: 81.8 FL (ref 78–100)
MONOCYTES ABSOLUTE: 0.7 K/CU MM
MONOCYTES RELATIVE PERCENT: 8 % (ref 0–4)
NUCLEATED RBC %: 0 %
PDW BLD-RTO: 15.3 % (ref 11.7–14.9)
PLATELET # BLD: 267 K/CU MM (ref 140–440)
PMV BLD AUTO: 12.1 FL (ref 7.5–11.1)
POTASSIUM SERPL-SCNC: 3 MMOL/L (ref 3.5–5.1)
PRO-BNP: 696.7 PG/ML
RBC # BLD: 4.45 M/CU MM (ref 4.2–5.4)
SEGMENTED NEUTROPHILS ABSOLUTE COUNT: 4.8 K/CU MM
SEGMENTED NEUTROPHILS RELATIVE PERCENT: 58.8 % (ref 36–66)
SODIUM BLD-SCNC: 138 MMOL/L (ref 135–145)
TOTAL IMMATURE NEUTOROPHIL: 0.02 K/CU MM
TOTAL NUCLEATED RBC: 0 K/CU MM
WBC # BLD: 8.2 K/CU MM (ref 4–10.5)

## 2021-11-08 PROCEDURE — 36415 COLL VENOUS BLD VENIPUNCTURE: CPT

## 2021-11-08 PROCEDURE — 83880 ASSAY OF NATRIURETIC PEPTIDE: CPT

## 2021-11-08 PROCEDURE — 85025 COMPLETE CBC W/AUTO DIFF WBC: CPT

## 2021-11-08 PROCEDURE — 80048 BASIC METABOLIC PNL TOTAL CA: CPT

## 2021-11-10 ENCOUNTER — HOSPITAL ENCOUNTER (OUTPATIENT)
Dept: CARDIAC REHAB | Age: 71
Setting detail: THERAPIES SERIES
Discharge: HOME | End: 2021-11-10
Payer: MEDICARE

## 2021-11-10 LAB
GLUCOSE BLD-MCNC: 142 MG/DL (ref 70–99)
GLUCOSE BLD-MCNC: 89 MG/DL (ref 70–99)

## 2021-11-10 PROCEDURE — 82962 GLUCOSE BLOOD TEST: CPT

## 2021-11-10 PROCEDURE — G0422 INTENS CARDIAC REHAB W/EXERC: HCPCS

## 2021-11-10 PROCEDURE — G0423 INTENS CARDIAC REHAB NO EXER: HCPCS

## 2021-11-15 ENCOUNTER — CLINICAL DOCUMENTATION (OUTPATIENT)
Dept: OTHER | Age: 71
End: 2021-11-15

## 2021-11-15 ENCOUNTER — HOSPITAL ENCOUNTER (OUTPATIENT)
Dept: CARDIAC REHAB | Age: 71
Setting detail: THERAPIES SERIES
Discharge: HOME | End: 2021-11-15
Payer: MEDICARE

## 2021-11-15 LAB
GLUCOSE BLD-MCNC: 113 MG/DL (ref 70–99)
GLUCOSE BLD-MCNC: 147 MG/DL (ref 70–99)

## 2021-11-15 PROCEDURE — 82962 GLUCOSE BLOOD TEST: CPT

## 2021-11-15 PROCEDURE — G0422 INTENS CARDIAC REHAB W/EXERC: HCPCS

## 2021-11-15 PROCEDURE — G0423 INTENS CARDIAC REHAB NO EXER: HCPCS

## 2021-11-16 ENCOUNTER — HOSPITAL ENCOUNTER (OUTPATIENT)
Dept: CARDIAC REHAB | Age: 71
Setting detail: THERAPIES SERIES
Discharge: HOME | End: 2021-11-16
Payer: MEDICARE

## 2021-11-16 LAB
GLUCOSE BLD-MCNC: 154 MG/DL (ref 70–99)
GLUCOSE BLD-MCNC: 156 MG/DL (ref 70–99)

## 2021-11-16 PROCEDURE — G0423 INTENS CARDIAC REHAB NO EXER: HCPCS

## 2021-11-16 PROCEDURE — 82962 GLUCOSE BLOOD TEST: CPT

## 2021-11-16 PROCEDURE — G0422 INTENS CARDIAC REHAB W/EXERC: HCPCS

## 2021-11-18 ENCOUNTER — HOSPITAL ENCOUNTER (OUTPATIENT)
Dept: CARDIAC REHAB | Age: 71
Setting detail: THERAPIES SERIES
Discharge: HOME | End: 2021-11-18
Payer: MEDICARE

## 2021-11-18 LAB
GLUCOSE BLD-MCNC: 193 MG/DL (ref 70–99)
GLUCOSE BLD-MCNC: 247 MG/DL (ref 70–99)

## 2021-11-18 PROCEDURE — 82962 GLUCOSE BLOOD TEST: CPT

## 2021-11-18 PROCEDURE — G0423 INTENS CARDIAC REHAB NO EXER: HCPCS

## 2021-11-18 PROCEDURE — G0422 INTENS CARDIAC REHAB W/EXERC: HCPCS

## 2021-11-19 ENCOUNTER — OFFICE VISIT (OUTPATIENT)
Dept: FAMILY MEDICINE CLINIC | Age: 71
End: 2021-11-19
Payer: MEDICARE

## 2021-11-19 VITALS
WEIGHT: 185.4 LBS | DIASTOLIC BLOOD PRESSURE: 60 MMHG | HEART RATE: 79 BPM | SYSTOLIC BLOOD PRESSURE: 120 MMHG | OXYGEN SATURATION: 96 % | HEIGHT: 59 IN | BODY MASS INDEX: 37.38 KG/M2

## 2021-11-19 DIAGNOSIS — E11.9 TYPE 2 DIABETES MELLITUS WITHOUT COMPLICATION, WITH LONG-TERM CURRENT USE OF INSULIN (HCC): Primary | ICD-10-CM

## 2021-11-19 DIAGNOSIS — Z79.4 TYPE 2 DIABETES MELLITUS WITHOUT COMPLICATION, WITH LONG-TERM CURRENT USE OF INSULIN (HCC): Primary | ICD-10-CM

## 2021-11-19 DIAGNOSIS — I10 PRIMARY HYPERTENSION: ICD-10-CM

## 2021-11-19 DIAGNOSIS — F33.41 RECURRENT MAJOR DEPRESSIVE DISORDER, IN PARTIAL REMISSION (HCC): ICD-10-CM

## 2021-11-19 DIAGNOSIS — D64.9 ANEMIA, UNSPECIFIED TYPE: ICD-10-CM

## 2021-11-19 DIAGNOSIS — F41.9 ANXIETY: ICD-10-CM

## 2021-11-19 DIAGNOSIS — E87.6 HYPOKALEMIA: ICD-10-CM

## 2021-11-19 DIAGNOSIS — Z95.2 S/P AVR: ICD-10-CM

## 2021-11-19 LAB
ANION GAP SERPL CALCULATED.3IONS-SCNC: 14 MMOL/L (ref 3–16)
BASOPHILS ABSOLUTE: 0.1 K/UL (ref 0–0.2)
BASOPHILS RELATIVE PERCENT: 0.6 %
BUN BLDV-MCNC: 9 MG/DL (ref 7–20)
CALCIUM SERPL-MCNC: 9 MG/DL (ref 8.3–10.6)
CHLORIDE BLD-SCNC: 99 MMOL/L (ref 99–110)
CO2: 24 MMOL/L (ref 21–32)
CREAT SERPL-MCNC: 0.7 MG/DL (ref 0.6–1.2)
EOSINOPHILS ABSOLUTE: 0.1 K/UL (ref 0–0.6)
EOSINOPHILS RELATIVE PERCENT: 1 %
GFR AFRICAN AMERICAN: >60
GFR NON-AFRICAN AMERICAN: >60
GLUCOSE BLD-MCNC: 119 MG/DL (ref 70–99)
HCT VFR BLD CALC: 36.7 % (ref 36–48)
HEMOGLOBIN: 11.9 G/DL (ref 12–16)
LYMPHOCYTES ABSOLUTE: 1.8 K/UL (ref 1–5.1)
LYMPHOCYTES RELATIVE PERCENT: 13.8 %
MCH RBC QN AUTO: 26.1 PG (ref 26–34)
MCHC RBC AUTO-ENTMCNC: 32.3 G/DL (ref 31–36)
MCV RBC AUTO: 80.6 FL (ref 80–100)
MONOCYTES ABSOLUTE: 0.7 K/UL (ref 0–1.3)
MONOCYTES RELATIVE PERCENT: 5.5 %
NEUTROPHILS ABSOLUTE: 10.2 K/UL (ref 1.7–7.7)
NEUTROPHILS RELATIVE PERCENT: 79.1 %
PDW BLD-RTO: 16.9 % (ref 12.4–15.4)
PLATELET # BLD: 228 K/UL (ref 135–450)
PMV BLD AUTO: 10.7 FL (ref 5–10.5)
POTASSIUM SERPL-SCNC: 4.7 MMOL/L (ref 3.5–5.1)
RBC # BLD: 4.55 M/UL (ref 4–5.2)
SODIUM BLD-SCNC: 137 MMOL/L (ref 136–145)
WBC # BLD: 12.9 K/UL (ref 4–11)

## 2021-11-19 PROCEDURE — 4040F PNEUMOC VAC/ADMIN/RCVD: CPT | Performed by: FAMILY MEDICINE

## 2021-11-19 PROCEDURE — G8484 FLU IMMUNIZE NO ADMIN: HCPCS | Performed by: FAMILY MEDICINE

## 2021-11-19 PROCEDURE — 3051F HG A1C>EQUAL 7.0%<8.0%: CPT | Performed by: FAMILY MEDICINE

## 2021-11-19 PROCEDURE — 99214 OFFICE O/P EST MOD 30 MIN: CPT | Performed by: FAMILY MEDICINE

## 2021-11-19 PROCEDURE — 1123F ACP DISCUSS/DSCN MKR DOCD: CPT | Performed by: FAMILY MEDICINE

## 2021-11-19 PROCEDURE — 3017F COLORECTAL CA SCREEN DOC REV: CPT | Performed by: FAMILY MEDICINE

## 2021-11-19 PROCEDURE — 1036F TOBACCO NON-USER: CPT | Performed by: FAMILY MEDICINE

## 2021-11-19 PROCEDURE — G8417 CALC BMI ABV UP PARAM F/U: HCPCS | Performed by: FAMILY MEDICINE

## 2021-11-19 PROCEDURE — 1090F PRES/ABSN URINE INCON ASSESS: CPT | Performed by: FAMILY MEDICINE

## 2021-11-19 PROCEDURE — G8399 PT W/DXA RESULTS DOCUMENT: HCPCS | Performed by: FAMILY MEDICINE

## 2021-11-19 PROCEDURE — G8427 DOCREV CUR MEDS BY ELIG CLIN: HCPCS | Performed by: FAMILY MEDICINE

## 2021-11-19 PROCEDURE — 2022F DILAT RTA XM EVC RTNOPTHY: CPT | Performed by: FAMILY MEDICINE

## 2021-11-19 NOTE — PROGRESS NOTES
Sheila De La Rosa  22    Chief Complaint   Patient presents with    6 Month Follow-Up    Diabetes    Hypertension    Hyperlipidemia    Depression    Anxiety           Patient here for 6 months f/u regarding DM, HTN, HLD, depression, and anxiety, patient doing fine, had surgery AVR , still fatigue, but much better, her BS okay. She is taking her cholesterol medication with no side effects.        Past Medical History:   Diagnosis Date    Aortic valve stenosis     Arthritis     Bronchitis 2021    Diabetes mellitus ([address]     dx age 53's    Heart murmur     Hx of drug abuse ([facility] [address]     \"did cocaine back in     Hypertension     Irregular heart beat     \"they just say I skip a beat , I think from the heart murmur- not sure\" follow with Dr Angeles Sorensen LV[facility] (left ventricular hypertrophy)     hx per old chart    Mixed hyperlipidemia 2016    Wears dentures     full upper plate    Wears glasses     to read     Past Surgical History:   Procedure Laterality Date    AORTIC VALVE REPLACEMENT N/A 2021    AORTIC VALVE REPLACEMENT AND AORTIC ROOT ENLARGEMENT performed by Arlyn Esteban MD at [address] [place] [address]  2019    left breast bx    CARDIAC CATHETERIZATION  2021     SECTION   and ( with BPS)    x2    DILATION AND CURETTAGE OF UTERUS      HERNIA REPAIR      umbilical hernia\"think done when I was a teenager    OTHER SURGICAL HISTORY  12/10/13    Left AC lipoma excision    OTHER SURGICAL HISTORY  12/10/13    Left flank lipoma excision     Family History   Problem Relation Age of Onset    Diabetes Mother     Heart Disease Mother     High Blood Pressure Father     Diabetes Sister     High Blood Pressure Sister     Stroke Sister     Breast Cancer Sister 58    Cancer Brother         Lung    High Blood Pressure Brother     Stroke Brother     Diabetes Brother     Diabetes Maternal Grandmother     Heart Disease Maternal Grandfather     Cancer Brother         Lung (smoker)    Breast Cancer Maternal Aunt      Social History     Socioeconomic History    Marital status:      Spouse name: Not on file    Number of children: Not on file    Years of education: Not on file    Highest education level: Not on file   Occupational History    Occupation: employed-[employer]   Tobacco Use    Smoking status: Former Smoker     Packs/day: 0.50     Years: 32.00     Pack years: 16.00     Types: Cigarettes     Start date:      Quit date: 2021     Years since quittin.4    Smokeless tobacco: Never Used    Tobacco comment: currently 0.5 PPD   Vaping Use    Vaping Use: Former    Substances: Nicotine, Flavoring   Substance and Sexual Activity    Alcohol use: No     Alcohol/week: 0.0 standard drinks     Comment: average \"2 times per month glass of wine\"    Drug use: Not Currently     Types: Marijuana (Weed)     Comment: \"last used 50+ yrs ago \" per pt on 2021\"yrs ago did do cocaine -\"    Sexual activity: Not Currently     Partners: Male   Other Topics Concern    Not on file   Social History Narrative    Not on file     Social Determinants of Health     Financial Resource Strain:     Difficulty of Paying Living Expenses: Not on file   Food Insecurity:     Worried About [address] in the Last Year: Not on file    Karolina of Food in the Last Year: Not on file   Transportation Needs:     Lack of Transportation (Medical): Not on file    Lack of Transportation (Non-Medical):  Not on file   Physical Activity:     Days of Exercise per Week: Not on file    Minutes of Exercise per Session: Not on file   Stress:     Feeling of Stress : Not on file   Social Connections:     Frequency of Communication with Friends and Family: Not on file    Frequency of Social Gatherings with Friends and Family: Not on file    Attends Holiness Services: Not on file    Active Member of Clubs or Organizations: Not on file  Attends Club or Organization Meetings: Not on file    Marital Status: Not on file   Intimate Partner Violence:     Fear of Current or Ex-Partner: Not on file    Emotionally Abused: Not on file    Physically Abused: Not on file    Sexually Abused: Not on file   Housing Stability:     Unable to Pay for Housing in the Last Year: Not on file    Number of Bogdan in the Last Year: Not on file    Unstable Housing in the Last Year: Not on file       Allergies   Allergen Reactions    Codeine Rash    Hydrocodone-Acetaminophen Nausea And Vomiting     Current Outpatient Medications   Medication Sig Dispense Refill    carvedilol (COREG) 3.125 MG tablet Take 1 tablet by mouth 2 times daily 60 tablet 5    apixaban (ELIQUIS) 5 MG TABS tablet Take 1 tablet by mouth 2 times daily 60 tablet 5    amiodarone (CORDARONE) 200 MG tablet Take 1 tablet by mouth daily 30 tablet 5    furosemide (LASIX) 40 MG tablet Take 1 tablet by mouth daily 60 tablet 3    Insulin Pen Needle 30G X 8 MM MISC 1 each by Does not apply route daily 100 each 5    SITagliptin (JANUVIA) 100 MG tablet Take 1 tablet by mouth daily 30 tablet 5    aspirin 81 MG EC tablet Take 1 tablet by mouth daily 30 tablet 3    Multiple Vitamin (MULTIVITAMIN) TABS tablet Take 1 tablet by mouth daily (with breakfast)  0    LEVEMIR FLEXTOUCH 100 UNIT/ML injection pen INJECT 35 UNITS UNDER THE SKIN EVERY NIGHT 5 pen 5    pravastatin (PRAVACHOL) 80 MG tablet Take 1 tablet by mouth daily 90 tablet 3    blood glucose monitor strips TIDAC and  strip 5    Lancets MISC TIDAC &  each 3     No current facility-administered medications for this visit. Review of Systems   Constitutional: Positive for fatigue. Negative for activity change, appetite change, chills and fever. HENT: Negative for congestion. Respiratory: Negative for cough, shortness of breath and wheezing. Cardiovascular: Negative for chest pain and leg swelling. Gastrointestinal: Negative for abdominal pain. Genitourinary: Negative for dysuria. Musculoskeletal: Negative for back pain. Neurological: Negative for dizziness and headaches. Psychiatric/Behavioral: Negative for agitation, behavioral problems and sleep disturbance. The patient is not nervous/anxious.         Lab Results   Component Value Date    WBC 12.9 (H) 11/19/2021    HGB 11.9 (L) 11/19/2021    HCT 36.7 11/19/2021    MCV 80.6 11/19/2021     11/19/2021     Lab Results   Component Value Date     11/19/2021    K 4.7 11/19/2021    CL 99 11/19/2021    CO2 24 11/19/2021    BUN 9 11/19/2021    CREATININE 0.7 11/19/2021    GLUCOSE 119 (H) 11/19/2021    CALCIUM 9.0 11/19/2021    PROT 7.6 06/07/2021    LABALBU 4.1 06/07/2021    BILITOT 0.3 06/07/2021    ALKPHOS 92 06/07/2021    AST 16 06/07/2021    ALT 15 06/07/2021    LABGLOM >60 11/19/2021    GFRAA >60 11/19/2021    AGRATIO 1.3 05/19/2016    GLOB 2.8 05/19/2016     Lab Results   Component Value Date    CHOL 256 (H) 06/08/2021    CHOL 309 (H) 03/17/2021    CHOL 183 05/17/2019     Lab Results   Component Value Date    TRIG 104 06/08/2021    TRIG 126 03/17/2021    TRIG 70 05/17/2019     Lab Results   Component Value Date    HDL 65 06/08/2021    HDL 60 03/17/2021    HDL 63 05/17/2019     Lab Results   Component Value Date    LDLCALC 224 (H) 03/17/2021    LDLCALC 193 (H) 05/19/2016    LDLCALC 169 (H) 05/19/2014     Lab Results   Component Value Date    LABA1C 7.4 (H) 09/09/2021     Lab Results   Component Value Date    TSH 1.99 05/19/2016    TSHHS 1.370 06/08/2021         /60   Pulse 79   Ht 4' 11\" (1.499 m)   Wt 185 lb 6.4 oz (84.1 kg)   LMP 07/09/2000   SpO2 96%   BMI 37.45 kg/m²     BP Readings from Last 3 Encounters:   11/19/21 120/60   10/14/21 (!) 146/74   10/01/21 (!) 96/52       Wt Readings from Last 3 Encounters:   11/19/21 185 lb 6.4 oz (84.1 kg)   10/14/21 196 lb (88.9 kg)   10/01/21 199 lb 12.8 oz (90.6 kg)         Physical Exam  Constitutional:       General: She is not in acute distress. Appearance: Normal appearance. She is well-developed. She is not ill-appearing or diaphoretic. HENT:      Head: Normocephalic and atraumatic. Eyes:      General: No scleral icterus. Neck:      Thyroid: No thyromegaly. Cardiovascular:      Rate and Rhythm: Normal rate and regular rhythm. Heart sounds: Normal heart sounds. No murmur heard. Pulmonary:      Effort: Pulmonary effort is normal.      Breath sounds: Normal breath sounds. No wheezing or rales. Comments: Scar looks good  Musculoskeletal:         General: Normal range of motion. Cervical back: Normal range of motion and neck supple. Right lower leg: No edema. Left lower leg: No edema. Lymphadenopathy:      Cervical: No cervical adenopathy. Neurological:      General: No focal deficit present. Mental Status: She is alert and oriented to person, place, and time. Cranial Nerves: No cranial nerve deficit. Motor: No abnormal muscle tone. Psychiatric:         Mood and Affect: Mood normal.         Behavior: Behavior normal.         ASSESSMENT/ PLAN:    1. Type 2 diabetes mellitus without complication, with long-term current use of insulin (HCC)  - stable    2. Primary hypertension  - stable    3. Recurrent major depressive disorder, in partial remission (HCC)  - stable    4. Anxiety  - stable    5. S/P AVR  - doing fine    6. Hypokalemia  - Basic Metabolic Panel; Future    7. Anemia, unspecified type  - CBC Auto Differential; Future              - All old blood work reviewed with the patient  - Appropriate prescription are addressed. - After visit summery provided. - Questions answered and patient verbalizes understanding.  - Call for any problem, questions, or concerns. Return in about 3 months (around 2/19/2022).

## 2021-11-21 PROBLEM — E87.6 HYPOKALEMIA: Status: ACTIVE | Noted: 2021-11-21

## 2021-11-21 PROBLEM — D64.9 ANEMIA: Status: ACTIVE | Noted: 2021-11-21

## 2021-11-21 PROBLEM — F41.9 ANXIETY: Status: ACTIVE | Noted: 2021-11-21

## 2021-11-21 ASSESSMENT — ENCOUNTER SYMPTOMS
WHEEZING: 0
SHORTNESS OF BREATH: 0
COUGH: 0
BACK PAIN: 0
ABDOMINAL PAIN: 0

## 2021-11-22 ENCOUNTER — HOSPITAL ENCOUNTER (OUTPATIENT)
Dept: CARDIAC REHAB | Age: 71
Setting detail: THERAPIES SERIES
Discharge: HOME | End: 2021-11-22
Payer: MEDICARE

## 2021-11-22 LAB
GLUCOSE BLD-MCNC: 121 MG/DL (ref 70–99)
GLUCOSE BLD-MCNC: 96 MG/DL (ref 70–99)

## 2021-11-22 PROCEDURE — 82962 GLUCOSE BLOOD TEST: CPT

## 2021-11-22 PROCEDURE — G0422 INTENS CARDIAC REHAB W/EXERC: HCPCS

## 2021-11-22 PROCEDURE — G0423 INTENS CARDIAC REHAB NO EXER: HCPCS

## 2021-11-23 ENCOUNTER — HOSPITAL ENCOUNTER (OUTPATIENT)
Dept: CARDIAC REHAB | Age: 71
Setting detail: THERAPIES SERIES
Discharge: HOME | End: 2021-11-23
Payer: MEDICARE

## 2021-11-23 LAB
GLUCOSE BLD-MCNC: 106 MG/DL (ref 70–99)
GLUCOSE BLD-MCNC: 139 MG/DL (ref 70–99)

## 2021-11-23 PROCEDURE — 82962 GLUCOSE BLOOD TEST: CPT

## 2021-11-23 PROCEDURE — G0423 INTENS CARDIAC REHAB NO EXER: HCPCS

## 2021-11-23 PROCEDURE — G0422 INTENS CARDIAC REHAB W/EXERC: HCPCS

## 2021-11-29 ENCOUNTER — HOSPITAL ENCOUNTER (OUTPATIENT)
Dept: CARDIAC REHAB | Age: 71
Setting detail: THERAPIES SERIES
Discharge: HOME | End: 2021-11-29
Payer: MEDICARE

## 2021-11-29 LAB
GLUCOSE BLD-MCNC: 123 MG/DL (ref 70–99)
GLUCOSE BLD-MCNC: 191 MG/DL (ref 70–99)

## 2021-11-29 PROCEDURE — G0423 INTENS CARDIAC REHAB NO EXER: HCPCS

## 2021-11-29 PROCEDURE — G0422 INTENS CARDIAC REHAB W/EXERC: HCPCS

## 2021-11-29 PROCEDURE — 82962 GLUCOSE BLOOD TEST: CPT

## 2021-11-30 ENCOUNTER — HOSPITAL ENCOUNTER (OUTPATIENT)
Dept: CARDIAC REHAB | Age: 71
Setting detail: THERAPIES SERIES
Discharge: HOME | End: 2021-11-30
Payer: MEDICARE

## 2021-11-30 DIAGNOSIS — E11.9 TYPE 2 DIABETES MELLITUS WITHOUT COMPLICATION, UNSPECIFIED WHETHER LONG TERM INSULIN USE (HCC): ICD-10-CM

## 2021-11-30 PROCEDURE — G0422 INTENS CARDIAC REHAB W/EXERC: HCPCS

## 2021-11-30 PROCEDURE — G0423 INTENS CARDIAC REHAB NO EXER: HCPCS

## 2021-12-01 RX ORDER — GLUCOSAMINE HCL/CHONDROITIN SU 500-400 MG
CAPSULE ORAL
Qty: 100 STRIP | Refills: 5 | Status: SHIPPED | OUTPATIENT
Start: 2021-12-01

## 2021-12-02 ENCOUNTER — HOSPITAL ENCOUNTER (OUTPATIENT)
Dept: CARDIAC REHAB | Age: 71
Setting detail: THERAPIES SERIES
Discharge: HOME | End: 2021-12-02
Payer: MEDICARE

## 2021-12-02 PROCEDURE — G0423 INTENS CARDIAC REHAB NO EXER: HCPCS

## 2021-12-02 PROCEDURE — G0422 INTENS CARDIAC REHAB W/EXERC: HCPCS

## 2021-12-07 ENCOUNTER — HOSPITAL ENCOUNTER (OUTPATIENT)
Dept: CARDIAC REHAB | Age: 71
Setting detail: THERAPIES SERIES
Discharge: HOME | End: 2021-12-07
Payer: MEDICARE

## 2021-12-07 PROCEDURE — G0422 INTENS CARDIAC REHAB W/EXERC: HCPCS

## 2021-12-07 PROCEDURE — G0423 INTENS CARDIAC REHAB NO EXER: HCPCS

## 2021-12-09 ENCOUNTER — HOSPITAL ENCOUNTER (OUTPATIENT)
Dept: CARDIAC REHAB | Age: 71
Setting detail: THERAPIES SERIES
Discharge: HOME | End: 2021-12-09
Payer: MEDICARE

## 2021-12-09 PROCEDURE — G0422 INTENS CARDIAC REHAB W/EXERC: HCPCS

## 2021-12-09 PROCEDURE — G0423 INTENS CARDIAC REHAB NO EXER: HCPCS

## 2021-12-13 ENCOUNTER — HOSPITAL ENCOUNTER (OUTPATIENT)
Dept: CARDIAC REHAB | Age: 71
Setting detail: THERAPIES SERIES
Discharge: HOME | End: 2021-12-13
Payer: MEDICARE

## 2021-12-13 PROCEDURE — G0423 INTENS CARDIAC REHAB NO EXER: HCPCS

## 2021-12-13 PROCEDURE — G0422 INTENS CARDIAC REHAB W/EXERC: HCPCS

## 2021-12-14 ENCOUNTER — HOSPITAL ENCOUNTER (OUTPATIENT)
Dept: CARDIAC REHAB | Age: 71
Setting detail: THERAPIES SERIES
Discharge: HOME | End: 2021-12-14
Payer: MEDICARE

## 2021-12-14 PROCEDURE — G0423 INTENS CARDIAC REHAB NO EXER: HCPCS

## 2021-12-14 PROCEDURE — G0422 INTENS CARDIAC REHAB W/EXERC: HCPCS

## 2021-12-16 ENCOUNTER — HOSPITAL ENCOUNTER (OUTPATIENT)
Dept: CARDIAC REHAB | Age: 71
Setting detail: THERAPIES SERIES
Discharge: HOME | End: 2021-12-16
Payer: MEDICARE

## 2021-12-16 PROCEDURE — G0422 INTENS CARDIAC REHAB W/EXERC: HCPCS

## 2021-12-16 PROCEDURE — G0423 INTENS CARDIAC REHAB NO EXER: HCPCS

## 2021-12-20 ENCOUNTER — HOSPITAL ENCOUNTER (OUTPATIENT)
Dept: CARDIAC REHAB | Age: 71
Setting detail: THERAPIES SERIES
Discharge: HOME | End: 2021-12-20
Payer: MEDICARE

## 2021-12-20 PROCEDURE — G0423 INTENS CARDIAC REHAB NO EXER: HCPCS

## 2021-12-20 PROCEDURE — G0422 INTENS CARDIAC REHAB W/EXERC: HCPCS

## 2021-12-21 ENCOUNTER — HOSPITAL ENCOUNTER (OUTPATIENT)
Dept: CARDIAC REHAB | Age: 71
Setting detail: THERAPIES SERIES
Discharge: HOME | End: 2021-12-21
Payer: MEDICARE

## 2021-12-21 PROCEDURE — G0423 INTENS CARDIAC REHAB NO EXER: HCPCS

## 2021-12-21 PROCEDURE — G0422 INTENS CARDIAC REHAB W/EXERC: HCPCS

## 2021-12-28 ENCOUNTER — HOSPITAL ENCOUNTER (OUTPATIENT)
Dept: CARDIAC REHAB | Age: 71
Setting detail: THERAPIES SERIES
Discharge: HOME | End: 2021-12-28
Payer: MEDICARE

## 2021-12-28 PROCEDURE — G0423 INTENS CARDIAC REHAB NO EXER: HCPCS

## 2021-12-28 PROCEDURE — G0422 INTENS CARDIAC REHAB W/EXERC: HCPCS

## 2022-01-03 ENCOUNTER — TELEPHONE (OUTPATIENT)
Dept: FAMILY MEDICINE CLINIC | Age: 72
End: 2022-01-03

## 2022-01-10 ENCOUNTER — TELEPHONE (OUTPATIENT)
Dept: FAMILY MEDICINE CLINIC | Age: 72
End: 2022-01-10

## 2022-01-10 RX ORDER — GUAIFENESIN 600 MG/1
1200 TABLET, EXTENDED RELEASE ORAL 2 TIMES DAILY
Qty: 40 TABLET | Refills: 0 | Status: SHIPPED | OUTPATIENT
Start: 2022-01-10 | End: 2022-01-20

## 2022-01-10 NOTE — TELEPHONE ENCOUNTER
Patient called and stated that she tested positive for COVID on 12/31/21 and she is not feeling any better today. Patient states she has not ran a fever in a week but she still having body aches and cough. Patient did have open heart surgery in September. Patient was informed she could take mucinex,cough syrup and Nyquil and or Dayquil. Patient would like to know if there is anything else she can do?  Please advise    Patient is not vaccinated

## 2022-01-10 NOTE — TELEPHONE ENCOUNTER
I call the patient and still cough, recommend to push the fluid, and mucinex was sent to the pharmacy

## 2022-01-31 ENCOUNTER — HOSPITAL ENCOUNTER (OUTPATIENT)
Dept: CARDIAC REHAB | Age: 72
Setting detail: THERAPIES SERIES
Discharge: HOME | End: 2022-01-31
Payer: MEDICARE

## 2022-01-31 PROCEDURE — G0422 INTENS CARDIAC REHAB W/EXERC: HCPCS

## 2022-01-31 PROCEDURE — G0423 INTENS CARDIAC REHAB NO EXER: HCPCS

## 2022-02-01 ENCOUNTER — HOSPITAL ENCOUNTER (OUTPATIENT)
Dept: CARDIAC REHAB | Age: 72
Setting detail: THERAPIES SERIES
Discharge: HOME | End: 2022-02-01
Payer: MEDICARE

## 2022-02-01 PROCEDURE — G0422 INTENS CARDIAC REHAB W/EXERC: HCPCS

## 2022-02-01 PROCEDURE — G0423 INTENS CARDIAC REHAB NO EXER: HCPCS

## 2022-02-07 ENCOUNTER — HOSPITAL ENCOUNTER (OUTPATIENT)
Dept: CARDIAC REHAB | Age: 72
Setting detail: THERAPIES SERIES
Discharge: HOME | End: 2022-02-07
Payer: MEDICARE

## 2022-02-07 PROCEDURE — G0422 INTENS CARDIAC REHAB W/EXERC: HCPCS

## 2022-02-07 PROCEDURE — G0423 INTENS CARDIAC REHAB NO EXER: HCPCS

## 2022-02-08 ENCOUNTER — HOSPITAL ENCOUNTER (OUTPATIENT)
Dept: CARDIAC REHAB | Age: 72
Setting detail: THERAPIES SERIES
Discharge: HOME | End: 2022-02-08
Payer: MEDICARE

## 2022-02-08 PROCEDURE — G0423 INTENS CARDIAC REHAB NO EXER: HCPCS

## 2022-02-08 PROCEDURE — G0422 INTENS CARDIAC REHAB W/EXERC: HCPCS

## 2022-02-10 ENCOUNTER — HOSPITAL ENCOUNTER (OUTPATIENT)
Dept: CARDIAC REHAB | Age: 72
Setting detail: THERAPIES SERIES
Discharge: HOME | End: 2022-02-10
Payer: MEDICARE

## 2022-02-10 PROCEDURE — G0423 INTENS CARDIAC REHAB NO EXER: HCPCS

## 2022-02-10 PROCEDURE — G0422 INTENS CARDIAC REHAB W/EXERC: HCPCS

## 2022-02-14 ENCOUNTER — HOSPITAL ENCOUNTER (OUTPATIENT)
Dept: CARDIAC REHAB | Age: 72
Setting detail: THERAPIES SERIES
Discharge: HOME | End: 2022-02-14
Payer: MEDICARE

## 2022-02-14 PROCEDURE — G0422 INTENS CARDIAC REHAB W/EXERC: HCPCS

## 2022-02-14 PROCEDURE — G0423 INTENS CARDIAC REHAB NO EXER: HCPCS

## 2022-02-15 ENCOUNTER — HOSPITAL ENCOUNTER (OUTPATIENT)
Dept: CARDIAC REHAB | Age: 72
Setting detail: THERAPIES SERIES
Discharge: HOME | End: 2022-02-15
Payer: MEDICARE

## 2022-02-15 PROCEDURE — G0423 INTENS CARDIAC REHAB NO EXER: HCPCS

## 2022-02-15 PROCEDURE — G0422 INTENS CARDIAC REHAB W/EXERC: HCPCS

## 2022-02-17 ENCOUNTER — HOSPITAL ENCOUNTER (OUTPATIENT)
Dept: CARDIAC REHAB | Age: 72
Setting detail: THERAPIES SERIES
Discharge: HOME | End: 2022-02-17
Payer: MEDICARE

## 2022-02-17 PROCEDURE — G0423 INTENS CARDIAC REHAB NO EXER: HCPCS

## 2022-02-17 PROCEDURE — G0422 INTENS CARDIAC REHAB W/EXERC: HCPCS

## 2022-02-22 ENCOUNTER — HOSPITAL ENCOUNTER (OUTPATIENT)
Dept: CARDIAC REHAB | Age: 72
Setting detail: THERAPIES SERIES
Discharge: HOME | End: 2022-02-22
Payer: MEDICARE

## 2022-02-22 PROCEDURE — G0422 INTENS CARDIAC REHAB W/EXERC: HCPCS

## 2022-02-22 PROCEDURE — G0423 INTENS CARDIAC REHAB NO EXER: HCPCS

## 2022-02-23 ENCOUNTER — OFFICE VISIT (OUTPATIENT)
Dept: FAMILY MEDICINE CLINIC | Age: 72
End: 2022-02-23
Payer: MEDICARE

## 2022-02-23 VITALS
OXYGEN SATURATION: 95 % | HEART RATE: 68 BPM | DIASTOLIC BLOOD PRESSURE: 78 MMHG | SYSTOLIC BLOOD PRESSURE: 118 MMHG | BODY MASS INDEX: 38.05 KG/M2 | WEIGHT: 188.4 LBS

## 2022-02-23 DIAGNOSIS — E78.2 MIXED HYPERLIPIDEMIA: ICD-10-CM

## 2022-02-23 DIAGNOSIS — E11.9 TYPE 2 DIABETES MELLITUS WITHOUT COMPLICATION, WITH LONG-TERM CURRENT USE OF INSULIN (HCC): Primary | ICD-10-CM

## 2022-02-23 DIAGNOSIS — Z79.4 TYPE 2 DIABETES MELLITUS WITH HYPERGLYCEMIA, WITH LONG-TERM CURRENT USE OF INSULIN (HCC): ICD-10-CM

## 2022-02-23 DIAGNOSIS — I10 PRIMARY HYPERTENSION: ICD-10-CM

## 2022-02-23 DIAGNOSIS — F33.41 RECURRENT MAJOR DEPRESSIVE DISORDER, IN PARTIAL REMISSION (HCC): ICD-10-CM

## 2022-02-23 DIAGNOSIS — F41.9 ANXIETY: ICD-10-CM

## 2022-02-23 DIAGNOSIS — E11.65 TYPE 2 DIABETES MELLITUS WITH HYPERGLYCEMIA, WITH LONG-TERM CURRENT USE OF INSULIN (HCC): ICD-10-CM

## 2022-02-23 DIAGNOSIS — I48.3 TYPICAL ATRIAL FLUTTER (HCC): ICD-10-CM

## 2022-02-23 DIAGNOSIS — Z95.2 S/P AVR: ICD-10-CM

## 2022-02-23 DIAGNOSIS — Z79.4 TYPE 2 DIABETES MELLITUS WITHOUT COMPLICATION, WITH LONG-TERM CURRENT USE OF INSULIN (HCC): Primary | ICD-10-CM

## 2022-02-23 LAB — HBA1C MFR BLD: 7.8 %

## 2022-02-23 PROCEDURE — G8427 DOCREV CUR MEDS BY ELIG CLIN: HCPCS | Performed by: FAMILY MEDICINE

## 2022-02-23 PROCEDURE — 4040F PNEUMOC VAC/ADMIN/RCVD: CPT | Performed by: FAMILY MEDICINE

## 2022-02-23 PROCEDURE — 3051F HG A1C>EQUAL 7.0%<8.0%: CPT | Performed by: FAMILY MEDICINE

## 2022-02-23 PROCEDURE — 3017F COLORECTAL CA SCREEN DOC REV: CPT | Performed by: FAMILY MEDICINE

## 2022-02-23 PROCEDURE — 1123F ACP DISCUSS/DSCN MKR DOCD: CPT | Performed by: FAMILY MEDICINE

## 2022-02-23 PROCEDURE — G8399 PT W/DXA RESULTS DOCUMENT: HCPCS | Performed by: FAMILY MEDICINE

## 2022-02-23 PROCEDURE — 1036F TOBACCO NON-USER: CPT | Performed by: FAMILY MEDICINE

## 2022-02-23 PROCEDURE — 99214 OFFICE O/P EST MOD 30 MIN: CPT | Performed by: FAMILY MEDICINE

## 2022-02-23 PROCEDURE — 83036 HEMOGLOBIN GLYCOSYLATED A1C: CPT | Performed by: FAMILY MEDICINE

## 2022-02-23 PROCEDURE — 2022F DILAT RTA XM EVC RTNOPTHY: CPT | Performed by: FAMILY MEDICINE

## 2022-02-23 PROCEDURE — G8417 CALC BMI ABV UP PARAM F/U: HCPCS | Performed by: FAMILY MEDICINE

## 2022-02-23 PROCEDURE — 1090F PRES/ABSN URINE INCON ASSESS: CPT | Performed by: FAMILY MEDICINE

## 2022-02-23 PROCEDURE — G8484 FLU IMMUNIZE NO ADMIN: HCPCS | Performed by: FAMILY MEDICINE

## 2022-02-23 ASSESSMENT — ENCOUNTER SYMPTOMS
SHORTNESS OF BREATH: 0
WHEEZING: 0
BACK PAIN: 0
COUGH: 0

## 2022-02-23 NOTE — PROGRESS NOTES
Won Pandey  1950 02/23/22    Chief Complaint   Patient presents with    3 Month Follow-Up    Diabetes    Hypertension    Hyperlipidemia    Other     s/p AVR    Anxiety    Depression           Patient here for 3 months f/u regarding DM, HTN, HLD, depression, anxiety,  AVR 9/21, patient feeling much better and doing okay, denies pain her heart surgery went good. The patient is taking hypertensive medications compliantly without side effects. Denies chest pain, dyspnea, edema, or TIA's. Her blood sugar she is taking Levemir 35 units and Januvia 100 mg daily. Her anxiety and depression under control. Dr. Ricardo Santos stopped one of her medication she does not know which one.       Past Medical History:   Diagnosis Date    Aortic valve stenosis     Arthritis     Bronchitis 06/2021    Diabetes mellitus ([facility]     dx age 53's    Heart murmur     Hx of drug abuse ([facility]     \"did cocaine back in 1980's    Hypertension     Irregular heart beat     \"they just say I skip a beat , I think from the heart murmur- not sure\" follow with Dr Elian Moya LVH (left ventricular hypertrophy)     hx per old chart    Mixed hyperlipidemia 5/12/2016    Wears dentures     full upper plate    Wears glasses     to read     Past Surgical History:   Procedure Laterality Date    AORTIC VALVE REPLACEMENT N/A 9/16/2021    AORTIC VALVE REPLACEMENT AND AORTIC ROOT ENLARGEMENT performed by Obey Manzano MD at [address] [place] [address]  2019    left breast bx    CARDIAC CATHETERIZATION  06/2021   [address]  and 1980( with BPS)    x2    DILATION AND CURETTAGE OF UTERUS      HERNIA REPAIR      umbilical hernia\"think done when I was a teenager    OTHER SURGICAL HISTORY  12/10/13    Left AC lipoma excision    OTHER SURGICAL HISTORY  12/10/13    Left flank lipoma excision     Family History   Problem Relation Age of Onset    Diabetes Mother     Heart Disease Mother     High Blood Pressure Father     Diabetes Sister     High Blood Pressure Sister     Stroke Sister     Breast Cancer Sister 58    Cancer Brother         Lung    High Blood Pressure Brother     Stroke Brother     Diabetes Brother     Diabetes Maternal Grandmother     Heart Disease Maternal Grandfather     Cancer Brother         Lung (smoker)    Breast Cancer Maternal Aunt      Social History     Socioeconomic History    Marital status:      Spouse name: Not on file    Number of children: Not on file    Years of education: Not on file    Highest education level: Not on file   Occupational History    Occupation: employed-[employer]   Tobacco Use    Smoking status: Former Smoker     Packs/day: 0.50     Years: 32.00     Pack years: 16.00     Types: Cigarettes     Start date:      Quit date: 2021     Years since quittin.7    Smokeless tobacco: Never Used    Tobacco comment: currently 0.5 PPD   Vaping Use    Vaping Use: Former    Substances: Nicotine, Flavoring   Substance and Sexual Activity    Alcohol use: No     Alcohol/week: 0.0 standard drinks     Comment: average \"2 times per month glass of wine\"    Drug use: Not Currently     Types: Marijuana (Weed)     Comment: \"last used 50+ yrs ago \" per pt on 2021\"yrs ago did do cocaine -\"    Sexual activity: Not Currently     Partners: Male   Other Topics Concern    Not on file   Social History Narrative    Not on file     Social Determinants of Health     Financial Resource Strain:     Difficulty of Paying Living Expenses: Not on file   Food Insecurity:     Worried About Running Out of Food in the Last Year: Not on file    Karolina of Food in the Last Year: Not on file   Transportation Needs:     Lack of Transportation (Medical): Not on file    Lack of Transportation (Non-Medical):  Not on file   Physical Activity:     Days of Exercise per Week: Not on file    Minutes of Exercise per Session: Not on file   Stress:     Feeling of Stress : Constitutional: Negative for activity change, appetite change, chills, fatigue and fever. HENT: Negative for congestion. Respiratory: Negative for cough, shortness of breath and wheezing. Cardiovascular: Negative for chest pain and leg swelling. Genitourinary: Negative for dysuria. Musculoskeletal: Negative for back pain. Neurological: Negative for dizziness and headaches. Psychiatric/Behavioral: Negative for agitation, behavioral problems and sleep disturbance. The patient is not nervous/anxious.         Lab Results   Component Value Date    WBC 12.9 (H) 11/19/2021    HGB 11.9 (L) 11/19/2021    HCT 36.7 11/19/2021    MCV 80.6 11/19/2021     11/19/2021     Lab Results   Component Value Date     11/19/2021    K 4.7 11/19/2021    CL 99 11/19/2021    CO2 24 11/19/2021    BUN 9 11/19/2021    CREATININE 0.7 11/19/2021    GLUCOSE 119 (H) 11/19/2021    CALCIUM 9.0 11/19/2021    PROT 7.6 06/07/2021    LABALBU 4.1 06/07/2021    BILITOT 0.3 06/07/2021    ALKPHOS 92 06/07/2021    AST 16 06/07/2021    ALT 15 06/07/2021    LABGLOM >60 11/19/2021    GFRAA >60 11/19/2021    AGRATIO 1.3 05/19/2016    GLOB 2.8 05/19/2016     Lab Results   Component Value Date    CHOL 256 (H) 06/08/2021    CHOL 309 (H) 03/17/2021    CHOL 183 05/17/2019     Lab Results   Component Value Date    TRIG 104 06/08/2021    TRIG 126 03/17/2021    TRIG 70 05/17/2019     Lab Results   Component Value Date    HDL 65 06/08/2021    HDL 60 03/17/2021    HDL 63 05/17/2019     Lab Results   Component Value Date    LDLCALC 224 (H) 03/17/2021    LDLCALC 193 (H) 05/19/2016    LDLCALC 169 (H) 05/19/2014     Lab Results   Component Value Date    LABA1C 7.8 02/23/2022     Lab Results   Component Value Date    TSH 1.99 05/19/2016    TSHHS 1.370 06/08/2021         /78 (Site: Left Upper Arm, Position: Sitting, Cuff Size: Medium Adult)   Pulse 68   Wt 188 lb 6.4 oz (85.5 kg)   LMP 07/09/2000   SpO2 95%   BMI 38.05 kg/m²     BP Readings from Last 3 Encounters:   02/23/22 118/78   01/25/22 124/78   11/19/21 120/60       Wt Readings from Last 3 Encounters:   02/23/22 188 lb 6.4 oz (85.5 kg)   01/25/22 184 lb (83.5 kg)   11/19/21 185 lb 6.4 oz (84.1 kg)         Physical Exam  Constitutional:       General: She is not in acute distress. Appearance: Normal appearance. She is well-developed. She is obese. She is not ill-appearing or diaphoretic. HENT:      Head: Normocephalic and atraumatic. Eyes:      General: No scleral icterus. Neck:      Thyroid: No thyromegaly. Cardiovascular:      Rate and Rhythm: Normal rate and regular rhythm. Heart sounds: Normal heart sounds. No murmur heard. Pulmonary:      Effort: Pulmonary effort is normal.      Breath sounds: Normal breath sounds. No wheezing or rales. Musculoskeletal:         General: Normal range of motion. Cervical back: Normal range of motion and neck supple. No rigidity. Right lower leg: No edema. Left lower leg: No edema. Neurological:      General: No focal deficit present. Mental Status: She is alert and oriented to person, place, and time. Cranial Nerves: No cranial nerve deficit. Motor: No abnormal muscle tone. Psychiatric:         Mood and Affect: Mood normal.         Behavior: Behavior normal.         ASSESSMENT/ PLAN:    1. Type 2 diabetes mellitus without complication, with long-term current use of insulin (HCC)  - POCT glycosylated hemoglobin (Hb A1C)  -Recommend increase the Levemir to 37 and watch her diet more strictly  Continue the Januvia 100 mg daily    2. Type 2 diabetes mellitus with hyperglycemia, with long-term current use of insulin (HCC)  -As above    3. Primary hypertension  -Stable she is taking carvedilol    4. Mixed hyperlipidemia  - she is taking pravastatin,  was last june so we will recheck the lipid next visit    5.  Recurrent major depressive disorder, in partial remission ([facility]  -Under control, without any medication    6. Anxiety  -Also under control without medication    7. Typical atrial flutter ([address]  -Is on Eliquis does follow-up with the cardiologist    8. S/P AVR  -Doing fine and does follow-up with the cardiologist              - All old blood work reviewed with the patient  - Appropriate prescription are addressed. - After visit summery provided. - Questions answered and patient verbalizes understanding.  - Call for any problem, questions, or concerns. Return in about 3 months (around 5/23/2022).

## 2022-02-24 ENCOUNTER — HOSPITAL ENCOUNTER (OUTPATIENT)
Dept: CARDIAC REHAB | Age: 72
Setting detail: THERAPIES SERIES
Discharge: HOME | End: 2022-02-24
Payer: MEDICARE

## 2022-02-24 PROCEDURE — G0423 INTENS CARDIAC REHAB NO EXER: HCPCS

## 2022-02-24 PROCEDURE — G0422 INTENS CARDIAC REHAB W/EXERC: HCPCS

## 2022-02-28 ENCOUNTER — HOSPITAL ENCOUNTER (OUTPATIENT)
Dept: CARDIAC REHAB | Age: 72
Setting detail: THERAPIES SERIES
Discharge: HOME | End: 2022-02-28
Payer: MEDICARE

## 2022-02-28 PROCEDURE — G0422 INTENS CARDIAC REHAB W/EXERC: HCPCS

## 2022-02-28 PROCEDURE — G0423 INTENS CARDIAC REHAB NO EXER: HCPCS

## 2022-03-01 ENCOUNTER — HOSPITAL ENCOUNTER (OUTPATIENT)
Dept: CARDIAC REHAB | Age: 72
Setting detail: THERAPIES SERIES
Discharge: HOME | End: 2022-03-01
Payer: MEDICARE

## 2022-03-01 PROCEDURE — G0423 INTENS CARDIAC REHAB NO EXER: HCPCS

## 2022-03-01 PROCEDURE — G0422 INTENS CARDIAC REHAB W/EXERC: HCPCS

## 2022-03-03 ENCOUNTER — HOSPITAL ENCOUNTER (OUTPATIENT)
Dept: CARDIAC REHAB | Age: 72
Setting detail: THERAPIES SERIES
Discharge: HOME | End: 2022-03-03
Payer: MEDICARE

## 2022-03-03 PROCEDURE — G0423 INTENS CARDIAC REHAB NO EXER: HCPCS

## 2022-03-03 PROCEDURE — G0422 INTENS CARDIAC REHAB W/EXERC: HCPCS

## 2022-03-07 ENCOUNTER — HOSPITAL ENCOUNTER (OUTPATIENT)
Dept: CARDIAC REHAB | Age: 72
Setting detail: THERAPIES SERIES
Discharge: HOME | End: 2022-03-07
Payer: MEDICARE

## 2022-03-07 PROCEDURE — G0423 INTENS CARDIAC REHAB NO EXER: HCPCS

## 2022-03-07 PROCEDURE — G0422 INTENS CARDIAC REHAB W/EXERC: HCPCS

## 2022-03-08 ENCOUNTER — HOSPITAL ENCOUNTER (OUTPATIENT)
Dept: CARDIAC REHAB | Age: 72
Setting detail: THERAPIES SERIES
Discharge: HOME | End: 2022-03-08
Payer: MEDICARE

## 2022-03-08 PROCEDURE — G0422 INTENS CARDIAC REHAB W/EXERC: HCPCS

## 2022-03-08 PROCEDURE — G0423 INTENS CARDIAC REHAB NO EXER: HCPCS

## 2022-03-14 ENCOUNTER — HOSPITAL ENCOUNTER (OUTPATIENT)
Dept: CARDIAC REHAB | Age: 72
Setting detail: THERAPIES SERIES
Discharge: HOME | End: 2022-03-14
Payer: MEDICARE

## 2022-03-14 PROCEDURE — G0422 INTENS CARDIAC REHAB W/EXERC: HCPCS

## 2022-03-14 PROCEDURE — G0423 INTENS CARDIAC REHAB NO EXER: HCPCS

## 2022-03-15 ENCOUNTER — HOSPITAL ENCOUNTER (OUTPATIENT)
Dept: CARDIAC REHAB | Age: 72
Setting detail: THERAPIES SERIES
Discharge: HOME | End: 2022-03-15
Payer: MEDICARE

## 2022-03-15 PROCEDURE — G0422 INTENS CARDIAC REHAB W/EXERC: HCPCS

## 2022-03-15 PROCEDURE — G0423 INTENS CARDIAC REHAB NO EXER: HCPCS

## 2022-03-17 ENCOUNTER — HOSPITAL ENCOUNTER (OUTPATIENT)
Dept: CARDIAC REHAB | Age: 72
Setting detail: THERAPIES SERIES
Discharge: HOME | End: 2022-03-17
Payer: MEDICARE

## 2022-03-17 PROCEDURE — G0423 INTENS CARDIAC REHAB NO EXER: HCPCS

## 2022-03-17 PROCEDURE — G0422 INTENS CARDIAC REHAB W/EXERC: HCPCS

## 2022-03-21 ENCOUNTER — HOSPITAL ENCOUNTER (OUTPATIENT)
Dept: CARDIAC REHAB | Age: 72
Setting detail: THERAPIES SERIES
Discharge: HOME | End: 2022-03-21
Payer: MEDICARE

## 2022-03-21 PROCEDURE — G0422 INTENS CARDIAC REHAB W/EXERC: HCPCS

## 2022-03-21 PROCEDURE — G0423 INTENS CARDIAC REHAB NO EXER: HCPCS

## 2022-03-22 ENCOUNTER — APPOINTMENT (OUTPATIENT)
Dept: CARDIAC REHAB | Age: 72
End: 2022-03-22
Payer: MEDICARE

## 2022-04-02 DIAGNOSIS — E11.9 TYPE 2 DIABETES MELLITUS WITHOUT COMPLICATION, WITH LONG-TERM CURRENT USE OF INSULIN (HCC): ICD-10-CM

## 2022-04-02 DIAGNOSIS — Z79.4 TYPE 2 DIABETES MELLITUS WITHOUT COMPLICATION, WITH LONG-TERM CURRENT USE OF INSULIN (HCC): ICD-10-CM

## 2022-04-04 DIAGNOSIS — Z79.4 TYPE 2 DIABETES MELLITUS WITHOUT COMPLICATION, WITH LONG-TERM CURRENT USE OF INSULIN (HCC): ICD-10-CM

## 2022-04-04 DIAGNOSIS — E11.9 TYPE 2 DIABETES MELLITUS WITHOUT COMPLICATION, WITH LONG-TERM CURRENT USE OF INSULIN (HCC): ICD-10-CM

## 2022-04-04 RX ORDER — INSULIN DETEMIR 100 [IU]/ML
INJECTION, SOLUTION SUBCUTANEOUS
Qty: 10 PEN | Refills: 5 | Status: SHIPPED | OUTPATIENT
Start: 2022-04-04 | End: 2022-10-06 | Stop reason: SDUPTHER

## 2022-04-04 RX ORDER — INSULIN DETEMIR 100 [IU]/ML
INJECTION, SOLUTION SUBCUTANEOUS
Qty: 10 PEN | Refills: 3 | Status: SHIPPED | OUTPATIENT
Start: 2022-04-04 | End: 2022-04-04 | Stop reason: SDUPTHER

## 2022-05-23 RX ORDER — APIXABAN 5 MG/1
TABLET, FILM COATED ORAL
Qty: 60 TABLET | Refills: 0 | Status: SHIPPED | OUTPATIENT
Start: 2022-05-23 | End: 2022-06-28

## 2022-05-23 NOTE — TELEPHONE ENCOUNTER
Medication:   Requested Prescriptions     Pending Prescriptions Disp Refills    ELIQUIS 5 MG TABS tablet [Pharmacy Med Name: Eliquis Oral Tablet 5 MG 5 MG  Tablet] 60 tablet 5     Sig: TAKE 1 TABLET BY MOUTH TWO TIMES DAILY        Last Filled:  10/27/2021 disp 60w/5 refills    Patient Phone Number: [phone] (home)     Last appt: 2/23/2022   Next appt: 5/26/2022    Last OARRS: No flowsheet data found.

## 2022-05-24 RX ORDER — CARVEDILOL 3.12 MG/1
3.12 TABLET ORAL 2 TIMES DAILY
Qty: 60 TABLET | Refills: 1 | Status: SHIPPED | OUTPATIENT
Start: 2022-05-24 | End: 2022-08-04

## 2022-05-24 NOTE — TELEPHONE ENCOUNTER
----- Message from Mayo Campbell sent at 5/24/2022 12:53 PM EDT -----  Subject: Refill Request    QUESTIONS  Name of Medication? ELIQUIS 5 MG TABS tablet  Patient-reported dosage and instructions? One pill twice a day  How many days do you have left? 0  Preferred Pharmacy? [facility] phone number (if available)? [phone]  ---------------------------------------------------------------------------  --------------,  Name of Medication? carvedilol (COREG) 3.125 MG tablet  Patient-reported dosage and instructions? one pill, twice a day  How many days do you have left? 0  Preferred Pharmacy? [facility] phone number (if available)? [phone]  ---------------------------------------------------------------------------  --------------  CALL BACK INFO  What is the best way for the office to contact you? OK to leave message on   voicemail  Preferred Call Back Phone Number? [phone]  ---------------------------------------------------------------------------  --------------  SCRIPT ANSWERS  Relationship to Patient?  Self

## 2022-06-28 RX ORDER — APIXABAN 5 MG/1
TABLET, FILM COATED ORAL
Qty: 60 TABLET | Refills: 5 | Status: SHIPPED | OUTPATIENT
Start: 2022-06-28

## 2022-07-07 ENCOUNTER — TELEPHONE (OUTPATIENT)
Dept: FAMILY MEDICINE CLINIC | Age: 72
End: 2022-07-07

## 2022-07-11 ENCOUNTER — OFFICE VISIT (OUTPATIENT)
Dept: FAMILY MEDICINE CLINIC | Age: 72
End: 2022-07-11
Payer: MEDICARE

## 2022-07-11 VITALS
WEIGHT: 197.6 LBS | OXYGEN SATURATION: 96 % | HEART RATE: 72 BPM | BODY MASS INDEX: 39.84 KG/M2 | DIASTOLIC BLOOD PRESSURE: 76 MMHG | SYSTOLIC BLOOD PRESSURE: 122 MMHG | HEIGHT: 59 IN

## 2022-07-11 DIAGNOSIS — Z12.11 SCREENING FOR COLORECTAL CANCER: ICD-10-CM

## 2022-07-11 DIAGNOSIS — E66.01 SEVERE OBESITY (BMI 35.0-39.9) WITH COMORBIDITY (HCC): ICD-10-CM

## 2022-07-11 DIAGNOSIS — Z12.31 ENCOUNTER FOR SCREENING MAMMOGRAM FOR BREAST CANCER: ICD-10-CM

## 2022-07-11 DIAGNOSIS — Z12.12 SCREENING FOR COLORECTAL CANCER: ICD-10-CM

## 2022-07-11 DIAGNOSIS — Z00.00 MEDICARE ANNUAL WELLNESS VISIT, SUBSEQUENT: Primary | ICD-10-CM

## 2022-07-11 DIAGNOSIS — D64.9 ANEMIA, UNSPECIFIED TYPE: ICD-10-CM

## 2022-07-11 DIAGNOSIS — Z13.220 SCREENING FOR HYPERLIPIDEMIA: ICD-10-CM

## 2022-07-11 PROCEDURE — G0439 PPPS, SUBSEQ VISIT: HCPCS | Performed by: FAMILY MEDICINE

## 2022-07-11 PROCEDURE — 3017F COLORECTAL CA SCREEN DOC REV: CPT | Performed by: FAMILY MEDICINE

## 2022-07-11 PROCEDURE — 1123F ACP DISCUSS/DSCN MKR DOCD: CPT | Performed by: FAMILY MEDICINE

## 2022-07-11 ASSESSMENT — PATIENT HEALTH QUESTIONNAIRE - PHQ9
2. FEELING DOWN, DEPRESSED OR HOPELESS: 0
SUM OF ALL RESPONSES TO PHQ QUESTIONS 1-9: 0
4. FEELING TIRED OR HAVING LITTLE ENERGY: 0
9. THOUGHTS THAT YOU WOULD BE BETTER OFF DEAD, OR OF HURTING YOURSELF: 0
8. MOVING OR SPEAKING SO SLOWLY THAT OTHER PEOPLE COULD HAVE NOTICED. OR THE OPPOSITE, BEING SO FIGETY OR RESTLESS THAT YOU HAVE BEEN MOVING AROUND A LOT MORE THAN USUAL: 0
1. LITTLE INTEREST OR PLEASURE IN DOING THINGS: 0
SUM OF ALL RESPONSES TO PHQ9 QUESTIONS 1 & 2: 0
3. TROUBLE FALLING OR STAYING ASLEEP: 0
SUM OF ALL RESPONSES TO PHQ QUESTIONS 1-9: 0
10. IF YOU CHECKED OFF ANY PROBLEMS, HOW DIFFICULT HAVE THESE PROBLEMS MADE IT FOR YOU TO DO YOUR WORK, TAKE CARE OF THINGS AT HOME, OR GET ALONG WITH OTHER PEOPLE: 0
6. FEELING BAD ABOUT YOURSELF - OR THAT YOU ARE A FAILURE OR HAVE LET YOURSELF OR YOUR FAMILY DOWN: 0
5. POOR APPETITE OR OVEREATING: 0
SUM OF ALL RESPONSES TO PHQ QUESTIONS 1-9: 0
SUM OF ALL RESPONSES TO PHQ QUESTIONS 1-9: 0
7. TROUBLE CONCENTRATING ON THINGS, SUCH AS READING THE NEWSPAPER OR WATCHING TELEVISION: 0

## 2022-07-11 ASSESSMENT — LIFESTYLE VARIABLES: HOW OFTEN DO YOU HAVE A DRINK CONTAINING ALCOHOL: NEVER

## 2022-07-11 NOTE — PATIENT INSTRUCTIONS
Personalized Preventive Plan for Sydney Lloyd - 7/11/2022  Medicare offers a range of preventive health benefits. Some of the tests and screenings are paid in full while other may be subject to a deductible, co-insurance, and/or copay. Some of these benefits include a comprehensive review of your medical history including lifestyle, illnesses that may run in your family, and various assessments and screenings as appropriate. After reviewing your medical record and screening and assessments performed today your provider may have ordered immunizations, labs, imaging, and/or referrals for you. A list of these orders (if applicable) as well as your Preventive Care list are included within your After Visit Summary for your review. Other Preventive Recommendations:    · A preventive eye exam performed by an eye specialist is recommended every 1-2 years to screen for glaucoma; cataracts, macular degeneration, and other eye disorders. · A preventive dental visit is recommended every 6 months. · Try to get at least 150 minutes of exercise per week or 10,000 steps per day on a pedometer . · Order or download the FREE \"Exercise & Physical Activity: Your Everyday Guide\" from [employer]. Call 2-406.669.5676 or search [employer] online. · You need 7132-1608 mg of calcium and 8651-1539 IU of vitamin D per day. It is possible to meet your calcium requirement with diet alone, but a vitamin D supplement is usually necessary to meet this goal.  · When exposed to the sun, use a sunscreen that protects against both UVA and UVB radiation with an SPF of 30 or greater. Reapply every 2 to 3 hours or after sweating, drying off with a towel, or swimming. · Always wear a seat belt when traveling in a car. Always wear a helmet when riding a bicycle or motorcycle.

## 2022-07-11 NOTE — PROGRESS NOTES
Medicare Annual Wellness Visit    Elease Bosworth is here for Medicare AWV    Assessment & Plan   Medicare annual wellness visit, subsequent  Anemia, unspecified type  -     CBC with Auto Differential; Future  -     Vitamin B12 & Folate; Future  Severe obesity (BMI 35.0-39. 9) with comorbidity ([address]  Encounter for screening mammogram for breast cancer  -     PAOLO Digital Screen Bilateral; Future  Screening for colorectal cancer  -     FIT-DNA (Cologuard)  Screening for hyperlipidemia  -     Lipid Panel; Future      Recommendations for Preventive Services Due: see orders and patient instructions/AVS.  Recommended screening schedule for the next 5-10 years is provided to the patient in written form: see Patient Instructions/AVS.     Return for Medicare Annual Wellness Visit in 1 year. Subjective   The following acute and/or chronic problems were also addressed today:doing fine and has no complaints    Patient's complete Health Risk Assessment and screening values have been reviewed and are found in Flowsheets. The following problems were reviewed today and where indicated follow up appointments were made and/or referrals ordered.     Positive Risk Factor Screenings with Interventions:             General Health and ACP:  General  In general, how would you say your health is?: Good  In the past 7 days, have you experienced any of the following: New or Increased Pain, New or Increased Fatigue, Loneliness, Social Isolation, Stress or Anger?: No  Do you get the social and emotional support that you need?: Yes  Do you have a Living Will?: (!) No    Advance Directives       Power of  Living Will ACP-Advance Directive ACP-Power of     Not on File Not on File Not on File Not on File        General Health Risk Interventions:  need to work on the living will    Health Habits/Nutrition:  Physical Activity: Sufficiently Active    Days of Exercise per Week: 3 days    Minutes of Exercise per Session: 50 min Vikas Perez MD   Multiple Vitamin (MULTIVITAMIN) TABS tablet Take 1 tablet by mouth daily (with breakfast) Yes C Vikas Perez MD   pravastatin (PRAVACHOL) 80 MG tablet Take 1 tablet by mouth daily Yes Kwasi Gaffney MD   Lancets MISC TIDAC & QHS Yes Sabas Zarco APRN - CNP       CareTeam (Including outside providers/suppliers regularly involved in providing care):   Patient Care Team:  Kwasi Gaffney MD as PCP - General (Family Medicine)  Kwasi Gaffney MD as PCP - [facility] Empaneled Provider     Reviewed and updated this visit:  Tobacco  Allergies  Meds  Med Hx  Surg Hx  Soc Hx  Fam Hx

## 2022-07-23 LAB — NONINV COLON CA DNA+OCC BLD SCRN STL QL: NEGATIVE

## 2022-08-04 RX ORDER — CARVEDILOL 3.12 MG/1
TABLET ORAL
Qty: 60 TABLET | Refills: 5 | Status: SHIPPED | OUTPATIENT
Start: 2022-08-04

## 2022-08-17 ENCOUNTER — HOSPITAL ENCOUNTER (OUTPATIENT)
Dept: WOMENS IMAGING | Age: 72
Discharge: HOME | End: 2022-08-17
Payer: MEDICARE

## 2022-08-17 ENCOUNTER — HOSPITAL ENCOUNTER (OUTPATIENT)
Age: 72
Discharge: HOME | End: 2022-08-17
Payer: MEDICARE

## 2022-08-17 DIAGNOSIS — D64.9 ANEMIA, UNSPECIFIED TYPE: ICD-10-CM

## 2022-08-17 DIAGNOSIS — Z12.31 ENCOUNTER FOR SCREENING MAMMOGRAM FOR BREAST CANCER: ICD-10-CM

## 2022-08-17 LAB
BASOPHILS ABSOLUTE: 0 K/CU MM
BASOPHILS RELATIVE PERCENT: 0.5 % (ref 0–1)
DIFFERENTIAL TYPE: ABNORMAL
EOSINOPHILS ABSOLUTE: 0.4 K/CU MM
EOSINOPHILS RELATIVE PERCENT: 4.8 % (ref 0–3)
FOLATE: >20 NG/ML (ref 3.1–17.5)
HCT VFR BLD CALC: 43.5 % (ref 37–47)
HEMOGLOBIN: 13.9 GM/DL (ref 12.5–16)
IMMATURE NEUTROPHIL %: 0.1 % (ref 0–0.43)
LYMPHOCYTES ABSOLUTE: 2.3 K/CU MM
LYMPHOCYTES RELATIVE PERCENT: 28.9 % (ref 24–44)
MCH RBC QN AUTO: 27.1 PG (ref 27–31)
MCHC RBC AUTO-ENTMCNC: 32 % (ref 32–36)
MCV RBC AUTO: 84.8 FL (ref 78–100)
MONOCYTES ABSOLUTE: 0.5 K/CU MM
MONOCYTES RELATIVE PERCENT: 5.7 % (ref 0–4)
NUCLEATED RBC %: 0 %
PDW BLD-RTO: 15.8 % (ref 11.7–14.9)
PLATELET # BLD: 186 K/CU MM (ref 140–440)
PMV BLD AUTO: 12.7 FL (ref 7.5–11.1)
RBC # BLD: 5.13 M/CU MM (ref 4.2–5.4)
SEGMENTED NEUTROPHILS ABSOLUTE COUNT: 4.8 K/CU MM
SEGMENTED NEUTROPHILS RELATIVE PERCENT: 60 % (ref 36–66)
TOTAL IMMATURE NEUTOROPHIL: 0.01 K/CU MM
TOTAL NUCLEATED RBC: 0 K/CU MM
VITAMIN B-12: 584.8 PG/ML (ref 211–911)
WBC # BLD: 7.9 K/CU MM (ref 4–10.5)

## 2022-08-17 PROCEDURE — 85025 COMPLETE CBC W/AUTO DIFF WBC: CPT

## 2022-08-17 PROCEDURE — 82746 ASSAY OF FOLIC ACID SERUM: CPT

## 2022-08-17 PROCEDURE — 36415 COLL VENOUS BLD VENIPUNCTURE: CPT

## 2022-08-17 PROCEDURE — 82607 VITAMIN B-12: CPT

## 2022-08-17 PROCEDURE — 77063 BREAST TOMOSYNTHESIS BI: CPT

## 2022-09-12 ENCOUNTER — TELEPHONE (OUTPATIENT)
Dept: FAMILY MEDICINE CLINIC | Age: 72
End: 2022-09-12

## 2022-09-12 NOTE — TELEPHONE ENCOUNTER
[facility] Transitions Initial Follow Up Call    Outreach made within 2 business days of discharge: Yes    Patient: Cosmo Ruiz Patient : 1950   MRN: [id number]  Reason for Admission: There are no discharge diagnoses documented for the most recent discharge. Discharge Date: 10/1/21       Spoke with: patient     Discharge department/facility: [facility] Interactive Patient Contact:  Was patient able to fill all prescriptions: Yes  Was patient instructed to bring all medications to the follow-up visit: Yes  Is patient taking all medications as directed in the discharge summary?  Yes and No  Does patient understand their discharge instructions: Yes  Does patient have questions or concerns that need addressed prior to 7-14 day follow up office visit: no    Scheduled appointment with PCP within 7-14 days    Follow Up  Future Appointments   Date Time Provider Jacque Adhikari   9/15/2022 11:00 AM CLIVE Francis - NP [facility]   2023  1:00 PM Heidi Carey MD [facility]   2023 10:30 AM Heidi Carey MD [facility]       Connie Ceballos MA

## 2022-09-15 ENCOUNTER — OFFICE VISIT (OUTPATIENT)
Dept: FAMILY MEDICINE CLINIC | Age: 72
End: 2022-09-15
Payer: MEDICARE

## 2022-09-15 ENCOUNTER — HOSPITAL ENCOUNTER (OUTPATIENT)
Dept: GENERAL RADIOLOGY | Age: 72
Discharge: HOME | End: 2022-09-15
Payer: MEDICARE

## 2022-09-15 ENCOUNTER — HOSPITAL ENCOUNTER (OUTPATIENT)
Age: 72
Discharge: HOME | End: 2022-09-15
Payer: MEDICARE

## 2022-09-15 VITALS
TEMPERATURE: 98.7 F | DIASTOLIC BLOOD PRESSURE: 80 MMHG | WEIGHT: 198.6 LBS | SYSTOLIC BLOOD PRESSURE: 140 MMHG | HEART RATE: 64 BPM | BODY MASS INDEX: 40.11 KG/M2 | OXYGEN SATURATION: 98 %

## 2022-09-15 DIAGNOSIS — R06.2 WHEEZING: ICD-10-CM

## 2022-09-15 DIAGNOSIS — B34.8 RHINOVIRUS: ICD-10-CM

## 2022-09-15 DIAGNOSIS — Z87.891 FORMER CIGARETTE SMOKER: ICD-10-CM

## 2022-09-15 DIAGNOSIS — Z09 HOSPITAL DISCHARGE FOLLOW-UP: Primary | ICD-10-CM

## 2022-09-15 PROCEDURE — 71046 X-RAY EXAM CHEST 2 VIEWS: CPT

## 2022-09-15 PROCEDURE — 99215 OFFICE O/P EST HI 40 MIN: CPT | Performed by: PHYSICIAN ASSISTANT

## 2022-09-15 RX ORDER — ALBUTEROL SULFATE 90 UG/1
AEROSOL, METERED RESPIRATORY (INHALATION)
COMMUNITY
Start: 2022-09-12

## 2022-09-15 RX ORDER — PREDNISONE 10 MG/1
TABLET ORAL
Qty: 45 TABLET | Refills: 0 | Status: SHIPPED | OUTPATIENT
Start: 2022-09-15

## 2022-09-15 RX ORDER — GUAIFENESIN DEXTROMETHORPHAN HYDROBROMIDE ORAL SOLUTION 10; 100 MG/5ML; MG/5ML
5 SOLUTION ORAL EVERY 4 HOURS PRN
COMMUNITY
Start: 2022-09-11 | End: 2022-10-11

## 2022-09-15 RX ORDER — PREDNISONE 10 MG/1
TABLET ORAL
COMMUNITY
Start: 2022-09-11 | End: 2022-09-15

## 2022-09-15 RX ORDER — AZITHROMYCIN 250 MG/1
TABLET, FILM COATED ORAL
Qty: 6 TABLET | Refills: 0 | Status: SHIPPED | OUTPATIENT
Start: 2022-09-15

## 2022-09-15 RX ORDER — PREDNISONE 10 MG/1
TABLET ORAL
COMMUNITY
Start: 2022-09-12 | End: 2022-09-15 | Stop reason: SDUPTHER

## 2022-09-15 RX ORDER — BENZONATATE 100 MG/1
100 CAPSULE ORAL 3 TIMES DAILY PRN
COMMUNITY
Start: 2022-09-11 | End: 2022-10-11

## 2022-09-15 NOTE — PROGRESS NOTES
9/15/2022    [facility]    Chief Complaint   Patient presents with    Follow-Up from Hospital     Coughing bad and difficulty breathing. Pt states she was concerned she may have been getting covid again when she went to the hospital       HPI  History was obtained from patient. Lul Yates is a 71 y.o. female who presents today for follow-up from [facility].  Admission date 9/9/2022. Discharge date 9/11/2022. Patient presented to the ED with concerns for COVID-19. She tested positive for rhinovirus with questionable COPD exacerbation. She was advised that she needed follow-up for PFTs and for official COPD diagnosis. She had an appointment with her PCP this morning, but was sent here instead due to URI symptoms. She has been compliant with her medications that were prescribed at discharge from hospital which include Robitussin-DM, Tessalon Perles, albuterol inhaler, and prednisone taper. Patient states she continues to have a dry cough, shortness of breath, wheezing. Nasal congestion has slightly improved. Today is symptom duration day 9. She denies chest pain, chest tightness or heaviness, palpitations, hemoptysis, fever or chills. She denies abdominal pain, nausea, vomiting, loss of taste or smell. She denies leg pain or swelling. She denies lightheadedness, dizziness, headaches, vision changes, confusion. Former long-term cigarette smoker. 16-pack-year history.     PAST MEDICAL HISTORY  Past Medical History:   Diagnosis Date    Aortic valve stenosis     Arthritis     Bronchitis 06/2021    Diabetes mellitus ([facility]     dx age 53's    Heart murmur     Hx of drug abuse ([facility]     \"did cocaine back in 1980's    Hypertension     Irregular heart beat     \"they just say I skip a beat , I think from the heart murmur- not sure\" follow with Dr Gaby Montalvo    LVH (left ventricular hypertrophy)     hx per old chart    Mixed hyperlipidemia 5/12/2016    Wears dentures     full upper plate    Wears glasses     to read       FAMILY HISTORY  Family History   Problem Relation Age of Onset    Diabetes Mother     Heart Disease Mother     High Blood Pressure Father     Diabetes Sister     High Blood Pressure Sister     Stroke Sister     Breast Cancer Sister 58    Cancer Brother         Lung    High Blood Pressure Brother     Stroke Brother     Diabetes Brother     Diabetes Maternal Grandmother     Heart Disease Maternal Grandfather     Cancer Brother         Lung (smoker)    Breast Cancer Maternal Aunt        SOCIAL HISTORY  Social History     Socioeconomic History    Marital status:      Spouse name: None    Number of children: None    Years of education: None    Highest education level: None   Occupational History    Occupation: employed-[employer]   Tobacco Use    Smoking status: Former     Packs/day: 0.50     Years: 32.00     Pack years: 16.00     Types: Cigarettes     Start date:      Quit date: 2021     Years since quittin.2    Smokeless tobacco: Never    Tobacco comments:     currently 0.5 PPD   Vaping Use    Vaping Use: Former    Substances: Nicotine, Flavoring   Substance and Sexual Activity    Alcohol use: No     Alcohol/week: 0.0 standard drinks     Comment: average \"2 times per month glass of wine\"    Drug use: Not Currently     Types: Marijuana (Paula Tanisha)     Comment: \"last used 50+ yrs ago \" per pt on 2021\"yrs ago did do cocaine -s\"    Sexual activity: Not Currently     Partners: Male     Social Determinants of Health     Physical Activity: Sufficiently Active    Days of Exercise per Week: 3 days    Minutes of Exercise per Session: 50 min        SURGICAL HISTORY  Past Surgical History:   Procedure Laterality Date    AORTIC VALVE REPLACEMENT N/A 2021    AORTIC VALVE REPLACEMENT AND AORTIC ROOT ENLARGEMENT performed by Vinay Johnson MD at [facility]  2019    left breast bx    CARDIAC CATHETERIZATION  2021    Archkogl 67 and ( with BPS)    x2    DILATION AND CURETTAGE OF UTERUS      HERNIA REPAIR      umbilical hernia\"think done when I was a teenager    OTHER SURGICAL HISTORY  12/10/13    Left AC lipoma excision    OTHER SURGICAL HISTORY  12/10/13    Left flank lipoma excision       CURRENT MEDICATIONS  Current Outpatient Medications   Medication Sig Dispense Refill    albuterol sulfate HFA (PROVENTIL;VENTOLIN;PROAIR) 108 (90 Base) MCG/ACT inhaler       benzonatate (TESSALON) 100 MG capsule Take 100 mg by mouth 3 times daily as needed      dextromethorphan-guaiFENesin (ROBITUSSIN-DM)  MG/5ML syrup Take 5 mLs by mouth every 4 hours as needed      azithromycin (ZITHROMAX) 250 MG tablet Use as directed 6 tablet 0    predniSONE (DELTASONE) 10 MG tablet 5 tabs for 3 days, 4 tabs for 3 days, 3 tabs for 3 days, 2 tabs for 3 days, 1 tab for 3 days 45 tablet 0    carvedilol (COREG) 3.125 MG tablet TAKE ONE (1) TABLET BY MOUTH TWO TIMES DAILY 60 tablet 5    ELIQUIS 5 MG TABS tablet TAKE ONE (1) TABLET BY MOUTH TWO TIMES DAILY 60 tablet 5    insulin detemir (LEVEMIR FLEXTOUCH) 100 UNIT/ML injection pen INJECT 35 UNITS UNDER THE SKIN EVERY NIGHT 10 pen 5    SITagliptin (JANUVIA) 100 MG tablet Take 1 tablet by mouth daily 30 tablet 5    blood glucose monitor strips TIDAC and  strip 5    Insulin Pen Needle 30G X 8 MM MISC 1 each by Does not apply route daily 100 each 5    Multiple Vitamin (MULTIVITAMIN) TABS tablet Take 1 tablet by mouth daily (with breakfast)  0    Lancets MISC TIDAC &  each 3    amiodarone (CORDARONE) 200 MG tablet Take 1 tablet by mouth daily 30 tablet 5    furosemide (LASIX) 40 MG tablet Take 1 tablet by mouth daily (Patient not taking: Reported on 9/15/2022) 60 tablet 3    aspirin 81 MG EC tablet Take 1 tablet by mouth daily 30 tablet 3    pravastatin (PRAVACHOL) 80 MG tablet Take 1 tablet by mouth daily (Patient not taking: Reported on 9/15/2022) 90 tablet 3     No current facility-administered medications for this visit. ALLERGIES  Allergies   Allergen Reactions    Codeine Rash    Hydrocodone-Acetaminophen Nausea And Vomiting       PHYSICAL EXAM    BP (!) 140/80   Pulse 64   Temp 98.7 °F (37.1 °C) (Oral)   Wt 198 lb 9.6 oz (90.1 kg)   LMP 07/09/2000   SpO2 98%   BMI 40.11 kg/m²     Constitutional:  Well developed, well nourished. Audible wheezing without use of stethoscope. No nasal flaring or retractions noted. HENT:  Normocephalic, atraumatic, bilateral external ears normal, bilateral ear canals and TMs normal, oropharynx moist, postnasal drip noted. Cobblestoning of posterior pharynx. No petechiae or exudate. Uvula midline. Airway benign. Nasal mucosa congested. Eyes:  conjunctiva normal, no discharge, no scleral icterus  Neck:  No tenderness, supple  Lymphatic:  No lymphadenopathy noted  Cardiovascular:  Normal heart rate, normal rhythm, no murmurs, gallops or rubs  Thorax & Lungs: Audible wheezing without use of stethoscope. No obvious respiratory distress. Satting well on room air at 98%. No use of supplemental oxygen. No retractions or nasal flaring. Expiratory rhonchi and wheezing scattered throughout all lung fields. Slight improvement in rhonchi with coughing fit. Rales. Abdomen:  Soft, nontender to palpation, no distention. No palpable mass or organomegaly. Skin:  Warm, dry, no erythema, no rash, normal turgor  Extremities:  No edema, no tenderness, no cyanosis, no palpable cord  Neurologic:  Alert & oriented   Psychiatric:  Affect normal, mood normal    ASSESSMENT & PLAN    Avril was seen today for follow-up from hospital.    Diagnoses and all orders for this visit:    Hospital discharge follow-up    Rhinovirus    Wheezing- suspected COPD exacerbation  -     XR CHEST (2 VW);  Future  -     azithromycin (ZITHROMAX) 250 MG tablet; Use as directed  -     predniSONE (DELTASONE) 10 MG tablet; 5 tabs for 3 days, 4 tabs for 3 days, 3 tabs for 3 days, 2 tabs for 3 days, 1 tab for 3 days  -     AFL (Epic) - Benjamín Farooq MD, Pulmonlogy, [place]    Former cigarette smoker     Obtain Chest Xray today  Rest  Fluids  Big deep breaths periodically throughout the day  Stop Prednisone taper from hospital- start the one I sent over today  Start antibiotic I prescribed today  OK to continue Mucinex and Tessalon Perles from hospital  Obtain a finger pulse ox. Measure oxygen periodically at home. Should stay above 92%- if drops below 92%, go to the ER  If shortness of breath or wheezing worsens or you spike a fever or just get worse in any way- go to the ER  F/U with Pulmonology    Medications Discontinued During This Encounter   Medication Reason    predniSONE (DELTASONE) 10 MG tablet DUPLICATE    predniSONE (DELTASONE) 10 MG tablet LIST CLEANUP        No follow-ups on file. Plan of care reviewed with patient who verbalizes understanding and wishes to continue. Patient verbalizes understanding with the above plan and is in agreement. Patient will call with  questions or concerns. I did don appropriate PPE (including N95 face mask, protective safety glasses, face shield, gloves, and gown) as recommended by the health facility/national standard best practice, during my interaction with the patient. Please note that this chart was generated using dragon dictation software. Although every effort was made to ensure the accuracy of this automated transcription, some errors in transcription may have occurred.     Electronically signed by Sanya Clifton PA-C on 9/15/2022

## 2022-09-15 NOTE — PATIENT INSTRUCTIONS
Obtain Chest Xray today  Rest  Fluids  Big deep breaths periodically throughout the day  Stop Prednisone taper from hospital- start the one I sent over today  Start antibiotic I prescribed today  OK to continue Mucinex and Tessalon Perles from hospital  Obtain a finger pulse ox. Measure oxygen periodically at home.   Should stay above 92%- if drops below 92%, go to the ER  If shortness of breath or wheezing worsens or you spike a fever or just get worse in any way- go to the ER  F/U with Pulmonology

## 2022-09-21 ENCOUNTER — TELEPHONE (OUTPATIENT)
Dept: FAMILY MEDICINE CLINIC | Age: 72
End: 2022-09-21

## 2022-09-21 NOTE — TELEPHONE ENCOUNTER
Spoke to patient regarding XR results. Verbal understanding. No questions or concerns. Patient reports wheezing & SOB has improved.

## 2022-10-04 ENCOUNTER — HOSPITAL ENCOUNTER (OUTPATIENT)
Dept: NON INVASIVE DIAGNOSTICS | Age: 72
Discharge: HOME | End: 2022-10-04
Payer: MEDICARE

## 2022-10-04 ENCOUNTER — CARE COORDINATION (OUTPATIENT)
Dept: CARE COORDINATION | Age: 72
End: 2022-10-04

## 2022-10-04 VITALS
SYSTOLIC BLOOD PRESSURE: 130 MMHG | OXYGEN SATURATION: 98 % | HEART RATE: 67 BPM | DIASTOLIC BLOOD PRESSURE: 75 MMHG | RESPIRATION RATE: 19 BRPM

## 2022-10-04 LAB
LV EF: 60 %
LVEF MODALITY: NORMAL

## 2022-10-04 PROCEDURE — 7100000000 HC PACU RECOVERY - FIRST 15 MIN

## 2022-10-04 PROCEDURE — 7100000001 HC PACU RECOVERY - ADDTL 15 MIN

## 2022-10-04 PROCEDURE — 93312 ECHO TRANSESOPHAGEAL: CPT

## 2022-10-04 NOTE — PROCEDURES
[address], [address] [place] [address]                                 ECHOCARDIOGRAM    PATIENT NAME: Larisa Alford                    :        1950  MED REC NO:   [id number]                          ROOM:  ACCOUNT NO:   [de-identified]                           ADMIT DATE: 10/04/2022  PROVIDER:     Matheus Lewis MD    INDICATION:  Evaluation of aortic valve. This is a 71-year-old female patient, status post aortic valve  replacement. Had an increased gradient present; therefore, she is here  for JOSE for further evaluation. The patient was brought to the noninvasive lab. The patient received 4  mg of Versed and 25 mg of fentanyl. The posterior oropharynx was  anesthetized using lidocaine viscous gel. A mouthguard was placed. A  JOSE probe was advanced to the posterior oropharynx, mid esophagus and  images were obtained. There is moderate concentric left ventricular hypertrophy noted. The  septum was also thickened. Aortic valve appears to be opening. There  is a trace aortic regurgitation noted and the leaflets were opening and  closing well also. LV function is preserved, greater, again at 50%-55%  present. Possible hyperdynamic LV function present. Mitral valve is  normal.  No ASD or PFO is noted. The left atrium is moderately  enlarged. No clot or thrombus noted in left atrium and left atrial  appendage and the left appendage is still present. It is not ligated. Trace pericardial effusion noted. IMPRESSION:  1. Left atrium is moderately enlarged. 2.  No clot or thrombus noted in the left atrium, left appendage, or LV  cavity. 3.  Left atrial appendage is not ligated. 4.  Mitral valve is normal.  Mild mitral regurgitation is noted. 5.  Concentric left ventricular hypertrophy present and LV function is  present. 6.  Trace pericardial effusion noted. 7.  No ASD or PFO was noted.   8.  Tricuspid valve and pulmonic valve normal.  9.  Aortic valve has trileaflet and it is opening and closing well. IMPRESSION:  I believe that gradients are due to LVH and hyperdynamic  LV. Prosthetic and septal thickening present; otherwise, aortic valve  appears normal.    The patient tolerated the procedure well. There were no complications  noted.         Antonio Meek MD    D: 10/04/2022 8:44:36       T: 10/04/2022 9:38:16     [email or web address]  Job#: [id number]     Doc#: [id number]    CC:

## 2022-10-04 NOTE — H&P
[address], [address] [place] [address]                              HISTORY AND PHYSICAL    PATIENT NAME: Meng Glass                    :        1950  MED REC NO:   [id number]                          ROOM:  ACCOUNT NO:   [de-identified]                           ADMIT DATE: 10/04/2022  PROVIDER:     Allyssa Montenegro MD    INDICATION:  Aortic valve evaluation. HISTORY OF PRESENT ILLNESS:  This is a 73-year-old female patient with a  history of having aortic valve replacement. She had undergone a heart  catheterization back in 2021. At that time found to have left  main was patent and LAD, circ and RCA had mild disease noted and severe  aortic stenosis noted. The patient was referred for aortic valve  replacement. She underwent an AVR and it was done. The patient was at  [facility] recently, having dyspnea. She had an echo done over there  and the echo showed the patient had severe aortic stenosis noted. Her  echo there showed that valve area was 0.9 cm2 and mean gradient was 32  mmHg. Her last echo last year shows that gradient was 60% present. Therefore, a significant change was noted; therefore, she is here for  JOSE to further evaluate the aortic valve. The patient had a 2:1 block noted. She was being evaluated for sleep  apnea also. The last echo was in 2022 at [facility].   Moderate-to-severe aortic stenosis noted. LV function is preserved. Holter in 2022 found to have 2:1 block noted and Mobitz type I  block was also present and last catheterization was done in 2021. No obstructive coronary artery disease noted. PAST MEDICAL HISTORY:  History of having atrial fibrillation but  currently in sinus rhythm. Hypertension, hyperlipidemia, and aortic  valve replacement with root enlargement done. The tissue aortic valve  was placed at that time.   History of diabetes and arthritis present. PAST SURGICAL HISTORY:  Breast biopsy, , hernia repair, aortic  valve placement, and aortic root dilatation. SOCIAL HISTORY:  She does not smoke. She does not drink. ALLERGIES:  CODEINE and HYDROCODONE. PHYSICAL EXAMINATION:  GENERAL:  The patient is awake, alert, and is answering questions, not  in acute distress. VITAL SIGNS:  Temperature is afebrile. Pulse is 75. Blood pressure  135/80. HEENT:  Head is atraumatic. Pupils are equal and reactive. CHEST:  Equal expansion. LUNGS:  Clear to auscultation. No wheezing or rhonchi. HEART:  Regular rhythm and sinus rhythm. ABDOMEN:  Soft and nontender. Bowel sounds are present. No  hepatosplenomegaly or guarding appreciated. EXTREMITIES:  No cyanosis or clubbing noted. NEUROLOGIC:  Cranial nerves II through XII are grossly intact. IMPRESSION AND PLAN:  This is a 71-year-old female patient with a  history of aortic valve replacement of the tissue valve and aortic root  enlargement. She had a recent echo done and found to have there is  increased gradient across the aortic valve and she has been  anticoagulation. She does have a history of having paroxysmal atrial  fibrillation. Currently, she is sinus rhythm. The plan is to for:  1.  JOSE to further evaluate. 2.  The patient has some bradycardia but is not symptomatic. So, at  this time, we will just observe. 3.  ASA is 3 and Mallampati is 3.         Chato Sanchez MD    D: 10/04/2022 8:28:09       T: 10/04/2022 9:03:07     [email or web address]  Job#: [id number]     Doc#: [id number]    CC:

## 2022-10-05 ENCOUNTER — CARE COORDINATION (OUTPATIENT)
Dept: CARE COORDINATION | Age: 72
End: 2022-10-05

## 2022-10-05 DIAGNOSIS — E11.9 TYPE 2 DIABETES MELLITUS WITHOUT COMPLICATION, WITH LONG-TERM CURRENT USE OF INSULIN (HCC): ICD-10-CM

## 2022-10-05 DIAGNOSIS — Z79.4 TYPE 2 DIABETES MELLITUS WITHOUT COMPLICATION, WITH LONG-TERM CURRENT USE OF INSULIN (HCC): ICD-10-CM

## 2022-10-05 SDOH — HEALTH STABILITY: MENTAL HEALTH: HOW OFTEN DO YOU HAVE A DRINK CONTAINING ALCOHOL?: PATIENT DECLINED

## 2022-10-05 SDOH — HEALTH STABILITY: MENTAL HEALTH: HOW MANY STANDARD DRINKS CONTAINING ALCOHOL DO YOU HAVE ON A TYPICAL DAY?: PATIENT DECLINED

## 2022-10-05 SDOH — SOCIAL STABILITY: SOCIAL NETWORK
IN A TYPICAL WEEK, HOW MANY TIMES DO YOU TALK ON THE PHONE WITH FAMILY, FRIENDS, OR NEIGHBORS?: MORE THAN THREE TIMES A WEEK

## 2022-10-05 SDOH — HEALTH STABILITY: PHYSICAL HEALTH: ON AVERAGE, HOW MANY MINUTES DO YOU ENGAGE IN EXERCISE AT THIS LEVEL?: 10 MIN

## 2022-10-05 SDOH — ECONOMIC STABILITY: INCOME INSECURITY: IN THE LAST 12 MONTHS, WAS THERE A TIME WHEN YOU WERE NOT ABLE TO PAY THE MORTGAGE OR RENT ON TIME?: NO

## 2022-10-05 SDOH — SOCIAL STABILITY: SOCIAL NETWORK: ARE YOU MARRIED, WIDOWED, DIVORCED, SEPARATED, NEVER MARRIED, OR LIVING WITH A PARTNER?: DIVORCED

## 2022-10-05 SDOH — ECONOMIC STABILITY: TRANSPORTATION INSECURITY
IN THE PAST 12 MONTHS, HAS THE LACK OF TRANSPORTATION KEPT YOU FROM MEDICAL APPOINTMENTS OR FROM GETTING MEDICATIONS?: NO

## 2022-10-05 SDOH — ECONOMIC STABILITY: FOOD INSECURITY: WITHIN THE PAST 12 MONTHS, THE FOOD YOU BOUGHT JUST DIDN'T LAST AND YOU DIDN'T HAVE MONEY TO GET MORE.: NEVER TRUE

## 2022-10-05 SDOH — ECONOMIC STABILITY: TRANSPORTATION INSECURITY
IN THE PAST 12 MONTHS, HAS LACK OF TRANSPORTATION KEPT YOU FROM MEETINGS, WORK, OR FROM GETTING THINGS NEEDED FOR DAILY LIVING?: NO

## 2022-10-05 SDOH — SOCIAL STABILITY: SOCIAL NETWORK: HOW OFTEN DO YOU ATTENT MEETINGS OF THE CLUB OR ORGANIZATION YOU BELONG TO?: NEVER

## 2022-10-05 SDOH — ECONOMIC STABILITY: HOUSING INSECURITY: IN THE LAST 12 MONTHS, HOW MANY PLACES HAVE YOU LIVED?: 1

## 2022-10-05 SDOH — SOCIAL STABILITY: SOCIAL NETWORK: HOW OFTEN DO YOU GET TOGETHER WITH FRIENDS OR RELATIVES?: MORE THAN THREE TIMES A WEEK

## 2022-10-05 SDOH — ECONOMIC STABILITY: INCOME INSECURITY: HOW HARD IS IT FOR YOU TO PAY FOR THE VERY BASICS LIKE FOOD, HOUSING, MEDICAL CARE, AND HEATING?: NOT VERY HARD

## 2022-10-05 SDOH — ECONOMIC STABILITY: HOUSING INSECURITY
IN THE LAST 12 MONTHS, WAS THERE A TIME WHEN YOU DID NOT HAVE A STEADY PLACE TO SLEEP OR SLEPT IN A SHELTER (INCLUDING NOW)?: NO

## 2022-10-05 SDOH — HEALTH STABILITY: PHYSICAL HEALTH: ON AVERAGE, HOW MANY DAYS PER WEEK DO YOU ENGAGE IN MODERATE TO STRENUOUS EXERCISE (LIKE A BRISK WALK)?: 2 DAYS

## 2022-10-05 SDOH — SOCIAL STABILITY: SOCIAL NETWORK: HOW OFTEN DO YOU ATTEND CHURCH OR RELIGIOUS SERVICES?: NEVER

## 2022-10-05 SDOH — HEALTH STABILITY: MENTAL HEALTH
STRESS IS WHEN SOMEONE FEELS TENSE, NERVOUS, ANXIOUS, OR CAN'T SLEEP AT NIGHT BECAUSE THEIR MIND IS TROUBLED. HOW STRESSED ARE YOU?: ONLY A LITTLE

## 2022-10-05 SDOH — ECONOMIC STABILITY: FOOD INSECURITY: WITHIN THE PAST 12 MONTHS, YOU WORRIED THAT YOUR FOOD WOULD RUN OUT BEFORE YOU GOT MONEY TO BUY MORE.: NEVER TRUE

## 2022-10-05 SDOH — SOCIAL STABILITY: SOCIAL NETWORK
DO YOU BELONG TO ANY CLUBS OR ORGANIZATIONS SUCH AS CHURCH GROUPS UNIONS, FRATERNAL OR ATHLETIC GROUPS, OR SCHOOL GROUPS?: NO

## 2022-10-05 NOTE — CARE COORDINATION
Ambulatory Care Coordination Note  10/5/2022    ACC: Madhav Mcdonough, RN      Spoke with patient for ACM follow up; [facility] eligible for enrollment. Oriented to the role of ACM. Patient consents to ongoing follow up. Medications:  Medication reconciliation completed. Patient reports that she manages her own medications and is taking them as directed. Denies any questions or issue with the cost of her prescriptions. Patient reports that she is in need of refills for Insulin pen needles. ACM to follow up. DM:  Patient reports that she was recently taking a steroid and her BS was running high around 180. Denies s/s hypo/ hyperglycemia. Last A1C 7.8  2/23/22. Instructed on routine BS monitoring, log for Provider review. Instructed on low concentrated sugar, low carb , portion control diet. Copy of zone tool sent via My Chart. Patient reports that she is not always compliant with Diabetic/ cardiac diet. Referral to be made for RD support. CHF:  Breathing is at baseline. Patient denies swelling in her feet or ankles, increased cough or fatigue. Reviewed HF protocol:  *daily weights--same time every day wearing the same type of clothing  *keep a log of daily weights to take to MD appts  *call MD immediately if gain of 3 or more pounds in 24hrs or 5 pounds in 1 week  *low sodium diet, usually no more than 2-3gm/day unless otherwise instructed by    physician  *monitor for edema, SOB, congestion, dizziness or confusion and immediately report any change/exacerbation to MD.  Copy of zone tool sent via My Chart for review. Discussed support needs:  Instructed on services per [facility]. Patient denies the need for support at this time. No questions noted. ACM contact information provided should questions arise. Offered patient enrollment in the Remote Patient Monitoring (RPM) program for in-home monitoring: NA.    Plan for next outreach:   Address care gaps    Spoke with [address] [place] [address] PCP office; transferred to MA line with message left with request for refill for pen needles. Ambulatory Care Coordination Assessment    Care Coordination Protocol  Referral from Primary Care Provider: No  Week 1 - Initial Assessment     Do you have all of your prescriptions and are they filled?: Yes  Barriers to medication adherence: None  Are you able to afford your medications?: Yes  How often do you have trouble taking your medications the way you have been told to take them?: I always take them as prescribed. Do you have Home O2 Therapy?: No      Ability to seek help/take action for Emergent Urgent situations i.e. fire, crime, inclement weather or health crisis. : Independent  Ability to ambulate to restroom: Independent  Ability handle personal hygeine needs (bathing/dressing/grooming): Independent  Ability to manage Medications: Independent  Ability to prepare Food Preparation: Independent  Ability to maintain home (clean home, laundry): Independent  Ability to drive and/or has transportation: Independent  Ability to do shopping: Independent  Ability to manage finances:  Independent  Is patient able to live independently?: Yes     Current Housing: Private Residence  For whom are you the caregiver?: father; does not live with patient  Does the person that you care for see a [facility] PCP?: No        Per the Fall Risk Screening, did the patient have 2 or more falls or 1 fall with injury in the past year?: No     Frequent urination at night?: No  Do you use rails/bars?: No  Do you have a non-slip tub mat?: Yes     Are you experiencing loss of meaning?: No  Are you experiencing loss of hope and peace?: No     Thinking about your patient's physical health needs, are there any symptoms or problems (risk indicators) you are unsure about that require further investigation?: No identified areas of uncertainly or problems already being investigated   Are the patients physical health problems impacting on their mental well-being?: No identified areas of concern   Are there any problems with your patients lifestyle behaviors (alcohol, drugs, diet, exercise) that are impacting on physical or mental well-being?: No identified areas of concern   Do you have any other concerns about your patients mental well-being? How would you rate their severity and impact on the patient?: No identified areas of concern   How would you rate their home environment in terms of safety and stability (including domestic violence, insecure housing, neighbor harassment)?: Consistently safe, supportive, stable, no identified problems   How do daily activities impact on the patient's well-being? (include current or anticipated unemployment, work, caregiving, access to transportation or other): No identified problems or perceived positive benefits   How would you rate their social network (family, work, friends)?: Good participation with social networks   How would you rate their financial resources (including ability to afford all required medical care)?: Financially secure, resources adequate, no identified problems   How wells does the patient now understand their health and well-being (symptoms, signs or risk factors) and what they need to do to manage their health?: Reasonable to good understanding and already engages in managing health or is willing to undertake better management   How well do you think your patient can engage in healthcare discussions? (Barriers include language, deafness, aphasia, alcohol or drug problems, learning difficulties, concentration): Clear and open communication, no identified barriers   Do other services need to be involved to help this patient?: Other care/services not required at this time   Are current services involved with this patient well-coordinated? (Include coordination with other services you are now recommendation):  All required care/services in place and well-coordinated   Suggested Interventions and Community Resources  Fall Risk Prevention: In Process Medication Assistance Program: Declined   Registered Dietician: In Process   Senior Services: [facility]   Social Work: Declined   Zone Management Tools: In Process         Set up/Review an Education Plan, Set up/Review Goals              Prior to Admission medications    Medication Sig Start Date End Date Taking?  Authorizing Provider   carvedilol (COREG) 3.125 MG tablet TAKE ONE (1) TABLET BY MOUTH TWO TIMES DAILY 8/4/22  Yes Ashly Haddad MD   ELIQUIS 5 MG TABS tablet TAKE ONE (1) TABLET BY MOUTH TWO TIMES DAILY 6/28/22  Yes Ashly Haddad MD   insulin detemir (LEVEMIR FLEXTOUCH) 100 UNIT/ML injection pen INJECT 35 UNITS UNDER THE SKIN EVERY NIGHT 4/4/22  Yes Ashly Haddad MD   SITagliptin (JANUVIA) 100 MG tablet Take 1 tablet by mouth daily 4/4/22  Yes Ashly Haddad MD   blood glucose monitor strips TIDA and QHS 12/1/21  Yes Ashly Haddad MD   amiodarone (CORDARONE) 200 MG tablet Take 1 tablet by mouth daily 10/27/21  Yes Ashly Haddad MD   Insulin Pen Needle 30G X 8 MM MISC 1 each by Does not apply route daily 10/11/21  Yes Ashly Haddad MD   aspirin 81 MG EC tablet Take 1 tablet by mouth daily 10/2/21  Yes Cathi Koyanagi, MD   Multiple Vitamin (MULTIVITAMIN) TABS tablet Take 1 tablet by mouth daily (with breakfast) 10/2/21  Yes GREGORIO Peralta MD   pravastatin (PRAVACHOL) 80 MG tablet Take 1 tablet by mouth daily 3/22/21  Yes Ashly Haddad MD   Lancets [facility] & QHS 1/22/19  Yes Reginald Chino, APRN - CNP   albuterol sulfate HFA (PROVENTIL;VENTOLIN;PROAIR) 108 (90 Base) MCG/ACT inhaler  9/12/22   Historical Provider, MD   benzonatate (TESSALON) 100 MG capsule Take 100 mg by mouth 3 times daily as needed  Patient not taking: No sig reported 9/11/22 10/11/22  Historical Provider, MD   dextromethorphan-guaiFENesin (ROBITUSSIN-DM)  MG/5ML syrup Take 5 mLs by mouth every 4 hours as needed  Patient not taking: No sig reported 9/11/22 10/11/22  Historical Provider, MD   azithromycin (ZITHROMAX) 250 MG tablet Use as directed  Patient not taking: No sig reported 9/15/22   Temo Mcrae PA-C   predniSONE (DELTASONE) 10 MG tablet 5 tabs for 3 days, 4 tabs for 3 days, 3 tabs for 3 days, 2 tabs for 3 days, 1 tab for 3 days  Patient not taking: No sig reported 9/15/22   Temo Mcrae PA-C   furosemide (LASIX) 40 MG tablet Take 1 tablet by mouth daily  Patient not taking: Reported on 10/5/2022 10/14/21   Chris Dotson MD       Future Appointments   Date Time Provider Jacque Adhikari   1/16/2023  1:00 PM MD Joelle Sanchez   7/17/2023 10:30 AM MD Joelle Sanchez     ,   Diabetes Assessment    Medic Alert ID: No  Meal Planning: Calorie counting, Avoidance of concentrated sweets   How often do you test your blood sugar?: Daily   Do you have barriers with adherence to non-pharmacologic self-management interventions?  (Nutrition/Exercise/Self-Monitoring): Yes   Have you ever had to go to the ED for symptoms of low blood sugar?: No       No patient-reported symptoms        , and   General Assessment    Do you have any symptoms that are causing concern?: No

## 2022-10-06 ENCOUNTER — CARE COORDINATION (OUTPATIENT)
Dept: CARE COORDINATION | Age: 72
End: 2022-10-06

## 2022-10-06 RX ORDER — INSULIN DETEMIR 100 [IU]/ML
INJECTION, SOLUTION SUBCUTANEOUS
Qty: 15 ML | Refills: 5 | Status: SHIPPED | OUTPATIENT
Start: 2022-10-06

## 2022-10-06 NOTE — CARE COORDINATION
Contacted Avril King and left voicemail regarding Dietitian referral. Left call back number and will follow up as appropriate.          [address] [facility], [address] [place] [address]

## 2022-10-12 ENCOUNTER — CARE COORDINATION (OUTPATIENT)
Dept: CARE COORDINATION | Age: 72
End: 2022-10-12

## 2022-10-12 DIAGNOSIS — E11.9 TYPE 2 DIABETES MELLITUS WITHOUT COMPLICATION, UNSPECIFIED WHETHER LONG TERM INSULIN USE (HCC): ICD-10-CM

## 2022-10-12 NOTE — CARE COORDINATION
Ambulatory Care Coordination Note  10/12/2022    ACC: Madhav Mcdonough, RN      Spoke with patient for ACM follow up. Patient reports that she is doing well. Instructed on routine BS monitoring; log for Provider review. Reviewed low concentrated sugar/ low carb diet. Confirmed RD attempt at contact. Provided patient with  number for return call. Patient reports that she has not received needs for Levemir Pen. ACM spoke with [facility]. Pharm. During contact and confirm no orders for refill received. ACM to follow up with PCP office. Discussed care gaps. Patient denies any questions. ACM contact information provided should questions arise. Offered patient enrollment in the Remote Patient Monitoring (RPM) program for in-home monitoring: NA.    Plan for next outreach:  address support needs    Note routed to PCP office with request for refill. VM left on MA line to ensure awareness. Lab Results       None            Care Coordination Interventions    Referral from Primary Care Provider: No  Suggested Interventions and Community Resources  Fall Risk Prevention: In Process  Medication Assistance Program: Declined  Registered Dietician: In Process  Senior Services: [facility]  Social Work: Declined  Zone Management Tools: In Process          Goals Addressed                   This Visit's Progress     Conditions and Symptoms   No change     I will schedule office visits, as directed by my provider. I will keep my appointment or reschedule if I have to cancel. I will notify my provider of any barriers to my plan of care. I will follow my Zone Management tool to seek urgent or emergent care. I will notify my provider of any symptoms that indicate a worsening of my condition. Barriers: overwhelmed by complexity of regimen  Plan for overcoming my barriers: Patient will utilize zone management tools to support symptom management.    Confidence: 8/10  Anticipated Goal Completion Date: 1/5/22 Prior to Admission medications    Medication Sig Start Date End Date Taking?  Authorizing Provider   insulin detemir (LEVEMIR FLEXTOUCH) 100 UNIT/ML injection pen INJECT 35 UNITS UNDER THE SKIN EVERY NIGHT 10/6/22   Ryland Mccarty MD   albuterol sulfate HFA (PROVENTIL;VENTOLIN;PROAIR) 108 (90 Base) MCG/ACT inhaler  9/12/22   Historical Provider, MD   azithromycin (ZITHROMAX) 250 MG tablet Use as directed  Patient not taking: No sig reported 9/15/22   Nia Cruz PA-C   predniSONE (DELTASONE) 10 MG tablet 5 tabs for 3 days, 4 tabs for 3 days, 3 tabs for 3 days, 2 tabs for 3 days, 1 tab for 3 days  Patient not taking: No sig reported 9/15/22   Nia Cruz PA-C   carvedilol (COREG) 3.125 MG tablet TAKE ONE (1) TABLET BY MOUTH TWO TIMES DAILY 8/4/22   Ryland Mccarty MD   ELIQUIS 5 MG TABS tablet TAKE ONE (1) TABLET BY MOUTH TWO TIMES DAILY 6/28/22   Ryland Mccarty MD   SITagliptin (JANUVIA) 100 MG tablet Take 1 tablet by mouth daily 4/4/22   Ryland Mccarty MD   blood glucose monitor strips TIDAC and QHS 12/1/21   Ryland Mccarty MD   amiodarone (CORDARONE) 200 MG tablet Take 1 tablet by mouth daily 10/27/21   Ryland Mccarty MD   furosemide (LASIX) 40 MG tablet Take 1 tablet by mouth daily  Patient not taking: Reported on 10/5/2022 10/14/21   Sara Arceo MD   Insulin Pen Needle 30G X 8 MM MISC 1 each by Does not apply route daily 10/11/21   Ryland Mccarty MD   aspirin 81 MG EC tablet Take 1 tablet by mouth daily 10/2/21   C Archana Santiago MD   Multiple Vitamin (MULTIVITAMIN) TABS tablet Take 1 tablet by mouth daily (with breakfast) 10/2/21   GREGORIO Santiago MD   pravastatin (PRAVACHOL) 80 MG tablet Take 1 tablet by mouth daily 3/22/21   Ryland Mccarty MD   [facility]   Anita Claros, APRN - CNP       Future Appointments   Date Time Provider Jacque Adhikari   1/16/2023  1:00 PM MD Joelle Barillas [facility]   7/17/2023 10:30 AM MD Joelle Barillas [facility]   ,   Diabetes Assessment    Medic Alert ID: No  Meal Planning: Calorie counting, Avoidance of concentrated sweets   How often do you test your blood sugar?: Daily   Do you have barriers with adherence to non-pharmacologic self-management interventions?  (Nutrition/Exercise/Self-Monitoring): Yes   Have you ever had to go to the ED for symptoms of low blood sugar?: No            , and   General Assessment    Do you have any symptoms that are causing concern?: No

## 2022-10-12 NOTE — TELEPHONE ENCOUNTER
----- Message from Angel Oquendo RN sent at 10/12/2022  9:25 AM EDT -----  Hello! I hope today finds you well. Patient is requesting a refill for insulin pen needles. Still using [facility]. Thanks a million! Have a great day.

## 2022-10-13 ENCOUNTER — CARE COORDINATION (OUTPATIENT)
Dept: CARE COORDINATION | Age: 72
End: 2022-10-13

## 2022-10-13 NOTE — CARE COORDINATION
Contacted Avril King and left voicemail regarding Dietitian referral. Left call back number and will follow up as appropriate.          [address] [facility], [address] [place] [address]

## 2022-10-17 ENCOUNTER — CARE COORDINATION (OUTPATIENT)
Dept: CARE COORDINATION | Age: 72
End: 2022-10-17

## 2022-10-17 NOTE — CARE COORDINATION
Contacted Robi Stallworth regarding Dietitian referral. Pt answered, RD explained reason for call and role in care. Pt prefers handouts in the mail. RD verified address. RD received referral for Diabetes and CHF. RD will mail Meal Planner Diabetes, Checking BS, Sample Menu and Heart Failure Nutrition Therapy. RD will outreach in 3-4 weeks to follow up and answer any nutrition related questions at this time.     [address] [facility], [address] [place] [address]

## 2022-10-19 ENCOUNTER — CARE COORDINATION (OUTPATIENT)
Dept: CARE COORDINATION | Age: 72
End: 2022-10-19

## 2022-10-27 ENCOUNTER — CARE COORDINATION (OUTPATIENT)
Dept: CARE COORDINATION | Age: 72
End: 2022-10-27

## 2022-10-27 NOTE — CARE COORDINATION
Attempted to reach patient for continued Care Coordination follow up and education. Patient was unavailable at the time of my call, and a generic voicemail message was left asking patient to return my call at [phone].

## 2022-11-02 ENCOUNTER — CARE COORDINATION (OUTPATIENT)
Dept: CARE COORDINATION | Age: 72
End: 2022-11-02

## 2022-11-02 NOTE — CARE COORDINATION
Heart Failure Education outreach Date/Time: 2022 10:29 AM    Ambulatory Care Manager (ACM) contacted the patient by telephone to perform Ambulatory Care Coordination. Verified name and  with patient as identifiers. Provided introduction to self, and explanation of the Ambulatory Care Manager's role. ACM reviewed that a Health Healthy tips for the Holiday packet has been sent to [facility]. ACM reviewed CHF zones, daily weights, fluid restriction, the importance of low sodium diet, and healthy tips packet with the patient. Instructed patient to call their PCP if they have a weight gain of 3 lbs in 2 days or 5 lbs in a week. Patient reminded that there is a physician on call 24 hours a day / 7 days a week should the patient have questions or concerns. The patient verbalized understanding.

## 2022-11-07 DIAGNOSIS — E11.9 TYPE 2 DIABETES MELLITUS WITHOUT COMPLICATION, UNSPECIFIED WHETHER LONG TERM INSULIN USE (HCC): ICD-10-CM

## 2022-11-14 ENCOUNTER — CARE COORDINATION (OUTPATIENT)
Dept: CARE COORDINATION | Age: 72
End: 2022-11-14

## 2022-11-14 NOTE — CARE COORDINATION
Contacted Avril King and left voicemail regarding Dietitian follow up. Left call back number and will follow up as appropriate.          [address] [facility], [address] [place] [address]

## 2022-11-16 ENCOUNTER — CARE COORDINATION (OUTPATIENT)
Dept: CARE COORDINATION | Age: 72
End: 2022-11-16

## 2022-11-16 NOTE — CARE COORDINATION
Contacted Avril King and left voicemail regarding Dietitian follow up. Left call back number. RD spoke to patient on 10/17/22 and mailed educational handouts. RD outreached for follow up on 11/14/22 and today 11/16/22- left VM both outreaches. RD will notify Giorgi GREEN. RD will continue to follow/assist with patient return call.        [address] [facility], [address] [facility]

## 2022-11-25 ENCOUNTER — CARE COORDINATION (OUTPATIENT)
Dept: CARE COORDINATION | Age: 72
End: 2022-11-25

## 2022-12-01 ENCOUNTER — CARE COORDINATION (OUTPATIENT)
Dept: CARE COORDINATION | Age: 72
End: 2022-12-01

## 2022-12-01 NOTE — CARE COORDINATION
Attempted to reach patient for continued Care Coordination follow up and education. Patient was unavailable at the time of my call, and a generic voicemail message was left asking patient to return my call at [phone].

## 2022-12-08 ENCOUNTER — CARE COORDINATION (OUTPATIENT)
Dept: CARE COORDINATION | Age: 72
End: 2022-12-08

## 2022-12-29 ENCOUNTER — TELEPHONE (OUTPATIENT)
Dept: FAMILY MEDICINE CLINIC | Age: 72
End: 2022-12-29

## 2022-12-29 NOTE — TELEPHONE ENCOUNTER
Received a fax for patient provider to sign, called and spoke to patient and she stated that she is not wanting to have this signed for,.

## 2023-01-16 ENCOUNTER — OFFICE VISIT (OUTPATIENT)
Dept: FAMILY MEDICINE CLINIC | Age: 73
End: 2023-01-16
Payer: MEDICARE

## 2023-01-16 VITALS
OXYGEN SATURATION: 96 % | HEART RATE: 71 BPM | HEIGHT: 59 IN | BODY MASS INDEX: 40 KG/M2 | DIASTOLIC BLOOD PRESSURE: 74 MMHG | WEIGHT: 198.4 LBS | SYSTOLIC BLOOD PRESSURE: 128 MMHG

## 2023-01-16 DIAGNOSIS — F41.9 ANXIETY AND DEPRESSION: ICD-10-CM

## 2023-01-16 DIAGNOSIS — F32.A ANXIETY AND DEPRESSION: ICD-10-CM

## 2023-01-16 DIAGNOSIS — I10 PRIMARY HYPERTENSION: ICD-10-CM

## 2023-01-16 DIAGNOSIS — E78.2 MIXED HYPERLIPIDEMIA: ICD-10-CM

## 2023-01-16 DIAGNOSIS — Z79.4 TYPE 2 DIABETES MELLITUS WITHOUT COMPLICATION, WITH LONG-TERM CURRENT USE OF INSULIN (HCC): Primary | ICD-10-CM

## 2023-01-16 DIAGNOSIS — E11.9 TYPE 2 DIABETES MELLITUS WITHOUT COMPLICATION, WITH LONG-TERM CURRENT USE OF INSULIN (HCC): Primary | ICD-10-CM

## 2023-01-16 DIAGNOSIS — Z95.2 S/P AVR: ICD-10-CM

## 2023-01-16 LAB — HBA1C MFR BLD: 13.8 %

## 2023-01-16 PROCEDURE — 1123F ACP DISCUSS/DSCN MKR DOCD: CPT | Performed by: FAMILY MEDICINE

## 2023-01-16 PROCEDURE — 1090F PRES/ABSN URINE INCON ASSESS: CPT | Performed by: FAMILY MEDICINE

## 2023-01-16 PROCEDURE — 1036F TOBACCO NON-USER: CPT | Performed by: FAMILY MEDICINE

## 2023-01-16 PROCEDURE — G8427 DOCREV CUR MEDS BY ELIG CLIN: HCPCS | Performed by: FAMILY MEDICINE

## 2023-01-16 PROCEDURE — 3017F COLORECTAL CA SCREEN DOC REV: CPT | Performed by: FAMILY MEDICINE

## 2023-01-16 PROCEDURE — 83036 HEMOGLOBIN GLYCOSYLATED A1C: CPT | Performed by: FAMILY MEDICINE

## 2023-01-16 PROCEDURE — 99214 OFFICE O/P EST MOD 30 MIN: CPT | Performed by: FAMILY MEDICINE

## 2023-01-16 PROCEDURE — 3078F DIAST BP <80 MM HG: CPT | Performed by: FAMILY MEDICINE

## 2023-01-16 PROCEDURE — 3046F HEMOGLOBIN A1C LEVEL >9.0%: CPT | Performed by: FAMILY MEDICINE

## 2023-01-16 PROCEDURE — G8399 PT W/DXA RESULTS DOCUMENT: HCPCS | Performed by: FAMILY MEDICINE

## 2023-01-16 PROCEDURE — 2022F DILAT RTA XM EVC RTNOPTHY: CPT | Performed by: FAMILY MEDICINE

## 2023-01-16 PROCEDURE — G8417 CALC BMI ABV UP PARAM F/U: HCPCS | Performed by: FAMILY MEDICINE

## 2023-01-16 PROCEDURE — 3074F SYST BP LT 130 MM HG: CPT | Performed by: FAMILY MEDICINE

## 2023-01-16 PROCEDURE — G8484 FLU IMMUNIZE NO ADMIN: HCPCS | Performed by: FAMILY MEDICINE

## 2023-01-16 RX ORDER — DULAGLUTIDE 0.75 MG/.5ML
0.75 INJECTION, SOLUTION SUBCUTANEOUS WEEKLY
Qty: 4 ADJUSTABLE DOSE PRE-FILLED PEN SYRINGE | Refills: 1 | Status: SHIPPED | OUTPATIENT
Start: 2023-01-16

## 2023-01-16 ASSESSMENT — PATIENT HEALTH QUESTIONNAIRE - PHQ9
10. IF YOU CHECKED OFF ANY PROBLEMS, HOW DIFFICULT HAVE THESE PROBLEMS MADE IT FOR YOU TO DO YOUR WORK, TAKE CARE OF THINGS AT HOME, OR GET ALONG WITH OTHER PEOPLE: 0
4. FEELING TIRED OR HAVING LITTLE ENERGY: 1
5. POOR APPETITE OR OVEREATING: 0
SUM OF ALL RESPONSES TO PHQ9 QUESTIONS 1 & 2: 0
9. THOUGHTS THAT YOU WOULD BE BETTER OFF DEAD, OR OF HURTING YOURSELF: 0
7. TROUBLE CONCENTRATING ON THINGS, SUCH AS READING THE NEWSPAPER OR WATCHING TELEVISION: 0
3. TROUBLE FALLING OR STAYING ASLEEP: 0
SUM OF ALL RESPONSES TO PHQ QUESTIONS 1-9: 1
2. FEELING DOWN, DEPRESSED OR HOPELESS: 0
SUM OF ALL RESPONSES TO PHQ QUESTIONS 1-9: 1
8. MOVING OR SPEAKING SO SLOWLY THAT OTHER PEOPLE COULD HAVE NOTICED. OR THE OPPOSITE, BEING SO FIGETY OR RESTLESS THAT YOU HAVE BEEN MOVING AROUND A LOT MORE THAN USUAL: 0
SUM OF ALL RESPONSES TO PHQ QUESTIONS 1-9: 1
1. LITTLE INTEREST OR PLEASURE IN DOING THINGS: 0
6. FEELING BAD ABOUT YOURSELF - OR THAT YOU ARE A FAILURE OR HAVE LET YOURSELF OR YOUR FAMILY DOWN: 0
SUM OF ALL RESPONSES TO PHQ QUESTIONS 1-9: 1

## 2023-01-16 NOTE — PROGRESS NOTES
Low Pate  24    Chief Complaint   Patient presents with    Follow-up    Diabetes    Hypertension    Hyperlipidemia    Anxiety    Depression           Patient here for 6 months f/u regarding DM, HTN, HLD, depression, anxiety. The patient is taking hypertensive medications compliantly without side effects. Denies chest pain, dyspnea, edema, or TIA's. Her blood sugar she is taking Levemir 35 units and Januvia 100 mg daily. Her anxiety and depression under control.         Past Medical History:   Diagnosis Date    Aortic valve stenosis     Arthritis     Bronchitis 2021    Diabetes mellitus ([address]     dx age 53's    Heart murmur     Hx of drug abuse ([facility] [address]     \"did cocaine back in     Hypertension     Irregular heart beat     \"they just say I skip a beat , I think from the heart murmur- not sure\" follow with Dr Sam Lovelace    LVH (left ventricular hypertrophy)     hx per old chart    Mixed hyperlipidemia 2016    Wears dentures     full upper plate    Wears glasses     to read     Past Surgical History:   Procedure Laterality Date    AORTIC VALVE REPLACEMENT N/A 2021    AORTIC VALVE REPLACEMENT AND AORTIC ROOT ENLARGEMENT performed by Jt Brooks MD at [address]      left breast bx    CARDIAC CATHETERIZATION  2021     SECTION   and ( with BPS)    x2    DILATION AND CURETTAGE OF UTERUS      HERNIA REPAIR      umbilical hernia\"think done when I was a teenager    OTHER SURGICAL HISTORY  12/10/13    Left AC lipoma excision    OTHER SURGICAL HISTORY  12/10/13    Left flank lipoma excision     Family History   Problem Relation Age of Onset    Diabetes Mother     Heart Disease Mother     High Blood Pressure Father     Diabetes Sister     High Blood Pressure Sister     Stroke Sister     Breast Cancer Sister 58    Cancer Brother         Lung    High Blood Pressure Brother     Stroke Brother     Diabetes Brother     Diabetes Maternal Grandmother     Heart Disease Maternal Grandfather     Cancer Brother         Lung (smoker)    Breast Cancer Maternal Aunt      Social History     Socioeconomic History    Marital status:      Spouse name: Not on file    Number of children: Not on file    Years of education: Not on file    Highest education level: Not on file   Occupational History    Occupation: employed-[employer]   Tobacco Use    Smoking status: Former     Packs/day: 0.50     Years: 32.00     Pack years: 16.00     Types: Cigarettes     Start date: 13     Quit date: 2021     Years since quittin.6    Smokeless tobacco: Never    Tobacco comments:     currently 0.5 PPD   Vaping Use    Vaping Use: Former    Substances: Nicotine, Flavoring   Substance and Sexual Activity    Alcohol use: No     Alcohol/week: 0.0 standard drinks     Comment: average \"2 times per month glass of wine\"    Drug use: Not Currently     Types: Marijuana (Weed)     Comment: \"last used 50+ yrs ago \" per pt on 2021\"yrs ago did do cocaine -\"    Sexual activity: Not Currently     Partners: Male   Other Topics Concern    Not on file   Social History Narrative    Not on file     Social Determinants of Health     Financial Resource Strain: Low Risk     Difficulty of Paying Living Expenses: Not very hard   Food Insecurity: No Food Insecurity    Worried About Running Out of Food in the Last Year: Never true    [address] in the Last Year: Never true   Transportation Needs: No Transportation Needs    Lack of Transportation (Medical): No    Lack of Transportation (Non-Medical): No   Physical Activity: Insufficiently Active    Days of Exercise per Week: 2 days    Minutes of Exercise per Session: 10 min   Stress: No Stress Concern Present    Feeling of Stress :  Only a little   Social Connections: Socially Isolated    Frequency of Communication with Friends and Family: More than three times a week    Frequency of Social Gatherings with Friends and Family: More than three times a week    Attends Caodaism Services: Never    Active Member of Clubs or Organizations: No    Attends Club or Organization Meetings: Never    Marital Status:    Intimate Partner Violence: Not on file   Housing Stability: Low Risk     Unable to Pay for Housing in the Last Year: No    Number of [place] in the Last Year: 1    Unstable Housing in the Last Year: No       Allergies   Allergen Reactions    Codeine Rash    Hydrocodone-Acetaminophen Nausea And Vomiting     Current Outpatient Medications   Medication Sig Dispense Refill    Dulaglutide (TRULICITY) 2.51 BZ/1.7DU SOPN Inject 0.75 mg into the skin once a week 4 Adjustable Dose Pre-filled Pen Syringe 1    blood glucose monitor kit and supplies Dispense sufficient amount for indicated testing frequency plus additional to accommodate PRN testing needs. Dispense all needed supplies to include: monitor, strips, lancing device, lancets, control solutions, alcohol swabs. 1 kit 0    Insulin Pen Needle 30G X 8 MM MISC 1 each by Does not apply route daily 100 each 5    insulin detemir (LEVEMIR FLEXTOUCH) 100 UNIT/ML injection pen INJECT 35 UNITS UNDER THE SKIN EVERY NIGHT 15 mL 5    carvedilol (COREG) 3.125 MG tablet TAKE ONE (1) TABLET BY MOUTH TWO TIMES DAILY 60 tablet 5    ELIQUIS 5 MG TABS tablet TAKE ONE (1) TABLET BY MOUTH TWO TIMES DAILY 60 tablet 5    blood glucose monitor strips TIDAC and  strip 5    aspirin 81 MG EC tablet Take 1 tablet by mouth daily 30 tablet 3    Multiple Vitamin (MULTIVITAMIN) TABS tablet Take 1 tablet by mouth daily (with breakfast)  0    Lancets MISC TIDAC &  each 3    SITagliptin (JANUVIA) 100 MG tablet Take 1 tablet by mouth daily 30 tablet 5    pravastatin (PRAVACHOL) 80 MG tablet Take 1 tablet by mouth daily (Patient not taking: Reported on 1/16/2023) 90 tablet 3     No current facility-administered medications for this visit.        Review of Systems   Constitutional:  Negative for activity change, appetite change, chills, fatigue and fever. HENT:  Negative for congestion. Respiratory:  Negative for cough, shortness of breath and wheezing. Cardiovascular:  Negative for chest pain and leg swelling. Gastrointestinal:  Negative for abdominal pain. Genitourinary:  Negative for dysuria. Musculoskeletal:  Negative for back pain. Neurological:  Negative for dizziness and headaches. Psychiatric/Behavioral:  Negative for agitation, behavioral problems and sleep disturbance. The patient is not nervous/anxious.       Lab Results   Component Value Date    WBC 7.9 08/17/2022    HGB 13.9 08/17/2022    HCT 43.5 08/17/2022    MCV 84.8 08/17/2022     08/17/2022     Lab Results   Component Value Date     11/19/2021    K 4.7 11/19/2021    CL 99 11/19/2021    CO2 24 11/19/2021    BUN 9 11/19/2021    CREATININE 0.7 11/19/2021    GLUCOSE 119 (H) 11/19/2021    CALCIUM 9.0 11/19/2021    PROT 7.6 06/07/2021    LABALBU 4.1 06/07/2021    BILITOT 0.3 06/07/2021    ALKPHOS 92 06/07/2021    AST 16 06/07/2021    ALT 15 06/07/2021    LABGLOM >60 11/19/2021    GFRAA >60 11/19/2021    AGRATIO 1.3 05/19/2016    GLOB 2.8 05/19/2016     Lab Results   Component Value Date    CHOL 256 (H) 06/08/2021    CHOL 309 (H) 03/17/2021    CHOL 183 05/17/2019     Lab Results   Component Value Date    TRIG 104 06/08/2021    TRIG 126 03/17/2021    TRIG 70 05/17/2019     Lab Results   Component Value Date    HDL 65 06/08/2021    HDL 60 03/17/2021    HDL 63 05/17/2019     Lab Results   Component Value Date    LDLCALC 224 (H) 03/17/2021    LDLCALC 193 (H) 05/19/2016    LDLCALC 169 (H) 05/19/2014     Lab Results   Component Value Date    LABA1C 13.8 01/16/2023     Lab Results   Component Value Date    TSH 1.99 05/19/2016    TSHHS 1.370 06/08/2021         /74 (Site: Left Upper Arm, Position: Sitting, Cuff Size: Medium Adult)   Pulse 71   Ht 4' 11\" (1.499 m)   Wt 198 lb 6.4 oz (90 kg)   LMP 07/09/2000 SpO2 96%   BMI 40.07 kg/m²     BP Readings from Last 3 Encounters:   01/16/23 128/74   10/04/22 130/75   09/15/22 (!) 140/80       Wt Readings from Last 3 Encounters:   01/16/23 198 lb 6.4 oz (90 kg)   09/15/22 198 lb 9.6 oz (90.1 kg)   09/15/22 196 lb 3.2 oz (89 kg)         Physical Exam  Constitutional:       General: She is not in acute distress. Appearance: Normal appearance. She is well-developed. She is obese. She is not ill-appearing or diaphoretic. HENT:      Head: Normocephalic and atraumatic. Eyes:      General: No scleral icterus. Neck:      Thyroid: No thyromegaly. Cardiovascular:      Rate and Rhythm: Normal rate and regular rhythm. Heart sounds: Normal heart sounds. No murmur heard. Pulmonary:      Effort: Pulmonary effort is normal.      Breath sounds: Normal breath sounds. No wheezing or rales. Musculoskeletal:         General: Normal range of motion. Cervical back: Normal range of motion and neck supple. No rigidity. Right lower leg: No edema. Left lower leg: No edema. Neurological:      General: No focal deficit present. Mental Status: She is alert and oriented to person, place, and time. Cranial Nerves: No cranial nerve deficit. Motor: No abnormal muscle tone. Psychiatric:         Mood and Affect: Mood normal.         Behavior: Behavior normal.       ASSESSMENT/ PLAN:    1. Type 2 diabetes mellitus without complication, with long-term current use of insulin ([address]  - patient is non complaince, dose not watch her diet, went up so add the trulicity:  - POCT glycosylated hemoglobin (Hb A1C)  - Dulaglutide (TRULICITY) 9.31 DV/1.2EX SOPN; Inject 0.75 mg into the skin once a week  Dispense: 4 Adjustable Dose Pre-filled Pen Syringe; Refill: 1    2. Primary hypertension  - stable on coreg    3. Mixed hyperlipidemia  - on pravastatin last  on 6/21, was ordered last visit, patient didn't do it yet    4.  S/P AVR  - stable, f/u with the cardiology    5. Anxiety and depression  - stable, doing fine, no medication              - All old blood work reviewed with the patient  - Appropriate prescription are addressed. - After visit summery provided. - Questions answered and patient verbalizes understanding.  - Call for any problem, questions, or concerns. Return in about 1 month (around 2/16/2023).

## 2023-01-27 DIAGNOSIS — E11.9 TYPE 2 DIABETES MELLITUS WITHOUT COMPLICATION, WITH LONG-TERM CURRENT USE OF INSULIN (HCC): ICD-10-CM

## 2023-01-27 DIAGNOSIS — Z79.4 TYPE 2 DIABETES MELLITUS WITHOUT COMPLICATION, WITH LONG-TERM CURRENT USE OF INSULIN (HCC): ICD-10-CM

## 2023-02-04 PROBLEM — F32.A ANXIETY AND DEPRESSION: Status: ACTIVE | Noted: 2021-11-21

## 2023-02-04 ASSESSMENT — ENCOUNTER SYMPTOMS
ABDOMINAL PAIN: 0
COUGH: 0
SHORTNESS OF BREATH: 0
BACK PAIN: 0
WHEEZING: 0

## 2023-02-13 DIAGNOSIS — E11.9 TYPE 2 DIABETES MELLITUS WITHOUT COMPLICATION, UNSPECIFIED WHETHER LONG TERM INSULIN USE (HCC): ICD-10-CM

## 2023-02-16 ENCOUNTER — OFFICE VISIT (OUTPATIENT)
Dept: FAMILY MEDICINE CLINIC | Age: 73
End: 2023-02-16

## 2023-02-16 VITALS
WEIGHT: 202.4 LBS | SYSTOLIC BLOOD PRESSURE: 134 MMHG | BODY MASS INDEX: 40.8 KG/M2 | HEART RATE: 72 BPM | OXYGEN SATURATION: 98 % | DIASTOLIC BLOOD PRESSURE: 78 MMHG | HEIGHT: 59 IN

## 2023-02-16 DIAGNOSIS — E11.9 TYPE 2 DIABETES MELLITUS WITHOUT COMPLICATION, WITH LONG-TERM CURRENT USE OF INSULIN (HCC): Primary | ICD-10-CM

## 2023-02-16 DIAGNOSIS — Z79.4 TYPE 2 DIABETES MELLITUS WITHOUT COMPLICATION, WITH LONG-TERM CURRENT USE OF INSULIN (HCC): Primary | ICD-10-CM

## 2023-02-16 DIAGNOSIS — I10 PRIMARY HYPERTENSION: ICD-10-CM

## 2023-02-16 DIAGNOSIS — E66.01 OBESITY, CLASS III, BMI 40-49.9 (MORBID OBESITY) (HCC): ICD-10-CM

## 2023-02-16 DIAGNOSIS — E78.2 MIXED HYPERLIPIDEMIA: ICD-10-CM

## 2023-02-16 RX ORDER — DULAGLUTIDE 0.75 MG/.5ML
0.75 INJECTION, SOLUTION SUBCUTANEOUS WEEKLY
Qty: 4 ADJUSTABLE DOSE PRE-FILLED PEN SYRINGE | Refills: 5 | Status: SHIPPED | OUTPATIENT
Start: 2023-02-16

## 2023-02-16 RX ORDER — CARVEDILOL 3.12 MG/1
TABLET ORAL
Qty: 60 TABLET | Refills: 5 | Status: SHIPPED | OUTPATIENT
Start: 2023-02-16

## 2023-02-16 RX ORDER — LANCETS 30 GAUGE
EACH MISCELLANEOUS
Qty: 100 EACH | Refills: 3 | Status: SHIPPED | OUTPATIENT
Start: 2023-02-16

## 2023-02-16 RX ORDER — PRAVASTATIN SODIUM 40 MG
40 TABLET ORAL DAILY
Qty: 30 TABLET | Refills: 5 | Status: SHIPPED | OUTPATIENT
Start: 2023-02-16

## 2023-02-16 NOTE — PROGRESS NOTES
Sheila De La Rosa  24    Chief Complaint   Patient presents with    1 Month Follow-Up    Diabetes    Hyperlipidemia    Hypertension           Patient here for f/u regarding DM, HTN, and HLD, her A1C last visit was 13.8, patient was started on trulicity, patient tolerate the medication and stats her BL S much better, and her reading  or less.       Past Medical History:   Diagnosis Date    Aortic valve stenosis     Arthritis     Bronchitis 2021    Diabetes mellitus ([facility] [address]     dx age 53's    Heart murmur     Hx of drug abuse ([facility] [address]     \"did cocaine back in     Hypertension     Irregular heart beat     \"they just say I skip a beat , I think from the heart murmur- not sure\" follow with Dr Curtis Saldaña    LVH (left ventricular hypertrophy)     hx per old chart    Mixed hyperlipidemia 2016    Wears dentures     full upper plate    Wears glasses     to read     Past Surgical History:   Procedure Laterality Date    AORTIC VALVE REPLACEMENT N/A 2021    AORTIC VALVE REPLACEMENT AND AORTIC ROOT ENLARGEMENT performed by Arlyn Esteban MD at [address]      left breast bx    CARDIAC CATHETERIZATION  2021     SECTION   and ( with BPS)    x2    DILATION AND CURETTAGE OF UTERUS      HERNIA REPAIR      umbilical hernia\"think done when I was a teenager    OTHER SURGICAL HISTORY  12/10/13    Left AC lipoma excision    OTHER SURGICAL HISTORY  12/10/13    Left flank lipoma excision     Family History   Problem Relation Age of Onset    Diabetes Mother     Heart Disease Mother     High Blood Pressure Father     Diabetes Sister     High Blood Pressure Sister     Stroke Sister     Breast Cancer Sister 58    Cancer Brother         Lung    High Blood Pressure Brother     Stroke Brother     Diabetes Brother     Diabetes Maternal Grandmother     Heart Disease Maternal Grandfather     Cancer Brother         Lung (smoker)    Breast Cancer Maternal Aunt Social History     Socioeconomic History    Marital status:      Spouse name: Not on file    Number of children: Not on file    Years of education: Not on file    Highest education level: Not on file   Occupational History    Occupation: employed-[employer]   Tobacco Use    Smoking status: Former     Packs/day: 0.50     Years: 32.00     Pack years: 16.00     Types: Cigarettes     Start date: 13     Quit date: 2021     Years since quittin.6    Smokeless tobacco: Never    Tobacco comments:     currently 0.5 PPD   Vaping Use    Vaping Use: Former    Substances: Nicotine, Flavoring   Substance and Sexual Activity    Alcohol use: No     Alcohol/week: 0.0 standard drinks     Comment: average \"2 times per month glass of wine\"    Drug use: Not Currently     Types: Marijuana (Weed)     Comment: \"last used 50+ yrs ago \" per pt on 2021\"yrs ago did do cocaine -\"    Sexual activity: Not Currently     Partners: Male   Other Topics Concern    Not on file   Social History Narrative    Not on file     Social Determinants of Health     Financial Resource Strain: Low Risk     Difficulty of Paying Living Expenses: Not very hard   Food Insecurity: No Food Insecurity    Worried About Running Out of Food in the Last Year: Never true    [address] in the Last Year: Never true   Transportation Needs: No Transportation Needs    Lack of Transportation (Medical): No    Lack of Transportation (Non-Medical): No   Physical Activity: Insufficiently Active    Days of Exercise per Week: 2 days    Minutes of Exercise per Session: 10 min   Stress: No Stress Concern Present    Feeling of Stress :  Only a little   Social Connections: Socially Isolated    Frequency of Communication with Friends and Family: More than three times a week    Frequency of Social Gatherings with Friends and Family: More than three times a week    Attends Tenriism Services: Never    Active Member of Clubs or Organizations: No    Attends [employer] or Organization Meetings: Never    Marital Status:    Intimate Partner Violence: Not on file   Housing Stability: Low Risk     Unable to Pay for Housing in the Last Year: No    Number of Places Lived in the Last Year: 1    Unstable Housing in the Last Year: No       Allergies   Allergen Reactions    Codeine Rash    Hydrocodone-Acetaminophen Nausea And Vomiting     Current Outpatient Medications   Medication Sig Dispense Refill    Lancets MISC TIDAC &  each 3    carvedilol (COREG) 3.125 MG tablet TAKE ONE (1) TABLET BY MOUTH TWO TIMES DAILY 60 tablet 5    Dulaglutide (TRULICITY) 3.40 LL/7.2LR SOPN Inject 0.75 mg into the skin once a week 4 Adjustable Dose Pre-filled Pen Syringe 5    pravastatin (PRAVACHOL) 40 MG tablet Take 1 tablet by mouth daily 30 tablet 5    Insulin Pen Needle 30G X 8 MM MISC 1 each by Does not apply route daily 100 each 5    apixaban (ELIQUIS) 5 MG TABS tablet TAKE ONE (1) TABLET BY MOUTH TWO TIMES DAILY 60 tablet 5    SITagliptin (JANUVIA) 100 MG tablet Take 1 tablet by mouth daily 30 tablet 5    blood glucose monitor kit and supplies Dispense sufficient amount for indicated testing frequency plus additional to accommodate PRN testing needs. Dispense all needed supplies to include: monitor, strips, lancing device, lancets, control solutions, alcohol swabs. 1 kit 0    insulin detemir (LEVEMIR FLEXTOUCH) 100 UNIT/ML injection pen INJECT 35 UNITS UNDER THE SKIN EVERY NIGHT 15 mL 5    blood glucose monitor strips TIDAC and  strip 5    aspirin 81 MG EC tablet Take 1 tablet by mouth daily 30 tablet 3    Multiple Vitamin (MULTIVITAMIN) TABS tablet Take 1 tablet by mouth daily (with breakfast)  0     No current facility-administered medications for this visit. Review of Systems   Constitutional:  Negative for activity change, appetite change, chills, fatigue and fever. HENT:  Negative for congestion. Respiratory:  Negative for cough, shortness of breath and wheezing. Cardiovascular:  Negative for chest pain and leg swelling. Gastrointestinal:  Negative for abdominal pain. Genitourinary:  Negative for dysuria. Musculoskeletal:  Negative for back pain. Neurological:  Negative for dizziness and headaches. Psychiatric/Behavioral:  Negative for agitation, behavioral problems and sleep disturbance. The patient is not nervous/anxious.       Lab Results   Component Value Date    WBC 7.9 08/17/2022    HGB 13.9 08/17/2022    HCT 43.5 08/17/2022    MCV 84.8 08/17/2022     08/17/2022     Lab Results   Component Value Date     11/19/2021    K 4.7 11/19/2021    CL 99 11/19/2021    CO2 24 11/19/2021    BUN 9 11/19/2021    CREATININE 0.7 11/19/2021    GLUCOSE 119 (H) 11/19/2021    CALCIUM 9.0 11/19/2021    PROT 7.6 06/07/2021    LABALBU 4.1 06/07/2021    BILITOT 0.3 06/07/2021    ALKPHOS 92 06/07/2021    AST 16 06/07/2021    ALT 15 06/07/2021    LABGLOM >60 11/19/2021    GFRAA >60 11/19/2021    AGRATIO 1.3 05/19/2016    GLOB 2.8 05/19/2016     Lab Results   Component Value Date    CHOL 256 (H) 06/08/2021    CHOL 309 (H) 03/17/2021    CHOL 183 05/17/2019     Lab Results   Component Value Date    TRIG 104 06/08/2021    TRIG 126 03/17/2021    TRIG 70 05/17/2019     Lab Results   Component Value Date    HDL 65 06/08/2021    HDL 60 03/17/2021    HDL 63 05/17/2019     Lab Results   Component Value Date    LDLCALC 224 (H) 03/17/2021    LDLCALC 193 (H) 05/19/2016    LDLCALC 169 (H) 05/19/2014     Lab Results   Component Value Date    LABA1C 13.8 01/16/2023     Lab Results   Component Value Date    TSH 1.99 05/19/2016    TSHHS 1.370 06/08/2021         /78 (Site: Left Upper Arm, Position: Sitting, Cuff Size: Medium Adult)   Pulse 72   Ht 4' 11\" (1.499 m)   Wt 202 lb 6.4 oz (91.8 kg)   LMP 07/09/2000   SpO2 98%   BMI 40.88 kg/m²     BP Readings from Last 3 Encounters:   02/16/23 134/78   01/16/23 128/74   10/04/22 130/75       Wt Readings from Last 3 Encounters: 02/16/23 202 lb 6.4 oz (91.8 kg)   01/16/23 198 lb 6.4 oz (90 kg)   09/15/22 198 lb 9.6 oz (90.1 kg)         Physical Exam  Constitutional:       General: She is not in acute distress. Appearance: Normal appearance. She is well-developed. She is obese. She is not ill-appearing or diaphoretic. HENT:      Head: Normocephalic and atraumatic. Eyes:      General: No scleral icterus. Neck:      Thyroid: No thyromegaly. Cardiovascular:      Rate and Rhythm: Normal rate and regular rhythm. Heart sounds: Normal heart sounds. No murmur heard. Pulmonary:      Effort: Pulmonary effort is normal.      Breath sounds: Normal breath sounds. No wheezing or rales. Musculoskeletal:         General: Normal range of motion. Cervical back: Normal range of motion and neck supple. No rigidity. Right lower leg: No edema. Left lower leg: No edema. Neurological:      General: No focal deficit present. Mental Status: She is alert and oriented to person, place, and time. Cranial Nerves: No cranial nerve deficit. Motor: No abnormal muscle tone. Psychiatric:         Mood and Affect: Mood normal.         Behavior: Behavior normal.       ASSESSMENT/ PLAN:    1. Type 2 diabetes mellitus without complication, with long-term current use of insulin ([facility])  - her A1c went down from 13.8 to 10.9 patient was started on trulicity, patient tolerate the medication.  - Lancets MISC; TIDAC & QHS  Dispense: 100 each; Refill: 3  - Dulaglutide (TRULICITY) 9.14 NV/0.9GO SOPN; Inject 0.75 mg into the skin once a week  Dispense: 4 Adjustable Dose Pre-filled Pen Syringe; Refill: 5  - Comprehensive Metabolic Panel, Fasting; Future  - POCT glycosylated hemoglobin (Hb A1C)    2. Primary hypertension  - stable  - carvedilol (COREG) 3.125 MG tablet; TAKE ONE (1) TABLET BY MOUTH TWO TIMES DAILY  Dispense: 60 tablet; Refill: 5    3.  Mixed hyperlipidemia  - last  on 6/21 so will recheck the lipid  - Lipid Panel; Future  - pravastatin (PRAVACHOL) 40 MG tablet; Take 1 tablet by mouth daily  Dispense: 30 tablet; Refill: 5    4. Obesity, Class III, BMI 40-49.9 (morbid obesity) ([facility]  - encourage wt loss              - All old blood work reviewed with the patient  - Appropriate prescription are addressed. - After visit summery provided. - Questions answered and patient verbalizes understanding.  - Call for any problem, questions, or concerns.  - RTC if symptoms worse. Return in about 2 months (around 4/16/2023).

## 2023-02-18 ASSESSMENT — ENCOUNTER SYMPTOMS
SHORTNESS OF BREATH: 0
BACK PAIN: 0
COUGH: 0
ABDOMINAL PAIN: 0
WHEEZING: 0

## 2023-03-09 DIAGNOSIS — E11.9 TYPE 2 DIABETES MELLITUS WITHOUT COMPLICATION, WITH LONG-TERM CURRENT USE OF INSULIN (HCC): ICD-10-CM

## 2023-03-09 DIAGNOSIS — Z79.4 TYPE 2 DIABETES MELLITUS WITHOUT COMPLICATION, WITH LONG-TERM CURRENT USE OF INSULIN (HCC): ICD-10-CM

## 2023-03-09 RX ORDER — DULAGLUTIDE 0.75 MG/.5ML
0.75 INJECTION, SOLUTION SUBCUTANEOUS WEEKLY
Qty: 4 ADJUSTABLE DOSE PRE-FILLED PEN SYRINGE | Refills: 5 | Status: SHIPPED | OUTPATIENT
Start: 2023-03-09

## 2023-03-17 ENCOUNTER — HOSPITAL ENCOUNTER (INPATIENT)
Age: 73
LOS: 1 days | Discharge: HOME | DRG: 178 | End: 2023-03-18
Attending: INTERNAL MEDICINE | Admitting: INTERNAL MEDICINE
Payer: COMMERCIAL

## 2023-03-17 PROBLEM — J96.00 ACUTE RESPIRATORY FAILURE (HCC): Status: ACTIVE | Noted: 2023-03-17

## 2023-03-17 PROBLEM — U07.1 COVID: Status: ACTIVE | Noted: 2023-03-17

## 2023-03-17 PROBLEM — R06.00 DYSPNEA: Status: ACTIVE | Noted: 2023-03-17

## 2023-03-17 LAB
ESTIMATED AVERAGE GLUCOSE: 212 MG/DL
GLUCOSE BLD-MCNC: 208 MG/DL (ref 70–99)
GLUCOSE BLD-MCNC: 214 MG/DL (ref 70–99)
GLUCOSE BLD-MCNC: 261 MG/DL (ref 70–99)
GLUCOSE BLD-MCNC: 275 MG/DL (ref 70–99)
HBA1C MFR BLD: 9 % (ref 4.2–6.3)
PROCALCITONIN SERPL-MCNC: 0.08 NG/ML

## 2023-03-17 PROCEDURE — 94618 PULMONARY STRESS TESTING: CPT

## 2023-03-17 PROCEDURE — 6370000000 HC RX 637 (ALT 250 FOR IP): Performed by: NURSE PRACTITIONER

## 2023-03-17 PROCEDURE — 94660 CPAP INITIATION&MGMT: CPT

## 2023-03-17 PROCEDURE — 2580000003 HC RX 258: Performed by: NURSE PRACTITIONER

## 2023-03-17 PROCEDURE — 94640 AIRWAY INHALATION TREATMENT: CPT

## 2023-03-17 PROCEDURE — 83036 HEMOGLOBIN GLYCOSYLATED A1C: CPT

## 2023-03-17 PROCEDURE — 1200000000 HC SEMI PRIVATE

## 2023-03-17 PROCEDURE — G0378 HOSPITAL OBSERVATION PER HR: HCPCS

## 2023-03-17 PROCEDURE — G0379 DIRECT REFER HOSPITAL OBSERV: HCPCS

## 2023-03-17 PROCEDURE — 6370000000 HC RX 637 (ALT 250 FOR IP): Performed by: INTERNAL MEDICINE

## 2023-03-17 PROCEDURE — 82962 GLUCOSE BLOOD TEST: CPT

## 2023-03-17 PROCEDURE — 94761 N-INVAS EAR/PLS OXIMETRY MLT: CPT

## 2023-03-17 PROCEDURE — 84145 PROCALCITONIN (PCT): CPT

## 2023-03-17 PROCEDURE — 36415 COLL VENOUS BLD VENIPUNCTURE: CPT

## 2023-03-17 PROCEDURE — 2700000000 HC OXYGEN THERAPY PER DAY

## 2023-03-17 RX ORDER — ACETAMINOPHEN 650 MG/1
650 SUPPOSITORY RECTAL EVERY 6 HOURS PRN
Status: DISCONTINUED | OUTPATIENT
Start: 2023-03-17 | End: 2023-03-18 | Stop reason: HOSPADM

## 2023-03-17 RX ORDER — CARVEDILOL 6.25 MG/1
3.12 TABLET ORAL 2 TIMES DAILY
Status: DISCONTINUED | OUTPATIENT
Start: 2023-03-17 | End: 2023-03-18 | Stop reason: HOSPADM

## 2023-03-17 RX ORDER — SODIUM CHLORIDE 9 MG/ML
INJECTION, SOLUTION INTRAVENOUS PRN
Status: DISCONTINUED | OUTPATIENT
Start: 2023-03-17 | End: 2023-03-18 | Stop reason: HOSPADM

## 2023-03-17 RX ORDER — PRAVASTATIN SODIUM 40 MG
40 TABLET ORAL DAILY
Status: DISCONTINUED | OUTPATIENT
Start: 2023-03-17 | End: 2023-03-18 | Stop reason: HOSPADM

## 2023-03-17 RX ORDER — ASPIRIN 81 MG/1
81 TABLET ORAL DAILY
Status: DISCONTINUED | OUTPATIENT
Start: 2023-03-17 | End: 2023-03-18 | Stop reason: HOSPADM

## 2023-03-17 RX ORDER — ALBUTEROL SULFATE 90 UG/1
2 AEROSOL, METERED RESPIRATORY (INHALATION) 4 TIMES DAILY
Status: DISCONTINUED | OUTPATIENT
Start: 2023-03-17 | End: 2023-03-18 | Stop reason: HOSPADM

## 2023-03-17 RX ORDER — POLYETHYLENE GLYCOL 3350 17 G/17G
17 POWDER, FOR SOLUTION ORAL DAILY PRN
Status: DISCONTINUED | OUTPATIENT
Start: 2023-03-17 | End: 2023-03-18 | Stop reason: HOSPADM

## 2023-03-17 RX ORDER — ONDANSETRON 4 MG/1
4 TABLET, ORALLY DISINTEGRATING ORAL EVERY 8 HOURS PRN
Status: DISCONTINUED | OUTPATIENT
Start: 2023-03-17 | End: 2023-03-18 | Stop reason: HOSPADM

## 2023-03-17 RX ORDER — SODIUM CHLORIDE 0.9 % (FLUSH) 0.9 %
10 SYRINGE (ML) INJECTION PRN
Status: DISCONTINUED | OUTPATIENT
Start: 2023-03-17 | End: 2023-03-18 | Stop reason: HOSPADM

## 2023-03-17 RX ORDER — DEXTROSE MONOHYDRATE 100 MG/ML
INJECTION, SOLUTION INTRAVENOUS CONTINUOUS PRN
Status: DISCONTINUED | OUTPATIENT
Start: 2023-03-17 | End: 2023-03-18 | Stop reason: HOSPADM

## 2023-03-17 RX ORDER — ONDANSETRON 2 MG/ML
4 INJECTION INTRAMUSCULAR; INTRAVENOUS EVERY 6 HOURS PRN
Status: DISCONTINUED | OUTPATIENT
Start: 2023-03-17 | End: 2023-03-18 | Stop reason: HOSPADM

## 2023-03-17 RX ORDER — ACETAMINOPHEN 325 MG/1
650 TABLET ORAL EVERY 6 HOURS PRN
Status: DISCONTINUED | OUTPATIENT
Start: 2023-03-17 | End: 2023-03-18 | Stop reason: HOSPADM

## 2023-03-17 RX ORDER — POLYETHYLENE GLYCOL 3350 17 G
2 POWDER IN PACKET (EA) ORAL
Status: DISCONTINUED | OUTPATIENT
Start: 2023-03-17 | End: 2023-03-18 | Stop reason: HOSPADM

## 2023-03-17 RX ORDER — INSULIN LISPRO 100 [IU]/ML
0-8 INJECTION, SOLUTION INTRAVENOUS; SUBCUTANEOUS
Status: DISCONTINUED | OUTPATIENT
Start: 2023-03-17 | End: 2023-03-18 | Stop reason: HOSPADM

## 2023-03-17 RX ORDER — INSULIN LISPRO 100 [IU]/ML
0-4 INJECTION, SOLUTION INTRAVENOUS; SUBCUTANEOUS NIGHTLY
Status: DISCONTINUED | OUTPATIENT
Start: 2023-03-17 | End: 2023-03-18 | Stop reason: HOSPADM

## 2023-03-17 RX ORDER — SODIUM CHLORIDE 0.9 % (FLUSH) 0.9 %
5-40 SYRINGE (ML) INJECTION EVERY 12 HOURS SCHEDULED
Status: DISCONTINUED | OUTPATIENT
Start: 2023-03-17 | End: 2023-03-18 | Stop reason: HOSPADM

## 2023-03-17 RX ORDER — PREDNISONE 20 MG/1
40 TABLET ORAL DAILY
Status: DISCONTINUED | OUTPATIENT
Start: 2023-03-17 | End: 2023-03-18 | Stop reason: HOSPADM

## 2023-03-17 RX ADMIN — APIXABAN 5 MG: 5 TABLET, FILM COATED ORAL at 21:49

## 2023-03-17 RX ADMIN — ALBUTEROL SULFATE 2 PUFF: 90 AEROSOL, METERED RESPIRATORY (INHALATION) at 15:03

## 2023-03-17 RX ADMIN — APIXABAN 5 MG: 5 TABLET, FILM COATED ORAL at 10:43

## 2023-03-17 RX ADMIN — CARVEDILOL 3.12 MG: 6.25 TABLET, FILM COATED ORAL at 21:48

## 2023-03-17 RX ADMIN — ACETAMINOPHEN 650 MG: 325 TABLET ORAL at 21:49

## 2023-03-17 RX ADMIN — INSULIN LISPRO 2 UNITS: 100 INJECTION, SOLUTION INTRAVENOUS; SUBCUTANEOUS at 11:39

## 2023-03-17 RX ADMIN — INSULIN LISPRO 4 UNITS: 100 INJECTION, SOLUTION INTRAVENOUS; SUBCUTANEOUS at 16:39

## 2023-03-17 RX ADMIN — ALBUTEROL SULFATE 2 PUFF: 90 AEROSOL, METERED RESPIRATORY (INHALATION) at 19:03

## 2023-03-17 RX ADMIN — CARVEDILOL 3.12 MG: 6.25 TABLET, FILM COATED ORAL at 10:43

## 2023-03-17 RX ADMIN — SODIUM CHLORIDE, PRESERVATIVE FREE 10 ML: 5 INJECTION INTRAVENOUS at 21:50

## 2023-03-17 RX ADMIN — PREDNISONE 40 MG: 20 TABLET ORAL at 10:43

## 2023-03-17 RX ADMIN — ASPIRIN 81 MG: 81 TABLET, COATED ORAL at 10:43

## 2023-03-17 RX ADMIN — ALBUTEROL SULFATE 2 PUFF: 90 AEROSOL, METERED RESPIRATORY (INHALATION) at 12:00

## 2023-03-17 RX ADMIN — INSULIN DETEMIR 40 UNITS: 100 INJECTION, SOLUTION SUBCUTANEOUS at 21:52

## 2023-03-17 ASSESSMENT — PAIN SCALES - GENERAL
PAINLEVEL_OUTOF10: 0

## 2023-03-17 ASSESSMENT — PAIN SCALES - WONG BAKER
WONGBAKER_NUMERICALRESPONSE: 0

## 2023-03-17 NOTE — PROGRESS NOTES
3/17/2023 11:40 AM  Patient Room #: 2001/2001-A  Patient Name: Tye Arroyo                      [x]Patient Does NOT qualify     (Step 1 Done by RN if possible otherwise call Pulmonary Diagnostics)  Place patient on room air at rest for at least 30 minutes. If patient falls below 88% before 30 minutes then you can record the level and stop. Record room air saturation level 94_ %. If patient is at 88% or below, they will qualify for home oxygen and you can stop. If level does not fall below 88%, fill in level above. If indicated continue to Step 2. Signature:_Lois Valdez, RRT_ Date: _03/17/2023__  (Step 2&3 Done by RCP)  Ambulate patient on room air until saturation falls below 89%. Record level of room air saturation with ambulation_92__ %. Next, place patient back on ___lpm oxygen and ambulate, record level __%. (Note:  this level must show improvement from room air level done with ambulation.)  If patients saturation on room air with ambulation is 88% or below AND patient shows improvement with oxygen during ambulation, they will qualify for home oxygen and you can stop. If patient does not drop below 89%, then patient should have an overnight oximetry trending on room air to see if level falls below 88%. Complete level in Step 3 below. Per GEORGIE Clifford, CLIVE,   Patient has ALF,  Room air overnight oximetry level 88 % for___  cumulative minutes. If patients room air oxygen level is < 89% for at least 5 cumulative minutes, patient will qualify for home oxygen and you can stop. (Attach Night Trending Report)    Complete order below: Diagnosis:_CHF, OSA__  Home oxygen at:  Length of Need: ? Lifetime ?  3 Months     ___lpm or __%   via  [] nasal cannula  []mask  [] other         []continuous []  with activity  []  Nocturnal   [] Portable Tanks []  Concentrator  [] Conserving Device        Therapist Signature:_______     Date:  ___  Physician Signature:  __Electronically Signed in EMR_    Date:___  Physician Printed Name:  Sanket Sutherland MD  NPI:  [id number]   NPI # ___    [] Patient Qualifies       [x]Patient Does NOT qualify

## 2023-03-17 NOTE — H&P
V2.0  History and Physical      Name:  Avril King /Age/Sex: 1950  (72 y.o. female)   MRN & CSN:  [id number] & [id number] Encounter Date/Time: 3/17/2023 9:23 AM EDT   Location:  -A PCP: Jacqueline Alanis MD       Hospital Day: 1    Assessment and Plan:   Avril King is a 72 y.o. female with a pmh of hypertension, type 2 diabetes, COPD, sleep apnea, mitral valve replacement who presents with shortness of breath and covid 19 infection.     Hospital Problems             Last Modified POA    Dyspnea 3/17/2023 Yes     Dyspnea  Covid 19 virus infection  -shortness of breath for 2 days, dry cough  -on room air   -mild wheezing in left upper lung  -chest xray in [facility] on  showed; may represent early congestive changes, increased bibasilar atelectasis  -covid 19 positive  -placed to observation with telemetry  -albuterol inhaler four times a day, prednisone 40 mg daily  -possible discharge tomorrow if no hypoxia with ambulation    COPD exacerbation  -mild wheezing in left upper lung  -mild exacerbation  -continue steroids and inhaler treatment    Type 2 diabetes  -hemoglobin a1c 13.3 in 2022  -update hemoglobin a1c  -lantus 35 units nightly  -insulin sliding scale    Hypertension  Hyperlipidemia  -continue coreg  -continue statin    History of atrial flutter  History of mitral valve replacement  -continue eliquis    Obesity   -BMI 40.88  -lifestyle change in discharge    Echocardiogram transespophageal on 10/04/2022 in [facility]:  Left ventricular systolic function is hyperdynamic.    Ejection fraction is visually estimated at >60%.    Moderate septal hypertrophy.    A porcine bioprosthesis was present (2021) with mean PG of 20mmHg,    likely due to hyperdynamic state and septal hypertrophy. AVR appears well    positioned with no obvious stenosis. Mild perivalvular leak.       Disposition:   Current Living situation: home  Expected Disposition: home  Estimated D/C: 1-2  days    Diet Diet NPO   DVT Prophylaxis [] Lovenox, []  Heparin, [] SCDs, [] Ambulation,  [x] Eliquis, [] Xarelto   Code Status Full Code   Surrogate Decision Maker/ POA      History from:     patient    History of Present Illness:     Chief Complaint:shortness of breath  Nikolas Gavin is a 71 y.o. female with pmh of hypertension, type 2 diabetes, mitral valve replacement , atrial flutter on eliquis, copd, sleep apnea who presents with shortness of breath. Pt stated the symptoms started 2 days ago. Associate with dry cough. No chills and fever. Pt visited [facility] ER yesterday. Pt was found positive Covid and hypoxia with ambulation. Pt's chest xray showed possible early congestion. Pt's BNP level was 91. No legs swelling. Pt was transferred to [facility] for further evaluation. Pt is in room air without dyspnea in my evaluation. Mild wheezing in left upper lung. Pt will be treated with oral steroids and inhaler. Possible discharge tomorrow if patient tolerates ambulation. Review of Systems: Need 10 Elements   Review of Systems   All other systems reviewed and are negative. Objective:   No intake or output data in the 24 hours ending 03/17/23 0958   Vitals:   Vitals:    03/17/23 0900 03/17/23 0930 03/17/23 0939   BP:  (!) 143/66    Pulse:  80 85   Resp:  18 23   Temp:  98.1 °F (36.7 °C)    TempSrc:  Oral    SpO2:  94% 95%   Height: 4' 11\" (1.499 m)         Medications Prior to Admission     Prior to Admission medications    Medication Sig Start Date End Date Taking?  Authorizing Provider   Dulaglutide (TRULICITY) 0.36 MZ/5.8VZ SOPN Inject 0.75 mg into the skin once a week 3/9/23   Hermann Ortega MD   [facility] 2/16/23   Hermann Ortega MD   carvedilol (COREG) 3.125 MG tablet TAKE ONE (1) TABLET BY MOUTH TWO TIMES DAILY 2/16/23   Hermann Ortega MD   pravastatin (PRAVACHOL) 40 MG tablet Take 1 tablet by mouth daily 2/16/23   Hermann Ortega MD   Insulin Pen Needle 30G X 8 MM MISC 1 each by Does not apply route daily 2/14/23   Zeb Lagos MD   apixaban (ELIQUIS) 5 MG TABS tablet TAKE ONE (1) TABLET BY MOUTH TWO TIMES DAILY 2/14/23   Zeb Lagos MD   SITagliptin (JANUVIA) 100 MG tablet Take 1 tablet by mouth daily 1/30/23   Zeb Lagos MD   blood glucose monitor kit and supplies Dispense sufficient amount for indicated testing frequency plus additional to accommodate PRN testing needs. Dispense all needed supplies to include: monitor, strips, lancing device, lancets, control solutions, alcohol swabs. 11/30/22   Zeb Lagos MD   insulin detemir (LEVEMIR FLEXTOUCH) 100 UNIT/ML injection pen INJECT 35 UNITS UNDER THE SKIN EVERY NIGHT 10/6/22   Zeb Lagos MD   blood glucose monitor strips TIDAC and QHS 12/1/21   Zeb Lagos MD   aspirin 81 MG EC tablet Take 1 tablet by mouth daily 10/2/21   GREGORIO Cohen MD   Multiple Vitamin (MULTIVITAMIN) TABS tablet Take 1 tablet by mouth daily (with breakfast) 10/2/21   GREGORIO Cohen MD       Physical Exam: Need 8 Elements   Physical Exam  HENT:      Head: Normocephalic. Nose: Nose normal.      Mouth/Throat:      Mouth: Mucous membranes are moist.   Eyes:      Pupils: Pupils are equal, round, and reactive to light. Cardiovascular:      Rate and Rhythm: Normal rate and regular rhythm. Pulmonary:      Effort: Pulmonary effort is normal.      Breath sounds: Wheezing present. Comments: Mild wheezing in left upper lung  Abdominal:      General: Abdomen is flat. Musculoskeletal:         General: Normal range of motion. Cervical back: Normal range of motion. Skin:     General: Skin is warm. Capillary Refill: Capillary refill takes less than 2 seconds. Neurological:      General: No focal deficit present. Mental Status: She is alert and oriented to person, place, and time.    Psychiatric:         Mood and Affect: Mood normal.          Past Medical History:   PMHx   Past Medical History:   Diagnosis Date Aortic valve stenosis     Arthritis     Bronchitis 2021    Diabetes mellitus ([facility] [address]     dx age 53's    Heart murmur     Hx of drug abuse ([facility] [address]     \"did cocaine back in     Hypertension     Irregular heart beat     \"they just say I skip a beat , I think from the heart murmur- not sure\" follow with Dr Gutiérrez Nicely    LVH (left ventricular hypertrophy)     hx per old chart    Mixed hyperlipidemia 2016    Wears dentures     full upper plate    Wears glasses     to read     PSHX:  has a past surgical history that includes other surgical history (12/10/13); other surgical history (12/10/13);  section ( and ( with BPS)); Breast surgery (); Breast biopsy; hernia repair; Dilation and curettage of uterus; Cardiac catheterization (2021); and Aortic valve replacement (N/A, 2021). Allergies: Allergies   Allergen Reactions    Codeine Rash    Hydrocodone-Acetaminophen Nausea And Vomiting     Fam HX: family history includes Breast Cancer in her maternal aunt; Breast Cancer (age of onset: 58) in her sister; Cancer in her brother and brother; Diabetes in her brother, maternal grandmother, mother, and sister; Heart Disease in her maternal grandfather and mother; High Blood Pressure in her brother, father, and sister; Stroke in her brother and sister.   Soc HX:   Social History     Socioeconomic History    Marital status:    Occupational History    Occupation: employed-[employer]   Tobacco Use    Smoking status: Former     Packs/day: 0.50     Years: 32.00     Pack years: 16.00     Types: Cigarettes     Start date:      Quit date: 2021     Years since quittin.7    Smokeless tobacco: Never    Tobacco comments:     currently 0.5 PPD   Vaping Use    Vaping Use: Former    Substances: Nicotine, Flavoring   Substance and Sexual Activity    Alcohol use: No     Alcohol/week: 0.0 standard drinks     Comment: average \"2 times per month glass of wine\"    Drug use: Not Currently Types: Marijuana (Weed)     Comment: \"last used 50+ yrs ago \" per pt on 7/9/2021\"yrs ago did do cocaine -1980's\"    Sexual activity: Not Currently     Partners: Male     Social Determinants of Health     Financial Resource Strain: Low Risk     Difficulty of Paying Living Expenses: Not very hard   Food Insecurity: No Food Insecurity    Worried About Running Out of Food in the Last Year: Never true    Ran Out of Food in the Last Year: Never true   Transportation Needs: No Transportation Needs    Lack of Transportation (Medical): No    Lack of Transportation (Non-Medical): No   Physical Activity: Insufficiently Active    Days of Exercise per Week: 2 days    Minutes of Exercise per Session: 10 min   Stress: No Stress Concern Present    Feeling of Stress :  Only a little   Social Connections: Socially Isolated    Frequency of Communication with Friends and Family: More than three times a week    Frequency of Social Gatherings with Friends and Family: More than three times a week    Attends Religion Services: Never    Active Member of Clubs or Organizations: No    Attends Club or Organization Meetings: Never    Marital Status:    Housing Stability: [address] to Pay for Housing in the Last Year: No    Number of Places Lived in the Last Year: 1    Unstable Housing in the Last Year: No       Medications:   Medications:    apixaban  5 mg Oral BID    aspirin  81 mg Oral Daily    carvedilol  3.125 mg Oral BID    insulin detemir  35 Units SubCUTAneous Nightly    pravastatin  40 mg Oral Daily    sodium chloride flush  5-40 mL IntraVENous 2 times per day    albuterol sulfate HFA  2 puff Inhalation 4x daily    predniSONE  40 mg Oral Daily    insulin lispro  0-8 Units SubCUTAneous TID WC    insulin lispro  0-4 Units SubCUTAneous Nightly      Infusions:    sodium chloride      dextrose       PRN Meds: sodium chloride flush, 10 mL, PRN  sodium chloride, , PRN  ondansetron, 4 mg, Q8H PRN   Or  ondansetron, 4 mg, Q6H PRN  polyethylene glycol, 17 g, Daily PRN  nicotine polacrilex, 2 mg, Q1H PRN  acetaminophen, 650 mg, Q6H PRN   Or  acetaminophen, 650 mg, Q6H PRN  glucose, 4 tablet, PRN  dextrose bolus, 125 mL, PRN   Or  dextrose bolus, 250 mL, PRN  glucagon (rDNA), 1 mg, PRN  dextrose, , Continuous PRN      Labs      CBC: No results for input(s): WBC, HGB, PLT in the last 72 hours. BMP:  No results for input(s): NA, K, CL, CO2, BUN, CREATININE, GLUCOSE in the last 72 hours. Hepatic: No results for input(s): AST, ALT, ALB, BILITOT, ALKPHOS in the last 72 hours. Lipids:   Lab Results   Component Value Date/Time    CHOL 256 06/08/2021 09:30 AM    CHOL 309 03/17/2021 11:33 AM    HDL 65 06/08/2021 09:30 AM    TRIG 104 06/08/2021 09:30 AM     Hemoglobin A1C:   Lab Results   Component Value Date/Time    LABA1C 10.9 02/16/2023 04:30 PM     TSH:   Lab Results   Component Value Date/Time    TSH 1.99 05/19/2016 08:34 AM     Troponin:   Lab Results   Component Value Date/Time    TROPONINT 0.032 06/08/2021 09:30 AM    TROPONINT 0.020 06/07/2021 09:30 PM    TROPONINT 0.025 06/07/2021 03:52 PM     Lactic Acid: No results for input(s): LACTA in the last 72 hours. BNP: No results for input(s): PROBNP in the last 72 hours.   UA:  Lab Results   Component Value Date/Time    NITRU NEGATIVE 09/09/2021 10:21 AM    COLORU YELLOW 09/09/2021 10:21 AM    WBCUA 2 09/09/2021 10:21 AM    RBCUA <1 09/09/2021 10:21 AM    MUCUS RARE 09/09/2021 10:21 AM    TRICHOMONAS NONE SEEN 09/09/2021 10:21 AM    BACTERIA NEGATIVE 09/09/2021 10:21 AM    CLARITYU CLEAR 09/09/2021 10:21 AM    SPECGRAV 1.015 09/09/2021 10:21 AM    LEUKOCYTESUR NEGATIVE 09/09/2021 10:21 AM    UROBILINOGEN NEGATIVE 09/09/2021 10:21 AM    BILIRUBINUR NEGATIVE 09/09/2021 10:21 AM    BLOODU NEGATIVE 09/09/2021 10:21 AM    KETUA NEGATIVE 09/09/2021 10:21 AM     Urine Cultures:   Lab Results   Component Value Date/Time    LABURIN 50mg/dL 03/15/2021 02:50 PM     Blood Cultures: No results found for: BC  No results found for: BLOODCULT2  Organism: No results found for: ORG    Imaging/Diagnostics Last 24 Hours   No results found.     Personally reviewed Lab Studies, Imaging, and discussed case with Dr. Heena Thompson    Electronically signed by CLIVE Hamilton CNP on 3/17/2023 at 9:58 AM

## 2023-03-18 VITALS
SYSTOLIC BLOOD PRESSURE: 127 MMHG | BODY MASS INDEX: 41.12 KG/M2 | OXYGEN SATURATION: 95 % | HEART RATE: 86 BPM | WEIGHT: 204 LBS | DIASTOLIC BLOOD PRESSURE: 59 MMHG | RESPIRATION RATE: 22 BRPM | TEMPERATURE: 97.4 F | HEIGHT: 59 IN

## 2023-03-18 LAB
GLUCOSE BLD-MCNC: 128 MG/DL (ref 70–99)
GLUCOSE BLD-MCNC: 311 MG/DL (ref 70–99)

## 2023-03-18 PROCEDURE — 6370000000 HC RX 637 (ALT 250 FOR IP): Performed by: STUDENT IN AN ORGANIZED HEALTH CARE EDUCATION/TRAINING PROGRAM

## 2023-03-18 PROCEDURE — 94660 CPAP INITIATION&MGMT: CPT

## 2023-03-18 PROCEDURE — 94761 N-INVAS EAR/PLS OXIMETRY MLT: CPT

## 2023-03-18 PROCEDURE — 94640 AIRWAY INHALATION TREATMENT: CPT

## 2023-03-18 PROCEDURE — 6370000000 HC RX 637 (ALT 250 FOR IP): Performed by: NURSE PRACTITIONER

## 2023-03-18 PROCEDURE — 82962 GLUCOSE BLOOD TEST: CPT

## 2023-03-18 RX ORDER — GUAIFENESIN 600 MG/1
600 TABLET, EXTENDED RELEASE ORAL 2 TIMES DAILY
Qty: 10 TABLET | Refills: 0 | Status: SHIPPED | OUTPATIENT
Start: 2023-03-18 | End: 2023-03-23

## 2023-03-18 RX ORDER — GUAIFENESIN 600 MG/1
600 TABLET, EXTENDED RELEASE ORAL 2 TIMES DAILY
Status: DISCONTINUED | OUTPATIENT
Start: 2023-03-18 | End: 2023-03-18 | Stop reason: HOSPADM

## 2023-03-18 RX ORDER — PREDNISONE 20 MG/1
40 TABLET ORAL DAILY
Qty: 6 TABLET | Refills: 0 | Status: SHIPPED | OUTPATIENT
Start: 2023-03-19 | End: 2023-03-22

## 2023-03-18 RX ADMIN — GUAIFENESIN 600 MG: 600 TABLET, EXTENDED RELEASE ORAL at 12:34

## 2023-03-18 RX ADMIN — CARVEDILOL 3.12 MG: 6.25 TABLET, FILM COATED ORAL at 08:41

## 2023-03-18 RX ADMIN — ASPIRIN 81 MG: 81 TABLET, COATED ORAL at 08:41

## 2023-03-18 RX ADMIN — ALBUTEROL SULFATE 2 PUFF: 90 AEROSOL, METERED RESPIRATORY (INHALATION) at 07:17

## 2023-03-18 RX ADMIN — PREDNISONE 40 MG: 20 TABLET ORAL at 08:40

## 2023-03-18 RX ADMIN — APIXABAN 5 MG: 5 TABLET, FILM COATED ORAL at 08:41

## 2023-03-18 RX ADMIN — INSULIN LISPRO 6 UNITS: 100 INJECTION, SOLUTION INTRAVENOUS; SUBCUTANEOUS at 12:35

## 2023-03-18 RX ADMIN — ALBUTEROL SULFATE 2 PUFF: 90 AEROSOL, METERED RESPIRATORY (INHALATION) at 11:09

## 2023-03-18 ASSESSMENT — PAIN SCALES - GENERAL
PAINLEVEL_OUTOF10: 0

## 2023-03-18 ASSESSMENT — PAIN SCALES - WONG BAKER
WONGBAKER_NUMERICALRESPONSE: 0
WONGBAKER_NUMERICALRESPONSE: 0

## 2023-03-18 NOTE — PROGRESS NOTES
Walking O2 evaluation completed. Pt walked >200ft in hallway, spO2 90-93% when walking on room air. No need for oxygen therapy during walk. Pt back in bed. Call light within reach.

## 2023-03-18 NOTE — DISCHARGE SUMMARY
V2.0  Discharge Summary    Name:  Kat Rey /Age/Sex: 1950 (44 y.o. female)   Admit Date: 3/17/2023  Discharge Date: 3/18/23    MRN & CSN:  [id number] & [id number] Encounter Date and Time 3/18/23 10:55 AM EDT    Attending:  Alina Alston MD Discharging Provider: Alina Alston MD       Hospital Course:     Brief HPI: Kat Rey is a 71 y.o. female with pmh of hypertension, type 2 diabetes, mitral valve replacement , atrial flutter on eliquis, copd, sleep apnea who presents with shortness of breath. Pt stated the symptoms started 2 days ago. Associate with dry cough. No chills and fever. Pt visited [facility] ER yesterday. Pt was found positive Covid and hypoxia with ambulation. Pt's chest xray showed possible early congestion. Pt's BNP level was 91. No legs swelling. Pt was transferred to [facility] for further evaluation    Brief Problem Based Course:   She was being managed for Covid 19 infection, her main symptoms were cough which is dry, She was started on prednisone 40 mg Daily we will finish the course for 5 days on discharge, She has been diagnosed with sleep apnea recently, needs to set up an appt with PCP and Pulmonology to get the CPAP. Return to ER if symptoms worsen, 6MWT was done for O2 eval but she did not require any Oxygen with ambulation. The patient expressed appropriate understanding of, and agreement with the discharge recommendations, medications, and plan.      Consults this admission:  None    Discharge Diagnosis:   COVID      Discharge Instruction:   Follow up appointments:   Primary care physician: Ashly Haddad MD within 2 weeks  Diet: regular diet   Activity: activity as tolerated  Disposition: Discharged to:   [x]Home, []HHC, []SNF, []Acute Rehab, []Hospice  Condition on discharge: Stable  Labs and Tests to be Followed up as an outpatient by PCP or Specialist:    Discharge Medications:        Medication List        START taking these medications predniSONE 20 MG tablet  Commonly known as: DELTASONE  Take 2 tablets by mouth daily for 3 days  Start taking on: March 19, 2023            Jasper Sahrif taking these medications      apixaban 5 MG Tabs tablet  Commonly known as: Eliquis  TAKE ONE (1) TABLET BY MOUTH TWO TIMES DAILY     aspirin 81 MG EC tablet  Take 1 tablet by mouth daily     blood glucose monitor kit and supplies  Dispense sufficient amount for indicated testing frequency plus additional to accommodate PRN testing needs. Dispense all needed supplies to include: monitor, strips, lancing device, lancets, control solutions, alcohol swabs. blood glucose test strips  TIDAC and QHS     carvedilol 3.125 MG tablet  Commonly known as: COREG  TAKE ONE (1) TABLET BY MOUTH TWO TIMES DAILY     Insulin Pen Needle 30G X 8 MM Misc  1 each by Does not apply route daily     Lancets Misc  TIDAC & QHS     Levemir FlexTouch 100 UNIT/ML injection pen  Generic drug: insulin detemir  INJECT 35 UNITS UNDER THE SKIN EVERY NIGHT     multivitamin Tabs tablet  Take 1 tablet by mouth daily (with breakfast)     pravastatin 40 MG tablet  Commonly known as: PRAVACHOL  Take 1 tablet by mouth daily     SITagliptin 100 MG tablet  Commonly known as: JANUVIA  Take 1 tablet by mouth daily     Trulicity 8.92 VANESA/2.9TA Sopn  Generic drug: Dulaglutide  Inject 0.75 mg into the skin once a week               Where to Get Your Medications        These medications were sent to [address] [place] [address]. [address] [place], [address]      Phone: [phone]   predniSONE 20 MG tablet        Objective Findings at Discharge:   BP (!) 136/55   Pulse 76   Temp 97.7 °F (36.5 °C) (Oral)   Resp 17   Ht 4' 11\" (1.499 m)   Wt 204 lb (92.5 kg)   LMP 07/09/2000   SpO2 96%   BMI 41.20 kg/m²       Physical Exam:   Physical Exam  Constitutional:       General: She is not in acute distress.      Appearance: She is obese. She is not diaphoretic. HENT:      Head: Normocephalic and atraumatic. Right Ear: Tympanic membrane normal.      Left Ear: Tympanic membrane normal.      Nose: Nose normal.      Mouth/Throat:      Mouth: Mucous membranes are moist.      Pharynx: Oropharynx is clear. Eyes:      Pupils: Pupils are equal, round, and reactive to light. Cardiovascular:      Rate and Rhythm: Normal rate and regular rhythm. Pulses: Normal pulses. Heart sounds: Normal heart sounds. No murmur heard. No friction rub. No gallop. Pulmonary:      Effort: Pulmonary effort is normal. No respiratory distress. Breath sounds: Normal breath sounds. No wheezing or rales. Abdominal:      General: Abdomen is flat. Bowel sounds are normal. There is no distension. Palpations: Abdomen is soft. Tenderness: There is no abdominal tenderness. There is no guarding. Musculoskeletal:         General: No swelling, tenderness or deformity. Normal range of motion. Cervical back: Normal range of motion and neck supple. Right lower leg: No edema. Left lower leg: No edema. Skin:     General: Skin is warm. Coloration: Skin is not jaundiced or pale. Findings: No bruising, erythema, lesion or rash. Neurological:      General: No focal deficit present. Mental Status: She is alert and oriented to person, place, and time. Mental status is at baseline. Cranial Nerves: No cranial nerve deficit. Sensory: No sensory deficit. Motor: No weakness. Psychiatric:         Mood and Affect: Mood normal.            Labs and Imaging   No results found. CBC: No results for input(s): WBC, HGB, PLT in the last 72 hours. BMP:  No results for input(s): NA, K, CL, CO2, BUN, CREATININE, GLUCOSE in the last 72 hours. Hepatic: No results for input(s): AST, ALT, ALB, BILITOT, ALKPHOS in the last 72 hours.   Lipids:   Lab Results   Component Value Date/Time    CHOL 256 06/08/2021 09:30 AM CHOL 309 03/17/2021 11:33 AM    HDL 65 06/08/2021 09:30 AM    TRIG 104 06/08/2021 09:30 AM     Hemoglobin A1C:   Lab Results   Component Value Date/Time    LABA1C 9.0 03/17/2023 10:32 AM     TSH:   Lab Results   Component Value Date/Time    TSH 1.99 05/19/2016 08:34 AM     Troponin:   Lab Results   Component Value Date/Time    TROPONINT 0.032 06/08/2021 09:30 AM    TROPONINT 0.020 06/07/2021 09:30 PM    TROPONINT 0.025 06/07/2021 03:52 PM     Lactic Acid: No results for input(s): LACTA in the last 72 hours. BNP: No results for input(s): PROBNP in the last 72 hours.   UA:  Lab Results   Component Value Date/Time    NITRU NEGATIVE 09/09/2021 10:21 AM    COLORU YELLOW 09/09/2021 10:21 AM    WBCUA 2 09/09/2021 10:21 AM    RBCUA <1 09/09/2021 10:21 AM    MUCUS RARE 09/09/2021 10:21 AM    TRICHOMONAS NONE SEEN 09/09/2021 10:21 AM    BACTERIA NEGATIVE 09/09/2021 10:21 AM    CLARITYU CLEAR 09/09/2021 10:21 AM    SPECGRAV 1.015 09/09/2021 10:21 AM    LEUKOCYTESUR NEGATIVE 09/09/2021 10:21 AM    UROBILINOGEN NEGATIVE 09/09/2021 10:21 AM    BILIRUBINUR NEGATIVE 09/09/2021 10:21 AM    BLOODU NEGATIVE 09/09/2021 10:21 AM    KETUA NEGATIVE 09/09/2021 10:21 AM     Urine Cultures:   Lab Results   Component Value Date/Time    LABURIN 50mg/dL 03/15/2021 02:50 PM     Blood Cultures: No results found for: BC  No results found for: BLOODCULT2  Organism: No results found for: ORG    Time Spent Discharging patient 30 minutes    Electronically signed by Jenn Alford MD on 3/18/2023 at 10:55 AM  ]

## 2023-03-18 NOTE — DISCHARGE INSTR - DIET

## 2023-03-18 NOTE — PLAN OF CARE
Problem: Chronic Conditions and Co-morbidities  Goal: Patient's chronic conditions and co-morbidity symptoms are monitored and maintained or improved  3/17/2023 2237 by Brooklyn Rodas RN  Outcome: Progressing  3/17/2023 1100 by Evangelina Frost RN  Outcome: Progressing     Problem: Safety - Adult  Goal: Free from fall injury  Outcome: Progressing     Problem: Discharge Planning  Goal: Discharge to home or other facility with appropriate resources  Outcome: Progressing

## 2023-03-20 ENCOUNTER — TELEPHONE (OUTPATIENT)
Dept: FAMILY MEDICINE CLINIC | Age: 73
End: 2023-03-20

## 2023-03-20 NOTE — TELEPHONE ENCOUNTER
Care Transitions Initial Follow Up Call    Outreach made within 2 business days of discharge: Yes    Patient: Jarrod Stringer Patient : 1950   MRN: [id number]  Reason for Admission: There are no discharge diagnoses documented for the most recent discharge.   Discharge Date: 3/18/23       Spoke with: left a vm for pt to call to schedule apt\    Discharge department/facility: [facility] Interactive Patient Contact:      Scheduled appointment with PCP within 7-14 days    Follow Up  Future Appointments   Date Time Provider Jacque Adhikari   2023  2:30 PM MD Joelle Garcia   2023 10:30 AM MD Joelle Garcia MA

## 2023-03-21 ENCOUNTER — CARE COORDINATION (OUTPATIENT)
Dept: CASE MANAGEMENT | Age: 73
End: 2023-03-21

## 2023-03-21 NOTE — CARE COORDINATION
Care Transitions Outreach Attempt    Call within 2 business days of discharge: Yes     Patient: Parveen Griffin Patient : 1950 MRN: [id number]    Last Discharge [address]       Date Complaint Diagnosis Description Type Department Provider    3/17/23   Admission (Discharged) Joss Castellanos MD          Discharge Facility: [facility]    Noted following upcoming appointments from discharge chart review:   [facility] follow up appointment(s):   Future Appointments   Date Time Provider Jacque Adhikari   2023  2:30 PM MD Joelle Johnson   2023 10:30 AM MD Joelle Johnson     1st attempt to reach for Covid 19 Monitoring discharge call unsuccessful. HIPAA compliant message left requesting call back.  [address] [place] [address] [phone]

## 2023-03-22 ENCOUNTER — CARE COORDINATION (OUTPATIENT)
Dept: CASE MANAGEMENT | Age: 73
End: 2023-03-22

## 2023-03-22 DIAGNOSIS — U07.1 COVID-19: Primary | ICD-10-CM

## 2023-03-22 PROCEDURE — 1111F DSCHRG MED/CURRENT MED MERGE: CPT | Performed by: FAMILY MEDICINE

## 2023-03-22 NOTE — CARE COORDINATION
call in 5-7 days based on severity of symptoms and risk factors.   Plan for next call: symptom management-covid sx?  follow-up appointment-confirm pcp appt  medication management-Prednisone  referrals-RPM- declined    Herbert Longoria RN

## 2023-03-28 NOTE — PROGRESS NOTES
- Clinically unable to determine / Unknown  -- Refer to Clinical Documentation Reviewer    PROVIDER RESPONSE TEXT:    Acute Respiratory Failure has been ruled out after study.     Query created by: Omar Duran on 3/23/2023 8:16 AM      Electronically signed by:  Destinee Lipscomb MD 3/27/2023 10:18 PM

## 2023-03-29 ENCOUNTER — CARE COORDINATION (OUTPATIENT)
Dept: CASE MANAGEMENT | Age: 73
End: 2023-03-29

## 2023-03-29 NOTE — CARE COORDINATION
Care Transitions Outreach Attempt    Call within 2 business days of discharge: No   Attempted to reach patient for transitions of care follow up. Unable to reach patient. Patient: Jamil Levine Patient : 1950 MRN: <[id number]>    Last Discharge  Street       Date Complaint Diagnosis Description Type Department Provider    3/17/23   Admission (Discharged) Zhao Ghosh MD              Was this an external facility discharge?  No Discharge Facility: NA    Noted following upcoming appointments from discharge chart review:   [facility] follow up appointment(s):   Future Appointments   Date Time Provider Jacque Adhikari   2023  9:30 AM MD Joelle Crystal   2023 10:30 AM MD Joelle Crystal     [facility] follow up appointment(s): NA

## 2023-04-04 DIAGNOSIS — E11.9 TYPE 2 DIABETES MELLITUS WITHOUT COMPLICATION, WITH LONG-TERM CURRENT USE OF INSULIN (HCC): ICD-10-CM

## 2023-04-04 DIAGNOSIS — Z79.4 TYPE 2 DIABETES MELLITUS WITHOUT COMPLICATION, WITH LONG-TERM CURRENT USE OF INSULIN (HCC): ICD-10-CM

## 2023-04-04 DIAGNOSIS — E78.2 MIXED HYPERLIPIDEMIA: ICD-10-CM

## 2023-04-04 LAB
ALBUMIN SERPL-MCNC: 3.8 G/DL (ref 3.4–5)
ALBUMIN/GLOB SERPL: 1.2 {RATIO} (ref 1.1–2.2)
ALP SERPL-CCNC: 97 U/L (ref 40–129)
ALT SERPL-CCNC: 14 U/L (ref 10–40)
ANION GAP SERPL CALCULATED.3IONS-SCNC: 11 MMOL/L (ref 3–16)
AST SERPL-CCNC: 14 U/L (ref 15–37)
BILIRUB SERPL-MCNC: 0.4 MG/DL (ref 0–1)
BUN SERPL-MCNC: 14 MG/DL (ref 7–20)
CALCIUM SERPL-MCNC: 9.4 MG/DL (ref 8.3–10.6)
CHLORIDE SERPL-SCNC: 104 MMOL/L (ref 99–110)
CHOLEST SERPL-MCNC: 287 MG/DL (ref 0–199)
CO2 SERPL-SCNC: 25 MMOL/L (ref 21–32)
CREAT SERPL-MCNC: 0.7 MG/DL (ref 0.6–1.2)
GFR SERPLBLD CREATININE-BSD FMLA CKD-EPI: >60 ML/MIN/{1.73_M2}
GLUCOSE P FAST SERPL-MCNC: 90 MG/DL (ref 70–99)
HDLC SERPL-MCNC: 64 MG/DL (ref 40–60)
LDLC SERPL CALC-MCNC: 201 MG/DL
POTASSIUM SERPL-SCNC: 4.1 MMOL/L (ref 3.5–5.1)
PROT SERPL-MCNC: 7 G/DL (ref 6.4–8.2)
SODIUM SERPL-SCNC: 140 MMOL/L (ref 136–145)
TRIGL SERPL-MCNC: 109 MG/DL (ref 0–150)
VLDLC SERPL CALC-MCNC: 22 MG/DL

## 2023-04-05 ENCOUNTER — OFFICE VISIT (OUTPATIENT)
Dept: FAMILY MEDICINE CLINIC | Age: 73
End: 2023-04-05
Payer: COMMERCIAL

## 2023-04-05 VITALS
HEIGHT: 59 IN | HEART RATE: 85 BPM | WEIGHT: 203.8 LBS | OXYGEN SATURATION: 95 % | DIASTOLIC BLOOD PRESSURE: 70 MMHG | BODY MASS INDEX: 41.08 KG/M2 | SYSTOLIC BLOOD PRESSURE: 136 MMHG

## 2023-04-05 DIAGNOSIS — Z79.4 TYPE 2 DIABETES MELLITUS WITHOUT COMPLICATION, WITH LONG-TERM CURRENT USE OF INSULIN (HCC): Primary | ICD-10-CM

## 2023-04-05 DIAGNOSIS — E78.2 MIXED HYPERLIPIDEMIA: ICD-10-CM

## 2023-04-05 DIAGNOSIS — E11.9 TYPE 2 DIABETES MELLITUS WITHOUT COMPLICATION, WITH LONG-TERM CURRENT USE OF INSULIN (HCC): Primary | ICD-10-CM

## 2023-04-05 DIAGNOSIS — I10 PRIMARY HYPERTENSION: ICD-10-CM

## 2023-04-05 LAB — HBA1C MFR BLD: 9.1 %

## 2023-04-05 PROCEDURE — 99213 OFFICE O/P EST LOW 20 MIN: CPT | Performed by: FAMILY MEDICINE

## 2023-04-05 PROCEDURE — 3078F DIAST BP <80 MM HG: CPT | Performed by: FAMILY MEDICINE

## 2023-04-05 PROCEDURE — 3052F HG A1C>EQUAL 8.0%<EQUAL 9.0%: CPT | Performed by: FAMILY MEDICINE

## 2023-04-05 PROCEDURE — 1123F ACP DISCUSS/DSCN MKR DOCD: CPT | Performed by: FAMILY MEDICINE

## 2023-04-05 PROCEDURE — 3075F SYST BP GE 130 - 139MM HG: CPT | Performed by: FAMILY MEDICINE

## 2023-04-05 RX ORDER — EZETIMIBE 10 MG/1
10 TABLET ORAL DAILY
Qty: 30 TABLET | Refills: 5 | Status: SHIPPED | OUTPATIENT
Start: 2023-04-05

## 2023-04-05 ASSESSMENT — ENCOUNTER SYMPTOMS
WHEEZING: 0
COUGH: 0
SHORTNESS OF BREATH: 0
BACK PAIN: 0
ABDOMINAL PAIN: 0

## 2023-04-05 NOTE — PROGRESS NOTES
History     Socioeconomic History    Marital status:      Spouse name: Not on file    Number of children: Not on file    Years of education: Not on file    Highest education level: Not on file   Occupational History    Occupation: employed-[employer]   Tobacco Use    Smoking status: Former     Packs/day: 0.50     Years: 32.00     Pack years: 16.00     Types: Cigarettes     Start date: 13     Quit date: 2021     Years since quittin.8    Smokeless tobacco: Never    Tobacco comments:     currently 0.5 PPD   Vaping Use    Vaping Use: Former    Substances: Nicotine, Flavoring   Substance and Sexual Activity    Alcohol use: No     Alcohol/week: 0.0 standard drinks     Comment: average \"2 times per month glass of wine\"    Drug use: Not Currently     Types: Marijuana (Weed)     Comment: \"last used 50+ yrs ago \" per pt on 2021\"yrs ago did do cocaine -\"    Sexual activity: Not Currently     Partners: Male   Other Topics Concern    Not on file   Social History Narrative    Not on file     Social Determinants of Health     Financial Resource Strain: Low Risk     Difficulty of Paying Living Expenses: Not very hard   Food Insecurity: No Food Insecurity    Worried About Running Out of Food in the Last Year: Never true    Ran Out of Food in the Last Year: Never true   Transportation Needs: No Transportation Needs    Lack of Transportation (Medical): No    Lack of Transportation (Non-Medical): No   Physical Activity: Insufficiently Active    Days of Exercise per Week: 2 days    Minutes of Exercise per Session: 10 min   Stress: No Stress Concern Present    Feeling of Stress :  Only a little   Social Connections: Socially Isolated    Frequency of Communication with Friends and Family: More than three times a week    Frequency of Social Gatherings with Friends and Family: More than three times a week    Attends Congregation Services: Never    Active Member of Clubs or Organizations: No    Attends Club or

## 2023-05-04 DIAGNOSIS — Z79.4 TYPE 2 DIABETES MELLITUS WITHOUT COMPLICATION, WITH LONG-TERM CURRENT USE OF INSULIN (HCC): ICD-10-CM

## 2023-05-04 DIAGNOSIS — E11.9 TYPE 2 DIABETES MELLITUS WITHOUT COMPLICATION, WITH LONG-TERM CURRENT USE OF INSULIN (HCC): ICD-10-CM

## 2023-08-28 ENCOUNTER — HOSPITAL ENCOUNTER (OUTPATIENT)
Dept: WOMENS IMAGING | Age: 73
Discharge: HOME | End: 2023-08-28
Payer: COMMERCIAL

## 2023-08-28 DIAGNOSIS — Z12.31 ENCOUNTER FOR SCREENING MAMMOGRAM FOR MALIGNANT NEOPLASM OF BREAST: ICD-10-CM

## 2023-08-28 PROCEDURE — 77063 BREAST TOMOSYNTHESIS BI: CPT

## 2023-09-07 LAB
BUN / CREAT RATIO: 18 (ref 12–28)
BUN BLDV-MCNC: 14 MG/DL (ref 8–27)
CALCIUM SERPL-MCNC: 9.4 MG/DL (ref 8.7–10.3)
CHLORIDE BLD-SCNC: 103 MMOL/L (ref 96–106)
CO2: 26 MMOL/L (ref 20–29)
CREAT SERPL-MCNC: 0.8 MG/DL (ref 0.57–1)
ESTIMATED GLOMERULAR FILTRATION RATE CREATININE EQUATION: 78 ML/MIN/1.73
GLUCOSE BLD-MCNC: 155 MG/DL (ref 70–99)
LDL CHOLESTEROL DIRECT: 69 MG/DL (ref 0–99)
MAGNESIUM: 2 MG/DL (ref 1.6–2.3)
POTASSIUM SERPL-SCNC: 4.3 MMOL/L (ref 3.5–5.2)
SODIUM BLD-SCNC: 142 MMOL/L (ref 134–144)

## 2023-09-27 LAB
BACTERIA: NORMAL
BILIRUBIN URINE: NEGATIVE
BLOOD, URINE: NEGATIVE
CASTS: NORMAL /LPF
CLARITY: CLEAR
COLOR: YELLOW
EPITHELIAL CELLS, UA: NORMAL /HPF (ref 0–10)
GLUCOSE URINE: ABNORMAL
KETONES, URINE: NEGATIVE
LEUKOCYTE ESTERASE, URINE: NEGATIVE
LEUKOCYTES, UA: NORMAL /HPF (ref 0–5)
MICROSCOPIC EXAMINATION: ABNORMAL
MICROSCOPIC EXAMINATION: ABNORMAL
NITRITE, URINE: NEGATIVE
PH UA: 6 (ref 5–7.5)
PROTEIN UA: NEGATIVE
RBC UA: NORMAL /HPF (ref 0–2)
SPECIFIC GRAVITY UA: 1.02 (ref 1–1.03)
URINALYSIS REFLEX: ABNORMAL
UROBILINOGEN, URINE: 0.2 MG/DL (ref 0.2–1)

## 2024-04-11 ENCOUNTER — OFFICE VISIT (OUTPATIENT)
Dept: SURGERY | Age: 74
End: 2024-04-11
Payer: MEDICARE

## 2024-04-11 ENCOUNTER — TELEPHONE (OUTPATIENT)
Dept: SURGERY | Age: 74
End: 2024-04-11

## 2024-04-11 VITALS
BODY MASS INDEX: 39.51 KG/M2 | OXYGEN SATURATION: 97 % | HEART RATE: 88 BPM | SYSTOLIC BLOOD PRESSURE: 128 MMHG | DIASTOLIC BLOOD PRESSURE: 76 MMHG | HEIGHT: 59 IN | WEIGHT: 196 LBS

## 2024-04-11 DIAGNOSIS — R22.2 MASS OF SKIN OF BACK: Primary | ICD-10-CM

## 2024-04-11 PROCEDURE — 3078F DIAST BP <80 MM HG: CPT | Performed by: SURGERY

## 2024-04-11 PROCEDURE — 3074F SYST BP LT 130 MM HG: CPT | Performed by: SURGERY

## 2024-04-11 PROCEDURE — 1123F ACP DISCUSS/DSCN MKR DOCD: CPT | Performed by: SURGERY

## 2024-04-11 PROCEDURE — 99214 OFFICE O/P EST MOD 30 MIN: CPT | Performed by: SURGERY

## 2024-04-11 ASSESSMENT — PATIENT HEALTH QUESTIONNAIRE - PHQ9
SUM OF ALL RESPONSES TO PHQ QUESTIONS 1-9: 0
SUM OF ALL RESPONSES TO PHQ QUESTIONS 1-9: 0
SUM OF ALL RESPONSES TO PHQ9 QUESTIONS 1 & 2: 0
1. LITTLE INTEREST OR PLEASURE IN DOING THINGS: NOT AT ALL
SUM OF ALL RESPONSES TO PHQ QUESTIONS 1-9: 0
SUM OF ALL RESPONSES TO PHQ QUESTIONS 1-9: 0
2. FEELING DOWN, DEPRESSED OR HOPELESS: NOT AT ALL

## 2024-04-11 NOTE — TELEPHONE ENCOUNTER
Scheduled US Left Lower Back at [facility]  On 4/12/24 at 10:30 with arrival time of 10:00.  Prep: N/A.  F/U scheduled 4/22/24 at 11:15.  Spoke with Avril in office, gave dates/times after today's visit.

## 2024-04-12 ENCOUNTER — HOSPITAL ENCOUNTER (OUTPATIENT)
Dept: ULTRASOUND IMAGING | Age: 74
Discharge: HOME | End: 2024-04-12
Attending: SURGERY
Payer: MEDICARE

## 2024-04-12 DIAGNOSIS — R22.2 MASS OF SKIN OF BACK: ICD-10-CM

## 2024-04-12 PROCEDURE — 76705 ECHO EXAM OF ABDOMEN: CPT

## 2024-04-22 ENCOUNTER — OFFICE VISIT (OUTPATIENT)
Dept: SURGERY | Age: 74
End: 2024-04-22
Payer: MEDICARE

## 2024-04-22 VITALS
HEART RATE: 62 BPM | WEIGHT: 200 LBS | HEIGHT: 59 IN | SYSTOLIC BLOOD PRESSURE: 138 MMHG | OXYGEN SATURATION: 96 % | DIASTOLIC BLOOD PRESSURE: 62 MMHG | BODY MASS INDEX: 40.32 KG/M2

## 2024-04-22 DIAGNOSIS — D17.1 LIPOMA OF BACK: Primary | ICD-10-CM

## 2024-04-22 PROCEDURE — 1123F ACP DISCUSS/DSCN MKR DOCD: CPT | Performed by: PHYSICIAN ASSISTANT

## 2024-04-22 PROCEDURE — 3078F DIAST BP <80 MM HG: CPT | Performed by: PHYSICIAN ASSISTANT

## 2024-04-22 PROCEDURE — 99213 OFFICE O/P EST LOW 20 MIN: CPT | Performed by: PHYSICIAN ASSISTANT

## 2024-04-22 PROCEDURE — 3075F SYST BP GE 130 - 139MM HG: CPT | Performed by: PHYSICIAN ASSISTANT

## 2024-04-22 ASSESSMENT — ENCOUNTER SYMPTOMS
CHOKING: 0
PHOTOPHOBIA: 0
RECTAL PAIN: 0
CONSTIPATION: 0
SORE THROAT: 0
EYE REDNESS: 0
EYE ITCHING: 0
BACK PAIN: 1
STRIDOR: 0
COLOR CHANGE: 0
APNEA: 0
ANAL BLEEDING: 0

## 2024-04-22 NOTE — PROGRESS NOTES
Chief Complaint   Patient presents with    Follow-up     F/up US of lefet lower back @ [facility] 24 , US in Chart          SUBJECTIVE:  HPI: Patient presents today with complaints of soft tissue mass that is tender to the touch and has been present for several years. Reports some hip and leg pain that she believes may be related to the mass.     Pt with recent US that showed no soft tissue changes.    I have reviewed the patient's (pertinent information to this visit) medical history, family history (scanned in  the Media tab under \"patient questioner\"), social history and review of systems with the patient today in the office.            Past Surgical History:   Procedure Laterality Date    AORTIC VALVE REPLACEMENT N/A 2021    AORTIC VALVE REPLACEMENT AND AORTIC ROOT ENLARGEMENT performed by Kiet Chen MD at [facility] OR    BREAST BIOPSY      BREAST SURGERY      left breast bx    CARDIAC CATHETERIZATION  2021     SECTION   and ( with BPS)    x2    DILATION AND CURETTAGE OF UTERUS      HERNIA REPAIR      umbilical hernia\"think done when I was a teenager    OTHER SURGICAL HISTORY  12/10/13    Left AC lipoma excision    OTHER SURGICAL HISTORY  12/10/13    Left flank lipoma excision     Past Medical History:   Diagnosis Date    Aortic valve stenosis     Arthritis     Bronchitis 2021    Diabetes mellitus (HCC)     dx age 50's    Heart murmur     Hx of drug abuse (HCC)     \"did cocaine back in s    Hypertension     Irregular heart beat     \"they just say I skip a beat , I think from the heart murmur- not sure\" follow with Dr Vannesa Flynn    LVH (left ventricular hypertrophy)     hx per old chart    Mixed hyperlipidemia 2016    Wears dentures     full upper plate    Wears glasses     to read     Family History   Problem Relation Age of Onset    Diabetes Mother     Heart Disease Mother     High Blood Pressure Father     Diabetes Sister     High Blood Pressure Sister     Stroke Sister

## 2024-04-23 ENCOUNTER — TELEPHONE (OUTPATIENT)
Dept: SURGERY | Age: 74
End: 2024-04-23

## 2024-04-23 NOTE — TELEPHONE ENCOUNTER
TOM GAO Patient    Member ID  [id number]  Date of Birth  1950  Gender  NA    Eligibility Status  A  Group Number  [id number]  Plan / Coverage Date  2024-01-01 - 2199-12-31    Transaction Type  Outpatient Authorization  Organization  [facility]  Payer  [facility]      Transaction ID: [id number]  Customer ID: [id number]  Transaction Date: 2024-04-23  ×    Based on information entered, no authorization is required.  No Authorization Required  Service Type  2 - Surgical  Place of Service  22 - [facility]  Service From - To Date  2024-05-10 - 2024-07-31    Quantity  1 Units    Level of Service  Elective    Diagnosis Code 1  D171 - Benign lipomatous neoplasm of skin subcu of trunk    Procedure Code 1  36464 - EXC BACK LES SC < 3 CM  Quantity  1 Units    Status  NO AUTH REQUIRED

## 2024-04-24 ENCOUNTER — TELEPHONE (OUTPATIENT)
Dept: BARIATRICS/WEIGHT MGMT | Age: 74
End: 2024-04-24

## 2024-04-24 NOTE — TELEPHONE ENCOUNTER
SPOKE TO  Avril King REGARDING SURGERY left back excision SCHEDULED @ [facility]. NOTIFIED OF DATES, TIMES AND LOCATION    PHONE ASSESSMENT   SURGERY -  5/10/24 1145  P/O - 5/23/24 10:15    NPO AFTER MIDNIGHT    HOLD BLOOD THINNERS -  hold for 5 days elquis

## 2024-04-29 ASSESSMENT — ENCOUNTER SYMPTOMS
RECTAL PAIN: 0
APNEA: 0
STRIDOR: 0
SORE THROAT: 0
CHOKING: 0
EYE ITCHING: 0
ANAL BLEEDING: 0
BACK PAIN: 1
CONSTIPATION: 0
PHOTOPHOBIA: 0
COLOR CHANGE: 0
EYE REDNESS: 0

## 2024-04-29 NOTE — PROGRESS NOTES
Chief Complaint   Patient presents with    New Patient     NP- Lump of skin - back-Ref: Bonita Flynn         SUBJECTIVE:  HPI: Patient is here with complaints of left lower back lump.  Patient has been feeling some soreness with pressure without any radiation.  Has been present for past 3 months.  Patient denies any trauma to this area.  She is on Eliquis without any bruising noted in the area..    I have reviewed the patient's(pertinent information to this visit) medical history, family history(scanned in  the Mediatab under \"patient questioner\"), social history and review of systems with the patient today in the office.            Past Surgical History:   Procedure Laterality Date    AORTIC VALVE REPLACEMENT N/A 2021    AORTIC VALVE REPLACEMENT AND AORTIC ROOT ENLARGEMENT performed by Kiet Chen MD at [facility] OR    BREAST BIOPSY      BREAST SURGERY  2019    left breast bx    CARDIAC CATHETERIZATION  2021     SECTION   and ( with BPS)    x2    DILATION AND CURETTAGE OF UTERUS      HERNIA REPAIR      umbilical hernia\"think done when I was a teenager    OTHER SURGICAL HISTORY  12/10/13    Left AC lipoma excision    OTHER SURGICAL HISTORY  12/10/13    Left flank lipoma excision     Past Medical History:   Diagnosis Date    Aortic valve stenosis     Arthritis     Bronchitis 2021    Diabetes mellitus (HCC)     dx age 50's    Heart murmur     Hx of drug abuse (HCC)     \"did cocaine back in     Hypertension     Irregular heart beat     \"they just say I skip a beat , I think from the heart murmur- not sure\" follow with Dr Vannesa Flynn    LVH (left ventricular hypertrophy)     hx per old chart    Mixed hyperlipidemia 2016    Wears dentures     full upper plate    Wears glasses     to read     Family History   Problem Relation Age of Onset    Diabetes Mother     Heart Disease Mother     High Blood Pressure Father     Diabetes Sister     High Blood Pressure Sister     Stroke Sister

## 2024-04-29 NOTE — H&P (VIEW-ONLY)
Chief Complaint   Patient presents with    New Patient     NP- Lump of skin - back-Ref: Bonita Flynn         SUBJECTIVE:  HPI: Patient is here with complaints of left lower back lump.  Patient has been feeling some soreness with pressure without any radiation.  Has been present for past 3 months.  Patient denies any trauma to this area.  She is on Eliquis without any bruising noted in the area..    I have reviewed the patient's(pertinent information to this visit) medical history, family history(scanned in  the Mediatab under \"patient questioner\"), social history and review of systems with the patient today in the office.            Past Surgical History:   Procedure Laterality Date    AORTIC VALVE REPLACEMENT N/A 2021    AORTIC VALVE REPLACEMENT AND AORTIC ROOT ENLARGEMENT performed [facility] Kiet Chen [facility] [place]    BREAST BIOPSY      BREAST SURGERY  2019    left breast bx    CARDIAC CATHETERIZATION  2021     SECTION   and ( with BPS)    x2    DILATION AND CURETTAGE OF UTERUS      HERNIA REPAIR      umbilical hernia\"think done when I was a teenager    OTHER SURGICAL HISTORY  12/10/13    Left AC lipoma excision    OTHER SURGICAL HISTORY  12/10/13    Left flank lipoma excision     Past Medical History:   Diagnosis Date    Aortic valve stenosis     Arthritis     Bronchitis 2021    Diabetes mellitus (HCC)     dx age 50's    Heart murmur     Hx of drug abuse (HCC)     \"did cocaine back in     Hypertension     Irregular heart beat     \"they just say I skip a beat , I think from the heart murmur- not sure\" follow with Dr Vannesa Flynn    LVH (left ventricular hypertrophy)     hx per old chart    Mixed hyperlipidemia 2016    Wears dentures     full upper plate    Wears glasses     to read     Family History   Problem Relation Age of Onset    Diabetes Mother     Heart Disease Mother     High Blood Pressure Father     Diabetes Sister     High Blood Pressure Sister     Stroke Sister

## 2024-05-06 RX ORDER — FUROSEMIDE 40 MG/1
40 TABLET ORAL DAILY
COMMUNITY

## 2024-05-06 NOTE — PROGRESS NOTES
.Surgery @ [facility] on 5/10/24 you will be called 5/9/24 with times    NOTHING TO EAT OR DRINK AFTER MIDNIGHT DAY OF SURGERY    1. Enter thru the hospital main entrance on day of surgery, check in at the Information Desk. If you arrive prior to 6:00am, enter thru the ER entrance.    2. Follow the directions as prescribed by the doctor for your procedure and medications.         Morning of surgery take: Carvedilol with sips of water          Hold Farxiga 5/9/2024          Stop vitamins, supplements and NSAIDS:  today     3. Check with your Doctor regarding stopping blood thinners and follow their instructions.    4. Do not smoke, vape or use chewing tobacco morning of surgery. Do not drink any alcoholic beverages 24 hours prior to surgery.       This includes NA Beer. No street drugs 7 days prior to surgery.    5. If you have dentures, contacts of glasses they will be removed before going to the OR; please bring a case.    6. Please bring picture ID, insurance card, paperwork from the doctor’s office (H & P, Consent, & card for implantable devices).    7. Take a shower with an antibacterial soap the night before surgery and the morning of surgery. Do not put anything on your skin      After your morning shower.    8. You will need a responsible adult to drive you home and check on you after surgery.

## 2024-05-09 ENCOUNTER — ANESTHESIA EVENT (OUTPATIENT)
Dept: OPERATING ROOM | Age: 74
End: 2024-05-09
Payer: MEDICARE

## 2024-05-09 NOTE — ANESTHESIA PRE PROCEDURE
Department of Anesthesiology  Preprocedure Note       Name:  Avril King   Age:  73 y.o.  :  1950                                          MRN:  [id number]         Date:  2024      Surgeon: Surgeon(s):  Cinthia Solis MD    Procedure: Procedure(s):  LEFT BACK LIPOMA EXCISION    Medications prior to admission:   Prior to Admission medications    Medication Sig Start Date End Date Taking? Authorizing Provider   LOVASTATIN PO Take by mouth   Yes Provider, MD Humaira   furosemide (LASIX) 40 MG tablet Take 1 tablet by mouth daily Takes 1/2 tab daily   Yes Provider, MD Humaira   Dapagliflozin Propanediol (FARXIGA PO) Take by mouth   Yes Provider, MD Humaira   insulin detemir (LEVEMIR FLEXTOUCH) 100 UNIT/ML injection pen INJECT 35 UNITS UNDER THE SKIN EVERY NIGHT  Patient taking differently: INJECT 35 UNITS UNDER THE SKIN EVERY NIGHT     Takes 50 units every night 23   Jacqueline Alanis MD   ezetimibe (ZETIA) 10 MG tablet Take 1 tablet by mouth daily 23   Jacqueline Alanis MD   Dulaglutide (TRULICITY) 0.75 MG/0.5ML SOPN Inject 0.75 mg into the skin once a week 3/9/23   Jacqueline Alanis MD   Lancets MISC TIDAC & QHS 23   Jacqueline Alanis MD   carvedilol (COREG) 3.125 MG tablet TAKE ONE (1) TABLET BY MOUTH TWO TIMES DAILY 23   Jacqueline Alanis MD   Insulin Pen Needle 30G X 8 MM MISC 1 each by Does not apply route daily 23   Jacqueline Alanis MD   apixaban (ELIQUIS) 5 MG TABS tablet TAKE ONE (1) TABLET BY MOUTH TWO TIMES DAILY 23   Jacqueline Alanis MD   SITagliptin (JANUVIA) 100 MG tablet Take 1 tablet by mouth daily  Patient not taking: Reported on 2024   Jacqueline Alanis MD   blood glucose monitor kit and supplies Dispense sufficient amount for indicated testing frequency plus additional to accommodate PRN testing needs. Dispense all needed supplies to include: monitor, strips, lancing device, lancets, control solutions, alcohol swabs. 22   Zeke,

## 2024-05-09 NOTE — PROGRESS NOTES
Patient notified of surgery time at [facility] 5/10/2024 0930 arrival 0730, NPO instructions and DOS meds reviewed, understanding verbalized

## 2024-05-10 ENCOUNTER — ANESTHESIA (OUTPATIENT)
Dept: OPERATING ROOM | Age: 74
End: 2024-05-10
Payer: MEDICARE

## 2024-05-10 ENCOUNTER — HOSPITAL ENCOUNTER (OUTPATIENT)
Age: 74
Setting detail: OUTPATIENT SURGERY
Discharge: HOME | End: 2024-05-10
Attending: SURGERY | Admitting: SURGERY
Payer: MEDICARE

## 2024-05-10 VITALS
HEART RATE: 74 BPM | HEIGHT: 59 IN | WEIGHT: 198 LBS | SYSTOLIC BLOOD PRESSURE: 119 MMHG | DIASTOLIC BLOOD PRESSURE: 59 MMHG | RESPIRATION RATE: 16 BRPM | TEMPERATURE: 97.6 F | OXYGEN SATURATION: 99 % | BODY MASS INDEX: 39.92 KG/M2

## 2024-05-10 DIAGNOSIS — D17.1 LIPOMA OF BACK: ICD-10-CM

## 2024-05-10 LAB
ANION GAP SERPL CALCULATED.3IONS-SCNC: 8 MMOL/L (ref 7–16)
BUN SERPL-MCNC: 16 MG/DL (ref 6–23)
CALCIUM SERPL-MCNC: 9 MG/DL (ref 8.3–10.6)
CHLORIDE BLD-SCNC: 104 MMOL/L (ref 99–110)
CO2: 28 MMOL/L (ref 21–32)
CREAT SERPL-MCNC: 0.6 MG/DL (ref 0.6–1.1)
EKG ATRIAL RATE: 72 BPM
EKG DIAGNOSIS: NORMAL
EKG P AXIS: 56 DEGREES
EKG P-R INTERVAL: 184 MS
EKG Q-T INTERVAL: 394 MS
EKG QRS DURATION: 80 MS
EKG QTC CALCULATION (BAZETT): 431 MS
EKG R AXIS: 15 DEGREES
EKG T AXIS: 81 DEGREES
EKG VENTRICULAR RATE: 72 BPM
GFR, ESTIMATED: >90 ML/MIN/1.73M2
GLUCOSE BLD-MCNC: 96 MG/DL (ref 70–99)
GLUCOSE SERPL-MCNC: 96 MG/DL (ref 70–99)
POTASSIUM SERPL-SCNC: 4 MMOL/L (ref 3.5–5.1)
SODIUM BLD-SCNC: 140 MMOL/L (ref 135–145)

## 2024-05-10 PROCEDURE — 3700000000 HC ANESTHESIA ATTENDED CARE: Performed by: SURGERY

## 2024-05-10 PROCEDURE — 80048 BASIC METABOLIC PNL TOTAL CA: CPT

## 2024-05-10 PROCEDURE — 82962 GLUCOSE BLOOD TEST: CPT

## 2024-05-10 PROCEDURE — 3600000012 HC SURGERY LEVEL 2 ADDTL 15MIN: Performed by: SURGERY

## 2024-05-10 PROCEDURE — 7100000010 HC PHASE II RECOVERY - FIRST 15 MIN: Performed by: SURGERY

## 2024-05-10 PROCEDURE — 6360000002 HC RX W HCPCS: Performed by: PHYSICIAN ASSISTANT

## 2024-05-10 PROCEDURE — 2720000010 HC SURG SUPPLY STERILE: Performed by: SURGERY

## 2024-05-10 PROCEDURE — 2500000003 HC RX 250 WO HCPCS: Performed by: SURGERY

## 2024-05-10 PROCEDURE — 21933 EXC BACK TUM DEEP 5 CM/>: CPT | Performed by: SURGERY

## 2024-05-10 PROCEDURE — 93005 ELECTROCARDIOGRAM TRACING: CPT | Performed by: ANESTHESIOLOGY

## 2024-05-10 PROCEDURE — 2580000003 HC RX 258: Performed by: PHYSICIAN ASSISTANT

## 2024-05-10 PROCEDURE — 3600000002 HC SURGERY LEVEL 2 BASE: Performed by: SURGERY

## 2024-05-10 PROCEDURE — 88304 TISSUE EXAM BY PATHOLOGIST: CPT | Performed by: PATHOLOGY

## 2024-05-10 PROCEDURE — 2580000003 HC RX 258: Performed by: ANESTHESIOLOGY

## 2024-05-10 PROCEDURE — 7100000011 HC PHASE II RECOVERY - ADDTL 15 MIN: Performed by: SURGERY

## 2024-05-10 PROCEDURE — 3700000001 HC ADD 15 MINUTES (ANESTHESIA): Performed by: SURGERY

## 2024-05-10 PROCEDURE — 6360000002 HC RX W HCPCS

## 2024-05-10 PROCEDURE — 93010 ELECTROCARDIOGRAM REPORT: CPT | Performed by: INTERNAL MEDICINE

## 2024-05-10 PROCEDURE — 2709999900 HC NON-CHARGEABLE SUPPLY: Performed by: SURGERY

## 2024-05-10 RX ORDER — SODIUM CHLORIDE, SODIUM LACTATE, POTASSIUM CHLORIDE, CALCIUM CHLORIDE 600; 310; 30; 20 MG/100ML; MG/100ML; MG/100ML; MG/100ML
INJECTION, SOLUTION INTRAVENOUS CONTINUOUS
Status: DISCONTINUED | OUTPATIENT
Start: 2024-05-10 | End: 2024-05-10 | Stop reason: HOSPADM

## 2024-05-10 RX ORDER — SODIUM CHLORIDE 9 MG/ML
INJECTION, SOLUTION INTRAVENOUS PRN
Status: DISCONTINUED | OUTPATIENT
Start: 2024-05-10 | End: 2024-05-10 | Stop reason: HOSPADM

## 2024-05-10 RX ORDER — LIDOCAINE HYDROCHLORIDE 20 MG/ML
INJECTION, SOLUTION INTRAVENOUS PRN
Status: DISCONTINUED | OUTPATIENT
Start: 2024-05-10 | End: 2024-05-10 | Stop reason: SDUPTHER

## 2024-05-10 RX ORDER — SODIUM CHLORIDE 0.9 % (FLUSH) 0.9 %
5-40 SYRINGE (ML) INJECTION PRN
Status: DISCONTINUED | OUTPATIENT
Start: 2024-05-10 | End: 2024-05-10 | Stop reason: HOSPADM

## 2024-05-10 RX ORDER — FENTANYL CITRATE 50 UG/ML
INJECTION, SOLUTION INTRAMUSCULAR; INTRAVENOUS PRN
Status: DISCONTINUED | OUTPATIENT
Start: 2024-05-10 | End: 2024-05-10 | Stop reason: SDUPTHER

## 2024-05-10 RX ORDER — HYDROCODONE BITARTRATE AND ACETAMINOPHEN 5; 325 MG/1; MG/1
1 TABLET ORAL EVERY 6 HOURS PRN
Qty: 12 TABLET | Refills: 0 | Status: SHIPPED | OUTPATIENT
Start: 2024-05-10 | End: 2024-05-13

## 2024-05-10 RX ORDER — SODIUM CHLORIDE 0.9 % (FLUSH) 0.9 %
5-40 SYRINGE (ML) INJECTION EVERY 12 HOURS SCHEDULED
Status: DISCONTINUED | OUTPATIENT
Start: 2024-05-10 | End: 2024-05-10 | Stop reason: HOSPADM

## 2024-05-10 RX ORDER — LIDOCAINE HYDROCHLORIDE 10 MG/ML
INJECTION, SOLUTION EPIDURAL; INFILTRATION; INTRACAUDAL; PERINEURAL
Status: COMPLETED | OUTPATIENT
Start: 2024-05-10 | End: 2024-05-10

## 2024-05-10 RX ORDER — PROPOFOL 10 MG/ML
INJECTION, EMULSION INTRAVENOUS PRN
Status: DISCONTINUED | OUTPATIENT
Start: 2024-05-10 | End: 2024-05-10 | Stop reason: SDUPTHER

## 2024-05-10 RX ADMIN — SODIUM CHLORIDE, POTASSIUM CHLORIDE, SODIUM LACTATE AND CALCIUM CHLORIDE: 600; 310; 30; 20 INJECTION, SOLUTION INTRAVENOUS at 08:26

## 2024-05-10 RX ADMIN — PROPOFOL 30 MG: 10 INJECTION, EMULSION INTRAVENOUS at 09:57

## 2024-05-10 RX ADMIN — PROPOFOL 40 MG: 10 INJECTION, EMULSION INTRAVENOUS at 09:53

## 2024-05-10 RX ADMIN — PROPOFOL 75 MCG/KG/MIN: 10 INJECTION, EMULSION INTRAVENOUS at 09:54

## 2024-05-10 RX ADMIN — LIDOCAINE HYDROCHLORIDE 40 MG: 20 INJECTION, SOLUTION INTRAVENOUS at 09:53

## 2024-05-10 RX ADMIN — FENTANYL CITRATE 50 MCG: 50 INJECTION, SOLUTION INTRAMUSCULAR; INTRAVENOUS at 09:52

## 2024-05-10 RX ADMIN — CEFAZOLIN 2000 MG: 2 INJECTION, POWDER, FOR SOLUTION INTRAMUSCULAR; INTRAVENOUS at 09:56

## 2024-05-10 ASSESSMENT — PAIN SCALES - GENERAL
PAINLEVEL_OUTOF10: 0
PAINLEVEL_OUTOF10: 2
PAINLEVEL_OUTOF10: 0

## 2024-05-10 ASSESSMENT — ENCOUNTER SYMPTOMS: SHORTNESS OF BREATH: 1

## 2024-05-10 ASSESSMENT — PAIN - FUNCTIONAL ASSESSMENT: PAIN_FUNCTIONAL_ASSESSMENT: 0-10

## 2024-05-10 NOTE — DISCHARGE INSTRUCTIONS
Patient Discharge Instructions  Dr. Cinthia Solis  [address] [place] [address]  [phone]    Discharge Date:  5/10/2024    Discharged To:  Home      RESUME ACTIVITY:      BATHING:   OK to shower but no bath tub or submerging incision under water    Wound:    Keep wound dry and clean.  May shower as instructed above.    Dressing:    Change outer dressing daily after shower or if soiled.     DRIVING:   3-5 days. No driving until off narcotic pain medications and     walking comfortably    RETURN TO WORK: when cleared after your follow up office visit    WALKING:    As tolerated     STAIRS:    As tolerated    LIFTING:   Less than 10 pounds for one week    DIET:    [place] diet on day of surgery then regular diet    SPECIAL INSTRUCTIONS:     Call the office at [phone]  if you have a fever greater than or equal to 101 oF or if your incision becomes red, tender, or has drainage of pus.      If follow up appointment was not given to you, call the Surgical Clinic at [phone] for follow up appointment with Dr. Solis in:  1-2 weeks.            [facility]  [phone]    Do not drive, work around machines or use equipment.  Do not drink any alcoholic beverages.  Do not smoke while alone.  Avoid making important decisions.  Plan to spend a quiet, relaxed evening @ home.  Resume normal activities as you begin to feel better.  Eat lightly for your first meal, then gradually increase your diet to what is normal for you.  In case of nausea, avoid food and drink only clear liquids.  Resume food as nausea ceases.  Notify your surgeon if you experience fever, chills, large amount of bleeding, difficulty breathing, persistent nausea and vomiting or any other disturbing problem.  Call for a follow-up appointment with your surgeon.

## 2024-05-10 NOTE — INTERVAL H&P NOTE
Update History & Physical    The patient's History and Physical of April 29, The plan was reviewed with the patient and I examined the patient. There was no change. The surgical site was confirmed by the patient and me.     Plan: The risks, benefits, expected outcome, and alternative to the recommended procedure have been discussed with the patient. Patient understands and wants to proceed with the procedure.     Electronically signed by Jason Christie MD on 5/10/2024 at 7:53 AM

## 2024-05-10 NOTE — ANESTHESIA POSTPROCEDURE EVALUATION
Department of Anesthesiology  Postprocedure Note    Patient: Avril King  MRN: [id number]  YOB: 1950  Date of evaluation: 5/10/2024    Procedure Summary       Date: 05/10/24 Room / Location: [address]    Anesthesia Start: 0947 Anesthesia Stop: 1031    Procedure: LEFT BACK LIPOMA EXCISION (Left: Back) Diagnosis:       Lipoma of back      (Lipoma of back [D17.1])    Surgeons: Cinthia Solis MD Responsible Provider: Teddy Emanuel MD    Anesthesia Type: MAC, general ASA Status: 3            Anesthesia Type: No value filed.    Karla Phase I: Karla Score: 10    Karla Phase II:      Anesthesia Post Evaluation    Patient location during evaluation: bedside  Patient participation: complete - patient participated  Level of consciousness: awake  Pain score: 0  Airway patency: patent  Nausea & Vomiting: no nausea and no vomiting  Cardiovascular status: blood pressure returned to baseline and hemodynamically stable  Respiratory status: acceptable and room air  Hydration status: euvolemic  Pain management: adequate    No notable events documented.

## 2024-05-10 NOTE — OP NOTE
Avril King  1950  5/10/24        Pre-operative Diagnosis:  Recurrent left lower back lipoma    Post-operative Diagnosis:  Same    Procedure:   1. Excision of lipoma from left lower back measuring 12 cm subfascial     2. Two layer closure 10 cm incision    Surgeon: SHARDA GONZALEZ MD    Anesthesia: MAC /lidocaine    ASA Class: 3    Findings: same    Estimated Blood Loss:  Minimal                  Specimens: lipoma             Complications:  None; patient tolerated the procedure well.           Disposition: PACU - hemodynamically stable.           Condition: stable      The patient was seen again in the Holding Room. The risks, benefits, complications, treatment options, and expected outcomes were discussed with the patient. There was concurrence with the proposed plan, and informed consent was obtained.  The site of surgery was properly noted/marked. The patient was taken to the Operating Room, identified as Avril King, and the procedure verified. A Time Out was held and the above information confirmed.      The patient was brought to the operating room and positioned supine.  After induction of anesthesia, the lower back was prepped and draped in standard sterile fashion.  1% lidocaine was infiltrated and an elliptical incision was made to remove the recurrent lipoma with surrounding normal tissue.  The specimen measured 12cm leaving 10 cm wound defect. The lipoma was subfascial.  Hemostasis was acquired using Bovie cautery. The wound irrigated and closed in two layers using 3-0 Vicryl for the deep dermal tissue and then interuppted 4-0 nylon for the skin.      SHARDA GONZALEZ MD

## 2024-05-10 NOTE — PROGRESS NOTES
Pt returned to room from   OR  Report received from Chantel  Pt A&O, call light placed within reach, side rails up x's 2, family at bedside  1050 pt given beverage of choice  1110 Discharge instructions given to pt and family, all voiced understanding.  1115 assisted pt up in room to get dressed  1120 Dr Solis spoke with pt,   1125 Pt escorted to main entrance via wheelchair for discharge with daughter.

## 2024-05-13 ENCOUNTER — TELEPHONE (OUTPATIENT)
Dept: BARIATRICS/WEIGHT MGMT | Age: 74
End: 2024-05-13

## 2024-05-23 ENCOUNTER — OFFICE VISIT (OUTPATIENT)
Dept: SURGERY | Age: 74
End: 2024-05-23

## 2024-05-23 VITALS
BODY MASS INDEX: 40.34 KG/M2 | OXYGEN SATURATION: 96 % | DIASTOLIC BLOOD PRESSURE: 64 MMHG | HEIGHT: 59 IN | SYSTOLIC BLOOD PRESSURE: 122 MMHG | WEIGHT: 200.1 LBS | HEART RATE: 70 BPM

## 2024-05-23 DIAGNOSIS — D17.1 LIPOMA OF BACK: Primary | ICD-10-CM

## 2024-05-23 PROCEDURE — APPNB15 APP NON BILLABLE TIME 0-15 MINS: Performed by: PHYSICIAN ASSISTANT

## 2024-05-23 PROCEDURE — 99024 POSTOP FOLLOW-UP VISIT: CPT | Performed by: PHYSICIAN ASSISTANT

## 2024-05-23 NOTE — PROGRESS NOTES
Chief Complaint   Patient presents with    Post-Op Check     1st P/O Excision of Back Lipoma @ [facility] 5/10/24         SUBJECTIVE:  Patient presents today for her 1st post op visit following back lipoma excision.  Pt reports that pain is intermittent and mild.    Incisions: minbruising and no discharge. sutures intact.    Past Surgical History:   Procedure Laterality Date    AORTIC VALVE REPLACEMENT N/A 2021    AORTIC VALVE REPLACEMENT AND AORTIC ROOT ENLARGEMENT performed by Kiet Chen MD at [facility] OR    BREAST BIOPSY      BREAST SURGERY  2019    left breast bx    CARDIAC CATHETERIZATION  2021     SECTION   and ( with BPS)    x2    DILATION AND CURETTAGE OF UTERUS      HERNIA REPAIR      umbilical hernia\"think done when I was a teenager    OTHER SURGICAL HISTORY  12/10/13    Left AC lipoma excision    OTHER SURGICAL HISTORY  12/10/13    Left flank lipoma excision    SKIN LESION EXCISION Left 5/10/2024    LEFT BACK LIPOMA EXCISION performed by Cinthia Solis MD at [facility] OR     Past Medical History:   Diagnosis Date    Aortic valve stenosis     Arthritis     Bronchitis 2021    Diabetes mellitus (HCC)     dx age 50's    Heart murmur     Hx of drug abuse (HCC)     \"did cocaine back in     Hypertension     Irregular heart beat     \"they just say I skip a beat , I think from the heart murmur- not sure\" follow with Dr Vannesa Flynn    LVH (left ventricular hypertrophy)     hx per old chart    Mixed hyperlipidemia 2016    Wears dentures     full upper plate    Wears glasses     to read     Family History   Problem Relation Age of Onset    Diabetes Mother     Heart Disease Mother     High Blood Pressure Father     Diabetes Sister     High Blood Pressure Sister     Stroke Sister     Breast Cancer Sister 62    Cancer Brother         Lung    High Blood Pressure Brother     Stroke Brother     Diabetes Brother     Diabetes Maternal Grandmother     Heart Disease Maternal Grandfather     Cancer

## (undated) DEVICE — LINER,SEMI-RIGID,3000CC,50EA/CS: Brand: MEDLINE

## (undated) DEVICE — KIT PLT CONC DISP SGL W/ ACDA GPS II

## (undated) DEVICE — SUTURE ETHBND EXCEL SZ 2-0 L36IN NONABSORBABLE GRN L26MM SH X523H

## (undated) DEVICE — CHLORAPREP 26ML ORANGE

## (undated) DEVICE — SWAN-GANZ CCOMBO V THERMODILUTION CATHETER: Brand: SWAN-GANZ CCOMBO V

## (undated) DEVICE — GLOVE SURG SZ 7 L12IN FNGR THK94MIL TRNSLUC YEL LTX HYDRGEL

## (undated) DEVICE — COUNTER NDL 30 COUNT FOAM STRP SGL MAG

## (undated) DEVICE — CANNULA PRFSN 12.5IN 15FR CS ADLT RTRGD

## (undated) DEVICE — TOWEL,OR,DSP,ST,BLUE,STD,6/PK,12PK/CS: Brand: MEDLINE

## (undated) DEVICE — 3M™ STERI-DRAPE™ INSTRUMENT POUCH 1018: Brand: STERI-DRAPE™

## (undated) DEVICE — TUBING, SUCTION, 9/32" X 10', STRAIGHT: Brand: MEDLINE

## (undated) DEVICE — CANNULA PERF 20FR L3/8IN ELONG TAPR DIFFUSE TIP WIREWOUND

## (undated) DEVICE — Z INACTIVE USE 2863041 SPONGE GZ W4XL4IN 100% COT 16 PLY RADPQ HIGHLY ABSRB

## (undated) DEVICE — SPONGE GZ W4XL8IN COT WVN 12 PLY

## (undated) DEVICE — BLOOD TRANSFUSION FILTER: Brand: HAEMONETICS

## (undated) DEVICE — MARKER SURG SKIN UTIL REGULAR/FINE 2 TIP W/ RUL AND 9 LBL

## (undated) DEVICE — TUBING SUCT 9 11FR L475IN RIG SHFT MINI SUC TIP DLP

## (undated) DEVICE — Z INACTIVE NO ACTIVE SUPPLIER APPLICATOR MEDICATED 26 CC TINT HI-LITE ORNG STRL CHLORAPREP

## (undated) DEVICE — SET ADMIN PRIMING 67ML L105IN NVENT 180UM FLTR 3 RLER CLMP

## (undated) DEVICE — CONTAINER,SPECIMEN,OR STERILE,4OZ: Brand: MEDLINE

## (undated) DEVICE — SUTURE SURGLON SZ 1 L18IN NONABSORBABLE BLK GS 21 L37MM 1 2 8886196272

## (undated) DEVICE — SUTURE S STL SZ 5 L18IN NONABSORBABLE SIL V-40 L48MM 1/2 M650G

## (undated) DEVICE — BLANKET THER AD W24XL60IN FAB COVERING SUP SFT ULT THN LTWT

## (undated) DEVICE — SOLUTION IV IRRIG WATER 1000ML POUR BRL 2F7114

## (undated) DEVICE — SUTURE NRLN SZ 1 L18IN NONABSORBABLE BLK L36MM CT-1 1/2 CIR C520D

## (undated) DEVICE — COR-KNOT MINI® COMBO KITBASE PACKAGE TYPE - KITEACH STERILE PACKAGE KIT CONTAINS (2) SINGLE PATIENT USE COR-KNOT MINI® DEVICES AND (12) COR-KNOT® QUICK LOADS®.: Brand: COR-KNOT MINI®

## (undated) DEVICE — GOWN,SIRUS,FABRNF,RAGLAN,L,ST,30/CS: Brand: MEDLINE

## (undated) DEVICE — THORACIC CATHETER, STRAIGHT, SILICONE, WITH CLOTSTOP®: Brand: AXIOM® ATRAUM™ WITH CLOTSTOP®

## (undated) DEVICE — SUTURE ETHIB EXCL X BR GRN V-7 DA 2-0 30 PX977 PX977H

## (undated) DEVICE — GLOVE ORANGE PI 7   MSG9070

## (undated) DEVICE — WAX SURG 2.5GM HEMSTAT BNE BEESWAX PARAFFIN ISO PALMITATE

## (undated) DEVICE — PERCUTANEOUS INSERTION KIT-VENOUS: Brand: PERCUTANEOUS INSERTION KIT-VENOUS

## (undated) DEVICE — TOTAL TRAY, 16FR 10ML SIL FOLEY, URN: Brand: MEDLINE

## (undated) DEVICE — AGENT HEMSTAT 3GM OXIDIZED REGENERATED CELOS ABSRB FOR CONT (ORDER MULTIPLES OF 5EA)

## (undated) DEVICE — OPEN HEART PACK: Brand: MEDLINE INDUSTRIES, INC.

## (undated) DEVICE — DEVICE RESUS AD TB L40IN SELF INFL MASK TEXT BG DBL SWVL EL

## (undated) DEVICE — COR-KNOT® QUICK LOAD® 6-POUCH: Brand: COR-KNOT® QUICK LOAD®

## (undated) DEVICE — CATHETER URETH 18FR RED RUB INTMIT ALL PURP

## (undated) DEVICE — SUTURE PROL SZ 4-0 L36IN NONABSORBABLE BLU L26MM SH 1/2 CIR 8521H

## (undated) DEVICE — SUTURE SURGLON SZ 1 L30IN NONABSORBABLE BLK NO NDL NYL PRE 8886191971

## (undated) DEVICE — CANNULA PERF 20FR L25CM CONN 3 8IN ART HI FLOW NVENT CRV TIP

## (undated) DEVICE — SUMP INTCARD SUCT AD 20FR PERICARD MAYO STYL FLX VERSATILE

## (undated) DEVICE — ELECTRODE ES AD CRDLSS PT RET REM POLYHESIVE

## (undated) DEVICE — MPS® RETROGRADE EXTENSION PRESSURE LINE W/Y-SET: Brand: MPS

## (undated) DEVICE — PLEDGET VASC W3/16XL3/8IN THK1/16IN PTFE SFT

## (undated) DEVICE — LEAD PACE L475MM CHNL A OR V MYOCARDIAL STEROID ELUT SIL

## (undated) DEVICE — DRAIN,WOUND,ROUND,24FR,5/16",FULL-FLUTED: Brand: MEDLINE

## (undated) DEVICE — KIT INTRO 9FR L4IN POLYUR PERC SHTH RADPQ W INTEGR HEMSTAS

## (undated) DEVICE — CANNULA PERF 17FR L10IN SIL COR ART OSTIAL SFT BLB SHP TIP

## (undated) DEVICE — ADHESIVE SKIN CLSR 0.7ML TOP DERMBND ADV

## (undated) DEVICE — GLOVE SURG SZ 6 L12IN FNGR THK75MIL WHT LTX POLYMER BEAD

## (undated) DEVICE — DRAPE,UTILITY,XL,4/PK,STERILE: Brand: MEDLINE

## (undated) DEVICE — ADAPTER IV TBNG CLR POLYCARB DBL M LUERLOCK

## (undated) DEVICE — SET EXTN 4ML L32IN 4 W STPCOCK SPIN M LUERLOCK STD BOR

## (undated) DEVICE — KIT CVC AD 7FR L20CM POLYUR BLU FLEXTIP ANTIMIC MULTILUMEN

## (undated) DEVICE — SUTURE S STL SZ 5 L18IN NONABSORBABLE SIL L48MM CCS 1/4 CIR M657G

## (undated) DEVICE — SET ADMIN L105IN 10GTT 3 NDL FREE CK VLV 2 PC M LUERLOCK

## (undated) DEVICE — SET ADMIN 25ML L117IN PMP MOD CK VLV RLER CLMP 2 SMRTSITE

## (undated) DEVICE — SUTURE MCRYL SZ 3-0 L27IN ABSRB UD L24MM PS-1 3/8 CIR PRIM Y936H

## (undated) DEVICE — SUTURE VICRYL 3-0  CTD SH-1 J219H

## (undated) DEVICE — Device

## (undated) DEVICE — GLOVE SURG SZ 6 THK91MIL LTX FREE SYN POLYISOPRENE ANTI

## (undated) DEVICE — PACK,BASIC,IX: Brand: MEDLINE

## (undated) DEVICE — DRAPE SHEET ULTRAGARD: Brand: MEDLINE

## (undated) DEVICE — CANNULA VEN 32 40FR L15IN CONN SITE 1 2IN OVL BODY TWO STG

## (undated) DEVICE — BLADE SAW W10XL54MM FOR PRI REPEAT STRNOTMY

## (undated) DEVICE — SHEET, T, LAPAROTOMY, STERILE: Brand: MEDLINE

## (undated) DEVICE — BAG AUTOTRNS 600ML BLD SGL CHMBR 3 PRT PLAS DEHP PVC

## (undated) DEVICE — SOLUTION IV 1000ML 0.9% SOD CHL FOR IRRIG PLAS CONT

## (undated) DEVICE — PRESSURE TUBING: Brand: TRUWAVE

## (undated) DEVICE — INTENDED FOR TISSUE SEPARATION, AND OTHER PROCEDURES THAT REQUIRE A SHARP SURGICAL BLADE TO PUNCTURE OR CUT.: Brand: BARD-PARKER ® STAINLESS STEEL BLADES

## (undated) DEVICE — CANNULA PERF L125IN OD13FR AUTO INFL CUF DEFL TIP RG

## (undated) DEVICE — CANNULA SUCT OD20FR SIL L HRT VENT 17L

## (undated) DEVICE — CANNULA PERF L15IN DIA29FR VEN 3 STG THN WALL DSGN W  VENT

## (undated) DEVICE — SUTURE VCRL SZ 2 L27IN ABSRB UD L65MM TP-1 1/2 CIR J849G

## (undated) DEVICE — CLIP SM RED INTERN HMOCLP TITAN LIGATING

## (undated) DEVICE — DECANTER FLD 9IN ST BG FOR ASEP TRNSF OF FLD

## (undated) DEVICE — GOWN,SIRUS,POLYRNF,BRTHSLV,XLN/XL,20/CS: Brand: MEDLINE

## (undated) DEVICE — DRAIN SURG SGL COLL PT TB FOR ATS BG OASIS

## (undated) DEVICE — CANNULA PERF 20FR L10IN SIL COR ART OSTIAL SFT BLB SHP TIP

## (undated) DEVICE — YANKAUER,FLEXIBLE HANDLE,REGLR CAPACITY: Brand: MEDLINE INDUSTRIES, INC.

## (undated) DEVICE — GLOVE SURG SZ 65 CRM LTX FREE POLYISOPRENE POLYMER BEAD ANTI

## (undated) DEVICE — SUTURE VCRL 2-0 L36IN ABSRB UD CTX L48MM 1/2 CIR TAPERPOINT J979H

## (undated) DEVICE — SENSOR PLSE OXMTR AD CBL L3FT ADH TRANSMISSIVE

## (undated) DEVICE — MPS® DELIVERY SET W/ARREST AGENT AND ADDITIVE CASSETTES, HEAT EXCHANGER & 10 FT. DELIVERY TUBING: Brand: MPS

## (undated) DEVICE — SUTURE NONABSORBABLE MONOFILAMENT 6-0 C-1 4X18 IN PROLENE M8718

## (undated) DEVICE — FOGARTY - HYDRAGRIP SURGICAL - CLAMP INSERTS: Brand: FOGARTY SOFTJAW

## (undated) DEVICE — CANNULA PERF L5.5IN DIA9FR AORT ROOT AG STD TIP W/ VENT LN

## (undated) DEVICE — SUTURE PROL SZ 6-0 L18IN NONABSORBABLE BLU L13MM C-1 3/8 8718H

## (undated) DEVICE — BLADE SAW W10XL35MM THK0.6MM STRNM FOR PRI AND REPEAT